# Patient Record
Sex: MALE | Race: WHITE | NOT HISPANIC OR LATINO | Employment: OTHER | URBAN - METROPOLITAN AREA
[De-identification: names, ages, dates, MRNs, and addresses within clinical notes are randomized per-mention and may not be internally consistent; named-entity substitution may affect disease eponyms.]

---

## 2017-04-10 DIAGNOSIS — F41.1 GAD (GENERALIZED ANXIETY DISORDER): Primary | ICD-10-CM

## 2017-04-10 RX ORDER — ALPRAZOLAM 0.25 MG/1
TABLET ORAL
Qty: 60 TABLET | Refills: 0 | Status: SHIPPED | OUTPATIENT
Start: 2017-04-10 | End: 2017-06-20 | Stop reason: SDUPTHER

## 2017-05-10 DIAGNOSIS — I10 ESSENTIAL HYPERTENSION: ICD-10-CM

## 2017-05-10 RX ORDER — OLMESARTAN MEDOXOMIL 20 MG/1
TABLET ORAL
Qty: 30 TABLET | Refills: 11 | Status: SHIPPED | OUTPATIENT
Start: 2017-05-10 | End: 2017-06-20 | Stop reason: SDUPTHER

## 2017-05-22 ENCOUNTER — TELEPHONE (OUTPATIENT)
Dept: FAMILY MEDICINE | Facility: CLINIC | Age: 62
End: 2017-05-22

## 2017-06-05 ENCOUNTER — HOSPITAL ENCOUNTER (INPATIENT)
Facility: HOSPITAL | Age: 62
LOS: 3 days | Discharge: HOME | DRG: 460 | End: 2017-06-09
Attending: EMERGENCY MEDICINE | Admitting: ORTHOPAEDIC SURGERY
Payer: COMMERCIAL

## 2017-06-05 ENCOUNTER — HOSPITAL ENCOUNTER (OUTPATIENT)
Dept: RADIOLOGY | Facility: CLINIC | Age: 62
Discharge: HOME | DRG: 460 | End: 2017-06-05
Attending: FAMILY MEDICINE
Payer: COMMERCIAL

## 2017-06-05 ENCOUNTER — OFFICE VISIT (OUTPATIENT)
Dept: FAMILY MEDICINE | Facility: CLINIC | Age: 62
DRG: 460 | End: 2017-06-05
Payer: COMMERCIAL

## 2017-06-05 ENCOUNTER — HOSPITAL ENCOUNTER (EMERGENCY)
Facility: HOSPITAL | Age: 62
Discharge: ANOTHER HOSPITAL | End: 2017-06-05
Attending: EMERGENCY MEDICINE
Payer: COMMERCIAL

## 2017-06-05 VITALS
HEART RATE: 84 BPM | HEIGHT: 70 IN | DIASTOLIC BLOOD PRESSURE: 69 MMHG | SYSTOLIC BLOOD PRESSURE: 140 MMHG | BODY MASS INDEX: 27.63 KG/M2 | RESPIRATION RATE: 16 BRPM | OXYGEN SATURATION: 95 % | WEIGHT: 193 LBS | TEMPERATURE: 98 F

## 2017-06-05 VITALS
SYSTOLIC BLOOD PRESSURE: 144 MMHG | HEIGHT: 70 IN | DIASTOLIC BLOOD PRESSURE: 87 MMHG | BODY MASS INDEX: 29.21 KG/M2 | HEART RATE: 96 BPM | WEIGHT: 204.06 LBS

## 2017-06-05 DIAGNOSIS — S32.001A BURST FRACTURE OF LUMBAR VERTEBRA, CLOSED, INITIAL ENCOUNTER: ICD-10-CM

## 2017-06-05 DIAGNOSIS — I10 ESSENTIAL HYPERTENSION: ICD-10-CM

## 2017-06-05 DIAGNOSIS — S32.001A BURST FRACTURE OF LUMBAR VERTEBRA: ICD-10-CM

## 2017-06-05 DIAGNOSIS — Z01.818 PREOPERATIVE EVALUATION TO RULE OUT SURGICAL CONTRAINDICATION: ICD-10-CM
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
## 2017-06-05 DIAGNOSIS — M54.42 ACUTE LEFT-SIDED LOW BACK PAIN WITH LEFT-SIDED SCIATICA: ICD-10-CM

## 2017-06-05 DIAGNOSIS — M54.42 ACUTE LEFT-SIDED LOW BACK PAIN WITH LEFT-SIDED SCIATICA: Primary | ICD-10-CM
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
## 2017-06-05 DIAGNOSIS — S32.040A CLOSED COMPRESSION FRACTURE OF L4 LUMBAR VERTEBRA, INITIAL ENCOUNTER: Primary | ICD-10-CM

## 2017-06-05 DIAGNOSIS — S32.001D BURST FRACTURE OF LUMBAR VERTEBRA, WITH ROUTINE HEALING, SUBSEQUENT ENCOUNTER: Primary | ICD-10-CM

## 2017-06-05 LAB
BASOPHILS # BLD AUTO: 0.01 K/UL
BASOPHILS NFR BLD: 0.2 %
DIFFERENTIAL METHOD: ABNORMAL
EOSINOPHIL # BLD AUTO: 0 K/UL
EOSINOPHIL NFR BLD: 0.5 %
ERYTHROCYTE [DISTWIDTH] IN BLOOD BY AUTOMATED COUNT: 13.4 %
HCT VFR BLD AUTO: 39.5 %
HGB BLD-MCNC: 13.4 G/DL
INR PPP: 1
LYMPHOCYTES # BLD AUTO: 0.9 K/UL
LYMPHOCYTES NFR BLD: 14.5 %
MCH RBC QN AUTO: 33.2 PG
MCHC RBC AUTO-ENTMCNC: 33.9 %
MCV RBC AUTO: 98 FL
MONOCYTES # BLD AUTO: 0.8 K/UL
MONOCYTES NFR BLD: 13 %
NEUTROPHILS # BLD AUTO: 4.3 K/UL
NEUTROPHILS NFR BLD: 71.8 %
PLATELET # BLD AUTO: 218 K/UL
PMV BLD AUTO: 9.5 FL
PROTHROMBIN TIME: 10.7 SEC
RBC # BLD AUTO: 4.04 M/UL
WBC # BLD AUTO: 5.98 K/UL

## 2017-06-05 PROCEDURE — 85025 COMPLETE CBC W/AUTO DIFF WBC: CPT

## 2017-06-05 PROCEDURE — 99285 EMERGENCY DEPT VISIT HI MDM: CPT | Mod: 25

## 2017-06-05 PROCEDURE — 96374 THER/PROPH/DIAG INJ IV PUSH: CPT

## 2017-06-05 PROCEDURE — 99214 OFFICE O/P EST MOD 30 MIN: CPT | Mod: S$GLB,,, | Performed by: FAMILY MEDICINE

## 2017-06-05 PROCEDURE — 63600175 PHARM REV CODE 636 W HCPCS: Performed by: EMERGENCY MEDICINE

## 2017-06-05 PROCEDURE — 99285 EMERGENCY DEPT VISIT HI MDM: CPT | Mod: 25,27

## 2017-06-05 PROCEDURE — 11000001 HC ACUTE MED/SURG PRIVATE ROOM

## 2017-06-05 PROCEDURE — 86850 RBC ANTIBODY SCREEN: CPT

## 2017-06-05 PROCEDURE — 99999 PR PBB SHADOW E&M-EST. PATIENT-LVL III: CPT | Mod: PBBFAC,,, | Performed by: FAMILY MEDICINE

## 2017-06-05 PROCEDURE — 85610 PROTHROMBIN TIME: CPT

## 2017-06-05 PROCEDURE — 86920 COMPATIBILITY TEST SPIN: CPT

## 2017-06-05 PROCEDURE — 93010 ELECTROCARDIOGRAM REPORT: CPT | Mod: S$GLB,,, | Performed by: INTERNAL MEDICINE

## 2017-06-05 PROCEDURE — 96375 TX/PRO/DX INJ NEW DRUG ADDON: CPT

## 2017-06-05 PROCEDURE — 93005 ELECTROCARDIOGRAM TRACING: CPT

## 2017-06-05 PROCEDURE — 86900 BLOOD TYPING SEROLOGIC ABO: CPT

## 2017-06-05 PROCEDURE — 72100 X-RAY EXAM L-S SPINE 2/3 VWS: CPT | Mod: 26,,, | Performed by: RADIOLOGY

## 2017-06-05 PROCEDURE — 96376 TX/PRO/DX INJ SAME DRUG ADON: CPT

## 2017-06-05 PROCEDURE — 99285 EMERGENCY DEPT VISIT HI MDM: CPT | Mod: 57,,, | Performed by: ORTHOPAEDIC SURGERY

## 2017-06-05 RX ORDER — HYDROCODONE BITARTRATE AND ACETAMINOPHEN 10; 325 MG/1; MG/1
1 TABLET ORAL EVERY 6 HOURS PRN
Qty: 20 TABLET | Refills: 0 | Status: ON HOLD | OUTPATIENT
Start: 2017-06-05 | End: 2017-06-09 | Stop reason: HOSPADM

## 2017-06-05 RX ORDER — CYCLOBENZAPRINE HCL 5 MG
5 TABLET ORAL 3 TIMES DAILY PRN
Qty: 30 TABLET | Refills: 0 | Status: SHIPPED | OUTPATIENT
Start: 2017-06-05 | End: 2017-06-15

## 2017-06-05 RX ORDER — HYDROMORPHONE HYDROCHLORIDE 1 MG/ML
1 INJECTION, SOLUTION INTRAMUSCULAR; INTRAVENOUS; SUBCUTANEOUS
Status: COMPLETED | OUTPATIENT
Start: 2017-06-05 | End: 2017-06-05

## 2017-06-05 RX ORDER — ONDANSETRON 2 MG/ML
INJECTION INTRAMUSCULAR; INTRAVENOUS
Status: DISCONTINUED
Start: 2017-06-05 | End: 2017-06-05 | Stop reason: HOSPADM

## 2017-06-05 RX ORDER — HYDROMORPHONE HYDROCHLORIDE 2 MG/ML
2 INJECTION, SOLUTION INTRAMUSCULAR; INTRAVENOUS; SUBCUTANEOUS
Status: COMPLETED | OUTPATIENT
Start: 2017-06-05 | End: 2017-06-05

## 2017-06-05 RX ORDER — IBUPROFEN 800 MG/1
800 TABLET ORAL 3 TIMES DAILY
Qty: 30 TABLET | Refills: 3 | Status: ON HOLD | OUTPATIENT
Start: 2017-06-05 | End: 2017-06-09 | Stop reason: HOSPADM

## 2017-06-05 RX ORDER — HYDROMORPHONE HYDROCHLORIDE 1 MG/ML
INJECTION, SOLUTION INTRAMUSCULAR; INTRAVENOUS; SUBCUTANEOUS
Status: DISCONTINUED
Start: 2017-06-05 | End: 2017-06-05 | Stop reason: HOSPADM

## 2017-06-05 RX ORDER — ONDANSETRON 2 MG/ML
4 INJECTION INTRAMUSCULAR; INTRAVENOUS
Status: COMPLETED | OUTPATIENT
Start: 2017-06-05 | End: 2017-06-05

## 2017-06-05 RX ADMIN — HYDROMORPHONE HYDROCHLORIDE 1 MG: 1 INJECTION, SOLUTION INTRAMUSCULAR; INTRAVENOUS; SUBCUTANEOUS at 06:06

## 2017-06-05 RX ADMIN — ONDANSETRON 4 MG: 2 INJECTION INTRAMUSCULAR; INTRAVENOUS at 06:06

## 2017-06-05 RX ADMIN — HYDROMORPHONE HYDROCHLORIDE 1 MG: 1 INJECTION, SOLUTION INTRAMUSCULAR; INTRAVENOUS; SUBCUTANEOUS at 09:06

## 2017-06-05 RX ADMIN — HYDROMORPHONE HYDROCHLORIDE 2 MG: 2 INJECTION, SOLUTION INTRAMUSCULAR; INTRAVENOUS; SUBCUTANEOUS at 07:06

## 2017-06-05 NOTE — ED PROVIDER NOTES
Encounter Date: 6/5/2017    SCRIBE #1 NOTE: I, Linda Vargas, am scribing for, and in the presence of, Dr. Garza.       History     Chief Complaint   Patient presents with    Back Injury     fell approx 10 ft from scaffolding yesterday.      Review of patient's allergies indicates:   Allergen Reactions    Naproxen      06/05/2017  5:55 PM     Chief Complaint:       The patient is a 61 y.o. male who has a PMHx of dermatitis, atopic; GERD; and HTN is presenting c/o sudden onset of fall yesterday. Pt fell 10 ft off a scaffolding and landed on his right side and right arm. He c/o left side back pain radiating to left buttock and left arm. He states that his pain worsens when he walks. He denies numbness, neck pain,  or right arm pain. Pt was seen at an urgent care, where he received a X-ray and was informed him that he had a 50% compression of L4 and instructed him to go to ED for CAT scan. Pt has a PSHx that includes hernia repair and wisdom tooth extraction.          The history is provided by the patient.     Past Medical History:   Diagnosis Date    Dermatitis, atopic     GERD (gastroesophageal reflux disease)     Hypertension      Past Surgical History:   Procedure Laterality Date    HERNIA REPAIR      WISDOM TOOTH EXTRACTION       Family History   Problem Relation Age of Onset    Early death Brother     Cancer Maternal Grandmother      abdominal    Cancer Maternal Grandfather 60     lung    Liver disease Paternal Grandfather     Arthritis Mother      Social History   Substance Use Topics    Smoking status: Former Smoker    Smokeless tobacco: Not on file    Alcohol use 1.2 oz/week     2 Glasses of wine per week     Review of Systems   Constitutional: Negative for fever.   HENT: Negative for sore throat.    Respiratory: Negative for shortness of breath.    Cardiovascular: Negative for chest pain.   Gastrointestinal: Negative for nausea.   Genitourinary: Negative for dysuria.   Musculoskeletal: Positive  for back pain (left side radiating to left buttock) and myalgias (left leg). Negative for neck pain.        No right arm pain.   Skin: Negative for rash.   Neurological: Negative for weakness and numbness.   Hematological: Does not bruise/bleed easily.       Physical Exam     Initial Vitals [06/05/17 1641]   BP Pulse Resp Temp SpO2   (!) 166/84 89 16 98.2 °F (36.8 °C) 97 %     Physical Exam    Nursing note and vitals reviewed.  Constitutional: He appears well-developed and well-nourished.  Non-toxic appearance.   HENT:   Head: Normocephalic and atraumatic.   Eyes: EOM are normal. Pupils are equal, round, and reactive to light.   Neck: Normal range of motion. Neck supple.   Cardiovascular: Normal rate, regular rhythm, normal heart sounds and intact distal pulses.   Abdominal: Normal appearance.   Musculoskeletal: Normal range of motion.   Left lower paraspinal tenderness.   + Straight leg raise. Great toe raise bilaterally. No step-off deformities.    Neurological: He is alert and oriented to person, place, and time. He has normal strength. GCS eye subscore is 4.   Reflex Scores:       Patellar reflexes are 2+ on the right side and 2+ on the left side.       Achilles reflexes are 2+ on the right side and 2+ on the left side.  5/5 strength and normal sensation throughout.   Skin: Skin is warm and dry.   Psychiatric: He has a normal mood and affect. His speech is normal and behavior is normal. He is not actively hallucinating.         ED Course   Procedures  Labs Reviewed - No data to display       X-Rays:   Independently Interpreted Readings:   Other Readings:  CT lumbar spine: Fragmented burst fracture of L4 with central canal stenosis and bilateral neuroforaminal stenosis.    Medical Decision Making:   History:   Old Medical Records: I decided to obtain old medical records.  Initial Assessment:   Dishes 61-year-old man fell off of a scaffold approximately 8-10 feet yesterday.  He presented complaining of severe low  back pain with radicular symptoms to the left.  X-rays obtained from primary care showed L4 burst fracture.  CT lumbar spine without contrast shows:         1.  Acute retropulsed L4 burst fracture with involvement of all 3 spinal columns which results in severe central canal stenosis, moderate left neuroforaminal stenosis, and moderate right neuroforaminal stenosis.  Please note that there is a coronally oriented fracture component which involves the left pedicle at L4.      2.  Obliquely oriented, mildly displaced fracture at the base of the L4 spinous process which extends inferiorly into the left lamina.    3.  Grade I anterolisthesis of L5 on S1 as a result of bilateral L5 pars defects.      Patient is neurovascularly intact.  He is placed on spinal precautions.  Pain is being addressed with Dilaudid.  Discussed with spine surgery at [facility] who suggests patient transferred to the emergency department on [facility].  Discussed with Dr. Blum who accepts patient and will order MRI and spine specialist consultation.  I have verbalized findings and plan with patient who voices understanding and is in agreement.  He is clinically stable for transfer.            Scribe Attestation:   Scribe #1: I performed the above scribed service and the documentation accurately describes the services I performed. I attest to the accuracy of the note.    Attending Attestation:           Physician Attestation for Scribe:  Physician Attestation Statement for Scribe #1: I, Dr. Garza, reviewed documentation, as scribed by Linda Vargas in my presence, and it is both accurate and complete.                 ED Course     Clinical Impression:   The encounter diagnosis was Closed compression fracture of L4 lumbar vertebra, initial encounter.          Handy Garza MD  06/05/17 1943

## 2017-06-05 NOTE — ED NOTES
Awake/alert, post fall approx. 15ft yesterday pm, states advised by private MD fracture of L4, verbalizes pain  To lumbar region only, spouse is present, side rails up

## 2017-06-05 NOTE — PROGRESS NOTES
Subjective:       Patient ID: Humberto Cantor is a 61 y.o. male.    Chief Complaint: Fall (cutting tree limb yesterday)    HPI     Fall  Pt reports that he had a fall from scaffold.   He fell due to a tree limb hittin his ladder.   Pt fell on his right arm and very minimal hit to his head.   He did not lose consciousness.   He has had Mild shoulder pain.   No HA.   He has pain to his lower back and left leg, however.   Pt has had improvement in leg pain with heating pad.   Back pain worse with movement     Review of Systems   Constitutional: Negative for chills and fever.   Respiratory: Negative for chest tightness and shortness of breath.    Cardiovascular: Negative for chest pain and leg swelling.   Gastrointestinal: Negative for abdominal pain, constipation, diarrhea and vomiting.   Genitourinary: Negative for decreased urine volume, dysuria and hematuria.   Musculoskeletal: Negative for arthralgias.   Neurological: Negative for weakness and light-headedness.       Objective:      Physical Exam   Constitutional: He appears well-developed and well-nourished. No distress.   HENT:   Head: Normocephalic and atraumatic.   Mouth/Throat: Oropharynx is clear and moist. No oropharyngeal exudate.   Eyes: EOM are normal. Pupils are equal, round, and reactive to light.   Neck: Normal range of motion. Neck supple. No thyromegaly present.   Cardiovascular: Normal rate, regular rhythm, normal heart sounds and intact distal pulses.    Pulmonary/Chest: Effort normal and breath sounds normal. No respiratory distress. He has no wheezes.   Abdominal: Soft. Bowel sounds are normal. He exhibits no distension and no mass. There is no tenderness.   Musculoskeletal: He exhibits no edema.   TTP to lumbar spine near L5-S1. Negative straight leg raise to BLE.   Lymphadenopathy:     He has no cervical adenopathy.   Neurological: He is alert.   Skin: Skin is warm. No rash noted. No erythema.   Psychiatric: He has a normal mood and affect. His  behavior is normal.   Vitals reviewed.      Assessment:       1. Acute left-sided low back pain with left-sided sciatica         Plan:       1. Acute left-sided low back pain with left-sided sciatica  - X-Ray Lumbar Spine AP And Lateral; Future  - hydrocodone-acetaminophen 10-325mg (NORCO)  mg Tab; Take 1 tablet by mouth every 6 (six) hours as needed for Pain.  Dispense: 20 tablet; Refill: 0  - ibuprofen (ADVIL,MOTRIN) 800 MG tablet; Take 1 tablet (800 mg total) by mouth 3 (three) times daily.  Dispense: 30 tablet; Refill: 3  - cyclobenzaprine (FLEXERIL) 5 MG tablet; Take 1 tablet (5 mg total) by mouth 3 (three) times daily as needed for Muscle spasms.  Dispense: 30 tablet; Refill: 0    Portions of this note were created using Dragon voice recognition software. There may be voice recognition errors found in the text, and attempts were made to correct these errors prior to signature    Esau Najera MD    Family Medicine  6/5/2017

## 2017-06-06 ENCOUNTER — ANESTHESIA (OUTPATIENT)
Dept: SURGERY | Facility: HOSPITAL | Age: 62
DRG: 460 | End: 2017-06-06
Payer: COMMERCIAL

## 2017-06-06 ENCOUNTER — ANESTHESIA EVENT (OUTPATIENT)
Dept: SURGERY | Facility: HOSPITAL | Age: 62
DRG: 460 | End: 2017-06-06
Payer: COMMERCIAL

## 2017-06-06 PROBLEM — S32.001A BURST FRACTURE OF LUMBAR VERTEBRA: Status: ACTIVE | Noted: 2017-06-06

## 2017-06-06 PROBLEM — Z01.818 PREOPERATIVE EVALUATION TO RULE OUT SURGICAL CONTRAINDICATION: Status: ACTIVE | Noted: 2017-06-06

## 2017-06-06 LAB
ABO + RH BLD: NORMAL
ALBUMIN SERPL BCP-MCNC: 3.6 G/DL
ALP SERPL-CCNC: 53 U/L
ALT SERPL W/O P-5'-P-CCNC: 33 U/L
ANION GAP SERPL CALC-SCNC: 10 MMOL/L
AST SERPL-CCNC: 60 U/L
BILIRUB SERPL-MCNC: 0.9 MG/DL
BLD GP AB SCN CELLS X3 SERPL QL: NORMAL
BUN SERPL-MCNC: 20 MG/DL
CALCIUM SERPL-MCNC: 9 MG/DL
CHLORIDE SERPL-SCNC: 102 MMOL/L
CO2 SERPL-SCNC: 24 MMOL/L
CREAT SERPL-MCNC: 0.9 MG/DL
EST. GFR  (AFRICAN AMERICAN): >60 ML/MIN/1.73 M^2
EST. GFR  (NON AFRICAN AMERICAN): >60 ML/MIN/1.73 M^2
GLUCOSE SERPL-MCNC: 106 MG/DL
POTASSIUM SERPL-SCNC: 3.7 MMOL/L
PROT SERPL-MCNC: 6.7 G/DL
SODIUM SERPL-SCNC: 136 MMOL/L

## 2017-06-06 PROCEDURE — 22325 TREAT SPINE FRACTURE: CPT | Mod: 62,51,, | Performed by: NEUROLOGICAL SURGERY

## 2017-06-06 PROCEDURE — 20936 SP BONE AGRFT LOCAL ADD-ON: CPT | Mod: ,,, | Performed by: ORTHOPAEDIC SURGERY

## 2017-06-06 PROCEDURE — 25000003 PHARM REV CODE 250: Performed by: ORTHOPAEDIC SURGERY

## 2017-06-06 PROCEDURE — 27201423 OPTIME MED/SURG SUP & DEVICES STERILE SUPPLY: Performed by: ORTHOPAEDIC SURGERY

## 2017-06-06 PROCEDURE — 22614 ARTHRD PST TQ 1NTRSPC EA ADD: CPT | Mod: 62,,, | Performed by: NEUROLOGICAL SURGERY

## 2017-06-06 PROCEDURE — 37000008 HC ANESTHESIA 1ST 15 MINUTES: Performed by: ORTHOPAEDIC SURGERY

## 2017-06-06 PROCEDURE — 63005 REMOVE SPINE LAMINA 1/2 LMBR: CPT | Mod: 62,51,, | Performed by: NEUROLOGICAL SURGERY

## 2017-06-06 PROCEDURE — 22325 TREAT SPINE FRACTURE: CPT | Mod: 62,51,, | Performed by: ORTHOPAEDIC SURGERY

## 2017-06-06 PROCEDURE — 36000711: Performed by: ORTHOPAEDIC SURGERY

## 2017-06-06 PROCEDURE — 71000033 HC RECOVERY, INTIAL HOUR: Performed by: ORTHOPAEDIC SURGERY

## 2017-06-06 PROCEDURE — 63600175 PHARM REV CODE 636 W HCPCS: Performed by: STUDENT IN AN ORGANIZED HEALTH CARE EDUCATION/TRAINING PROGRAM

## 2017-06-06 PROCEDURE — 99222 1ST HOSP IP/OBS MODERATE 55: CPT | Mod: ,,, | Performed by: INTERNAL MEDICINE

## 2017-06-06 PROCEDURE — 22614 ARTHRD PST TQ 1NTRSPC EA ADD: CPT | Mod: 62,,, | Performed by: ORTHOPAEDIC SURGERY

## 2017-06-06 PROCEDURE — 63005 REMOVE SPINE LAMINA 1/2 LMBR: CPT | Mod: 62,51,, | Performed by: ORTHOPAEDIC SURGERY

## 2017-06-06 PROCEDURE — D9220A PRA ANESTHESIA: Mod: CRNA,,, | Performed by: NURSE ANESTHETIST, CERTIFIED REGISTERED

## 2017-06-06 PROCEDURE — 25000003 PHARM REV CODE 250: Performed by: NURSE ANESTHETIST, CERTIFIED REGISTERED

## 2017-06-06 PROCEDURE — 94760 N-INVAS EAR/PLS OXIMETRY 1: CPT

## 2017-06-06 PROCEDURE — 22842 INSERT SPINE FIXATION DEVICE: CPT | Mod: ,,, | Performed by: ORTHOPAEDIC SURGERY

## 2017-06-06 PROCEDURE — 36000710: Performed by: ORTHOPAEDIC SURGERY

## 2017-06-06 PROCEDURE — 63600175 PHARM REV CODE 636 W HCPCS: Performed by: NURSE ANESTHETIST, CERTIFIED REGISTERED

## 2017-06-06 PROCEDURE — 37000009 HC ANESTHESIA EA ADD 15 MINS: Performed by: ORTHOPAEDIC SURGERY

## 2017-06-06 PROCEDURE — 11000001 HC ACUTE MED/SURG PRIVATE ROOM

## 2017-06-06 PROCEDURE — 25000003 PHARM REV CODE 250: Performed by: STUDENT IN AN ORGANIZED HEALTH CARE EDUCATION/TRAINING PROGRAM

## 2017-06-06 PROCEDURE — C1729 CATH, DRAINAGE: HCPCS | Performed by: ORTHOPAEDIC SURGERY

## 2017-06-06 PROCEDURE — D9220A PRA ANESTHESIA: Mod: ANES,,, | Performed by: ANESTHESIOLOGY

## 2017-06-06 PROCEDURE — 63600175 PHARM REV CODE 636 W HCPCS: Performed by: ORTHOPAEDIC SURGERY

## 2017-06-06 PROCEDURE — 22612 ARTHRD PST TQ 1NTRSPC LUMBAR: CPT | Mod: 62,,, | Performed by: ORTHOPAEDIC SURGERY

## 2017-06-06 PROCEDURE — C1713 ANCHOR/SCREW BN/BN,TIS/BN: HCPCS | Performed by: ORTHOPAEDIC SURGERY

## 2017-06-06 PROCEDURE — 99222 1ST HOSP IP/OBS MODERATE 55: CPT | Mod: 57,,, | Performed by: ORTHOPAEDIC SURGERY

## 2017-06-06 PROCEDURE — 96360 HYDRATION IV INFUSION INIT: CPT

## 2017-06-06 PROCEDURE — 71000039 HC RECOVERY, EACH ADD'L HOUR: Performed by: ORTHOPAEDIC SURGERY

## 2017-06-06 PROCEDURE — 22612 ARTHRD PST TQ 1NTRSPC LUMBAR: CPT | Mod: 62,,, | Performed by: NEUROLOGICAL SURGERY

## 2017-06-06 PROCEDURE — 80053 COMPREHEN METABOLIC PANEL: CPT

## 2017-06-06 DEVICE — ROD SPINAL EXPEDIUM 65MM: Type: IMPLANTABLE DEVICE | Site: BACK | Status: FUNCTIONAL

## 2017-06-06 DEVICE — SCREW INNER SINGLE SET TITANIU: Type: IMPLANTABLE DEVICE | Site: BACK | Status: FUNCTIONAL

## 2017-06-06 DEVICE — SCREW BONE SPINAL CORT 5X45MM: Type: IMPLANTABLE DEVICE | Site: BACK | Status: FUNCTIONAL

## 2017-06-06 DEVICE — IMPLANTABLE DEVICE: Type: IMPLANTABLE DEVICE | Site: BACK | Status: FUNCTIONAL

## 2017-06-06 RX ORDER — HYDROMORPHONE HYDROCHLORIDE 2 MG/ML
INJECTION, SOLUTION INTRAMUSCULAR; INTRAVENOUS; SUBCUTANEOUS
Status: DISCONTINUED | OUTPATIENT
Start: 2017-06-06 | End: 2017-06-06

## 2017-06-06 RX ORDER — PANTOPRAZOLE SODIUM 40 MG/1
40 TABLET, DELAYED RELEASE ORAL DAILY
Status: DISCONTINUED | OUTPATIENT
Start: 2017-06-06 | End: 2017-06-09 | Stop reason: HOSPADM

## 2017-06-06 RX ORDER — HYDROMORPHONE HCL IN 0.9% NACL 6 MG/30 ML
PATIENT CONTROLLED ANALGESIA SYRINGE INTRAVENOUS
Status: DISPENSED
Start: 2017-06-06 | End: 2017-06-07

## 2017-06-06 RX ORDER — AMOXICILLIN 250 MG
1 CAPSULE ORAL 2 TIMES DAILY
Status: DISCONTINUED | OUTPATIENT
Start: 2017-06-06 | End: 2017-06-09 | Stop reason: HOSPADM

## 2017-06-06 RX ORDER — VANCOMYCIN HYDROCHLORIDE 1 G/20ML
INJECTION, POWDER, LYOPHILIZED, FOR SOLUTION INTRAVENOUS
Status: DISCONTINUED | OUTPATIENT
Start: 2017-06-06 | End: 2017-06-06 | Stop reason: HOSPADM

## 2017-06-06 RX ORDER — DOCUSATE SODIUM 100 MG/1
100 CAPSULE, LIQUID FILLED ORAL 2 TIMES DAILY
Qty: 60 CAPSULE | Refills: 0 | Status: SHIPPED | OUTPATIENT
Start: 2017-06-06 | End: 2017-06-20

## 2017-06-06 RX ORDER — BUPIVACAINE HYDROCHLORIDE AND EPINEPHRINE 5; 5 MG/ML; UG/ML
INJECTION, SOLUTION EPIDURAL; INTRACAUDAL; PERINEURAL
Status: DISCONTINUED | OUTPATIENT
Start: 2017-06-06 | End: 2017-06-06 | Stop reason: HOSPADM

## 2017-06-06 RX ORDER — SUCCINYLCHOLINE CHLORIDE 20 MG/ML
INJECTION INTRAMUSCULAR; INTRAVENOUS
Status: DISCONTINUED | OUTPATIENT
Start: 2017-06-06 | End: 2017-06-06

## 2017-06-06 RX ORDER — ACETAMINOPHEN 325 MG/1
650 TABLET ORAL EVERY 8 HOURS PRN
Status: DISCONTINUED | OUTPATIENT
Start: 2017-06-06 | End: 2017-06-08

## 2017-06-06 RX ORDER — LIDOCAINE HYDROCHLORIDE AND EPINEPHRINE 10; 10 MG/ML; UG/ML
INJECTION, SOLUTION INFILTRATION; PERINEURAL
Status: DISCONTINUED | OUTPATIENT
Start: 2017-06-06 | End: 2017-06-06 | Stop reason: HOSPADM

## 2017-06-06 RX ORDER — NALOXONE HCL 0.4 MG/ML
0.02 VIAL (ML) INJECTION
Status: DISCONTINUED | OUTPATIENT
Start: 2017-06-06 | End: 2017-06-07

## 2017-06-06 RX ORDER — CEFAZOLIN SODIUM 2 G/50ML
2 SOLUTION INTRAVENOUS
Status: COMPLETED | OUTPATIENT
Start: 2017-06-07 | End: 2017-06-08

## 2017-06-06 RX ORDER — PROMETHAZINE HYDROCHLORIDE 25 MG/1
25 TABLET ORAL EVERY 6 HOURS PRN
Status: DISCONTINUED | OUTPATIENT
Start: 2017-06-06 | End: 2017-06-09 | Stop reason: HOSPADM

## 2017-06-06 RX ORDER — DEXAMETHASONE SODIUM PHOSPHATE 4 MG/ML
INJECTION, SOLUTION INTRA-ARTICULAR; INTRALESIONAL; INTRAMUSCULAR; INTRAVENOUS; SOFT TISSUE
Status: DISCONTINUED | OUTPATIENT
Start: 2017-06-06 | End: 2017-06-06

## 2017-06-06 RX ORDER — PHENYLEPHRINE HYDROCHLORIDE 10 MG/ML
INJECTION INTRAVENOUS
Status: DISCONTINUED | OUTPATIENT
Start: 2017-06-06 | End: 2017-06-06

## 2017-06-06 RX ORDER — OXYCODONE AND ACETAMINOPHEN 10; 325 MG/1; MG/1
1 TABLET ORAL EVERY 4 HOURS PRN
Qty: 90 TABLET | Refills: 0 | Status: SHIPPED | OUTPATIENT
Start: 2017-06-06 | End: 2017-06-27 | Stop reason: SDUPTHER

## 2017-06-06 RX ORDER — SODIUM CHLORIDE 0.9 % (FLUSH) 0.9 %
3 SYRINGE (ML) INJECTION
Status: DISCONTINUED | OUTPATIENT
Start: 2017-06-06 | End: 2017-06-06

## 2017-06-06 RX ORDER — METHOCARBAMOL 750 MG/1
750 TABLET, FILM COATED ORAL 4 TIMES DAILY
Qty: 60 TABLET | Refills: 0 | Status: SHIPPED | OUTPATIENT
Start: 2017-06-06 | End: 2017-06-16

## 2017-06-06 RX ORDER — PROPOFOL 10 MG/ML
VIAL (ML) INTRAVENOUS
Status: DISCONTINUED | OUTPATIENT
Start: 2017-06-06 | End: 2017-06-06

## 2017-06-06 RX ORDER — FENTANYL CITRATE 50 UG/ML
INJECTION, SOLUTION INTRAMUSCULAR; INTRAVENOUS
Status: DISCONTINUED | OUTPATIENT
Start: 2017-06-06 | End: 2017-06-06

## 2017-06-06 RX ORDER — ONDANSETRON HYDROCHLORIDE 2 MG/ML
INJECTION, SOLUTION INTRAMUSCULAR; INTRAVENOUS
Status: DISCONTINUED | OUTPATIENT
Start: 2017-06-06 | End: 2017-06-06

## 2017-06-06 RX ORDER — ONDANSETRON 8 MG/1
8 TABLET, ORALLY DISINTEGRATING ORAL EVERY 6 HOURS PRN
Qty: 60 TABLET | Refills: 0 | Status: SHIPPED | OUTPATIENT
Start: 2017-06-06 | End: 2017-06-20

## 2017-06-06 RX ORDER — OXYCODONE HYDROCHLORIDE 5 MG/1
10 TABLET ORAL EVERY 4 HOURS PRN
Status: DISCONTINUED | OUTPATIENT
Start: 2017-06-06 | End: 2017-06-08

## 2017-06-06 RX ORDER — LIDOCAINE HCL/PF 100 MG/5ML
SYRINGE (ML) INTRAVENOUS
Status: DISCONTINUED | OUTPATIENT
Start: 2017-06-06 | End: 2017-06-06

## 2017-06-06 RX ORDER — MORPHINE SULFATE 2 MG/ML
6 INJECTION, SOLUTION INTRAMUSCULAR; INTRAVENOUS EVERY 4 HOURS PRN
Status: DISCONTINUED | OUTPATIENT
Start: 2017-06-06 | End: 2017-06-08

## 2017-06-06 RX ORDER — MIDAZOLAM HYDROCHLORIDE 5 MG/ML
INJECTION INTRAMUSCULAR; INTRAVENOUS
Status: DISCONTINUED | OUTPATIENT
Start: 2017-06-06 | End: 2017-06-06

## 2017-06-06 RX ORDER — OLMESARTAN MEDOXOMIL 20 MG/1
20 TABLET ORAL DAILY
Status: DISCONTINUED | OUTPATIENT
Start: 2017-06-06 | End: 2017-06-09 | Stop reason: HOSPADM

## 2017-06-06 RX ORDER — HYDROMORPHONE HYDROCHLORIDE 1 MG/ML
0.2 INJECTION, SOLUTION INTRAMUSCULAR; INTRAVENOUS; SUBCUTANEOUS EVERY 5 MIN PRN
Status: DISCONTINUED | OUTPATIENT
Start: 2017-06-06 | End: 2017-06-06 | Stop reason: HOSPADM

## 2017-06-06 RX ORDER — LABETALOL HYDROCHLORIDE 5 MG/ML
INJECTION, SOLUTION INTRAVENOUS
Status: DISPENSED
Start: 2017-06-06 | End: 2017-06-07

## 2017-06-06 RX ORDER — LABETALOL HYDROCHLORIDE 5 MG/ML
INJECTION, SOLUTION INTRAVENOUS
Status: DISCONTINUED | OUTPATIENT
Start: 2017-06-06 | End: 2017-06-06

## 2017-06-06 RX ORDER — ALPRAZOLAM 0.25 MG/1
0.25 TABLET ORAL 2 TIMES DAILY PRN
Status: DISCONTINUED | OUTPATIENT
Start: 2017-06-06 | End: 2017-06-09 | Stop reason: HOSPADM

## 2017-06-06 RX ORDER — RAMELTEON 8 MG/1
8 TABLET ORAL NIGHTLY PRN
Status: DISCONTINUED | OUTPATIENT
Start: 2017-06-06 | End: 2017-06-09 | Stop reason: HOSPADM

## 2017-06-06 RX ORDER — HYDROMORPHONE HCL IN 0.9% NACL 6 MG/30 ML
PATIENT CONTROLLED ANALGESIA SYRINGE INTRAVENOUS CONTINUOUS
Status: DISCONTINUED | OUTPATIENT
Start: 2017-06-06 | End: 2017-06-07

## 2017-06-06 RX ORDER — METHOCARBAMOL 500 MG/1
500 TABLET, FILM COATED ORAL 4 TIMES DAILY
Status: DISCONTINUED | OUTPATIENT
Start: 2017-06-07 | End: 2017-06-09 | Stop reason: HOSPADM

## 2017-06-06 RX ORDER — FAMOTIDINE 20 MG/1
20 TABLET, FILM COATED ORAL 2 TIMES DAILY
Status: DISCONTINUED | OUTPATIENT
Start: 2017-06-06 | End: 2017-06-09 | Stop reason: HOSPADM

## 2017-06-06 RX ORDER — ONDANSETRON 8 MG/1
8 TABLET, ORALLY DISINTEGRATING ORAL EVERY 8 HOURS PRN
Status: DISCONTINUED | OUTPATIENT
Start: 2017-06-06 | End: 2017-06-09 | Stop reason: HOSPADM

## 2017-06-06 RX ORDER — SODIUM CHLORIDE 9 MG/ML
INJECTION, SOLUTION INTRAVENOUS CONTINUOUS
Status: DISCONTINUED | OUTPATIENT
Start: 2017-06-06 | End: 2017-06-08

## 2017-06-06 RX ORDER — KETAMINE HYDROCHLORIDE 100 MG/ML
INJECTION, SOLUTION INTRAMUSCULAR; INTRAVENOUS
Status: DISCONTINUED | OUTPATIENT
Start: 2017-06-06 | End: 2017-06-06

## 2017-06-06 RX ORDER — BACITRACIN 50000 [IU]/1
INJECTION, POWDER, FOR SOLUTION INTRAMUSCULAR
Status: DISCONTINUED | OUTPATIENT
Start: 2017-06-06 | End: 2017-06-06 | Stop reason: HOSPADM

## 2017-06-06 RX ORDER — ROCURONIUM BROMIDE 10 MG/ML
INJECTION, SOLUTION INTRAVENOUS
Status: DISCONTINUED | OUTPATIENT
Start: 2017-06-06 | End: 2017-06-06

## 2017-06-06 RX ORDER — GLYCOPYRROLATE 0.2 MG/ML
INJECTION INTRAMUSCULAR; INTRAVENOUS
Status: DISCONTINUED | OUTPATIENT
Start: 2017-06-06 | End: 2017-06-06

## 2017-06-06 RX ORDER — NEOSTIGMINE METHYLSULFATE 1 MG/ML
INJECTION, SOLUTION INTRAVENOUS
Status: DISCONTINUED | OUTPATIENT
Start: 2017-06-06 | End: 2017-06-06

## 2017-06-06 RX ADMIN — SODIUM CHLORIDE, SODIUM GLUCONATE, SODIUM ACETATE, POTASSIUM CHLORIDE, MAGNESIUM CHLORIDE, SODIUM PHOSPHATE, DIBASIC, AND POTASSIUM PHOSPHATE: .53; .5; .37; .037; .03; .012; .00082 INJECTION, SOLUTION INTRAVENOUS at 03:06

## 2017-06-06 RX ADMIN — GLYCOPYRROLATE 0.2 MG: 0.2 INJECTION, SOLUTION INTRAMUSCULAR; INTRAVENOUS at 03:06

## 2017-06-06 RX ADMIN — PHENYLEPHRINE HYDROCHLORIDE 200 MCG: 10 INJECTION INTRAVENOUS at 03:06

## 2017-06-06 RX ADMIN — FENTANYL CITRATE 150 MCG: 50 INJECTION, SOLUTION INTRAMUSCULAR; INTRAVENOUS at 03:06

## 2017-06-06 RX ADMIN — PROPOFOL 150 MG: 10 INJECTION, EMULSION INTRAVENOUS at 03:06

## 2017-06-06 RX ADMIN — DEXTROSE 2 G: 50 INJECTION, SOLUTION INTRAVENOUS at 04:06

## 2017-06-06 RX ADMIN — ROCURONIUM BROMIDE 20 MG: 10 INJECTION, SOLUTION INTRAVENOUS at 04:06

## 2017-06-06 RX ADMIN — MIDAZOLAM 2 MG: 5 INJECTION INTRAMUSCULAR; INTRAVENOUS at 03:06

## 2017-06-06 RX ADMIN — ROCURONIUM BROMIDE 5 MG: 10 INJECTION, SOLUTION INTRAVENOUS at 03:06

## 2017-06-06 RX ADMIN — STANDARDIZED SENNA CONCENTRATE AND DOCUSATE SODIUM 1 TABLET: 8.6; 5 TABLET, FILM COATED ORAL at 09:06

## 2017-06-06 RX ADMIN — MORPHINE SULFATE 6 MG: 2 INJECTION, SOLUTION INTRAMUSCULAR; INTRAVENOUS at 11:06

## 2017-06-06 RX ADMIN — HYDROMORPHONE HYDROCHLORIDE 0.4 MG: 2 INJECTION, SOLUTION INTRAMUSCULAR; INTRAVENOUS; SUBCUTANEOUS at 06:06

## 2017-06-06 RX ADMIN — REMIFENTANIL HYDROCHLORIDE 0.2 MCG/KG/MIN: 1 INJECTION, POWDER, LYOPHILIZED, FOR SOLUTION INTRAVENOUS at 03:06

## 2017-06-06 RX ADMIN — LIDOCAINE HYDROCHLORIDE 100 MG: 20 INJECTION, SOLUTION INTRAVENOUS at 03:06

## 2017-06-06 RX ADMIN — KETAMINE HYDROCHLORIDE 30 MG: 100 INJECTION, SOLUTION, CONCENTRATE INTRAMUSCULAR; INTRAVENOUS at 03:06

## 2017-06-06 RX ADMIN — SUCCINYLCHOLINE CHLORIDE 120 MG: 20 INJECTION, SOLUTION INTRAMUSCULAR; INTRAVENOUS at 03:06

## 2017-06-06 RX ADMIN — PHENYLEPHRINE HYDROCHLORIDE 200 MCG: 10 INJECTION INTRAVENOUS at 04:06

## 2017-06-06 RX ADMIN — ROCURONIUM BROMIDE 35 MG: 10 INJECTION, SOLUTION INTRAVENOUS at 03:06

## 2017-06-06 RX ADMIN — SODIUM CHLORIDE, SODIUM GLUCONATE, SODIUM ACETATE, POTASSIUM CHLORIDE, MAGNESIUM CHLORIDE, SODIUM PHOSPHATE, DIBASIC, AND POTASSIUM PHOSPHATE: .53; .5; .37; .037; .03; .012; .00082 INJECTION, SOLUTION INTRAVENOUS at 06:06

## 2017-06-06 RX ADMIN — LABETALOL HYDROCHLORIDE 15 MG: 5 INJECTION, SOLUTION INTRAVENOUS at 06:06

## 2017-06-06 RX ADMIN — ONDANSETRON 4 MG: 2 INJECTION, SOLUTION INTRAMUSCULAR; INTRAVENOUS at 05:06

## 2017-06-06 RX ADMIN — PHENYLEPHRINE HYDROCHLORIDE 100 MCG: 10 INJECTION INTRAVENOUS at 05:06

## 2017-06-06 RX ADMIN — PHENYLEPHRINE HYDROCHLORIDE 0.5 MCG/KG/MIN: 10 INJECTION INTRAVENOUS at 03:06

## 2017-06-06 RX ADMIN — MORPHINE SULFATE 6 MG: 2 INJECTION, SOLUTION INTRAMUSCULAR; INTRAVENOUS at 07:06

## 2017-06-06 RX ADMIN — NEOSTIGMINE METHYLSULFATE 4 MG: 1 INJECTION INTRAVENOUS at 06:06

## 2017-06-06 RX ADMIN — Medication: at 06:06

## 2017-06-06 RX ADMIN — PROPOFOL 50 MG: 10 INJECTION, EMULSION INTRAVENOUS at 04:06

## 2017-06-06 RX ADMIN — SODIUM CHLORIDE: 0.9 INJECTION, SOLUTION INTRAVENOUS at 02:06

## 2017-06-06 RX ADMIN — MORPHINE SULFATE 6 MG: 2 INJECTION, SOLUTION INTRAMUSCULAR; INTRAVENOUS at 03:06

## 2017-06-06 RX ADMIN — ONDANSETRON 8 MG: 8 TABLET, ORALLY DISINTEGRATING ORAL at 07:06

## 2017-06-06 RX ADMIN — SODIUM CHLORIDE: 0.9 INJECTION, SOLUTION INTRAVENOUS at 06:06

## 2017-06-06 RX ADMIN — FAMOTIDINE 20 MG: 20 TABLET, FILM COATED ORAL at 07:06

## 2017-06-06 RX ADMIN — DEXAMETHASONE SODIUM PHOSPHATE 8 MG: 4 INJECTION, SOLUTION INTRAMUSCULAR; INTRAVENOUS at 03:06

## 2017-06-06 RX ADMIN — GLYCOPYRROLATE 0.4 MG: 0.2 INJECTION, SOLUTION INTRAMUSCULAR; INTRAVENOUS at 06:06

## 2017-06-06 RX ADMIN — ALPRAZOLAM 0.25 MG: 0.25 TABLET ORAL at 07:06

## 2017-06-06 NOTE — PLAN OF CARE
Patient admitted with an L4 burst fracture. Plans for OR today at 1600 for possible decompression and fusion (according to MD notes). PT/OT to eval and treat post operatively. Patient currently lives with his spouse. Patient has good family support. CM completed discharge assessment and planning with patient. Patient verbalized understanding. All questions and concerns addressed. SW and CM will continue to follow for any additional needs. Plan A to discharge home with home health as soon as medically stable. Plan B to discharge home with family support and plans for outpatient rehab. Patient has no HH preference.    PCP: Saud Sosa MD    Pharmacy:   [id number] - [place], [place] - [address]  [address]  [place] [address]  Phone: [phone] Fax: [phone]    [facility] - [address]  [address] [facility] [address]  Phone: [phone] Fax: [phone]    Payor: BLUE CROSS BLUE [employer]IELD / Plan: [employer] [place] PPO / Product Type: PPO /      06/06/17 0930   Discharge Assessment   Assessment Type Discharge Planning Assessment   Confirmed/corrected address and phone number on facesheet? Yes   Assessment information obtained from? Patient;Medical Record   Expected Length of Stay (days) 5   Communicated expected length of stay with patient/caregiver yes   Prior to hospitilization cognitive status: Alert/Oriented   Prior to hospitalization functional status: Independent   Current cognitive status: Alert/Oriented   Current Functional Status: Needs Assistance   Arrived From home or self-care   Lives With spouse   Able to Return to Prior Arrangements yes   Is patient able to care for self after discharge? Yes   How many people do you have in your home that can help with your care after discharge? 1   Who are your caregiver(s) and their phone number(s)? Eleanor Cantor (spouse)- [phone]   Patient's perception of discharge disposition home  health   Readmission Within The Last 30 Days no previous admission in last 30 days   Patient currently being followed by outpatient case management? No   Patient currently receives home health services? No   Does the patient currently use HME? No   Patient currently receives private duty nursing? No   Patient currently receives any other outside agency services? No   Equipment Currently Used at Home none   Do you have any problems affording any of your prescribed medications? No   Is the patient taking medications as prescribed? yes   Do you have any financial concerns preventing you from receiving the healthcare you need? No   Does the patient have transportation to healthcare appointments? Yes   Transportation Available family or friend will provide   On Dialysis? No   Does the patient receive services at the [facility]mad[facility] Clinic? No   Are there any open cases? No   Discharge Plan A Home Health;Home with family   Discharge Plan B Home with family;Other  (outpatient rehab)   Patient/Family In Agreement With Plan yes

## 2017-06-06 NOTE — HPI
60 yo pmhx HTN, GERD, anixety presenting with L4 fracture after fall from scaffolding. We are consulted for pre-op risk assessment. Patient has no cardiac hx, denies angina, dyspnea on exertion. Patient swims laps daily.  Hypertension is well controlled on Benicar 20 mg daily. He is a former smoker. Only prior surgery was as a child. Pain is currently well controlled on regimen.

## 2017-06-06 NOTE — H&P
[facility]  Orthopedic Spine  H&P Note     Patient Name: Humberto Cantor  MRN: [id number]  Admission Date: 6/5/2017  Hospital Length of Stay: 0 days  Attending Provider: Donna Cali MD  Primary Care Provider: Saud Sosa MD     Patient information was obtained from patient and ER records.      Inpatient consult to Orthopedic Surgery  Consult performed by: GEOVANNI RAMIREZ  Consult ordered by: DONNA CALI        Subjective:     Humberto Cantor is a 61 y.o. male with PMHx of anxiety who presents as a transfer from the [facility] for evaluation of L4 vertebral fracture s/p fall from height which occurred yesterday. Patient states he was working on a ladder (3rd step on a platform) when he fell onto his right side. He was able to ambulate after the accident but not without pain on his left side. He denies LOC or any other injuries. X-rays by his PCP revealed an L4 fracture and he was sent to [place] ED where he was initially managed and sent [place] [facility] He denies previous back pain. He endorses mild paresthesias in the dorsum of his left foot. He denies any bowel or bladder dysfunction. He endorses pain radiating from his left flank to his lateral thigh and knee. He is currently retired     Principal Problem:lumbar back pain s/p fall. L4 burst fx      Chief Complaint:        Chief Complaint   Patient presents with    L4 Fracture -[facility] Transfer       Pt fell approx 10 feet from scaffolding yesterday. Pt here for Neuro eval.           HPI: Humberto Cantor is a 61 y.o. male with PMHx of anxiety who presents as a transfer from the [facility] for evaluation of L4 vertebral fracture s/p fall from height which occurred yesterday. Patient states he was working on a ladder (3rd step on a platform) when he fell onto his right side. He was able to ambulate after the accident but not without pain on his left side. He denies LOC or any other injuries. X-rays by his PCP revealed an L4 fracture and he was  sent to [place] ED where he was initially managed and sent to [facility]. He denies previous back pain. He endorses mild paresthesias in the dorsum of his left foot. He denies any bowel or bladder dysfunction. He endorses pain radiating from his left flank to his lateral thigh and knee. He is currently retired          Past Medical History:   Diagnosis Date    Dermatitis, atopic      GERD (gastroesophageal reflux disease)      Hypertension                 Past Surgical History:   Procedure Laterality Date    HERNIA REPAIR        WISDOM TOOTH EXTRACTION                  Review of patient's allergies indicates:   Allergen Reactions    Naproxen               Current Facility-Administered Medications   Medication    0.9%  NaCl infusion    acetaminophen tablet 650 mg    famotidine tablet 20 mg    morphine injection 6 mg    ondansetron disintegrating tablet 8 mg    oxycodone immediate release tablet 10 mg    promethazine tablet 25 mg    ramelteon tablet 8 mg    senna-docusate 8.6-50 mg per tablet 1 tablet           Current Outpatient Prescriptions   Medication Sig    alprazolam (XANAX) 0.25 MG tablet TAKE ONE TABLET BY MOUTH TWICE DAILY AS NEEDED    cyclobenzaprine (FLEXERIL) 5 MG tablet Take 1 tablet (5 mg total) by mouth 3 (three) times daily as needed for Muscle spasms.    fluocinonide 0.05% (LIDEX) 0.05 % cream Apply topically 2 (two) times daily.    hydrocodone-acetaminophen 10-325mg (NORCO)  mg Tab Take 1 tablet by mouth every 6 (six) hours as needed for Pain.    ibuprofen (ADVIL,MOTRIN) 800 MG tablet Take 1 tablet (800 mg total) by mouth 3 (three) times daily.    lansoprazole (PREVACID) 15 MG capsule Take 1 capsule (15 mg total) by mouth once daily.    olmesartan (BENICAR) 20 MG tablet Take 1 tablet (20 mg total) by mouth once daily.    olmesartan (BENICAR) 20 MG tablet TAKE ONE TABLET BY MOUTH ONCE DAILY    tadalafil (CIALIS) 20 MG Tab Take 1 tablet (20 mg total) by mouth once daily.          "  Family History      Problem Relation (Age of Onset)     Arthritis Mother     Cancer Maternal Grandmother, Maternal Grandfather (60)     Early death Brother     Liver disease Paternal Grandfather                Social History Main Topics    Smoking status: Former Smoker    Smokeless tobacco: Not on file    Alcohol use 1.2 oz/week       2 Glasses of wine per week    Drug use: No    Sexual activity: Yes       Partners: Female      ROS      Negative except stated above in HPI      Objective:      Vital Signs (Most Recent):  Temp: 98.1 °F (36.7 °C) (06/05/17 2225)  Pulse: 76 (06/05/17 2302)  Resp: 18 (06/05/17 2302)  BP: (!) 148/93 (06/05/17 2302)  SpO2: 98 % (06/05/17 2302) Vital Signs (24h Range):  Temp:  [98.1 °F (36.7 °C)-98.2 °F (36.8 °C)] 98.1 °F (36.7 °C)  Pulse:  [76-96] 76  Resp:  [16-18] 18  SpO2:  [95 %-98 %] 98 %  BP: (140-166)/(69-93) 148/93      Weight: 87.5 kg (193 lb)  Height: 5' 10" (177.8 cm)  Body mass index is 27.69 kg/m².     No intake or output data in the 24 hours ending 06/06/17 0154     Ortho/SPM Exam     AOx3 in no acute distress. Unlabored breathing  RRR, well perfused extremities. Palpable distal pulses     UE:   D B T WF WE HI  R 5 5 5 5 5 5  L 5 5 5 5 5 5    SILT C5-T1  2+ Radial Pulse  Negative Tang's  Normoreflexic Biceps, Triceps, Brachiradialis    LE:   IP Q H TA EHL GS  R 5 5 5 5 5 5  L 5 5 5 5 5 5    SILT L2-S1  2+ DP, PT pulses  Negative Clonus  Negative Babinski  Normoreflexic Knee Jerk and Achilles     Normal rectal tone             Imaging: CT, MRI, Lumbar spine XR obtained revealing an L4 vertebral body fracture with >50% loss of height, retropulsion with canal stenosis. There is extension into the L4 lamina. There is previous chronic L5/S1 isthmic spondylisthesis.      Assessment/Plan:          Burst fracture of lumbar vertebra     61 year old male with L4 burst fracture, retropulsion with canal stenosis--neurovascularly intact     --admit to orthopedic spine  --bed " rest. Pain control  --operative vs non-op treatment options discussed. He meets operative criteria for decompression and fusion due to significant stenosis and involvement of the posterior elements  --consented. Will discuss with staff in AM. Keep NPO.              Saul Tyson MD  Orthopedic Surgery, PGY3  [phone] (p)

## 2017-06-06 NOTE — SUBJECTIVE & OBJECTIVE
Past Medical History:   Diagnosis Date    Dermatitis, atopic     GERD (gastroesophageal reflux disease)     Hypertension        Past Surgical History:   Procedure Laterality Date    HERNIA REPAIR      WISDOM TOOTH EXTRACTION         Review of patient's allergies indicates:   Allergen Reactions    Naproxen        Current Facility-Administered Medications   Medication    0.9%  NaCl infusion    acetaminophen tablet 650 mg    famotidine tablet 20 mg    morphine injection 6 mg    ondansetron disintegrating tablet 8 mg    oxycodone immediate release tablet 10 mg    promethazine tablet 25 mg    ramelteon tablet 8 mg    senna-docusate 8.6-50 mg per tablet 1 tablet     Current Outpatient Prescriptions   Medication Sig    alprazolam (XANAX) 0.25 MG tablet TAKE ONE TABLET BY MOUTH TWICE DAILY AS NEEDED    cyclobenzaprine (FLEXERIL) 5 MG tablet Take 1 tablet (5 mg total) by mouth 3 (three) times daily as needed for Muscle spasms.    fluocinonide 0.05% (LIDEX) 0.05 % cream Apply topically 2 (two) times daily.    hydrocodone-acetaminophen 10-325mg (NORCO)  mg Tab Take 1 tablet by mouth every 6 (six) hours as needed for Pain.    ibuprofen (ADVIL,MOTRIN) 800 MG tablet Take 1 tablet (800 mg total) by mouth 3 (three) times daily.    lansoprazole (PREVACID) 15 MG capsule Take 1 capsule (15 mg total) by mouth once daily.    olmesartan (BENICAR) 20 MG tablet Take 1 tablet (20 mg total) by mouth once daily.    olmesartan (BENICAR) 20 MG tablet TAKE ONE TABLET BY MOUTH ONCE DAILY    tadalafil (CIALIS) 20 MG Tab Take 1 tablet (20 mg total) by mouth once daily.     Family History     Problem Relation (Age of Onset)    Arthritis Mother    Cancer Maternal Grandmother, Maternal Grandfather (60)    Early death Brother    Liver disease Paternal Grandfather        Social History Main Topics    Smoking status: Former Smoker    Smokeless tobacco: Not on file    Alcohol use 1.2 oz/week     2 Glasses of wine per week  "   Drug use: No    Sexual activity: Yes     Partners: Female     ROS     Negative except stated above in HPI     Objective:     Vital Signs (Most Recent):  Temp: 98.1 °F (36.7 °C) (06/05/17 2225)  Pulse: 76 (06/05/17 2302)  Resp: 18 (06/05/17 2302)  BP: (!) 148/93 (06/05/17 2302)  SpO2: 98 % (06/05/17 2302) Vital Signs (24h Range):  Temp:  [98.1 °F (36.7 °C)-98.2 °F (36.8 °C)] 98.1 °F (36.7 °C)  Pulse:  [76-96] 76  Resp:  [16-18] 18  SpO2:  [95 %-98 %] 98 %  BP: (140-166)/(69-93) 148/93     Weight: 87.5 kg (193 lb)  Height: 5' 10" (177.8 cm)  Body mass index is 27.69 kg/m².    No intake or output data in the 24 hours ending 06/06/17 0154    Ortho/SPM Exam    AOx3 in no acute distress. Unlabored breathing  RRR, well perfused extremities. Palpable distal pulses    UE:   D B T WF WE HI  R 5 5 5 5 5 5  L 5 5 5 5 5 5    SILT C5-T1  2+ Radial Pulse  Negative Tang's  Normoreflexic Biceps, Triceps, Brachiradialis    LE:   IP Q H TA EHL GS  R 5 5 5 5 5 5  L 5 5 5 5 5 5    SILT L2-S1  2+ DP, PT pulses  Negative Clonus  Negative Babinski  Normoreflexic Knee Jerk and Achilles         Imaging: CT, MRI, Lumbar spine XR obtained revealing an L4 vertebral body fracture with >50% loss of height, retropulsion with canal stenosis. There is extension into the L4 lamina. There is previous chronic L5/S1 isthmic spondylisthesis.   "

## 2017-06-06 NOTE — ANESTHESIA PREPROCEDURE EVALUATION
06/06/2017  Humberto Cantor is a 61 y.o., male with pmhx HTN, GERD, anixety presenting with L4 fracture after fall from scaffolding. Patient is now scheduled for the procedure below.    Pre-operative evaluation for L2-Pelvis PSF L4 Laminectomy Depuy SNS: SSEP/EMG New Reinaldo + Tongs + Pads Req (can roll for 3pm) (N/A)      LDA:  PIV 18G R forearm    Previous Airway: none on file    Drips:  MIVF @ 125ml/hr      Past Surgical History:   Procedure Laterality Date    HERNIA REPAIR      WISDOM TOOTH EXTRACTION           Vital Signs Range (Last 24H):  Temp:  [35.7 °C (96.2 °F)-36.8 °C (98.2 °F)]   Pulse:  [66-96]   Resp:  [16-18]   BP: (122-166)/(69-93)   SpO2:  [95 %-98 %]       CBC:     Recent Labs  Lab 06/05/17  2305   WBC 5.98   RBC 4.04*   HGB 13.4*   HCT 39.5*      MCV 98   MCH 33.2*   MCHC 33.9       CMP:   Recent Labs  Lab 06/06/17  0100      K 3.7      CO2 24   BUN 20   CREATININE 0.9      CALCIUM 9.0   ALBUMIN 3.6   PROT 6.7   ALKPHOS 53*   ALT 33   AST 60*   BILITOT 0.9       INR:    Recent Labs  Lab 06/05/17  2305   INR 1.0         Diagnostic Studies:  CXR:  The cardiomediastinal silhouette is within normal limits.  There is no pleural effusion.  The trachea is midline.  The lungs are symmetrically expanded bilaterally without evidence of acute parenchymal process.  There is bandlike atelectasis or scarring projected over the left lower lung zone. No large focal consolidation seen.  There is no pneumothorax.  The osseous structures are remarkable for degenerative changes of the spine and shoulders.    EKG:  Blood Pressure : 148/093 mmHG  Vent. Rate : 080 BPM     Atrial Rate : 080 BPM     P-R Int : 144 ms          QRS Dur : 086 ms      QT Int : 402 ms       P-R-T Axes : 088 077 070 degrees     QTc Int : 463 ms    Normal sinus rhythm  Normal ECG  No previous ECGs  available  Confirmed by Jose ORTEGA MD ([id number]) on 6/6/2017 9:16:21 AM    2D Echo:  None on file      Anesthesia Evaluation         Review of Systems  Anesthesia Hx:  Neg history of prior surgery. Denies Family Hx of Anesthesia complications.  Denies Personal Hx of Anesthesia complications.   Social:  Former Smoker    Hematology/Oncology:     Oncology Normal     EENT/Dental:EENT/Dental Normal   Cardiovascular:   Hypertension Denies CAD.    Denies CABG/stent.     Pulmonary:   Denies COPD.  Denies Asthma.    Hepatic/GI:   GERD    Musculoskeletal:  Spine Disorders: lumbar    Neurological:   Denies CVA. Denies Seizures.    Endocrine:   Denies Diabetes. Denies Hypothyroidism.    Psych:   anxiety          Physical Exam  General:  Well nourished    Airway/Jaw/Neck:  Airway Findings: Mouth Opening: Normal Tongue: Normal  General Airway Assessment: Adult  Mallampati: II  Jaw/Neck Findings:      Dental:  Dental Findings: In tact   Chest/Lungs:  Chest/Lungs Findings: Normal Respiratory Rate     Heart/Vascular:  Heart Findings: Rate: Normal  Rhythm: Regular Rhythm        Mental Status:  Mental Status Findings:  Cooperative, Alert and Oriented         Anesthesia Plan  Type of Anesthesia, risks & benefits discussed:  Anesthesia Type:  general  Patient's Preference:   Intra-op Monitoring Plan: standard ASA monitors and arterial line  Intra-op Monitoring Plan Comments:   Post Op Pain Control Plan:   Post Op Pain Control Plan Comments:   Induction:   IV  Beta Blocker:  Patient is not currently on a Beta-Blocker (No further documentation required).       Informed Consent: Patient understands risks and agrees with Anesthesia plan.  Questions answered. Anesthesia consent signed with patient.  ASA Score: 2     Day of Surgery Review of History & Physical:    H&P update referred to the surgeon.         Ready For Surgery From Anesthesia Perspective.

## 2017-06-06 NOTE — TRANSFER OF CARE
"Anesthesia Transfer of Care Note    Patient: Humberto Cantor    Procedure(s) Performed: Procedure(s) (LRB):  L3-L5 PSF, L4 Laminectomy, ORIF L4 Burst Fracture Depuy SNS: SSEP/EMG New Reinaldo + Tongs + Pads Req (can roll for 3pm) (N/A)    Patient location: PACU    Anesthesia Type: general    Transport from OR: Transported from OR on room air with adequate spontaneous ventilation    Post pain: adequate analgesia    Post assessment: no apparent anesthetic complications and tolerated procedure well    Post vital signs: stable    Level of consciousness: awake and alert    Nausea/Vomiting: no nausea/vomiting    Complications: none    Transfer of care protocol was followed      Last vitals:   Visit Vitals  BP (!) 177/86   Pulse (!) 125   Temp 36.6 °C (97.9 °F) (Temporal)   Resp 18   Ht 5' 10" (1.778 m)   Wt 87.5 kg (193 lb)   SpO2 98%   BMI 27.69 kg/m²     "

## 2017-06-06 NOTE — CONSULTS
[facility] Hospital Medicine  Consult Note    Patient Name: Humberto Cantor  MRN: [id number]  Admission Date: 6/5/2017  Hospital Length of Stay: 0 days  Attending Physician: Joaquin Navarro MD   Primary Care Provider: Saud Sosa MD     [facility] Medicine Team: Networked reference to record PCT  Tavares Cole MD      Patient information was obtained from patient, past medical records and ER records.     Inpatient consult to Hospital Medicine-Ortho Comanagement Only  Consult performed by: TAVARES COLE  Consult ordered by: GEOVANNI RAMIREZ        Subjective:     Principal Problem: <principal problem not specified>    Chief Complaint:   Chief Complaint   Patient presents with    L4 Fracture -[facility] Transfer     Pt fell approx 10 feet from scaffolding yesterday. Pt here for Neuro eval.          HPI: 60 yo pmhx HTN, GERD, anixety presenting with L4 fracture after fall from scaffolding. We are consulted for pre-op risk assessment. Patient has no cardiac hx, denies angina, dyspnea on exertion. Patient swims laps daily.  Hypertension is well controlled on Benicar 20 mg daily. He is a former smoker. Only prior surgery was as a child. Pain is currently well controlled on regimen.    Past Medical History:   Diagnosis Date    Dermatitis, atopic     GERD (gastroesophageal reflux disease)     Hypertension        Past Surgical History:   Procedure Laterality Date    HERNIA REPAIR      WISDOM TOOTH EXTRACTION         Review of patient's allergies indicates:   Allergen Reactions    Naproxen        Current Facility-Administered Medications on File Prior to Encounter   Medication    [COMPLETED] hydromorphone (PF) injection 2 mg    [COMPLETED] HYDROmorphone injection 1 mg    [COMPLETED] HYDROmorphone injection 1 mg    [COMPLETED] ondansetron injection 4 mg    [DISCONTINUED] HYDROmorphone (DILAUDID) 1 mg/mL injection    [DISCONTINUED] ondansetron 4 mg/2 mL injection     Current  Outpatient Prescriptions on File Prior to Encounter   Medication Sig    alprazolam (XANAX) 0.25 MG tablet TAKE ONE TABLET BY MOUTH TWICE DAILY AS NEEDED    cyclobenzaprine (FLEXERIL) 5 MG tablet Take 1 tablet (5 mg total) by mouth 3 (three) times daily as needed for Muscle spasms.    fluocinonide 0.05% (LIDEX) 0.05 % cream Apply topically 2 (two) times daily.    hydrocodone-acetaminophen 10-325mg (NORCO)  mg Tab Take 1 tablet by mouth every 6 (six) hours as needed for Pain.    ibuprofen (ADVIL,MOTRIN) 800 MG tablet Take 1 tablet (800 mg total) by mouth 3 (three) times daily.    lansoprazole (PREVACID) 15 MG capsule Take 1 capsule (15 mg total) by mouth once daily.    olmesartan (BENICAR) 20 MG tablet Take 1 tablet (20 mg total) by mouth once daily.    olmesartan (BENICAR) 20 MG tablet TAKE ONE TABLET BY MOUTH ONCE DAILY    tadalafil (CIALIS) 20 MG Tab Take 1 tablet (20 mg total) by mouth once daily.     Family History     Problem Relation (Age of Onset)    Arthritis Mother    Cancer Maternal Grandmother, Maternal Grandfather (60)    Early death Brother    Liver disease Paternal Grandfather        Social History Main Topics    Smoking status: Former Smoker    Smokeless tobacco: Not on file    Alcohol use 1.2 oz/week     2 Glasses of wine per week    Drug use: No    Sexual activity: Yes     Partners: Female     Review of Systems   Constitutional: Negative for activity change, appetite change and fatigue.   HENT: Negative for trouble swallowing and voice change.    Eyes: Negative for photophobia and visual disturbance.   Respiratory: Negative for apnea, choking, chest tightness and wheezing.    Cardiovascular: Negative for chest pain, palpitations and leg swelling.   Gastrointestinal: Negative for abdominal pain, nausea and vomiting.   Genitourinary: Negative for dysuria and urgency.   Musculoskeletal: Positive for back pain.   Skin: Negative for color change, pallor, rash and wound.   Neurological:  Negative for dizziness and weakness.   Hematological: Negative for adenopathy. Does not bruise/bleed easily.   Psychiatric/Behavioral: Negative for confusion.     Objective:     Vital Signs (Most Recent):  Temp: 96.2 °F (35.7 °C) (06/06/17 0715)  Pulse: 74 (06/06/17 0715)  Resp: 17 (06/06/17 0715)  BP: (!) 141/93 (06/06/17 0715)  SpO2: 97 % (06/06/17 0715) Vital Signs (24h Range):  Temp:  [96.2 °F (35.7 °C)-98.2 °F (36.8 °C)] 96.2 °F (35.7 °C)  Pulse:  [66-96] 74  Resp:  [16-18] 17  SpO2:  [95 %-98 %] 97 %  BP: (122-166)/(69-93) 141/93     Weight: 87.5 kg (193 lb)  Body mass index is 27.69 kg/m².    Physical Exam   Constitutional: He is oriented to person, place, and time. He appears well-developed and well-nourished. No distress.   HENT:   Head: Normocephalic and atraumatic.   Mouth/Throat: No oropharyngeal exudate.   Eyes: Conjunctivae and EOM are normal. Pupils are equal, round, and reactive to light. No scleral icterus.   Neck: Normal range of motion. Neck supple. No JVD present.   Cardiovascular: Normal rate, regular rhythm, normal heart sounds and intact distal pulses.  Exam reveals no gallop and no friction rub.    No murmur heard.  Pulmonary/Chest: Effort normal and breath sounds normal. No respiratory distress. He has no wheezes. He has no rales. He exhibits no tenderness.   Abdominal: Soft. Bowel sounds are normal. He exhibits no distension and no mass. There is no tenderness. There is no guarding.   Musculoskeletal: Normal range of motion. He exhibits no edema or tenderness.   Lymphadenopathy:     He has no cervical adenopathy.   Neurological: He is alert and oriented to person, place, and time.   Skin: Skin is warm and dry. He is not diaphoretic.       Significant Labs:   CBC:   Recent Labs  Lab 06/05/17  2305   WBC 5.98   HGB 13.4*   HCT 39.5*        CMP:   Recent Labs  Lab 06/06/17  0100      K 3.7      CO2 24      BUN 20   CREATININE 0.9   CALCIUM 9.0   PROT 6.7   ALBUMIN 3.6    BILITOT 0.9   ALKPHOS 53*   AST 60*   ALT 33   ANIONGAP 10   EGFRNONAA >60.0       Significant Imaging: I have reviewed all pertinent imaging results/findings within the past 24 hours.    Assessment/Plan:     Burst fracture of lumbar vertebra    -Patient with L4 burst fracture after fall. We are consulted for pre-op risk assessment. Patient is very active, >10 Mets, low risk for immediate risk surgery.    Cardiovascular Risk Assessment:  Non-emergent surgery.  No active cardiac problems (such as unstable angina, decompensated heart failure, significant uncontrolled arrhythmias or severe valvular disease).  Intermediate risk surgery.  Functional Status: able to climb a flight of stairs (> 4 METS)  Revised cardiac risk index is 0.         Recommendation:  1. Proceed to Surgery. No further testing indicated.  2. Continue home medications.              VTE Risk Mitigation         Ordered     Medium Risk of VTE  Once      06/06/17 0151     Place ANDRÉS hose  Until discontinued      06/06/17 0151     Place sequential compression device  Until discontinued      06/06/17 0151        Thank you for your consult. I will sign off. Please contact us if you have any additional questions.    Tavares Cole MD  Department of Hospital Medicine   [facility]

## 2017-06-06 NOTE — PLAN OF CARE
Problem: Patient Care Overview  Goal: Plan of Care Review  Outcome: Ongoing (interventions implemented as appropriate)  Plan of care reviewed with pt and spouse. Pt and spouse verbalized understanding. AAOx4. VSS on room air. Bedrest maintained. Pain managed with prn pain medication. Tolerating NPO. Denied n/v. Remained free from falls or injuries. Call light within reach. Will call for assistance. No distress noted. Will continue to monitor.

## 2017-06-06 NOTE — ASSESSMENT & PLAN NOTE
-Patient with L4 burst fracture after fall. We are consulted for pre-op risk assessment.    Cardiovascular Risk Assessment:  Non-emergent surgery.  No active cardiac problems (such as unstable angina, decompensated heart failure, significant uncontrolled arrhythmias or severe valvular disease).  Intermediate risk surgery.  Functional Status: able to climb a flight of stairs (> 4 METS)  Revised cardiac risk index is 0.         Recommendation:  1. Proceed to Surgery.

## 2017-06-06 NOTE — PROGRESS NOTES
"[facility]  Orthopedics  Progress Note    Patient Name: Humberto Cantor  MRN: [id number]  Admission Date: 6/5/2017  Hospital Length of Stay: 0 days  Attending Provider: Joaquin Navarro MD  Primary Care Provider: Saud Sosa MD  Follow-up For: Procedure(s) (LRB):  L2-Pelvis PSF L4 Laminectomy Depuy SNS: SSEP/EMG New Reinaldo + Tongs + Pads Req 4pm Room time.(can roll for 3pm) (N/A)    Post-Operative Day:    Subjective:     Principal Problem:<principal problem not specified>    Principal Orthopedic Problem: same    Interval History: NAEON.  NPO.  Images completed.  Pain controlled.    Review of patient's allergies indicates:   Allergen Reactions    Naproxen        Current Facility-Administered Medications   Medication    0.9%  NaCl infusion    acetaminophen tablet 650 mg    famotidine tablet 20 mg    morphine injection 6 mg    ondansetron disintegrating tablet 8 mg    oxycodone immediate release tablet 10 mg    promethazine tablet 25 mg    ramelteon tablet 8 mg    senna-docusate 8.6-50 mg per tablet 1 tablet     Objective:     Vital Signs (Most Recent):  Temp: 96.2 °F (35.7 °C) (06/06/17 0715)  Pulse: 74 (06/06/17 0715)  Resp: 17 (06/06/17 0715)  BP: (!) 141/93 (06/06/17 0715)  SpO2: 97 % (06/06/17 0715) Vital Signs (24h Range):  Temp:  [96.2 °F (35.7 °C)-98.2 °F (36.8 °C)] 96.2 °F (35.7 °C)  Pulse:  [66-96] 74  Resp:  [16-18] 17  SpO2:  [95 %-98 %] 97 %  BP: (122-166)/(69-93) 141/93     Weight: 87.5 kg (193 lb)  Height: 5' 10" (177.8 cm)  Body mass index is 27.69 kg/m².    No intake or output data in the 24 hours ending 06/06/17 0840    Ortho/SPM Exam     Awake/alert/oriented x3, No acute distress, Afebrile, Vital signs stable  Good inspiratory effort with unlaboured breathing  Pain to LLE  No lumbar ttp  SILT BL LE    Significant Labs:   CBC:   Recent Labs  Lab 06/05/17  2305   WBC 5.98   HGB 13.4*   HCT 39.5*        CMP:   Recent Labs  Lab 06/06/17  0100      K 3.7    "   CO2 24      BUN 20   CREATININE 0.9   CALCIUM 9.0   PROT 6.7   ALBUMIN 3.6   BILITOT 0.9   ALKPHOS 53*   AST 60*   ALT 33   ANIONGAP 10   EGFRNONAA >60.0     Coagulation:   Recent Labs  Lab 06/05/17  2305   INR 1.0     All pertinent labs within the past 24 hours have been reviewed.    Significant Imaging:   L4 burst fx, L5 isthmic spondylolisthesis, L5 facet cyst    Assessment/Plan:     * Burst fracture of lumbar vertebra    61 year old male with L4 burst fracture, retropulsion with canal stenosis--neurovascularly intact, L5 isthmic spondylolisthesis    - to OR today for L4/5 laminectomy and L2-pelvis PSF  - NPO  - pain control  - bed rest        Anxiety    Home meds        HTN (hypertension)    Home meds        GERD (gastroesophageal reflux disease)    Gi ppx              Mark Mon MD  Orthopedics  [facility]

## 2017-06-06 NOTE — SUBJECTIVE & OBJECTIVE
"Principal Problem:<principal problem not specified>    Principal Orthopedic Problem: same    Interval History: NAEON.  NPO.  Images completed.  Pain controlled.    Review of patient's allergies indicates:   Allergen Reactions    Naproxen        Current Facility-Administered Medications   Medication    0.9%  NaCl infusion    acetaminophen tablet 650 mg    famotidine tablet 20 mg    morphine injection 6 mg    ondansetron disintegrating tablet 8 mg    oxycodone immediate release tablet 10 mg    promethazine tablet 25 mg    ramelteon tablet 8 mg    senna-docusate 8.6-50 mg per tablet 1 tablet     Objective:     Vital Signs (Most Recent):  Temp: 96.2 °F (35.7 °C) (06/06/17 0715)  Pulse: 74 (06/06/17 0715)  Resp: 17 (06/06/17 0715)  BP: (!) 141/93 (06/06/17 0715)  SpO2: 97 % (06/06/17 0715) Vital Signs (24h Range):  Temp:  [96.2 °F (35.7 °C)-98.2 °F (36.8 °C)] 96.2 °F (35.7 °C)  Pulse:  [66-96] 74  Resp:  [16-18] 17  SpO2:  [95 %-98 %] 97 %  BP: (122-166)/(69-93) 141/93     Weight: 87.5 kg (193 lb)  Height: 5' 10" (177.8 cm)  Body mass index is 27.69 kg/m².    No intake or output data in the 24 hours ending 06/06/17 0840    Ortho/SPM Exam     Awake/alert/oriented x3, No acute distress, Afebrile, Vital signs stable  Good inspiratory effort with unlaboured breathing  Pain to LLE  No lumbar ttp  SILT BL LE    Significant Labs:   CBC:   Recent Labs  Lab 06/05/17  2305   WBC 5.98   HGB 13.4*   HCT 39.5*        CMP:   Recent Labs  Lab 06/06/17  0100      K 3.7      CO2 24      BUN 20   CREATININE 0.9   CALCIUM 9.0   PROT 6.7   ALBUMIN 3.6   BILITOT 0.9   ALKPHOS 53*   AST 60*   ALT 33   ANIONGAP 10   EGFRNONAA >60.0     Coagulation:   Recent Labs  Lab 06/05/17  2305   INR 1.0     All pertinent labs within the past 24 hours have been reviewed.    Significant Imaging:   L4 burst fx, L5 isthmic spondylolisthesis, L5 facet cyst  "

## 2017-06-06 NOTE — ASSESSMENT & PLAN NOTE
61 year old male with L4 burst fracture, retropulsion with canal stenosis--neurovascularly intact    --admit to orthopedic spine  --bed rest. Pain control  --operative vs non-op treatment options discussed. He meets operative criteria for decompression and fusion due to significant stenosis and involvement of the posterior elements  --consented. Will discuss with staff in AM. Keep NPO.

## 2017-06-06 NOTE — HPI
Humberto Cantor is a 61 y.o. male with PMHx of anxiety who presents as a transfer from the [facility] for evaluation of L4 vertebral fracture s/p fall from height which occurred yesterday. Patient states he was working on a ladder (3rd step on a platform) when he fell onto his right side. He was able to ambulate after the accident but not without pain on his left side. He denies LOC or any other injuries. X-rays by his PCP revealed an L4 fracture and he was sent to [place] ED where he was initially managed and sent to [facility]. He denies previous back pain. He endorses mild paresthesias in the dorsum of his left foot. He denies any bowel or bladder dysfunction. He endorses pain radiating from his left flank to his lateral thigh and knee. He is currently retired

## 2017-06-06 NOTE — SUBJECTIVE & OBJECTIVE
Past Medical History:   Diagnosis Date    Dermatitis, atopic     GERD (gastroesophageal reflux disease)     Hypertension        Past Surgical History:   Procedure Laterality Date    HERNIA REPAIR      WISDOM TOOTH EXTRACTION         Review of patient's allergies indicates:   Allergen Reactions    Naproxen        Current Facility-Administered Medications on File Prior to Encounter   Medication    [COMPLETED] hydromorphone (PF) injection 2 mg    [COMPLETED] HYDROmorphone injection 1 mg    [COMPLETED] HYDROmorphone injection 1 mg    [COMPLETED] ondansetron injection 4 mg    [DISCONTINUED] HYDROmorphone (DILAUDID) 1 mg/mL injection    [DISCONTINUED] ondansetron 4 mg/2 mL injection     Current Outpatient Prescriptions on File Prior to Encounter   Medication Sig    alprazolam (XANAX) 0.25 MG tablet TAKE ONE TABLET BY MOUTH TWICE DAILY AS NEEDED    cyclobenzaprine (FLEXERIL) 5 MG tablet Take 1 tablet (5 mg total) by mouth 3 (three) times daily as needed for Muscle spasms.    fluocinonide 0.05% (LIDEX) 0.05 % cream Apply topically 2 (two) times daily.    hydrocodone-acetaminophen 10-325mg (NORCO)  mg Tab Take 1 tablet by mouth every 6 (six) hours as needed for Pain.    ibuprofen (ADVIL,MOTRIN) 800 MG tablet Take 1 tablet (800 mg total) by mouth 3 (three) times daily.    lansoprazole (PREVACID) 15 MG capsule Take 1 capsule (15 mg total) by mouth once daily.    olmesartan (BENICAR) 20 MG tablet Take 1 tablet (20 mg total) by mouth once daily.    olmesartan (BENICAR) 20 MG tablet TAKE ONE TABLET BY MOUTH ONCE DAILY    tadalafil (CIALIS) 20 MG Tab Take 1 tablet (20 mg total) by mouth once daily.     Family History     Problem Relation (Age of Onset)    Arthritis Mother    Cancer Maternal Grandmother, Maternal Grandfather (60)    Early death Brother    Liver disease Paternal Grandfather        Social History Main Topics    Smoking status: Former Smoker    Smokeless tobacco: Not on file    Alcohol  use 1.2 oz/week     2 Glasses of wine per week    Drug use: No    Sexual activity: Yes     Partners: Female     Review of Systems   Constitutional: Negative for activity change, appetite change and fatigue.   HENT: Negative for trouble swallowing and voice change.    Eyes: Negative for photophobia and visual disturbance.   Respiratory: Negative for apnea, choking, chest tightness and wheezing.    Cardiovascular: Negative for chest pain, palpitations and leg swelling.   Gastrointestinal: Negative for abdominal pain, nausea and vomiting.   Genitourinary: Negative for dysuria and urgency.   Musculoskeletal: Positive for back pain.   Skin: Negative for color change, pallor, rash and wound.   Neurological: Negative for dizziness and weakness.   Hematological: Negative for adenopathy. Does not bruise/bleed easily.   Psychiatric/Behavioral: Negative for confusion.     Objective:     Vital Signs (Most Recent):  Temp: 96.2 °F (35.7 °C) (06/06/17 0715)  Pulse: 74 (06/06/17 0715)  Resp: 17 (06/06/17 0715)  BP: (!) 141/93 (06/06/17 0715)  SpO2: 97 % (06/06/17 0715) Vital Signs (24h Range):  Temp:  [96.2 °F (35.7 °C)-98.2 °F (36.8 °C)] 96.2 °F (35.7 °C)  Pulse:  [66-96] 74  Resp:  [16-18] 17  SpO2:  [95 %-98 %] 97 %  BP: (122-166)/(69-93) 141/93     Weight: 87.5 kg (193 lb)  Body mass index is 27.69 kg/m².    Physical Exam   Constitutional: He is oriented to person, place, and time. He appears well-developed and well-nourished. No distress.   HENT:   Head: Normocephalic and atraumatic.   Mouth/Throat: No oropharyngeal exudate.   Eyes: Conjunctivae and EOM are normal. Pupils are equal, round, and reactive to light. No scleral icterus.   Neck: Normal range of motion. Neck supple. No JVD present.   Cardiovascular: Normal rate, regular rhythm, normal heart sounds and intact distal pulses.  Exam reveals no gallop and no friction rub.    No murmur heard.  Pulmonary/Chest: Effort normal and breath sounds normal. No respiratory  distress. He has no wheezes. He has no rales. He exhibits no tenderness.   Abdominal: Soft. Bowel sounds are normal. He exhibits no distension and no mass. There is no tenderness. There is no guarding.   Musculoskeletal: Normal range of motion. He exhibits no edema or tenderness.   Lymphadenopathy:     He has no cervical adenopathy.   Neurological: He is alert and oriented to person, place, and time.   Skin: Skin is warm and dry. He is not diaphoretic.       Significant Labs:   CBC:   Recent Labs  Lab 06/05/17  2305   WBC 5.98   HGB 13.4*   HCT 39.5*        CMP:   Recent Labs  Lab 06/06/17  0100      K 3.7      CO2 24      BUN 20   CREATININE 0.9   CALCIUM 9.0   PROT 6.7   ALBUMIN 3.6   BILITOT 0.9   ALKPHOS 53*   AST 60*   ALT 33   ANIONGAP 10   EGFRNONAA >60.0       Significant Imaging: I have reviewed all pertinent imaging results/findings within the past 24 hours.

## 2017-06-06 NOTE — CONSULTS
[facility]  Orthopedics  Consult Note    Patient Name: Humberto Cantor  MRN: [id number]  Admission Date: 6/5/2017  Hospital Length of Stay: 0 days  Attending Provider: Donna Cali MD  Primary Care Provider: Saud Sosa MD    Patient information was obtained from patient and ER records.     Inpatient consult to Orthopedic Surgery  Consult performed by: GEOVANNI RAMIREZ  Consult ordered by: DONNA CALI        Subjective:   Humberto Cantor is a 61 y.o. male with PMHx of anxiety who presents as a transfer from the [facility] for evaluation of L4 vertebral fracture s/p fall from height which occurred yesterday. Patient states he was working on a ladder (3rd step on a platform) when he fell onto his right side. He was able to ambulate after the accident but not without pain on his left side. He denies LOC or any other injuries. X-rays by his PCP revealed an L4 fracture and he was sent to [place] ED where he was initially managed and sent to [facility]. He denies previous back pain. He endorses mild paresthesias in the dorsum of his left foot. He denies any bowel or bladder dysfunction. He endorses pain radiating from his left flank to his lateral thigh and knee. He is currently retired    Principal Problem:<principal problem not specified>    Chief Complaint:   Chief Complaint   Patient presents with    L4 Fracture -[facility] Transfer     Pt fell approx 10 feet from scaffolding yesterday. Pt here for Neuro eval.          HPI: Humberto Cantor is a 61 y.o. male with PMHx of anxiety who presents as a transfer from the [facility] for evaluation of L4 vertebral fracture s/p fall from height which occurred yesterday. Patient states he was working on a ladder (3rd step on a platform) when he fell onto his right side. He was able to ambulate after the accident but not without pain on his left side. He denies LOC or any other injuries. X-rays by his PCP revealed an L4 fracture and he was sent to [place] ED where  he was initially managed and sent to [facility]. He denies previous back pain. He endorses mild paresthesias in the dorsum of his left foot. He denies any bowel or bladder dysfunction. He endorses pain radiating from his left flank to his lateral thigh and knee. He is currently retired    Past Medical History:   Diagnosis Date    Dermatitis, atopic     GERD (gastroesophageal reflux disease)     Hypertension        Past Surgical History:   Procedure Laterality Date    HERNIA REPAIR      WISDOM TOOTH EXTRACTION         Review of patient's allergies indicates:   Allergen Reactions    Naproxen        Current Facility-Administered Medications   Medication    0.9%  NaCl infusion    acetaminophen tablet 650 mg    famotidine tablet 20 mg    morphine injection 6 mg    ondansetron disintegrating tablet 8 mg    oxycodone immediate release tablet 10 mg    promethazine tablet 25 mg    ramelteon tablet 8 mg    senna-docusate 8.6-50 mg per tablet 1 tablet     Current Outpatient Prescriptions   Medication Sig    alprazolam (XANAX) 0.25 MG tablet TAKE ONE TABLET BY MOUTH TWICE DAILY AS NEEDED    cyclobenzaprine (FLEXERIL) 5 MG tablet Take 1 tablet (5 mg total) by mouth 3 (three) times daily as needed for Muscle spasms.    fluocinonide 0.05% (LIDEX) 0.05 % cream Apply topically 2 (two) times daily.    hydrocodone-acetaminophen 10-325mg (NORCO)  mg Tab Take 1 tablet by mouth every 6 (six) hours as needed for Pain.    ibuprofen (ADVIL,MOTRIN) 800 MG tablet Take 1 tablet (800 mg total) by mouth 3 (three) times daily.    lansoprazole (PREVACID) 15 MG capsule Take 1 capsule (15 mg total) by mouth once daily.    olmesartan (BENICAR) 20 MG tablet Take 1 tablet (20 mg total) by mouth once daily.    olmesartan (BENICAR) 20 MG tablet TAKE ONE TABLET BY MOUTH ONCE DAILY    tadalafil (CIALIS) 20 MG Tab Take 1 tablet (20 mg total) by mouth once daily.     Family History     Problem Relation (Age of Onset)    Arthritis  "Mother    Cancer Maternal Grandmother, Maternal Grandfather (60)    Early death Brother    Liver disease Paternal Grandfather        Social History Main Topics    Smoking status: Former Smoker    Smokeless tobacco: Not on file    Alcohol use 1.2 oz/week     2 Glasses of wine per week    Drug use: No    Sexual activity: Yes     Partners: Female     ROS     Negative except stated above in HPI     Objective:     Vital Signs (Most Recent):  Temp: 98.1 °F (36.7 °C) (06/05/17 2225)  Pulse: 76 (06/05/17 2302)  Resp: 18 (06/05/17 2302)  BP: (!) 148/93 (06/05/17 2302)  SpO2: 98 % (06/05/17 2302) Vital Signs (24h Range):  Temp:  [98.1 °F (36.7 °C)-98.2 °F (36.8 °C)] 98.1 °F (36.7 °C)  Pulse:  [76-96] 76  Resp:  [16-18] 18  SpO2:  [95 %-98 %] 98 %  BP: (140-166)/(69-93) 148/93     Weight: 87.5 kg (193 lb)  Height: 5' 10" (177.8 cm)  Body mass index is 27.69 kg/m².    No intake or output data in the 24 hours ending 06/06/17 0154    Ortho/SPM Exam    AOx3 in no acute distress. Unlabored breathing  RRR, well perfused extremities. Palpable distal pulses    UE:   D B T WF WE HI  R 5 5 5 5 5 5  L 5 5 5 5 5 5    SILT C5-T1  2+ Radial Pulse  Negative Tang's  Normoreflexic Biceps, Triceps, Brachiradialis    LE:   IP Q H TA EHL GS  R 5 5 5 5 5 5  L 5 5 5 5 5 5    SILT L2-S1  2+ DP, PT pulses  Negative Clonus  Negative Babinski  Normoreflexic Knee Jerk and Achilles         Imaging: CT, MRI, Lumbar spine XR obtained revealing an L4 vertebral body fracture with >50% loss of height, retropulsion with canal stenosis. There is extension into the L4 lamina. There is previous chronic L5/S1 isthmic spondylisthesis.     Assessment/Plan:     Burst fracture of lumbar vertebra    61 year old male with L4 burst fracture, retropulsion with canal stenosis--neurovascularly intact    --admit to orthopedic spine  --bed rest. Pain control  --operative vs non-op treatment options discussed. He meets operative criteria for decompression and fusion " due to significant stenosis and involvement of the posterior elements  --consented. Will discuss with staff in AM. Keep NPO.             Saul Tyson MD  Orthopedic Surgery, PGY3  [phone] (p)

## 2017-06-07 PROCEDURE — 25000003 PHARM REV CODE 250: Performed by: ORTHOPAEDIC SURGERY

## 2017-06-07 PROCEDURE — 11000001 HC ACUTE MED/SURG PRIVATE ROOM

## 2017-06-07 PROCEDURE — 0QS004Z REPOSITION LUMBAR VERTEBRA WITH INTERNAL FIXATION DEVICE, OPEN APPROACH: ICD-10-PCS | Performed by: ORTHOPAEDIC SURGERY

## 2017-06-07 PROCEDURE — 97530 THERAPEUTIC ACTIVITIES: CPT

## 2017-06-07 PROCEDURE — 97535 SELF CARE MNGMENT TRAINING: CPT

## 2017-06-07 PROCEDURE — 0SG10J1 FUSION OF 2 OR MORE LUMBAR VERTEBRAL JOINTS WITH SYNTHETIC SUBSTITUTE, POSTERIOR APPROACH, POSTERIOR COLUMN, OPEN APPROACH: ICD-10-PCS | Performed by: ORTHOPAEDIC SURGERY

## 2017-06-07 PROCEDURE — 01NB0ZZ RELEASE LUMBAR NERVE, OPEN APPROACH: ICD-10-PCS | Performed by: ORTHOPAEDIC SURGERY

## 2017-06-07 PROCEDURE — 63600175 PHARM REV CODE 636 W HCPCS: Performed by: STUDENT IN AN ORGANIZED HEALTH CARE EDUCATION/TRAINING PROGRAM

## 2017-06-07 PROCEDURE — 97165 OT EVAL LOW COMPLEX 30 MIN: CPT

## 2017-06-07 PROCEDURE — 97162 PT EVAL MOD COMPLEX 30 MIN: CPT

## 2017-06-07 PROCEDURE — 25000003 PHARM REV CODE 250: Performed by: STUDENT IN AN ORGANIZED HEALTH CARE EDUCATION/TRAINING PROGRAM

## 2017-06-07 RX ADMIN — STANDARDIZED SENNA CONCENTRATE AND DOCUSATE SODIUM 1 TABLET: 8.6; 5 TABLET, FILM COATED ORAL at 08:06

## 2017-06-07 RX ADMIN — OLMESARTAN MEDOXOMIL 20 MG: 20 TABLET, FILM COATED ORAL at 08:06

## 2017-06-07 RX ADMIN — FAMOTIDINE 20 MG: 20 TABLET, FILM COATED ORAL at 08:06

## 2017-06-07 RX ADMIN — METHOCARBAMOL 500 MG: 500 TABLET ORAL at 05:06

## 2017-06-07 RX ADMIN — METHOCARBAMOL 500 MG: 500 TABLET ORAL at 12:06

## 2017-06-07 RX ADMIN — CEFAZOLIN SODIUM 2 G: 2 SOLUTION INTRAVENOUS at 08:06

## 2017-06-07 RX ADMIN — VANCOMYCIN HYDROCHLORIDE 1500 MG: 100 INJECTION, POWDER, LYOPHILIZED, FOR SOLUTION INTRAVENOUS at 05:06

## 2017-06-07 RX ADMIN — METHOCARBAMOL 500 MG: 500 TABLET ORAL at 02:06

## 2017-06-07 RX ADMIN — OXYCODONE HYDROCHLORIDE 10 MG: 5 TABLET ORAL at 05:06

## 2017-06-07 RX ADMIN — CEFAZOLIN SODIUM 2 G: 2 SOLUTION INTRAVENOUS at 02:06

## 2017-06-07 RX ADMIN — METHOCARBAMOL 500 MG: 500 TABLET ORAL at 11:06

## 2017-06-07 RX ADMIN — CEFAZOLIN SODIUM 2 G: 2 SOLUTION INTRAVENOUS at 10:06

## 2017-06-07 RX ADMIN — OXYCODONE HYDROCHLORIDE 10 MG: 5 TABLET ORAL at 11:06

## 2017-06-07 RX ADMIN — PANTOPRAZOLE SODIUM 40 MG: 40 TABLET, DELAYED RELEASE ORAL at 08:06

## 2017-06-07 RX ADMIN — Medication: at 03:06

## 2017-06-07 RX ADMIN — VANCOMYCIN HYDROCHLORIDE 1500 MG: 100 INJECTION, POWDER, LYOPHILIZED, FOR SOLUTION INTRAVENOUS at 06:06

## 2017-06-07 NOTE — NURSING TRANSFER
Nursing Transfer Note      6/6/2017     Transfer To: 541B from PACU    Transfer via bed    Transfer with IV pole, ET monitoring    Transported by RN and PCT    Medicines sent: NS infusing, Dilaudid PCA    Chart send with patient: Yes    Notified: spouse at bedside    Patient reassessed at: 6/6/17 20:00

## 2017-06-07 NOTE — ASSESSMENT & PLAN NOTE
61 year old male s/p L4 laminectomy and L3-5 PSF for L4 burst fracture, retropulsion with canal stenosis--neurovascularly intact, L5 isthmic spondylolisthesis    Plan:  1) Antibiotics: post op  2) Weight bearing status: wbat in LSO brace  3) Labs: reveiwed  4) DVT Prophylaxis: mechanical  5) Lines/Drains: suresh, drains x2, PIV  6) PT/OT  7) LSO brace at all times when not in bed

## 2017-06-07 NOTE — PROGRESS NOTES
Patient AAOx3, VSS, on room air, tolerating sips of water, pain control with PCA, Patients wife at bedside visiting with Patient, NAD, Wctm

## 2017-06-07 NOTE — ANESTHESIA POSTPROCEDURE EVALUATION
"Anesthesia Post Evaluation    Patient: Humberto Cantor    Procedure(s) Performed: Procedure(s) (LRB):  L3-L5 PSF, L4 Laminectomy, ORIF L4 Burst Fracture Depuy SNS: SSEP/EMG New Reinaldo + Tongs + Pads Req (can roll for 3pm) (N/A)    Final Anesthesia Type: general  Patient location during evaluation: PACU  Patient participation: Yes- Able to Participate  Level of consciousness: awake and alert and oriented  Post-procedure vital signs: reviewed and stable  Pain management: adequate  Airway patency: patent  PONV status at discharge: No PONV  Anesthetic complications: no      Cardiovascular status: blood pressure returned to baseline and hemodynamically stable  Respiratory status: unassisted and room air  Hydration status: euvolemic  Follow-up not needed.        Visit Vitals  /66   Pulse 67   Temp 36.6 °C (97.9 °F) (Temporal)   Resp (!) 24   Ht 5' 10" (1.778 m)   Wt 87.5 kg (193 lb)   SpO2 98%   BMI 27.69 kg/m²       Pain/Cristin Score: Pain Assessment Performed: Yes (6/6/2017  6:56 PM)  Presence of Pain: complains of pain/discomfort (6/6/2017  7:46 PM)  Pain Rating Prior to Med Admin: 3 (6/6/2017  7:19 PM)  Pain Rating Post Med Admin: 6 (6/6/2017 12:00 PM)  Cristin Score: 10 (6/6/2017  8:02 PM)      "

## 2017-06-07 NOTE — OP NOTE
DATE OF PROCEDURE:  06/06/2017    SURGEON:  Joaquin Navarro M.D.    CO-SURGEON:  Trev Gregory M.D. of the Neurosurgery Service.    PREOPERATIVE DIAGNOSES:  L4 burst fracture with disruption of the posterior   ligamentous complex and left lower extremity radiculopathy.    POSTOPERATIVE DIAGNOSES:  L4 burst fracture with disruption of the posterior   ligamentous complex and left lower extremity radiculopathy.    PROCEDURES PERFORMED:  1.  Open reduction and internal fixation of L4 burst fracture.  2.  Posterior spinal fusion, L3 to L5.  3.  Posterior segmental instrumentation, L3 to L5.  4.  L4 laminectomy for decompression of epidural hematoma as well as   decompression of left L4 nerve root.  5.  Local bone grafting.    ANESTHESIA:  General endotracheal anesthesia.    ESTIMATED BLOOD LOSS:  250 mL.    IMPLANTS USED:  mcTEL.    FINDINGS:  Laminar fracture at L4 and gross disruption of the L5-S1 interspinous   ligament as well as dorsal and ventral epidural hematomas.    SPECIMENS:  None.    DRAINS:  One superficial, one deep.    SPONGE AND NEEDLE COUNT:  Correct x2.    NEUROLOGIC MONITORING NOTES:  Somatosensory evoked potentials, free-run EMG as   well as EMG stimulation of all screws.  Please note that there were no changes   to stable and reliable bilateral upper and lower extremity SSEPs throughout the   entire procedure.  There was no significant EMG activity.  All screws stimulated   at or greater than 20 milliamperes.  The left L5 nerve root stimulated at 5.5   milliamperes.    REASON FOR OPERATION AND BRIEF HISTORY AND PHYSICAL:  Humberto Cantor is a   61-year-old male who suffered a L4 burst fracture after a fall out of a tree on   Sunday, 06/04/2017.  Because of the patient's severe lower extremity radicular   pain as well as low back pain, he presented to an Emergency Room yesterday.  He   was transferred to [facility] for definitive evaluation and is here for surgery   today.    DESCRIPTION OF INFORMED  CONSENT:  Please see my full hospital consultation for   full description of the informed consent process I had with the patient and his   wife.    DESCRIPTION OF PROCEDURE:  The patient was met in the preoperative area and   subsequently brought to the Operating Room.  Sequential compression devices were   placed on the patient's bilateral calves and run throughout the case.  At this   point, general endotracheal anesthesia was then induced.  A Sharma catheter was   then inserted in a standard sterile fashion.  The Morales-[place] tongs were   applied in standard sterile fashion.  The patient was then flipped prone onto a   Reinaldo table with four post-pads.  The head was secured with 15 pounds of   inline traction.  The arms were held in the 90/90 position.  All bony   prominences were padded paying special attention to the hands, elbows, axilla,   chest, genitalia, knees and feet.  Being satisfied with positioning and   confirming this to be adequate with anesthesia, the patient's back was prepped   and draped in normal sterile fashion.    A full timeout was called identifying the patient, procedural site and levels,   the availability of all instruments and implants and no specific nursing,   surgical, anesthetic or neurological monitoring concerns.  Finding that it was   safe to proceed with surgery, the patient was given a weight appropriate dose of   Ancef by the Anesthesia Service.    Next, we infiltrated the planned incision with 10 mL of 1% lidocaine with   epinephrine.    Next, working in tandem with Dr. Gregory, we made a midline skin incision and   carried dissection down through skin and subcutaneous tissues using combination   of sharp dissection and electrocautery where necessary.  The dorsal fascia was   identified, incised in the midline and we performed a preliminary subperiosteal   exposure of what we took to be the L3, L4 and L5 lamina as well as L3, L4, L5   transverse processes and relevant  intervening facet joints.  Great care was   taken not to sacrifice any facet joints at this time.  At the conclusion of the   preliminary exposure, I placed a marker and what we took to be the left L4   pedicle, took AP and lateral radiograph and thus confirmed our level.  Exposure   was then finalized.  Next, we performed facetectomies with an osteotome at the   L3-L4 and L4-L5 levels.    Next, freehand pedicle screws were placed using anatomical landmarks into the   L3, L4 and L5 bilaterally.  Please note that we placed short interpedicular   screws at L4.  The patient's bone quality was found to be excellent.  At this   point, we decided that clinically fusion from L3-L5 would be acceptable.  We did   discuss fusing from L2 to the sacrum/pelvis; however, this was a significantly   larger operation that will leave him with only one lumbar motion segment.  The   patient is very active and I elected that it was desirable to plan a limited   fusion.  Thus, the transverse processes at L3, L4 and L5 were decorticated with   a high-speed drill.  The wound was then thoroughly irrigated.    Our attention was then turned to the neurological decompression portion of the   procedure.  Gross rupture of the L4-L5 interspinous ligament was noted.  We then   performed an L4 laminectomy with a combination of Leksell rongeur, high-speed   drill and Kerrison punches.  The ligamentum flavum was visualized and resected   revealing the blue white dura.  We continued our decompression out laterally on   each side.  We paid special attention on the left to the L4 exiting nerve root,   which was visualized and any ventral fracture fragments were tamped anteriorly.    In conclusion of our neurological decompression, we could see the dura from its   lateral margins to its lateral margin with no residual compression along the   L4-L5 nerve roots, particularly on the left.  The wound was then thoroughly   irrigated.  Hemostasis was achieved  with FloSeal, bipolar electrocautery and   patties.  Rods were then measured, cut, contoured and locked into place with    provided torque wrench.  Final radiographs were taken,   demonstrating correct level as well as adequate position of all implants.  The   patient's local bone graft was milled in a bone mill, mixed with 1 g of   vancomycin powder and was laid dorsally along the transverse processes from L3   to L5 bilaterally.  Deep drain was then placed and deep fascia was closed with   #1 Vicryl in an interrupted figure-of-eight fashion.  The superficial layer was   then irrigated and closed over a drain with 2-0 Vicryl, 3-0 Monocryl, Dermabond   and Steri-Strips.  The drains were secured in place with 2-0 silk suture.  At   this point, a soft dressing was placed.  The patient was then flipped supine and   the Morales-[place] tongs were removed.  Bleeding was noted from the right   Morales-[place]s tongs site, which was stapled.  The patient was then extubated and   transferred to the Recovery Room in stable condition.    POSTOPERATIVE PLAN:  1.  The patient will be weightbearing as tolerated in a LSO brace.  2.  Mobilize with physical therapy tomorrow.  3.  Sequential compression devices for now.  4.  Sharma catheter out tomorrow morning.    JUSTIFICATION OF CO-SURGEON: This is a complex spinal fracture. The combined efforts of 2 attending surgeons is indicated to expedite surgery, decrease blood loss, and improve outcomes.

## 2017-06-07 NOTE — PROGRESS NOTES
[facility]  Orthopedics  Progress Note    Patient Name: Humberto Cantor  MRN: [id number]  Admission Date: 6/5/2017  Hospital Length of Stay: 1 days  Attending Provider: Joaquin Navarro MD  Primary Care Provider: Saud Sosa MD  Follow-up For: Procedure(s) (LRB):  L3-L5 PSF, L4 Laminectomy, ORIF L4 Burst Fracture Depuy SNS: SSEP/EMG New Reinaldo + Tongs + Pads Req (can roll for 3pm) (N/A)    Post-Operative Day: 1 Day Post-Op  Subjective:     Principal Problem:Burst fracture of lumbar vertebra    Principal Orthopedic Problem: same    Interval History: The patient was seen and examined this morning at the bedside. Patient reports no acute issues overnight.  Pain very well controlled with PCA.  Radicular pain to LLE is gone.       Review of patient's allergies indicates:   Allergen Reactions    Naproxen        Current Facility-Administered Medications   Medication    0.9%  NaCl infusion    acetaminophen tablet 650 mg    alprazolam tablet 0.25 mg    cefazolin (ANCEF) 2 gram in dextrose 5% 50 mL IVPB (premix)    famotidine tablet 20 mg    HYDROmorphone PCA in 0.9 % NaCl 6 Mg/30 mL (0.2 mg/mL)    methocarbamol tablet 500 mg    morphine injection 6 mg    naloxone 0.4 mg/mL injection 0.02 mg    olmesartan tablet 20 mg    ondansetron disintegrating tablet 8 mg    oxycodone immediate release tablet 10 mg    pantoprazole EC tablet 40 mg    promethazine tablet 25 mg    ramelteon tablet 8 mg    senna-docusate 8.6-50 mg per tablet 1 tablet    vancomycin (VANCOCIN) 1,500 mg in dextrose 5 % 250 mL IVPB     Objective:     Vital Signs (Most Recent):  Temp: 96.3 °F (35.7 °C) (06/07/17 0339)  Pulse: 64 (06/07/17 0339)  Resp: 16 (06/07/17 0339)  BP: (!) 102/57 (06/07/17 0339)  SpO2: 97 % (06/07/17 0339) Vital Signs (24h Range):  Temp:  [96.3 °F (35.7 °C)-98.2 °F (36.8 °C)] 96.3 °F (35.7 °C)  Pulse:  [] 64  Resp:  [16-26] 16  SpO2:  [96 %-99 %] 97 %  BP: (102-177)/(57-89) 102/57     Weight: 87.5 kg  "(193 lb)  Height: 5' 10" (177.8 cm)  Body mass index is 27.69 kg/m².      Intake/Output Summary (Last 24 hours) at 06/07/17 0942  Last data filed at 06/06/17 2020   Gross per 24 hour   Intake             3231 ml   Output              605 ml   Net             2626 ml       Ortho/SPM Exam    NAD, A/O x 3.  Wound c/d/i with clean dressing.  No focal motor or sensory deficits noted.     Significant Labs: All pertinent labs within the past 24 hours have been reviewed.      Assessment/Plan:     * Burst fracture of lumbar vertebra    61 year old male s/p L4 laminectomy and L3-5 PSF for L4 burst fracture, retropulsion with canal stenosis--neurovascularly intact, L5 isthmic spondylolisthesis    Plan:  1) Antibiotics: post op  2) Weight bearing status: wbat in LSO brace  3) Labs: reveiwed  4) DVT Prophylaxis: mechanical  5) Lines/Drains: suresh, drains x2, PIV  6) PT/OT  7) LSO brace at all times when not in bed         Anxiety    Home meds        Essential hypertension    Home meds        GERD (gastroesophageal reflux disease)    Gi ppx              Makr Mon MD  Orthopedics  [facility]  "

## 2017-06-07 NOTE — PLAN OF CARE
Problem: Occupational Therapy Goal  Goal: Occupational Therapy Goal  Goals to be met by: 06/13    Patient will increase functional independence with ADLs by performing:    UE Dressing with Supervision.  LE Dressing with Supervision with AD as needed.  Grooming while standing with Supervision.  Toileting from bedside commode with Supervision for hygiene and clothing management.   Stand pivot transfers with Supervision.  Toilet transfer to bedside commode with Supervision.         JANELLE Dior  6/7/2017

## 2017-06-07 NOTE — SUBJECTIVE & OBJECTIVE
"Principal Problem:Burst fracture of lumbar vertebra    Principal Orthopedic Problem: same    Interval History: The patient was seen and examined this morning at the bedside. Patient reports no acute issues overnight.  Pain very well controlled with PCA.  Radicular pain to LLE is gone.       Review of patient's allergies indicates:   Allergen Reactions    Naproxen        Current Facility-Administered Medications   Medication    0.9%  NaCl infusion    acetaminophen tablet 650 mg    alprazolam tablet 0.25 mg    cefazolin (ANCEF) 2 gram in dextrose 5% 50 mL IVPB (premix)    famotidine tablet 20 mg    HYDROmorphone PCA in 0.9 % NaCl 6 Mg/30 mL (0.2 mg/mL)    methocarbamol tablet 500 mg    morphine injection 6 mg    naloxone 0.4 mg/mL injection 0.02 mg    olmesartan tablet 20 mg    ondansetron disintegrating tablet 8 mg    oxycodone immediate release tablet 10 mg    pantoprazole EC tablet 40 mg    promethazine tablet 25 mg    ramelteon tablet 8 mg    senna-docusate 8.6-50 mg per tablet 1 tablet    vancomycin (VANCOCIN) 1,500 mg in dextrose 5 % 250 mL IVPB     Objective:     Vital Signs (Most Recent):  Temp: 96.3 °F (35.7 °C) (06/07/17 0339)  Pulse: 64 (06/07/17 0339)  Resp: 16 (06/07/17 0339)  BP: (!) 102/57 (06/07/17 0339)  SpO2: 97 % (06/07/17 0339) Vital Signs (24h Range):  Temp:  [96.3 °F (35.7 °C)-98.2 °F (36.8 °C)] 96.3 °F (35.7 °C)  Pulse:  [] 64  Resp:  [16-26] 16  SpO2:  [96 %-99 %] 97 %  BP: (102-177)/(57-89) 102/57     Weight: 87.5 kg (193 lb)  Height: 5' 10" (177.8 cm)  Body mass index is 27.69 kg/m².      Intake/Output Summary (Last 24 hours) at 06/07/17 0942  Last data filed at 06/06/17 2020   Gross per 24 hour   Intake             3231 ml   Output              605 ml   Net             2626 ml       Ortho/SPM Exam    NAD, A/O x 3.  Wound c/d/i with clean dressing.  No focal motor or sensory deficits noted.     Significant Labs: All pertinent labs within the past 24 hours have been " reviewed.

## 2017-06-07 NOTE — PT/OT/SLP EVAL
Physical Therapy  Evaluation    Humberto Cantor   MRN: [id number]   Admitting Diagnosis: Burst fracture of lumbar vertebra    PT Received On: 06/07/17  PT Start Time: 1313     PT Stop Time: 1340    PT Total Time (min): 27 min       Billable Minutes:  Evaluation 15 and Therapeutic Activity 12    Diagnosis: Burst fracture of lumbar vertebra  PT/OT Co-eval    Past Medical History:   Diagnosis Date    Dermatitis, atopic     Essential hypertension 11/19/2013    GERD (gastroesophageal reflux disease)     Hypertension       Past Surgical History:   Procedure Laterality Date    HERNIA REPAIR      WISDOM TOOTH EXTRACTION       General Precautions: Standard, fall (spinal)  Orthopedic Precautions:  (WBAT)   Braces: LSO            Patient History:  Lives With: spouse  Living Arrangements: house  Home Accessibility: stairs to enter home, stairs within home  Home Layout: Able to live on 1st floor  Number of Stairs to Enter Home: 1  Number of Stairs Within Home: 15  Stair Railings at Home: none  Transportation Available: family or friend will provide  Living Environment Comment: 24/7 Spv/(A) available  Equipment Currently Used at Home: none    Previous Level of Function:  Ambulation Skills: independent  Transfer Skills: independent  ADL Skills: independent  Work/Leisure Activity: independent    Subjective:  Communicated with nsg prior to session.  Pt agreeable to PT session  Chief Complaint: Decreased mobility  Patient goals: Return to PLOF    Pain/Comfort  Pain Rating 1: 4/10  Location 1: back      Objective:   Patient found with: SCD, PCEA, hemovac, peripheral IV     Cognitive Exam:  Oriented to: Person, Place, Time and Situation    Follows Commands/attention: Follows multistep  commands  Communication: clear/fluent  Safety awareness/insight to disability: intact    Physical Exam:  Postural examination/scapula alignment: Abnormal trunk flexion    Sensation:   Intact to LT in BLEs    Lower Extremity Strength:  Right Lower  "Extremity: 4/5 grossly  Left Lower Extremity: 3+/5 grossly     Fine motor coordination:  Intact    Functional Mobility:  Bed Mobility:  Supine to Sit: Stand by Assistance (log rolling)    Transfers:  Sit <> Stand Assistance: Contact Guard Assistance  Sit <> Stand Assistive Device: Rolling Walker    Gait:   Gait Distance: 200 ft  Assistance 1: Stand by Assistance  Gait Assistive Device: Rolling walker  Gait Pattern: swing-through gait  Gait Deviation(s): decreased migdalia, increased time in double stance, decreased velocity of limb motion, increased stride width    Stairs:  Pt ascended/descend 6" curb step with Rolling Walker with no and handrails with Contact Guard Assistance.     Balance:   Static Sit: GOOD: Takes MODERATE challenges from all directions  Dynamic Sit: GOOD: Maintains balance through MODERATE excursions of active trunk movement  Static Stand: FAIR+: Takes MINIMAL challenges from all directions  Dynamic stand: FAIR+: Needs CLOSE SUPERVISION during gait and is able to right self with minor LOB    Therapeutic Activities and Exercises:  White board updated    Pt educated on PT POC  Pt educated on importance of OOB mobility for improving outcomes after surgery    LSO otilia    AM-PAC 6 CLICK MOBILITY  How much help from another person does this patient currently need?   1 = Unable, Total/Dependent Assistance  2 = A lot, Maximum/Moderate Assistance  3 = A little, Minimum/Contact Guard/Supervision  4 = None, Modified [place]/Independent    Turning over in bed (including adjusting bedclothes, sheets and blankets)?: 4  Sitting down on and standing up from a chair with arms (e.g., wheelchair, bedside commode, etc.): 3  Moving from lying on back to sitting on the side of the bed?: 4  Moving to and from a bed to a chair (including a wheelchair)?: 3  Need to walk in hospital room?: 3  Climbing 3-5 steps with a railing?: 3  Total Score: 20     AM-PAC Raw Score CMS G-Code Modifier Level of Impairment " Assistance   6 % Total / Unable   7 - 9 CM 80 - 100% Maximal Assist   10 - 14 CL 60 - 80% Moderate Assist   15 - 19 CK 40 - 60% Moderate Assist   20 - 22 CJ 20 - 40% Minimal Assist   23 CI 1-20% SBA / CGA   24 CH 0% Independent/ Mod I     Patient left up in chair with all lines intact, call button in reach and nsg notified.    Assessment:   Humberto Cantor is a 61 y.o. male with a medical diagnosis of Burst fracture of lumbar vertebra.  Pt tolerated initial session well and was able to complete all ex's.  Pt displayed an appropriate safety level during functional mobility tasks for discharge to home with home health and family support when medically appropriate.    Rehab identified problem list/impairments: Rehab identified problem list/impairments: weakness, impaired endurance, impaired self care skills, impaired functional mobilty, gait instability, impaired balance, pain, impaired skin, decreased ROM, edema, orthopedic precautions    Rehab potential is good.    Activity tolerance: Good    Discharge recommendations: Discharge Facility/Level Of Care Needs: home with home health     Barriers to discharge: Barriers to Discharge: None    Equipment recommendations: Equipment Needed After Discharge: bedside commode, walker, rolling, shower chair, hip kit     GOALS:    Physical Therapy Goals        Problem: Physical Therapy Goal    Goal Priority Disciplines Outcome Goal Variances Interventions   Physical Therapy Goal     PT/OT, PT Ongoing (interventions implemented as appropriate)     Description:  Goals to be met by:      Patient will increase functional independence with mobility by performin. Supine to sit with Set-up [place]  2. Sit to supine with Set-up [place]  3. Sit to stand transfer with Supervision  4. Gait  x 150 feet with Supervision using Rolling Walker.   5. Ascend/descend 15 stairs without Handrails Contact Guard Assistance using appropriate AD.                       PLAN:     Patient to be seen daily to address the above listed problems via therapeutic activities, therapeutic exercises, gait training, neuromuscular re-education  Plan of Care expires: 07/07/17  Plan of Care reviewed with: patient, spouse          Ari Francois, PT  06/07/2017

## 2017-06-07 NOTE — PLAN OF CARE
Problem: Patient Care Overview  Goal: Plan of Care Review  Outcome: Ongoing (interventions implemented as appropriate)  Patient resting in bed comfortably. IV intact with no signs of irritation. Fall precautions maintained with no falls noted. Call light in reach bed locked and in lowest position. Non-skid socks on while out of bed. patient instructed to call for assistance. Skin integrity maintained as patient is assisted with frequent positioning. C/o pain managed with prn meds. No other complaints or concerns. Progressing towards goals. Will continue to monitor and follow plan of care.

## 2017-06-07 NOTE — PT/OT/SLP EVAL
Occupational Therapy  Evaluation    Humberto Cantor   MRN: [id number]   Admitting Diagnosis: Burst fracture of lumbar vertebra    OT Date of Treatment: 06/07/17   OT Start Time: 1313  OT Stop Time: 1340  OT Total Time (min): 27 min    Billable Minutes:  Evaluation 18  Self Care/Home Management 9    Diagnosis: Burst fracture of lumbar vertebra   S/p L3-L5 PSF, L4 Laminectomy, ORIF L4 Burst Fracture     Past Medical History:   Diagnosis Date    Dermatitis, atopic     Essential hypertension 11/19/2013    GERD (gastroesophageal reflux disease)     Hypertension       Past Surgical History:   Procedure Laterality Date    HERNIA REPAIR      WISDOM TOOTH EXTRACTION         General Precautions: Standard, fall (spinal)  Orthopedic Precautions:  (WBAT )  Braces: LSO    Patient History:  Living Environment  Lives With: spouse  Living Arrangements: house  Home Accessibility: stairs to enter home, stairs within home  Home Layout: Able to live on 1st floor  Number of Stairs to Enter Home: 1  Number of Stairs Within Home: 15  Stair Railings at Home: none  Transportation Available: family or friend will provide  Living Environment Comment: 24/7 Spv/(A) available  Equipment Currently Used at Home: none    Prior level of function:   Bed Mobility/Transfers: independent  Grooming: independent  Bathing: independent  Upper Body Dressing: independent  Lower Body Dressing: independent  Toileting: independent    Subjective:  Communicated with RN prior to session.    Pt agreeable to Evaluation    Pain/Comfort  Pain Rating 1: 4/10  Location 1: back  Pain Addressed 1: Pre-medicate for activity, Reposition, Distraction    Objective:  Patient found with: SCD, PCEA, hemovac, peripheral IV    Upper Extremity Range of Motion:  Right Upper Extremity: WFL  Left Upper Extremity: WFL    Upper Extremity Strength:  Right Upper Extremity: WFL  Left Upper Extremity: WFL    Functional Mobility:  Bed Mobility:  Scooting/Bridging: Stand by Assistance  Supine  "to Sit: Minimum Assistance    Transfers:  Sit <> Stand Assistance: Contact Guard Assistance  Sit <> Stand Assistive Device: Rolling Walker    Functional Ambulation: CGA with  feet    Activities of Daily Living:  UE Dressing Level of Assistance: Moderate assistance    LE Dressing Level of Assistance: Total assistance   Pt educated on LE dressing techniques to maintain spinal precautions    Grooming Position: Seated, EOB  Grooming Level of Assistance: Stand by assistance    AM-PAC 6 CLICK ADL  How much help from another person does this patient currently need?  1 = Unable, Total/Dependent Assistance  2 = A lot, Maximum/Moderate Assistance  3 = A little, Minimum/Contact Guard/Supervision  4 = None, Modified [place]/Independent    Putting on and taking off regular lower body clothing? : 2  Bathing (including washing, rinsing, drying)?: 2  Toileting, which includes using toilet, bedpan, or urinal? : 3  Putting on and taking off regular upper body clothing?: 3  Taking care of personal grooming such as brushing teeth?: 3  Eating meals?: 4  Total Score: 17    AM-PAC Raw Score CMS "G-Code Modifier Level of Impairment Assistance   6 % Total / Unable   7 - 9 CM 80 - 100% Maximal Assist   10-14 CL 60 - 80% Moderate Assist   15 - 19 CK 40 - 60% Moderate Assist   20 - 22 CJ 20 - 40% Minimal Assist   23 CI 1-20% SBA / CGA   24 CH 0% Independent/ Mod I       Patient left up in chair with all lines intact, call button in reach and RN notified    Assessment:  Humberto Cantor is a 61 y.o. male with a medical diagnosis of Burst fracture of lumbar vertebra and presents with deficits below and spinal precautions and would benefit from OT services to maximize functional (I) and safety.    Rehab identified problem list/impairments: Rehab identified problem list/impairments: weakness, impaired endurance, impaired self care skills, impaired functional mobilty, gait instability, decreased lower extremity function, decreased " safety awareness, impaired balance, pain, orthopedic precautions, decreased ROM, impaired skin, edema    Rehab potential is good.    Activity tolerance: Good    Discharge recommendations: Discharge Facility/Level Of Care Needs: home with home health     Barriers to discharge: Barriers to Discharge: None    Equipment recommendations: bedside commode, walker, rolling, shower chair (hip kit pending progress)     GOALS:    Occupational Therapy Goals        Problem: Occupational Therapy Goal    Goal Priority Disciplines Outcome Interventions   Occupational Therapy Goal     OT, PT/OT     Description:  Goals to be met by: 06/13    Patient will increase functional independence with ADLs by performing:    UE Dressing with Supervision.  LE Dressing with Supervision with AD as needed.  Grooming while standing with Supervision.  Toileting from bedside commode with Supervision for hygiene and clothing management.   Stand pivot transfers with Supervision.  Toilet transfer to bedside commode with Supervision.                         PLAN:  Patient to be seen 5 x/week to address the above listed problems via self-care/home management, therapeutic activities, therapeutic exercises  Plan of Care reviewed with: patient, spouse    JANELLE Dior  06/07/2017

## 2017-06-07 NOTE — ANESTHESIA RELEASE NOTE
"Anesthesia Release from PACU Note    Patient: Humberto Cantor    Procedure(s) Performed: Procedure(s) (LRB):  L3-L5 PSF, L4 Laminectomy, ORIF L4 Burst Fracture Depuy SNS: SSEP/EMG New [place] + Tongs + Pads Req (can roll for 3pm) (N/A)    Anesthesia type: general    Post pain: Adequate analgesia    Post assessment: no apparent anesthetic complications, tolerated procedure well and no evidence of recall    Last Vitals:   Visit Vitals  /66   Pulse 67   Temp 36.6 °C (97.9 °F) (Temporal)   Resp (!) 24   Ht 5' 10" (1.778 m)   Wt 87.5 kg (193 lb)   SpO2 98%   BMI 27.69 kg/m²       Post vital signs: stable    Level of consciousness: awake, alert  and oriented    Nausea/Vomiting: no nausea/no vomiting    Complications: none    Airway Patency: patent    Respiratory: unassisted    Cardiovascular: stable and blood pressure at baseline    Hydration: euvolemic  "

## 2017-06-07 NOTE — PLAN OF CARE
Problem: Physical Therapy Goal  Goal: Physical Therapy Goal  Goals to be met by:      Patient will increase functional independence with mobility by performin. Supine to sit with Set-up [place]  2. Sit to supine with Set-up [place]  3. Sit to stand transfer with Supervision  4. Gait  x 150 feet with Supervision using Rolling Walker.   5. Ascend/descend 15 stairs without Handrails Contact Guard Assistance using appropriate AD.     Outcome: Ongoing (interventions implemented as appropriate)  Ari Francois, PT  2017

## 2017-06-08 PROCEDURE — 11000001 HC ACUTE MED/SURG PRIVATE ROOM

## 2017-06-08 PROCEDURE — 97116 GAIT TRAINING THERAPY: CPT

## 2017-06-08 PROCEDURE — 25000003 PHARM REV CODE 250: Performed by: STUDENT IN AN ORGANIZED HEALTH CARE EDUCATION/TRAINING PROGRAM

## 2017-06-08 PROCEDURE — 25000003 PHARM REV CODE 250: Performed by: ORTHOPAEDIC SURGERY

## 2017-06-08 PROCEDURE — 97530 THERAPEUTIC ACTIVITIES: CPT

## 2017-06-08 PROCEDURE — 63600175 PHARM REV CODE 636 W HCPCS: Performed by: STUDENT IN AN ORGANIZED HEALTH CARE EDUCATION/TRAINING PROGRAM

## 2017-06-08 RX ORDER — OXYCODONE HYDROCHLORIDE 5 MG/1
5 TABLET ORAL EVERY 4 HOURS PRN
Status: DISCONTINUED | OUTPATIENT
Start: 2017-06-08 | End: 2017-06-09 | Stop reason: HOSPADM

## 2017-06-08 RX ORDER — OXYCODONE HYDROCHLORIDE 5 MG/1
10 TABLET ORAL EVERY 4 HOURS PRN
Status: DISCONTINUED | OUTPATIENT
Start: 2017-06-08 | End: 2017-06-09 | Stop reason: HOSPADM

## 2017-06-08 RX ORDER — OXYCODONE HYDROCHLORIDE 5 MG/1
15 TABLET ORAL EVERY 4 HOURS PRN
Status: DISCONTINUED | OUTPATIENT
Start: 2017-06-08 | End: 2017-06-09 | Stop reason: HOSPADM

## 2017-06-08 RX ORDER — HYDROMORPHONE HYDROCHLORIDE 1 MG/ML
1 INJECTION, SOLUTION INTRAMUSCULAR; INTRAVENOUS; SUBCUTANEOUS EVERY 4 HOURS PRN
Status: DISCONTINUED | OUTPATIENT
Start: 2017-06-08 | End: 2017-06-09 | Stop reason: HOSPADM

## 2017-06-08 RX ORDER — HEPARIN SODIUM 5000 [USP'U]/ML
5000 INJECTION, SOLUTION INTRAVENOUS; SUBCUTANEOUS EVERY 8 HOURS
Status: DISCONTINUED | OUTPATIENT
Start: 2017-06-08 | End: 2017-06-09 | Stop reason: HOSPADM

## 2017-06-08 RX ADMIN — VANCOMYCIN HYDROCHLORIDE 1500 MG: 100 INJECTION, POWDER, LYOPHILIZED, FOR SOLUTION INTRAVENOUS at 06:06

## 2017-06-08 RX ADMIN — METHOCARBAMOL 500 MG: 500 TABLET ORAL at 11:06

## 2017-06-08 RX ADMIN — STANDARDIZED SENNA CONCENTRATE AND DOCUSATE SODIUM 1 TABLET: 8.6; 5 TABLET, FILM COATED ORAL at 09:06

## 2017-06-08 RX ADMIN — HYDROMORPHONE HYDROCHLORIDE 1 MG: 1 INJECTION, SOLUTION INTRAMUSCULAR; INTRAVENOUS; SUBCUTANEOUS at 11:06

## 2017-06-08 RX ADMIN — CEFAZOLIN SODIUM 2 G: 2 SOLUTION INTRAVENOUS at 05:06

## 2017-06-08 RX ADMIN — PANTOPRAZOLE SODIUM 40 MG: 40 TABLET, DELAYED RELEASE ORAL at 09:06

## 2017-06-08 RX ADMIN — CEFAZOLIN SODIUM 2 G: 2 SOLUTION INTRAVENOUS at 01:06

## 2017-06-08 RX ADMIN — HEPARIN SODIUM 5000 UNITS: 5000 INJECTION, SOLUTION INTRAVENOUS; SUBCUTANEOUS at 09:06

## 2017-06-08 RX ADMIN — OLMESARTAN MEDOXOMIL 20 MG: 20 TABLET, FILM COATED ORAL at 09:06

## 2017-06-08 RX ADMIN — OXYCODONE HYDROCHLORIDE 15 MG: 5 TABLET ORAL at 06:06

## 2017-06-08 RX ADMIN — METHOCARBAMOL 500 MG: 500 TABLET ORAL at 06:06

## 2017-06-08 RX ADMIN — OXYCODONE HYDROCHLORIDE 10 MG: 5 TABLET ORAL at 05:06

## 2017-06-08 RX ADMIN — OXYCODONE HYDROCHLORIDE 15 MG: 5 TABLET ORAL at 09:06

## 2017-06-08 RX ADMIN — FAMOTIDINE 20 MG: 20 TABLET, FILM COATED ORAL at 09:06

## 2017-06-08 RX ADMIN — METHOCARBAMOL 500 MG: 500 TABLET ORAL at 05:06

## 2017-06-08 RX ADMIN — OXYCODONE HYDROCHLORIDE 10 MG: 5 TABLET ORAL at 09:06

## 2017-06-08 NOTE — PROGRESS NOTES
[facility]  Orthopedics  Progress Note    Patient Name: Humberto Cantor  MRN: [id number]  Admission Date: 6/5/2017  Hospital Length of Stay: 2 days  Attending Provider: Joaquin Navarro MD  Primary Care Provider: Saud Sosa MD  Follow-up For: Procedure(s) (LRB):  L3-L5 PSF, L4 Laminectomy, ORIF L4 Burst Fracture Depuy SNS: SSEP/EMG New [place] + Tongs + Pads Req (can roll for 3pm) (N/A)    Post-Operative Day: 2 Days Post-Op  Subjective:     Principal Problem:Burst fracture of lumbar vertebra    Principal Orthopedic Problem: same    Interval History: The patient was seen and examined this morning at the bedside. Patient reports no acute issues overnight.  PCA dc'd, pain controlled on PO meds.  Worked with PT, ambulated 200ft and tolerated well.  Only has LLE pain/numbness if laying prone.    Review of patient's allergies indicates:   Allergen Reactions    Naproxen        Current Facility-Administered Medications   Medication    alprazolam tablet 0.25 mg    cefazolin (ANCEF) 2 gram in dextrose 5% 50 mL IVPB (premix)    famotidine tablet 20 mg    HYDROmorphone injection 1 mg    methocarbamol tablet 500 mg    olmesartan tablet 20 mg    ondansetron disintegrating tablet 8 mg    oxycodone immediate release tablet 10 mg    oxycodone immediate release tablet 15 mg    oxycodone immediate release tablet 5 mg    pantoprazole EC tablet 40 mg    promethazine tablet 25 mg    ramelteon tablet 8 mg    senna-docusate 8.6-50 mg per tablet 1 tablet    vancomycin (VANCOCIN) 1,500 mg in dextrose 5 % 250 mL IVPB     Objective:     Vital Signs (Most Recent):  Temp: 98.8 °F (37.1 °C) (06/08/17 0336)  Pulse: 86 (06/08/17 0336)  Resp: 16 (06/08/17 0336)  BP: 130/73 (06/08/17 0336)  SpO2: (!) 94 % (06/08/17 0336) Vital Signs (24h Range):  Temp:  [97.8 °F (36.6 °C)-99.5 °F (37.5 °C)] 98.8 °F (37.1 °C)  Pulse:  [] 86  Resp:  [14-18] 16  SpO2:  [92 %-97 %] 94 %  BP: (107-130)/(51-73) 130/73     Weight:  "87.5 kg (193 lb)  Height: 5' 10" (177.8 cm)  Body mass index is 27.69 kg/m².      Intake/Output Summary (Last 24 hours) at 06/08/17 0621  Last data filed at 06/08/17 0619   Gross per 24 hour   Intake              480 ml   Output             5750 ml   Net            -5270 ml       Ortho/SPM Exam      NAD, A/O x 3.  Wound c/d/i with clean dressing.  No focal motor or sensory deficits noted.     Significant Labs: All pertinent labs within the past 24 hours have been reviewed.      Assessment/Plan:     * Burst fracture of lumbar vertebra    61 year old male s/p L4 laminectomy and L3-5 PSF for L4 burst fracture, retropulsion with canal stenosis--neurovascularly intact, L5 isthmic spondylolisthesis    Plan:  1) Antibiotics: post op  2) Weight bearing status: wbat in LSO brace  3) Labs: reveiwed  4) DVT Prophylaxis: mechanical  5) Lines/Drains: PIV, drains x2  6) PT/OT  7) LSO brace at all times when not in bed   8) dc drains today        Anxiety    Home meds        Essential hypertension    Home meds        GERD (gastroesophageal reflux disease)    Gi ppx              Mark Mon MD  Orthopedics  [facility]  "

## 2017-06-08 NOTE — ASSESSMENT & PLAN NOTE
61 year old male s/p L4 laminectomy and L3-5 PSF for L4 burst fracture, retropulsion with canal stenosis--neurovascularly intact, L5 isthmic spondylolisthesis    Plan:  1) Antibiotics: post op  2) Weight bearing status: wbat in LSO brace  3) Labs: reveiwed  4) DVT Prophylaxis: mechanical  5) Lines/Drains: PIV, drains x2  6) PT/OT  7) LSO brace at all times when not in bed   8) dc drains today

## 2017-06-08 NOTE — PLAN OF CARE
Problem: Physical Therapy Goal  Goal: Physical Therapy Goal  Goals to be met by:      Patient will increase functional independence with mobility by performin. Supine to sit with Set-up [place]  2. Sit to supine with Set-up [place]  3. Sit to stand transfer with Supervision  4. Gait  x 150 feet with Supervision using Rolling Walker.   5. Ascend/descend 15 stairs without Handrails Contact Guard Assistance using appropriate AD.      Goals remain appropriate. Cont. With POC

## 2017-06-08 NOTE — PT/OT/SLP PROGRESS
Physical Therapy  Treatment    Humberto Cantor   MRN: [id number]   Admitting Diagnosis: Burst fracture of lumbar vertebra    PT Received On: 06/08/17  PT Start Time: 1600     PT Stop Time: 1635    PT Total Time (min): 35 min       Billable Minutes:  Gait [id number] and Therapeutic Activity 8    Treatment Type: Treatment  PT/PTA: PTA     PTA Visit Number: 1       General Precautions: Standard, fall (spinal precautions)  Orthopedic Precautions:  (WBAT)   Braces: LSO         Subjective:  Communicated with Peri villa prior to session.  Pt agreeable and motivated for physical therapy. Him and his wife reported that they want to discuss the discharge equipment recommendations. They said that they do not want a shower chair or a bedside commode. Eleanor said the shower chair may not fit into their tub and suggested going to a store to find a good match there.   Pt reported numbness in involved lower extremity and encouraged to tell his nurse or doctor about it. Nsg notified.       Objective:   Patient found with: PCEA walking to the bathroom utilizing the furniture in the room for support.     Functional Mobility:  Bed Mobility:   Not performed 2/2 to patient not in bed.     Transfers:  Sit <> Stand Assistance: Contact Guard Assistance  Sit <> Stand Assistive Device: Rolling Walker    Gait:   Gait Distance: >400 feet with 2 sitting rest breaks.   Assistance 1: Contact Guard Assistance  Gait Assistive Device: Rolling walker  Gait Pattern: reciprocal (vcs for heel strike)  Gait Deviation(s): decreased migdalia, increased time in double stance, decreased velocity of limb motion, increased stride width    Balance:   Static Sit: GOOD: Takes MODERATE challenges from all directions  Dynamic Sit: GOOD: Maintains balance through MODERATE excursions of active trunk movement  Static Stand: FAIR+: Takes MINIMAL challenges from all directions  Dynamic stand: FAIR+: Needs CLOSE SUPERVISION during gait and is able to right self with minor LOB  "    Therapeutic Activities and Exercises:  White board updated  LSO donned  Pt performed sit <> stand x 10 trials with emphasis on hand placement, knee flexion and maintaining back precautions. Minimal vcs needed. CGA  Pt educated on the benefits of equipment to be utilized at home and the considerations of potential progression of adaptive equipment based on his progress if deemed appropriate by his physical therapist. Pt and spouse verbalized understanding. Both were encouraged to communicate their thoughts with the therapist during their next treatment session. Both verbalized understanding.     Pt ascended/descend 6" curb step with Rolling Walker x 6 trials with no and handrails with Contact Guard Assistance.     Chair available throughout the session for safety.    AM-PAC 6 CLICK MOBILITY  How much help from another person does this patient currently need?   1 = Unable, Total/Dependent Assistance  2 = A lot, Maximum/Moderate Assistance  3 = A little, Minimum/Contact Guard/Supervision  4 = None, Modified [place]/Independent    Turning over in bed (including adjusting bedclothes, sheets and blankets)?: 4  Sitting down on and standing up from a chair with arms (e.g., wheelchair, bedside commode, etc.): 3  Moving from lying on back to sitting on the side of the bed?: 4  Moving to and from a bed to a chair (including a wheelchair)?: 3  Need to walk in hospital room?: 3  Climbing 3-5 steps with a railing?: 3  Total Score: 20    AM-PAC Raw Score CMS G-Code Modifier Level of Impairment Assistance   6 % Total / Unable   7 - 9 CM 80 - 100% Maximal Assist   10 - 14 CL 60 - 80% Moderate Assist   15 - 19 CK 40 - 60% Moderate Assist   20 - 22 CJ 20 - 40% Minimal Assist   23 CI 1-20% SBA / CGA   24 CH 0% Independent/ Mod I     Patient left up in chair with call button in reach, nsg notified and spouse present.    Assessment:  Humberto Cantor is a 61 y.o. male with a medical diagnosis of Burst fracture of lumbar " vertebra and presents with the rehab identified problem list below. The patient tolerated treatment well. He shows good endurance with the distance ambulated from his room to the main elevators x 3 trials and ascending/descending a curb step during these laps. The patient required some cuing during his first trial with the curb step, but demonstrated understanding in subsequent attempts. The patient would continue to benefit from skilled PT to address the deficits in his plan of care.     Rehab identified problem list/impairments: Rehab identified problem list/impairments: weakness, impaired endurance, impaired self care skills, gait instability, impaired balance, impaired skin, decreased ROM, edema, orthopedic precautions, impaired functional mobilty, impaired sensation    Rehab potential is good.    Activity tolerance: Good    Discharge recommendations: Discharge Facility/Level Of Care Needs: home with home health     Barriers to discharge: Barriers to Discharge: None    Equipment recommendations: Equipment Needed After Discharge: walker, rolling, bedside commode, hip kit, shower chair     GOALS:    Physical Therapy Goals        Problem: Physical Therapy Goal    Goal Priority Disciplines Outcome Goal Variances Interventions   Physical Therapy Goal     PT/OT, PT Ongoing (interventions implemented as appropriate)     Description:  Goals to be met by:      Patient will increase functional independence with mobility by performin. Supine to sit with Set-up [place]  2. Sit to supine with Set-up [place]  3. Sit to stand transfer with Supervision  4. Gait  x 150 feet with Supervision using Rolling Walker.   5. Ascend/descend 15 stairs without Handrails Contact Guard Assistance using appropriate AD.                       PLAN:    Patient to be seen daily  to address the above listed problems via gait training, therapeutic activities, therapeutic exercises, neuromuscular re-education  Plan of Care  expires: 07/07/17  Plan of Care reviewed with: patient, spouse (spouse's name is Eleanor)         Concepcion Akers, PTA  06/08/2017

## 2017-06-08 NOTE — SUBJECTIVE & OBJECTIVE
"Principal Problem:Burst fracture of lumbar vertebra    Principal Orthopedic Problem: same    Interval History: The patient was seen and examined this morning at the bedside. Patient reports no acute issues overnight.  PCA dc'd, pain controlled on PO meds.  Worked with PT, ambulated 200ft and tolerated well.  Only has LLE pain/numbness if laying prone.    Review of patient's allergies indicates:   Allergen Reactions    Naproxen        Current Facility-Administered Medications   Medication    alprazolam tablet 0.25 mg    cefazolin (ANCEF) 2 gram in dextrose 5% 50 mL IVPB (premix)    famotidine tablet 20 mg    HYDROmorphone injection 1 mg    methocarbamol tablet 500 mg    olmesartan tablet 20 mg    ondansetron disintegrating tablet 8 mg    oxycodone immediate release tablet 10 mg    oxycodone immediate release tablet 15 mg    oxycodone immediate release tablet 5 mg    pantoprazole EC tablet 40 mg    promethazine tablet 25 mg    ramelteon tablet 8 mg    senna-docusate 8.6-50 mg per tablet 1 tablet    vancomycin (VANCOCIN) 1,500 mg in dextrose 5 % 250 mL IVPB     Objective:     Vital Signs (Most Recent):  Temp: 98.8 °F (37.1 °C) (06/08/17 0336)  Pulse: 86 (06/08/17 0336)  Resp: 16 (06/08/17 0336)  BP: 130/73 (06/08/17 0336)  SpO2: (!) 94 % (06/08/17 0336) Vital Signs (24h Range):  Temp:  [97.8 °F (36.6 °C)-99.5 °F (37.5 °C)] 98.8 °F (37.1 °C)  Pulse:  [] 86  Resp:  [14-18] 16  SpO2:  [92 %-97 %] 94 %  BP: (107-130)/(51-73) 130/73     Weight: 87.5 kg (193 lb)  Height: 5' 10" (177.8 cm)  Body mass index is 27.69 kg/m².      Intake/Output Summary (Last 24 hours) at 06/08/17 0621  Last data filed at 06/08/17 0619   Gross per 24 hour   Intake              480 ml   Output             5750 ml   Net            -5270 ml       Ortho/SPM Exam      NAD, A/O x 3.  Wound c/d/i with clean dressing.  No focal motor or sensory deficits noted.     Significant Labs: All pertinent labs within the past 24 hours have " been reviewed.

## 2017-06-09 VITALS
HEIGHT: 70 IN | OXYGEN SATURATION: 96 % | SYSTOLIC BLOOD PRESSURE: 130 MMHG | WEIGHT: 193 LBS | BODY MASS INDEX: 27.63 KG/M2 | RESPIRATION RATE: 18 BRPM | HEART RATE: 74 BPM | TEMPERATURE: 98 F | DIASTOLIC BLOOD PRESSURE: 70 MMHG

## 2017-06-09 LAB
BLD PROD TYP BPU: NORMAL
BLD PROD TYP BPU: NORMAL
BLOOD UNIT EXPIRATION DATE: NORMAL
BLOOD UNIT EXPIRATION DATE: NORMAL
BLOOD UNIT TYPE CODE: 5100
BLOOD UNIT TYPE CODE: 5100
BLOOD UNIT TYPE: NORMAL
BLOOD UNIT TYPE: NORMAL
CODING SYSTEM: NORMAL
CODING SYSTEM: NORMAL
DISPENSE STATUS: NORMAL
DISPENSE STATUS: NORMAL
TRANS ERYTHROCYTES VOL PATIENT: NORMAL ML
TRANS ERYTHROCYTES VOL PATIENT: NORMAL ML

## 2017-06-09 PROCEDURE — 63600175 PHARM REV CODE 636 W HCPCS: Performed by: STUDENT IN AN ORGANIZED HEALTH CARE EDUCATION/TRAINING PROGRAM

## 2017-06-09 PROCEDURE — 25000003 PHARM REV CODE 250: Performed by: ORTHOPAEDIC SURGERY

## 2017-06-09 PROCEDURE — 25000003 PHARM REV CODE 250: Performed by: STUDENT IN AN ORGANIZED HEALTH CARE EDUCATION/TRAINING PROGRAM

## 2017-06-09 PROCEDURE — 97116 GAIT TRAINING THERAPY: CPT

## 2017-06-09 RX ORDER — HEPARIN SODIUM 5000 [USP'U]/ML
5000 INJECTION, SOLUTION INTRAVENOUS; SUBCUTANEOUS EVERY 8 HOURS
Qty: 90 ML | Refills: 0 | Status: SHIPPED | OUTPATIENT
Start: 2017-06-09 | End: 2017-06-20 | Stop reason: ALTCHOICE

## 2017-06-09 RX ADMIN — PANTOPRAZOLE SODIUM 40 MG: 40 TABLET, DELAYED RELEASE ORAL at 07:06

## 2017-06-09 RX ADMIN — FAMOTIDINE 20 MG: 20 TABLET, FILM COATED ORAL at 07:06

## 2017-06-09 RX ADMIN — OXYCODONE HYDROCHLORIDE 15 MG: 5 TABLET ORAL at 05:06

## 2017-06-09 RX ADMIN — VANCOMYCIN HYDROCHLORIDE 1500 MG: 100 INJECTION, POWDER, LYOPHILIZED, FOR SOLUTION INTRAVENOUS at 05:06

## 2017-06-09 RX ADMIN — METHOCARBAMOL 500 MG: 500 TABLET ORAL at 12:06

## 2017-06-09 RX ADMIN — HYDROMORPHONE HYDROCHLORIDE 1 MG: 1 INJECTION, SOLUTION INTRAMUSCULAR; INTRAVENOUS; SUBCUTANEOUS at 07:06

## 2017-06-09 RX ADMIN — METHOCARBAMOL 500 MG: 500 TABLET ORAL at 05:06

## 2017-06-09 RX ADMIN — OXYCODONE HYDROCHLORIDE 15 MG: 5 TABLET ORAL at 11:06

## 2017-06-09 RX ADMIN — METHOCARBAMOL 500 MG: 500 TABLET ORAL at 11:06

## 2017-06-09 RX ADMIN — HEPARIN SODIUM 5000 UNITS: 5000 INJECTION, SOLUTION INTRAVENOUS; SUBCUTANEOUS at 05:06

## 2017-06-09 RX ADMIN — STANDARDIZED SENNA CONCENTRATE AND DOCUSATE SODIUM 1 TABLET: 8.6; 5 TABLET, FILM COATED ORAL at 07:06

## 2017-06-09 RX ADMIN — HEPARIN SODIUM 5000 UNITS: 5000 INJECTION, SOLUTION INTRAVENOUS; SUBCUTANEOUS at 01:06

## 2017-06-09 RX ADMIN — OLMESARTAN MEDOXOMIL 20 MG: 20 TABLET, FILM COATED ORAL at 07:06

## 2017-06-09 NOTE — ASSESSMENT & PLAN NOTE
61 year old male s/p L4 laminectomy and L3-5 PSF for L4 burst fracture, retropulsion with canal stenosis--neurovascularly intact, L5 isthmic spondylolisthesis    Plan:  1) Antibiotics: post op  2) Weight bearing status: wbat in LSO brace  3) Labs: reveiwed  4) DVT Prophylaxis: mechanical  5) Lines/Drains: PIV, drains x2  6) PT/OT  7) LSO brace at all times when not in bed   8) dc drains today  9) xrays today

## 2017-06-09 NOTE — PROGRESS NOTES
"[facility]  Orthopedics  Progress Note    Patient Name: Humberto Cantor  MRN: [id number]  Admission Date: 6/5/2017  Hospital Length of Stay: 3 days  Attending Provider: Joaquin Navarro MD  Primary Care Provider: Saud Sosa MD  Follow-up For: Procedure(s) (LRB):  L3-L5 PSF, L4 Laminectomy, ORIF L4 Burst Fracture Depuy SNS: SSEP/EMG [place] + Tongs + Pads Req (can roll for 3pm) (N/A)    Post-Operative Day: 3 Days Post-Op  Subjective:     Principal Problem:Burst fracture of lumbar vertebra    Principal Orthopedic Problem: same    Interval History: The patient was seen and examined this morning at the bedside. Patient reports no acute issues overnight.  Pain well controlled.  Has been ambulating independently.      Drains with 160cc out total    Review of patient's allergies indicates:   Allergen Reactions    Naproxen        Current Facility-Administered Medications   Medication    alprazolam tablet 0.25 mg    famotidine tablet 20 mg    heparin (porcine) injection 5,000 Units    HYDROmorphone injection 1 mg    methocarbamol tablet 500 mg    olmesartan tablet 20 mg    ondansetron disintegrating tablet 8 mg    oxycodone immediate release tablet 10 mg    oxycodone immediate release tablet 15 mg    oxycodone immediate release tablet 5 mg    pantoprazole EC tablet 40 mg    promethazine tablet 25 mg    ramelteon tablet 8 mg    senna-docusate 8.6-50 mg per tablet 1 tablet    vancomycin (VANCOCIN) 1,500 mg in dextrose 5 % 250 mL IVPB     Objective:     Vital Signs (Most Recent):  Temp: 98.5 °F (36.9 °C) (06/09/17 0732)  Pulse: 81 (06/09/17 0732)  Resp: 18 (06/09/17 0732)  BP: 132/76 (06/09/17 0732)  SpO2: 95 % (06/09/17 0732) Vital Signs (24h Range):  Temp:  [97.8 °F (36.6 °C)-98.9 °F (37.2 °C)] 98.5 °F (36.9 °C)  Pulse:  [70-81] 81  Resp:  [17-18] 18  SpO2:  [93 %-97 %] 95 %  BP: (109-137)/(58-85) 132/76     Weight: 87.5 kg (193 lb)  Height: 5' 10" (177.8 cm)  Body mass index is 27.69 " kg/m².      Intake/Output Summary (Last 24 hours) at 06/09/17 1007  Last data filed at 06/09/17 0500   Gross per 24 hour   Intake             2700 ml   Output              290 ml   Net             2410 ml       Ortho/SPM Exam      NAD, A/O x 3.  Wound c/d/i with clean dressing.  No focal motor or sensory deficits noted.     Significant Labs: All pertinent labs within the past 24 hours have been reviewed.      Assessment/Plan:     * Burst fracture of lumbar vertebra    61 year old male s/p L4 laminectomy and L3-5 PSF for L4 burst fracture, retropulsion with canal stenosis--neurovascularly intact, L5 isthmic spondylolisthesis    Plan:  1) Antibiotics: post op  2) Weight bearing status: wbat in LSO brace  3) Labs: reveiwed  4) DVT Prophylaxis: mechanical  5) Lines/Drains: PIV, drains x2  6) PT/OT  7) LSO brace at all times when not in bed   8) dc drains today  9) xrays today        Anxiety    Home meds        Essential hypertension    Home meds        GERD (gastroesophageal reflux disease)    Gi ppx          Home today    Mark Mon MD  Orthopedics  [facility]

## 2017-06-09 NOTE — PLAN OF CARE
[facility]    HOME HEALTH ORDERS  FACE TO FACE ENCOUNTER    Patient Name: Humberto Cantor  YOB: 1955    PCP: Saud Sosa MD   PCP Address: [address] / [place] [address]  PCP Phone Number: [phone]  PCP Fax: [phone]    Encounter Date: 06/09/2017    Admit to Home Health    Diagnoses:  Active Hospital Problems    Diagnosis  POA    *Burst fracture of lumbar vertebra [S32.001A]  Yes     Priority: 1 - High    Preoperative evaluation to rule out surgical contraindication [Z01.818]  Not Applicable    Anxiety [F41.9]  Yes     Chronic    Essential hypertension [I10]  Yes    GERD (gastroesophageal reflux disease) [K21.9]  Yes      Resolved Hospital Problems    Diagnosis Date Resolved POA   No resolved problems to display.       Future Appointments  Date Time Provider Department Center   6/20/2017 9:00 AM Saud Sosa MD [facility] [place]           I have seen and examined this patient face to face today. My clinical findings that support the need for the home health skilled services and home bound status are the following:  Weakness/numbness causing balance and gait disturbance due to Fracture and Surgery making it taxing to leave home.    Allergies:  Review of patient's allergies indicates:   Allergen Reactions    Naproxen        Diet: regular diet    Activities: WBAT; LSO brace to be worn at all times when not in bed    Nursing:   SN to complete comprehensive assessment including routine vital signs. Instruct on disease process and s/s of complications to report to MD. Review/verify medication list sent home with the patient at time of discharge  and instruct patient/caregiver as needed. Frequency may be adjusted depending on start of care date.    Notify MD if SBP > 160 or < 90; DBP > 90 or < 50; HR > 120 or < 50; Temp > 101;       CONSULTS:    Physical Therapy to evaluate and treat. Evaluate for home safety and equipment needs; Establish/upgrade home exercise  program. Perform / instruct on therapeutic exercises, gait training, transfer training, and Range of Motion.  Occupational Therapy to evaluate and treat. Evaluate home environment for safety and equipment needs. Perform/Instruct on transfers, ADL training, ROM, and therapeutic exercises.  Aide to provide assistance with personal care, ADLs, and vital signs.    MISCELLANEOUS CARE:      WOUND CARE ORDERS  Change dressing as needed.  OK to shower 7 days after surgery.  No baths      Medications: Review discharge medications with patient and family and provide education.      Current Discharge Medication List      START taking these medications    Details   docusate sodium (COLACE) 100 MG capsule Take 1 capsule (100 mg total) by mouth 2 (two) times daily.  Qty: 60 capsule, Refills: 0      HEPARIN SODIUM,PORCINE (HEPARIN, PORCINE,) 5,000 unit/mL injection Inject 1 mL (5,000 Units total) into the skin every 8 (eight) hours.  Qty: 90 mL, Refills: 0      methocarbamol (ROBAXIN) 750 MG Tab Take 1 tablet (750 mg total) by mouth 4 (four) times daily.  Qty: 60 tablet, Refills: 0      ondansetron (ZOFRAN-ODT) 8 MG TbDL Take 1 tablet (8 mg total) by mouth every 6 (six) hours as needed.  Qty: 60 tablet, Refills: 0      oxycodone-acetaminophen (PERCOCET)  mg per tablet Take 1 tablet by mouth every 4 (four) hours as needed for Pain.  Qty: 90 tablet, Refills: 0         CONTINUE these medications which have NOT CHANGED    Details   alprazolam (XANAX) 0.25 MG tablet TAKE ONE TABLET BY MOUTH TWICE DAILY AS NEEDED  Qty: 60 tablet, Refills: 0    Associated Diagnoses: TORRIE (generalized anxiety disorder)      cyclobenzaprine (FLEXERIL) 5 MG tablet Take 1 tablet (5 mg total) by mouth 3 (three) times daily as needed for Muscle spasms.  Qty: 30 tablet, Refills: 0    Associated Diagnoses: Acute left-sided low back pain with left-sided sciatica      fluocinonide 0.05% (LIDEX) 0.05 % cream Apply topically 2 (two) times daily.  Qty: 60 g,  Refills: 3    Associated Diagnoses: Dermatitis due to plants      lansoprazole (PREVACID) 15 MG capsule Take 1 capsule (15 mg total) by mouth once daily.  Qty: 30 capsule, Refills: 11    Associated Diagnoses: GERD (gastroesophageal reflux disease)      !! olmesartan (BENICAR) 20 MG tablet Take 1 tablet (20 mg total) by mouth once daily.  Qty: 90 tablet, Refills: 3      !! olmesartan (BENICAR) 20 MG tablet TAKE ONE TABLET BY MOUTH ONCE DAILY  Qty: 30 tablet, Refills: 11    Associated Diagnoses: Essential hypertension      tadalafil (CIALIS) 20 MG Tab Take 1 tablet (20 mg total) by mouth once daily.  Qty: 100 tablet, Refills: 0    Associated Diagnoses: Essential hypertension       !! - Potential duplicate medications found. Please discuss with provider.      STOP taking these medications       hydrocodone-acetaminophen 10-325mg (NORCO)  mg Tab Comments:   Reason for Stopping:         ibuprofen (ADVIL,MOTRIN) 800 MG tablet Comments:   Reason for Stopping:               I certify that this patient is confined to his home and needs intermittent skilled nursing care, physical therapy and occupational therapy.

## 2017-06-09 NOTE — DISCHARGE INSTRUCTIONS
DR. NONA THOMPSON  POSTOPERATIVE LUMBAR FUSION INSTRUCTIONS  Antibiotics: You do not need additional antibiotics at home.  NSAIDs: Please refrain from taking ibuprofen (Advil), naproxen (Aleve), and other non  steroidal anti-inflammatory medications (NSAIDs) as they may inhibit bone healing in  the postoperative period.  Wound Care: You may remove your dressing and shower 7 days after surgery. Until  then please keep your wound clean and dry. Sponge baths are acceptable. Do not go in  a pool or hot tub until seen in clinic. Please leave the small steri-strips covering your  wound in place until they fall off naturally (2 weeks). You may notice clear suture ends  hanging from the sides of your incense after the steri-strips are removed, it is ok to clip  these with scissors.  Brace: You may be prescribed a brace, please wear this when up and walking, it is not  necessary to wear at night when sleeping.  Pain: You will be given a prescription for pain medicines and muscle relaxers before  discharge. If you pain is not adequately controlled with these medications, please call  the number below.  Infection: Signs of infection include increasing wound drainage and redness around the  wound, as well as a temperature over 101.5 degrees. It is unnecessary to take your  temperature on a routine basis. Please call the below number if you are concerned  about an infection.  Driving and Work: It is ok to return to driving and work as long as you are not taking  narcotic pain medications and can walk greater than 100 feet. Please do not lift over 10  pounds or participate in exercise or sports until cleared by Dr. Navarro.  Deep Venous Thrombosis (Blood Clots): Symptoms include swelling in the legs and  shortness of breath. Please call the office or proceed to the nearest emergency room if  you have any of these symptoms.  Physical Therapy: The best physical therapy after surgery is walking. Please try to  walk as much as  possible.  Follow-up: You will be scheduled for a follow-up appointment in 2-3 weeks.  Questions: During business hours please call [phone] for routine questions. For  after hours questions please call [phone] and ask to speak with the orthopaedic  resident on call.

## 2017-06-09 NOTE — PLAN OF CARE
Pt. to d/c home with [facility]. RW delivered to pt.     Future Appointments  Date Time Provider Department Center   6/20/2017 9:00 AM Saud Sosa MD [facility] [place]

## 2017-06-09 NOTE — PLAN OF CARE
SW informed pt was unsure about the necessity of recommended DME and HH. SW to f/u with pt at bedside.    SW met with pt and pt's wife. After much discussion, pt is amenable to [facility] for PT and OT only. Pt would also like a RW for d/c. Referral sent to [facility].    [facility] notified of pending RW order. RW delivered.    Katey Vizcarra LMSW, [facility]  [phone]

## 2017-06-09 NOTE — NURSING
Pt discharged to home via wheelchair. Pt given prescriptions and copy of discharge home instructions. Pt denies pain at the this time. Pt educated on signs and symptoms of when to call the doctor. All belongings with the patient. Pt verbalized understanding of all discharge instructions.

## 2017-06-09 NOTE — PT/OT/SLP PROGRESS
"Physical Therapy         Treatment        Humberto Cantor   MRN: [id number]     PT Received On: 06/09/17  PT Start Time: 1354     PT Stop Time: 1404    PT Total Time (min): 10 min       Billable Minutes:  Gait Training 10      General Precautions: Standard, fall (spinal)  Orthopedic Precautions :  (WBAT)  Braces: LSO    Subjective:  Communicated with RN prior to session.    "I need to get my walker fitted"    Pain Rating 1: 0/10   Pain Rating Post-Intervention 1: 0/10    Objective:  Patient found with: PCEA    Pt found supine in bed     Functional Mobility:    Bed Mobility:  Scooting/Bridging: Supervision (to EOB)  Supine to Sit: Supervision    Transfers:    Sit <> Stand Assistance: Stand By Assistance  Sit <> Stand Assistive Device: Rolling Walker    Ambulation:    Gait Distance: ~200 feet with no LOB or SOB  Assistance 1: Stand by Assistance  Gait Assistive Device: Rolling walker  Gait Pattern: reciprocal  Gait Deviation(s): decreased migdalia, decreased step length, decreased stride length    Stairs:  Pt ascended/descend 4 stair(s) with No Assistive Device with right railing with Stand-by Assistance.  Pt reports feeling comfortable ascending/descending stairs upon return to home     Balance:   Static Sit: GOOD: Takes MODERATE challenges from all directions  Dynamic Sit:  GOOD: Maintains balance through MODERATE excursions of active trunk movement  Static Stand: FAIR+: Takes MINIMAL challenges from all directions  Dynamic stand: FAIR+: Needs CLOSE SUPERVISION during gait and is able to right self with minor LOB    Therapeutic Activities and Exercises:  PT adjusted pt's RW to appropriate height for home use.      Pt able to don LSO with (S) from PT    Pt safe to transfer with RN staff    Patient left up in chair with all lines intact, call button in reach and RN notified.    AM-PAC 6 CLICK MOBILITY  1 = Unable, Total/Dependent Assistance  2 = A lot, Maximum/Moderate Assistance  3 = A little, Minimum/Contact " Guard/Supervision  4 = None, Modified Raymond/Independent  Turning over in bed (including adjusting bedclothes, sheets and blankets)?: 4  Sitting down on and standing up from a chair with arms (e.g., wheelchair, bedside commode, etc.): 4  Moving from lying on back to sitting on the side of the bed?: 4  Moving to and from a bed to a chair (including a wheelchair)?: 4  Need to walk in hospital room?: 3  Climbing 3-5 steps with a railing?: 3  Total Score: 22    AM-PAC Raw Score   CMS G-Code Modifier   Level of Impairment   Assistance     6   CN   100%         Total / Unable   7 - 9   CM   80 - 100%   Maximal Assist     10 - 14   CL   60 - 80%   Moderate Assist     15 - 19   CK   40 - 60%   Moderate Assist     20 - 22   CJ   20 - 40%   Minimal Assist     23   CI   1-20%         SBA / CGA     24 CH   0%   Independent/Modified Independent       Assessment:  Humberto Cantor is a 61 y.o. male with a medical diagnosis of Burst fracture of lumbar vertebra. He presents with deficits listed below.  Pt tolerated treatment well and is safe to discharge home from a PT standpoint.      Rehab identified problem list/impairments: weakness, impaired endurance, impaired functional mobilty, gait instability, impaired balance, impaired self care skills, decreased lower extremity function, pain, impaired sensation, decreased ROM, orthopedic precautions    Rehab potential is good.    Activity tolerance: Good    Discharge Facility/Level Of Care Needs: home with home health     Barriers to Discharge: None    Equipment Needed After Discharge: walker, rolling, bedside commode, hip kit, shower chair     GOALS:    Physical Therapy Goals        Problem: Physical Therapy Goal    Goal Priority Disciplines Outcome Goal Variances Interventions   Physical Therapy Goal     PT/OT, PT Ongoing (interventions implemented as appropriate)     Description:  Goals to be met by: 6/17     Patient will increase functional independence with mobility by  performin. Supine to sit with Set-up Dougherty  2. Sit to supine with Set-up Dougherty  3. Sit to stand transfer with Supervision  4. Gait  x 150 feet with Supervision using Rolling Walker.   5. Ascend/descend 15 stairs without Handrails Contact Guard Assistance using appropriate AD.                       PLAN:    D/C Acute PT     Lucho Schneider PT, DPT  2017   [phone]

## 2017-06-09 NOTE — PLAN OF CARE
Ssc received a call from Mary Jo @ 12:35 and delivered @ 12:53 a rolling walker        Venita/colt

## 2017-06-12 NOTE — DISCHARGE SUMMARY
[facility]  Discharge Summary      Patient Name: Humberto Cantor  MRN: [id number]  Admission Date: 6/5/2017  Hospital Length of Stay: 3 days  Discharge Date and Time: 6/9/2017  2:45 PM  Attending Physician: Joaquin Navarro MD    Discharging Provider: Mark Mon MD  Primary Care Provider: Saud Sosa MD     HPI: L3 burst fx    Procedure(s) (LRB):  L3-L5 PSF, L4 Laminectomy, ORIF L4 Burst Fracture Depuy SNS: SSEP/EMG New Reinaldo + Tongs + Pads Req (can roll for 3pm) (N/A)     Hospital Course: On 6/5/2017 the patient presented to [facility] ED with back pain as a transfer from an OSH.  He fell off of a ladder.  After reviewing the images, the decision was made to take him to the operating room for stabilization of his fracture.  On 6/6/2017 he was taken for L4 laminectomy and L3-5 PSF, please see op note for details.  After recovery from the anesthetic agents he returned to the hospital floor.  Remainder of his stay was uncomplicated.  A suresh catheter was placed upon admission and removed on POD 1; he has been urinating without complication since.  He had complete resolution of his radicular pains.  Pain was controlled initially with PCA, but was transitioned to an oral regimen.  He began working with PT on POD 1 and has been mobilizing independently since.  He is safe/stable for discharge home.    Consults:   Consults         Status Ordering Provider     Inpatient consult to Hospital Medicine-Ortho Comanagement Only  Once     Provider:  (Not yet assigned)    Completed GEOVANNI RAMIREZ     Inpatient consult to Orthopedic Surgery  Once     Provider:  (Not yet assigned)    Completed DONNA TINAJERO          Significant Diagnostic Studies:     Pending Diagnostic Studies:     None        Final Active Diagnoses:    Diagnosis Date Noted POA    PRINCIPAL PROBLEM:  Burst fracture of lumbar vertebra [S32.001A] 06/06/2017 Yes    Preoperative evaluation to rule out surgical contraindication [Z01.818]  "06/06/2017 Not Applicable    Anxiety [F41.9] 05/29/2014 Yes     Chronic    Essential hypertension [I10] 11/19/2013 Yes    GERD (gastroesophageal reflux disease) [K21.9]  Yes      Problems Resolved During this Admission:    Diagnosis Date Noted Date Resolved POA      Discharged Condition: stable    Disposition: Home or Self Care    Follow Up:  Follow-up Information     Joaquin Navarro MD In 3 weeks.    Specialties:  Orthopedic Surgery, Spine Surgery  Why:  clinic will call to schedule  Contact information:  [address]  [place] [phone]                 Patient Instructions:     WALKER FOR HOME USE   Order Specific Question Answer Comments   Type of Walker: Adult (5'4"-6'6")    With wheels? Yes    Height: 5' 10" (1.778 m)    Weight: 87.5 kg (193 lb)    Length of need (1-99 months): 99    Does patient have medical equipment at home? none    Please check all that apply: Patient is unable to safely ambulate without equipment.    Please check all that apply: Patient's condition impairs ambulation.    Please check all that apply: Walker will be used for gait training.    Vendor: [facility] delivered to pts bedside   Expected Date of Delivery: 6/9/2017      Diet general     Activity as tolerated     Other restrictions (specify):   Order Comments: Brace to be worn at all times when not in bed     Call MD for:  temperature >100.4     Call MD for:  increased confusion or weakness     Call MD for:  persistent dizziness, light-headedness, or visual disturbances     Call MD for:  worsening rash     Call MD for:  severe persistent headache     Call MD for:  difficulty breathing or increased cough     Call MD for:  redness, tenderness, or signs of infection (pain, swelling, redness, odor or green/yellow discharge around incision site)     Call MD for:  severe uncontrolled pain     Call MD for:  persistent nausea and vomiting or diarrhea     Change dressing (specify)   Order Comments: Change " dressing as needed, shower 7 days after surgery, no baths       Medications:  Reconciled Home Medications:   Discharge Medication List as of 6/9/2017 10:26 AM      START taking these medications    Details   docusate sodium (COLACE) 100 MG capsule Take 1 capsule (100 mg total) by mouth 2 (two) times daily., Starting Tue 6/6/2017, Until Tue 7/4/2017, Print      methocarbamol (ROBAXIN) 750 MG Tab Take 1 tablet (750 mg total) by mouth 4 (four) times daily., Starting Tue 6/6/2017, Until Fri 6/16/2017, Print      ondansetron (ZOFRAN-ODT) 8 MG TbDL Take 1 tablet (8 mg total) by mouth every 6 (six) hours as needed., Starting Tue 6/6/2017, Print      oxycodone-acetaminophen (PERCOCET)  mg per tablet Take 1 tablet by mouth every 4 (four) hours as needed for Pain., Starting Tue 6/6/2017, Print         CONTINUE these medications which have NOT CHANGED    Details   alprazolam (XANAX) 0.25 MG tablet TAKE ONE TABLET BY MOUTH TWICE DAILY AS NEEDED, Normal      cyclobenzaprine (FLEXERIL) 5 MG tablet Take 1 tablet (5 mg total) by mouth 3 (three) times daily as needed for Muscle spasms., Starting Mon 6/5/2017, Until Thu 6/15/2017, Normal      fluocinonide 0.05% (LIDEX) 0.05 % cream Apply topically 2 (two) times daily., Starting 3/26/2015, Until Wed 6/29/16, Print      lansoprazole (PREVACID) 15 MG capsule Take 1 capsule (15 mg total) by mouth once daily., Starting 9/25/2014, Until Wed 6/29/16, No Print      !! olmesartan (BENICAR) 20 MG tablet Take 1 tablet (20 mg total) by mouth once daily., Starting 12/19/2016, Until Discontinued, Normal      !! olmesartan (BENICAR) 20 MG tablet TAKE ONE TABLET BY MOUTH ONCE DAILY, Normal      tadalafil (CIALIS) 20 MG Tab Take 1 tablet (20 mg total) by mouth once daily., Starting 10/31/2016, Until Discontinued, Print       !! - Potential duplicate medications found. Please discuss with provider.      STOP taking these medications       hydrocodone-acetaminophen 10-325mg (NORCO)  mg Tab  Comments:   Reason for Stopping:         ibuprofen (ADVIL,MOTRIN) 800 MG tablet Comments:   Reason for Stopping:               Mark Mon MD  General Surgery  [facility]

## 2017-06-14 DIAGNOSIS — Z11.59 NEED FOR HEPATITIS C SCREENING TEST: Primary | ICD-10-CM

## 2017-06-19 ENCOUNTER — DOCUMENTATION ONLY (OUTPATIENT)
Dept: FAMILY MEDICINE | Facility: CLINIC | Age: 62
End: 2017-06-19

## 2017-06-19 NOTE — PROGRESS NOTES
Pre-Visit Chart Review  For Appointment Scheduled on 06/20/2017    Health Maintenance Due   Topic Date Due    Hepatitis C Screening  1955    Colonoscopy  10/12/2003    Zoster Vaccine  10/12/2013    Lipid Panel  03/29/2017

## 2017-06-20 ENCOUNTER — LAB VISIT (OUTPATIENT)
Dept: LAB | Facility: HOSPITAL | Age: 62
End: 2017-06-20
Attending: FAMILY MEDICINE
Payer: COMMERCIAL

## 2017-06-20 ENCOUNTER — OFFICE VISIT (OUTPATIENT)
Dept: FAMILY MEDICINE | Facility: CLINIC | Age: 62
End: 2017-06-20
Payer: COMMERCIAL

## 2017-06-20 VITALS
BODY MASS INDEX: 27.67 KG/M2 | WEIGHT: 193.31 LBS | SYSTOLIC BLOOD PRESSURE: 133 MMHG | TEMPERATURE: 98 F | DIASTOLIC BLOOD PRESSURE: 85 MMHG | HEART RATE: 75 BPM | HEIGHT: 70 IN

## 2017-06-20 DIAGNOSIS — D63.8 ANEMIA, CHRONIC DISEASE: ICD-10-CM

## 2017-06-20 DIAGNOSIS — I10 ESSENTIAL HYPERTENSION: Primary | ICD-10-CM

## 2017-06-20 DIAGNOSIS — F41.1 GAD (GENERALIZED ANXIETY DISORDER): ICD-10-CM

## 2017-06-20 DIAGNOSIS — K21.9 GASTROESOPHAGEAL REFLUX DISEASE WITHOUT ESOPHAGITIS: ICD-10-CM

## 2017-06-20 LAB
ANION GAP SERPL CALC-SCNC: 10 MMOL/L
BASOPHILS # BLD AUTO: 0.04 K/UL
BASOPHILS NFR BLD: 0.5 %
BUN SERPL-MCNC: 17 MG/DL
CALCIUM SERPL-MCNC: 10.3 MG/DL
CHLORIDE SERPL-SCNC: 101 MMOL/L
CO2 SERPL-SCNC: 26 MMOL/L
CREAT SERPL-MCNC: 1.2 MG/DL
DIFFERENTIAL METHOD: ABNORMAL
EOSINOPHIL # BLD AUTO: 0.2 K/UL
EOSINOPHIL NFR BLD: 2.3 %
ERYTHROCYTE [DISTWIDTH] IN BLOOD BY AUTOMATED COUNT: 13.2 %
EST. GFR  (AFRICAN AMERICAN): >60 ML/MIN/1.73 M^2
EST. GFR  (NON AFRICAN AMERICAN): >60 ML/MIN/1.73 M^2
GLUCOSE SERPL-MCNC: 97 MG/DL
HCT VFR BLD AUTO: 36.4 %
HGB BLD-MCNC: 11.8 G/DL
LYMPHOCYTES # BLD AUTO: 1 K/UL
LYMPHOCYTES NFR BLD: 12.5 %
MCH RBC QN AUTO: 32.2 PG
MCHC RBC AUTO-ENTMCNC: 32.4 %
MCV RBC AUTO: 99 FL
MONOCYTES # BLD AUTO: 0.7 K/UL
MONOCYTES NFR BLD: 9.1 %
NEUTROPHILS # BLD AUTO: 5.9 K/UL
NEUTROPHILS NFR BLD: 75 %
PLATELET # BLD AUTO: 630 K/UL
PMV BLD AUTO: 8.9 FL
POTASSIUM SERPL-SCNC: 4.2 MMOL/L
RBC # BLD AUTO: 3.67 M/UL
SODIUM SERPL-SCNC: 137 MMOL/L
WBC # BLD AUTO: 7.91 K/UL

## 2017-06-20 PROCEDURE — 99214 OFFICE O/P EST MOD 30 MIN: CPT | Mod: S$GLB,,, | Performed by: FAMILY MEDICINE

## 2017-06-20 PROCEDURE — 85025 COMPLETE CBC W/AUTO DIFF WBC: CPT

## 2017-06-20 PROCEDURE — 80048 BASIC METABOLIC PNL TOTAL CA: CPT

## 2017-06-20 PROCEDURE — 99999 PR PBB SHADOW E&M-EST. PATIENT-LVL IV: CPT | Mod: PBBFAC,,, | Performed by: FAMILY MEDICINE

## 2017-06-20 PROCEDURE — 36415 COLL VENOUS BLD VENIPUNCTURE: CPT | Mod: PO

## 2017-06-20 RX ORDER — METHOCARBAMOL 750 MG/1
750 TABLET, FILM COATED ORAL 4 TIMES DAILY
COMMUNITY
End: 2017-06-27 | Stop reason: SDUPTHER

## 2017-06-20 RX ORDER — CALC/MAG/B COMPLEX/D3/HERB 61
15 TABLET ORAL DAILY
Qty: 30 CAPSULE | Refills: 11
Start: 2017-06-20 | End: 2019-11-14 | Stop reason: SDUPTHER

## 2017-06-20 RX ORDER — ALPRAZOLAM 0.25 MG/1
0.25 TABLET ORAL 2 TIMES DAILY PRN
Qty: 60 TABLET | Refills: 1 | Status: SHIPPED | OUTPATIENT
Start: 2017-06-20 | End: 2017-09-05 | Stop reason: SDUPTHER

## 2017-06-20 RX ORDER — IBUPROFEN 200 MG
200 TABLET ORAL EVERY 6 HOURS PRN
Status: ON HOLD | COMMUNITY
End: 2017-07-05 | Stop reason: HOSPADM

## 2017-06-20 RX ORDER — LORATADINE 10 MG/1
10 TABLET ORAL DAILY PRN
COMMUNITY
End: 2017-09-21

## 2017-06-20 NOTE — PATIENT INSTRUCTIONS
Anemia  Anemia is a condition that occurs when your body does not have enough healthy red blood cells (RBCs). Your RBCs are the parts of your blood that carry oxygen throughout your body. A protein called hemoglobin allows your RBCs to absorb and release oxygen. Without enough RBCs or hemoglobin, your body doesn't get enough oxygen. Symptoms of anemia may then occur.    Symptoms of anemia  Some people with anemia have no symptoms. But most people have symptoms that range from mild to severe. These can include:  · Tiredness (fatigue)  · Weakness  · Pale skin  · Shortness of breath  · Dizziness or fainting  · Rapid heartbeat  · Trouble doing normal amounts of activity  · Jaundice (yellowing of your eyes, skin, or mouth; dark urine)  Causes of anemia  Anemia can occur when your body:  · Loses too much blood  · Does not make enough RBCs  · Destroys your RBCs at a faster rate than it can replace them  · Does not make a normal amount of hemoglobin in your RBCs  These problems can occur for many reasons, including:  · A condition that you are born with (congenital or inherited). This includes sickle cell disease or thalassemia.  · Heavy bleeding for any reason, including injury, surgery, childbirth, or even heavy menstrual periods.  · Being low in certain nutrients, such as iron, folate, or vitamin B12. This may be due to poor diet. Also, a condition like celiac disease or Crohn's disease can cause poor absorption of these nutrients  · Certain chronic conditions like diabetes, arthritis, or kidney disease.  · Certain chronic infections like tuberculosis or HIV.  · Exposure to certain medications, such as those used for chemotherapy.  There are different types of anemia. Your doctor can tell you more about the type of anemia you have and what may have caused it.  Diagnosing anemia  To diagnose anemia, your doctor gives you blood tests. These can include:  · Complete blood cell count (CBC). This test measures the amounts  of the different types of blood cells.  · Blood smear. This test checks the size and shape of your blood cells. To perform the test, your doctor views a drop of your blood under a microscope. Your doctor uses a stain to make the blood cells easier to see.  · Iron studies. These tests measure the amount of iron in your blood. Your body needs iron to make hemoglobin in your RBCs.  · Vitamin B12 and folate studies. These tests check for some of the components that help give RBCs a normal size and shape.  · Reticulocyte count. This test measures the amount of new RBCs that your bone marrow makes.  · Hemoglobin electrophoresis. This test checks for problems with your hemoglobin in RBCs.  Treating anemia  Treatment for anemia is based on the type of anemia, its cause, and the severity of your symptoms. Treatments may include:  · Diet changes. This involves increasing the amount of certain nutrients in your diet, such as iron, vitamin B12, or folate. Your doctor may also prescribe nutrient supplements.  · Medications. Certain medications treat the cause of your anemia. Others help build new RBCs or relieve symptoms. If a medication is the cause of your anemia, you may need to stop or change it.  · Blood transfusions. Replacing some of your blood can increase the number of healthy RBCs in your body.  · Surgery. In some cases, your doctor can do surgery to treat the underlying cause of anemia. If you need surgery, your doctor will explain the procedure and outline the risks and benefits for you.  Long-term concerns  If you have a certain type of anemia, you can expect a full recovery after treatment. If you have other types of anemia (especially a type you're born with), you will need to manage it for life. Your doctor can tell you more.  Date Last Reviewed: 4/27/2015  © 0894-0868 [employer]. [address] [place] [address], [place] [address]. All rights reserved. This information is not intended as a substitute for  professional medical care. Always follow your healthcare professional's instructions.

## 2017-06-20 NOTE — PROGRESS NOTES
"DATE OF PROCEDURE:  06/06/2017     SURGEON:  Joaquin Navarro M.D.     CO-SURGEON:  Trev Gregory M.D. of the Neurosurgery Service.     PREOPERATIVE DIAGNOSES:  L4 burst fracture with disruption of the posterior   ligamentous complex and left lower extremity radiculopathy.     POSTOPERATIVE DIAGNOSES:  L4 burst fracture with disruption of the posterior   ligamentous complex and left lower extremity radiculopathy.     PROCEDURES PERFORMED:  1.  Open reduction and internal fixation of L4 burst fracture.  2.  Posterior spinal fusion, L3 to L5.  3.  Posterior segmental instrumentation, L3 to L5.  4.  L4 laminectomy for decompression of epidural hematoma as well as   decompression of left L4 nerve root.  5.  Local bone grafting.  SUBJECTIVE:   Humberto Cantor is a 61 y.o. male who complains of low back pain for 3 week(s), recent fall, and surgery due to L4 burst fracture, He has mild pain, ambulating with a walker, positional with bending or lifting, without radiation down the legs. Wound dressing are clean, no fevers, no chills.  OBJECTIVE:  /85 (BP Location: Right arm, Patient Position: Sitting, BP Method: Automatic)   Pulse 75   Temp 97.9 °F (36.6 °C) (Oral)   Ht 5' 10" (1.778 m)   Wt 87.7 kg (193 lb 5.5 oz)   BMI 27.74 kg/m²    Patient appears to be in mild to moderate pain, antalgic gait noted. Lumbosacral spine area reveals no local tenderness or mass.  Painful and reduced LS ROM noted. Straight leg raise is neg at 90 degrees  Bilateral. DTR's, motor strength and sensation normal, including heel and toe gait.  Peripheral pulses are palpable. X-Ray: not indicated.Dressing clean, No fevers and chills, He has been sluggish at movement.  ASSESSMENT: s/p Laminectomy  degenerative disc disease without herniated disc  Essential hypertension    TORRIE (generalized anxiety disorder)  -     alprazolam (XANAX) 0.25 MG tablet; Take 1 tablet (0.25 mg total) by mouth 2 (two) times daily as needed.  Dispense: 60 tablet; " Refill: 1    Anemia, chronic disease  -     Basic metabolic panel; Future  -     CBC auto differential; Future    Gastroesophageal reflux disease without esophagitis  -     lansoprazole (PREVACID) 15 MG capsule; Take 1 capsule (15 mg total) by mouth once daily.  Dispense: 30 capsule; Refill: 11      Needs PT.  CC Dr Navarro  PLAN:  Call or return to clinic prn if these symptoms worsen or fail to improve as anticipated.He needs to participate with PT. He declined referral from me. He

## 2017-06-27 ENCOUNTER — HOSPITAL ENCOUNTER (OUTPATIENT)
Dept: RADIOLOGY | Facility: HOSPITAL | Age: 62
Discharge: HOME | End: 2017-06-27
Attending: ORTHOPAEDIC SURGERY
Payer: COMMERCIAL

## 2017-06-27 ENCOUNTER — OFFICE VISIT (OUTPATIENT)
Dept: ORTHOPEDICS | Facility: CLINIC | Age: 62
End: 2017-06-27
Payer: COMMERCIAL

## 2017-06-27 VITALS — HEIGHT: 70 IN | WEIGHT: 191.5 LBS | BODY MASS INDEX: 27.41 KG/M2

## 2017-06-27 DIAGNOSIS — S32.001D BURST FRACTURE OF LUMBAR VERTEBRA, WITH ROUTINE HEALING, SUBSEQUENT ENCOUNTER: ICD-10-CM

## 2017-06-27 DIAGNOSIS — Z98.1 S/P LUMBAR FUSION: ICD-10-CM

## 2017-06-27 DIAGNOSIS — S32.001D BURST FRACTURE OF LUMBAR VERTEBRA, WITH ROUTINE HEALING, SUBSEQUENT ENCOUNTER: Primary | ICD-10-CM

## 2017-06-27 PROCEDURE — 99024 POSTOP FOLLOW-UP VISIT: CPT | Mod: S$GLB,,, | Performed by: PHYSICIAN ASSISTANT

## 2017-06-27 PROCEDURE — 72131 CT LUMBAR SPINE W/O DYE: CPT | Mod: 26,,, | Performed by: RADIOLOGY

## 2017-06-27 PROCEDURE — 72131 CT LUMBAR SPINE W/O DYE: CPT | Mod: TC

## 2017-06-27 PROCEDURE — 72100 X-RAY EXAM L-S SPINE 2/3 VWS: CPT | Mod: 26,,, | Performed by: RADIOLOGY

## 2017-06-27 PROCEDURE — 72100 X-RAY EXAM L-S SPINE 2/3 VWS: CPT | Mod: TC

## 2017-06-27 PROCEDURE — 99999 PR PBB SHADOW E&M-EST. PATIENT-LVL III: CPT | Mod: PBBFAC,,, | Performed by: PHYSICIAN ASSISTANT

## 2017-06-27 RX ORDER — METHOCARBAMOL 750 MG/1
750 TABLET, FILM COATED ORAL 4 TIMES DAILY
Qty: 90 TABLET | Refills: 0 | Status: ON HOLD | OUTPATIENT
Start: 2017-06-27 | End: 2017-07-05 | Stop reason: HOSPADM

## 2017-06-27 RX ORDER — OXYCODONE AND ACETAMINOPHEN 10; 325 MG/1; MG/1
1 TABLET ORAL EVERY 4 HOURS PRN
Qty: 90 TABLET | Refills: 0 | Status: ON HOLD | OUTPATIENT
Start: 2017-06-27 | End: 2017-07-05 | Stop reason: HOSPADM

## 2017-06-27 NOTE — PROGRESS NOTES
Date: 06/27/2017    Supervising Physician: Joaquin Navarro M.D.    Date of Surgery: 6/6/2017    Procedure: ORIF L4 burst fracture, L3-L5 posterior spinal fusion, L4 laminectomy     History: Humberto Cantor is seen today for follow-up following the above listed procedure. Overall the patient is doing well but today notes he slipped in the shower yesterday.  He caught himself before he fell but says he is having increased pain in the back and left leg.  Overall he says his pain is improving since surgery.   Pain is well controlled with current pain medication.  He takes percocet and robaxin.   he denies fever, chills, and sweats since the time of the surgery.  He has not started home health PT because there was a problem with the insurance.  He is ambulating with a walker today.  He is wearing an LSO brace.       Exam: Post op dressing taken down.  Incision is healing well, clean, dry and intact.   There is no sign of infection. Neuro exam is stable. No signs of DVT.    Radiographs: imaging today shows backing out of the inferior screws.     Assessment/Plan: 3 weeks post op.    Doing well postoperatively. Will obtain CT scan for further evaluation    Refill percocet and robaxin given today.  We discussed discontinuing ibuprofen.  Patient understands and agrees.       I will plan to see the patient back for the next postop visit in 3 weeks with imaging.      Thank you for the opportunity to participate in this patient's care. Please give me a call if there are any concerns or questions.

## 2017-06-30 ENCOUNTER — OFFICE VISIT (OUTPATIENT)
Dept: ORTHOPEDICS | Facility: CLINIC | Age: 62
DRG: 460 | End: 2017-06-30
Payer: COMMERCIAL

## 2017-06-30 VITALS — HEIGHT: 70 IN | WEIGHT: 191.38 LBS | BODY MASS INDEX: 27.4 KG/M2

## 2017-06-30 DIAGNOSIS — S32.001K: Primary | ICD-10-CM

## 2017-06-30 DIAGNOSIS — S32.001D BURST FRACTURE OF LUMBAR VERTEBRA, WITH ROUTINE HEALING, SUBSEQUENT ENCOUNTER: Primary | ICD-10-CM

## 2017-06-30 DIAGNOSIS — M54.16 LUMBAR RADICULOPATHY: ICD-10-CM

## 2017-06-30 DIAGNOSIS — Z98.1 S/P LUMBAR FUSION: ICD-10-CM

## 2017-06-30 PROCEDURE — 99999 PR PBB SHADOW E&M-EST. PATIENT-LVL III: CPT | Mod: PBBFAC,,, | Performed by: PHYSICIAN ASSISTANT

## 2017-06-30 PROCEDURE — 99024 POSTOP FOLLOW-UP VISIT: CPT | Mod: S$GLB,,, | Performed by: PHYSICIAN ASSISTANT

## 2017-06-30 RX ORDER — MUPIROCIN 20 MG/G
1 OINTMENT TOPICAL
Status: CANCELLED | OUTPATIENT
Start: 2017-06-30

## 2017-06-30 NOTE — PROGRESS NOTES
Date: 06/30/2017    Supervising Physician: Joaquin Navarro M.D.    Date of Surgery: 6/6/2017    Procedure: ORIF L4 burst fracture, L3-L5 posterior spinal fusion, L4 laminectomy     History: Humberto Cantor is seen today for follow-up following the above listed procedure. He returns today after having a CT scan to evaluate for hardware failure.  The CT shows backing out of the inferior screws.  He continues to have back pain and left leg pain and worsening kyphosis due to pain.  He is ambulating with a walker.      Exam: Post op dressing taken down.  Incision is healing well, clean, dry and intact.   There is no sign of infection. Neuro exam is stable. No signs of DVT.  He is ambulating with a walker and leaning forward to relieve pain.      Radiographs: imaging shows backing out of the inferior screws.     CT scan shows loosening of the inferior screws.      Assessment/Plan: 3 weeks post op.    Patient seen and examined with Dr. Navarro.  We discussed at length all treatment options including observation with possibly hardware removal in 6 months, and a revision surgery including lumbar fusion to pelvis.  The patient has decided to proceed with a revision surgery planned for Monday, 7/3.

## 2017-07-02 ENCOUNTER — ANESTHESIA EVENT (OUTPATIENT)
Dept: SURGERY | Facility: HOSPITAL | Age: 62
DRG: 460 | End: 2017-07-02
Payer: COMMERCIAL

## 2017-07-03 ENCOUNTER — ANESTHESIA (OUTPATIENT)
Dept: SURGERY | Facility: HOSPITAL | Age: 62
DRG: 460 | End: 2017-07-03
Payer: COMMERCIAL

## 2017-07-03 ENCOUNTER — HOSPITAL ENCOUNTER (INPATIENT)
Facility: HOSPITAL | Age: 62
LOS: 4 days | Discharge: HOME HEALTH | DRG: 460 | End: 2017-07-07
Attending: ORTHOPAEDIC SURGERY | Admitting: ORTHOPAEDIC SURGERY
Payer: COMMERCIAL

## 2017-07-03 DIAGNOSIS — I10 ESSENTIAL HYPERTENSION: ICD-10-CM

## 2017-07-03 DIAGNOSIS — S32.001K: Primary | ICD-10-CM

## 2017-07-03 DIAGNOSIS — K21.9 GASTROESOPHAGEAL REFLUX DISEASE, ESOPHAGITIS PRESENCE NOT SPECIFIED: ICD-10-CM

## 2017-07-03 PROBLEM — S32.001A LUMBAR BURST FRACTURE: Status: ACTIVE | Noted: 2017-07-03

## 2017-07-03 LAB
ABO + RH BLD: NORMAL
ALBUMIN SERPL BCP-MCNC: 3.1 G/DL
ALP SERPL-CCNC: 79 U/L
ALT SERPL W/O P-5'-P-CCNC: 16 U/L
ANION GAP SERPL CALC-SCNC: 11 MMOL/L
AST SERPL-CCNC: 24 U/L
BASOPHILS # BLD AUTO: 0.02 K/UL
BASOPHILS # BLD AUTO: 0.05 K/UL
BASOPHILS NFR BLD: 0.3 %
BASOPHILS NFR BLD: 0.9 %
BILIRUB SERPL-MCNC: 0.3 MG/DL
BLD GP AB SCN CELLS X3 SERPL QL: NORMAL
BUN SERPL-MCNC: 13 MG/DL
CALCIUM SERPL-MCNC: 8.8 MG/DL
CHLORIDE SERPL-SCNC: 103 MMOL/L
CO2 SERPL-SCNC: 22 MMOL/L
CREAT SERPL-MCNC: 0.9 MG/DL
DIFFERENTIAL METHOD: ABNORMAL
DIFFERENTIAL METHOD: ABNORMAL
EOSINOPHIL # BLD AUTO: 0 K/UL
EOSINOPHIL # BLD AUTO: 0.1 K/UL
EOSINOPHIL NFR BLD: 0.3 %
EOSINOPHIL NFR BLD: 1.9 %
ERYTHROCYTE [DISTWIDTH] IN BLOOD BY AUTOMATED COUNT: 12.5 %
ERYTHROCYTE [DISTWIDTH] IN BLOOD BY AUTOMATED COUNT: 12.6 %
EST. GFR  (AFRICAN AMERICAN): >60 ML/MIN/1.73 M^2
EST. GFR  (NON AFRICAN AMERICAN): >60 ML/MIN/1.73 M^2
GLUCOSE SERPL-MCNC: 101 MG/DL
GLUCOSE SERPL-MCNC: 88 MG/DL (ref 70–110)
HCO3 UR-SCNC: 21 MMOL/L (ref 24–28)
HCT VFR BLD AUTO: 33.2 %
HCT VFR BLD AUTO: 35.1 %
HCT VFR BLD CALC: 24 %PCV (ref 36–54)
HGB BLD-MCNC: 11.1 G/DL
HGB BLD-MCNC: 11.6 G/DL
LYMPHOCYTES # BLD AUTO: 0.5 K/UL
LYMPHOCYTES # BLD AUTO: 0.8 K/UL
LYMPHOCYTES NFR BLD: 14.7 %
LYMPHOCYTES NFR BLD: 7.4 %
MAGNESIUM SERPL-MCNC: 1.7 MG/DL
MCH RBC QN AUTO: 32.2 PG
MCH RBC QN AUTO: 32.7 PG
MCHC RBC AUTO-ENTMCNC: 33 %
MCHC RBC AUTO-ENTMCNC: 33.4 %
MCV RBC AUTO: 98 FL
MCV RBC AUTO: 98 FL
MONOCYTES # BLD AUTO: 0.1 K/UL
MONOCYTES # BLD AUTO: 0.4 K/UL
MONOCYTES NFR BLD: 1.2 %
MONOCYTES NFR BLD: 7.8 %
NEUTROPHILS # BLD AUTO: 4 K/UL
NEUTROPHILS # BLD AUTO: 6.1 K/UL
NEUTROPHILS NFR BLD: 74.5 %
NEUTROPHILS NFR BLD: 89.9 %
PCO2 BLDA: 35 MMHG (ref 35–45)
PH SMN: 7.39 [PH] (ref 7.35–7.45)
PHOSPHATE SERPL-MCNC: 2.9 MG/DL
PLATELET # BLD AUTO: 338 K/UL
PLATELET # BLD AUTO: 364 K/UL
PMV BLD AUTO: 9 FL
PMV BLD AUTO: 9.1 FL
PO2 BLDA: 29 MMHG (ref 40–60)
POC BE: -4 MMOL/L
POC IONIZED CALCIUM: 1.12 MMOL/L (ref 1.06–1.42)
POC SATURATED O2: 54 % (ref 95–100)
POC TCO2: 22 MMOL/L (ref 24–29)
POTASSIUM BLD-SCNC: 3.4 MMOL/L (ref 3.5–5.1)
POTASSIUM SERPL-SCNC: 4.7 MMOL/L
PROT SERPL-MCNC: 6 G/DL
RBC # BLD AUTO: 3.39 M/UL
RBC # BLD AUTO: 3.6 M/UL
SAMPLE: ABNORMAL
SODIUM BLD-SCNC: 141 MMOL/L (ref 136–145)
SODIUM SERPL-SCNC: 136 MMOL/L
WBC # BLD AUTO: 5.38 K/UL
WBC # BLD AUTO: 6.75 K/UL

## 2017-07-03 PROCEDURE — C1713 ANCHOR/SCREW BN/BN,TIS/BN: HCPCS | Performed by: ORTHOPAEDIC SURGERY

## 2017-07-03 PROCEDURE — 63600175 PHARM REV CODE 636 W HCPCS: Performed by: ORTHOPAEDIC SURGERY

## 2017-07-03 PROCEDURE — 0SG1071 FUSION OF 2 OR MORE LUMBAR VERTEBRAL JOINTS WITH AUTOLOGOUS TISSUE SUBSTITUTE, POSTERIOR APPROACH, POSTERIOR COLUMN, OPEN APPROACH: ICD-10-PCS | Performed by: ORTHOPAEDIC SURGERY

## 2017-07-03 PROCEDURE — 0SP00JZ REMOVAL OF SYNTHETIC SUBSTITUTE FROM LUMBAR VERTEBRAL JOINT, OPEN APPROACH: ICD-10-PCS | Performed by: ORTHOPAEDIC SURGERY

## 2017-07-03 PROCEDURE — 0QH204Z INSERTION OF INTERNAL FIXATION DEVICE INTO RIGHT PELVIC BONE, OPEN APPROACH: ICD-10-PCS | Performed by: ORTHOPAEDIC SURGERY

## 2017-07-03 PROCEDURE — 0SG3071 FUSION OF LUMBOSACRAL JOINT WITH AUTOLOGOUS TISSUE SUBSTITUTE, POSTERIOR APPROACH, POSTERIOR COLUMN, OPEN APPROACH: ICD-10-PCS | Performed by: ORTHOPAEDIC SURGERY

## 2017-07-03 PROCEDURE — 25000003 PHARM REV CODE 250: Performed by: ORTHOPAEDIC SURGERY

## 2017-07-03 PROCEDURE — 36415 COLL VENOUS BLD VENIPUNCTURE: CPT

## 2017-07-03 PROCEDURE — 22848 INSERT PELV FIXATION DEVICE: CPT | Mod: 59,,, | Performed by: ORTHOPAEDIC SURGERY

## 2017-07-03 PROCEDURE — 20600001 HC STEP DOWN PRIVATE ROOM

## 2017-07-03 PROCEDURE — 25000003 PHARM REV CODE 250: Performed by: STUDENT IN AN ORGANIZED HEALTH CARE EDUCATION/TRAINING PROGRAM

## 2017-07-03 PROCEDURE — 63600175 PHARM REV CODE 636 W HCPCS: Performed by: NURSE ANESTHETIST, CERTIFIED REGISTERED

## 2017-07-03 PROCEDURE — 84100 ASSAY OF PHOSPHORUS: CPT

## 2017-07-03 PROCEDURE — 25000003 PHARM REV CODE 250: Performed by: NURSE ANESTHETIST, CERTIFIED REGISTERED

## 2017-07-03 PROCEDURE — 20930 SP BONE ALGRFT MORSEL ADD-ON: CPT | Mod: ,,, | Performed by: ORTHOPAEDIC SURGERY

## 2017-07-03 PROCEDURE — C1751 CATH, INF, PER/CENT/MIDLINE: HCPCS | Performed by: NURSE ANESTHETIST, CERTIFIED REGISTERED

## 2017-07-03 PROCEDURE — 99900035 HC TECH TIME PER 15 MIN (STAT)

## 2017-07-03 PROCEDURE — 27100021 HC MULTIPORT INFUSION MANIFOLD: Performed by: NURSE ANESTHETIST, CERTIFIED REGISTERED

## 2017-07-03 PROCEDURE — 37000008 HC ANESTHESIA 1ST 15 MINUTES: Performed by: ORTHOPAEDIC SURGERY

## 2017-07-03 PROCEDURE — 22830 EXPLORATION OF SPINAL FUSION: CPT | Mod: 78,59,62, | Performed by: NEUROLOGICAL SURGERY

## 2017-07-03 PROCEDURE — 22612 ARTHRD PST TQ 1NTRSPC LUMBAR: CPT | Mod: 78,62,, | Performed by: ORTHOPAEDIC SURGERY

## 2017-07-03 PROCEDURE — 20936 SP BONE AGRFT LOCAL ADD-ON: CPT | Mod: ,,, | Performed by: ORTHOPAEDIC SURGERY

## 2017-07-03 PROCEDURE — 63600175 PHARM REV CODE 636 W HCPCS: Performed by: ANESTHESIOLOGY

## 2017-07-03 PROCEDURE — D9220A PRA ANESTHESIA: Mod: ANES,,, | Performed by: ANESTHESIOLOGY

## 2017-07-03 PROCEDURE — 88300 SURGICAL PATH GROSS: CPT | Performed by: PATHOLOGY

## 2017-07-03 PROCEDURE — 86901 BLOOD TYPING SEROLOGIC RH(D): CPT

## 2017-07-03 PROCEDURE — 71000039 HC RECOVERY, EACH ADD'L HOUR: Performed by: ORTHOPAEDIC SURGERY

## 2017-07-03 PROCEDURE — 85025 COMPLETE CBC W/AUTO DIFF WBC: CPT | Mod: 91

## 2017-07-03 PROCEDURE — 36000711: Performed by: ORTHOPAEDIC SURGERY

## 2017-07-03 PROCEDURE — 63600175 PHARM REV CODE 636 W HCPCS: Performed by: STUDENT IN AN ORGANIZED HEALTH CARE EDUCATION/TRAINING PROGRAM

## 2017-07-03 PROCEDURE — 22614 ARTHRD PST TQ 1NTRSPC EA ADD: CPT | Mod: 78,62,, | Performed by: NEUROLOGICAL SURGERY

## 2017-07-03 PROCEDURE — 22614 ARTHRD PST TQ 1NTRSPC EA ADD: CPT | Mod: 78,62,, | Performed by: ORTHOPAEDIC SURGERY

## 2017-07-03 PROCEDURE — 83735 ASSAY OF MAGNESIUM: CPT

## 2017-07-03 PROCEDURE — 22612 ARTHRD PST TQ 1NTRSPC LUMBAR: CPT | Mod: 78,62,, | Performed by: NEUROLOGICAL SURGERY

## 2017-07-03 PROCEDURE — 37000009 HC ANESTHESIA EA ADD 15 MINS: Performed by: ORTHOPAEDIC SURGERY

## 2017-07-03 PROCEDURE — 22842 INSERT SPINE FIXATION DEVICE: CPT | Mod: ,,, | Performed by: ORTHOPAEDIC SURGERY

## 2017-07-03 PROCEDURE — 27201423 OPTIME MED/SURG SUP & DEVICES STERILE SUPPLY: Performed by: ORTHOPAEDIC SURGERY

## 2017-07-03 PROCEDURE — 94770 HC EXHALED C02 TEST: CPT

## 2017-07-03 PROCEDURE — D9220A PRA ANESTHESIA: Mod: CRNA,,, | Performed by: NURSE ANESTHETIST, CERTIFIED REGISTERED

## 2017-07-03 PROCEDURE — 36000710: Performed by: ORTHOPAEDIC SURGERY

## 2017-07-03 PROCEDURE — 27800903 OPTIME MED/SURG SUP & DEVICES OTHER IMPLANTS: Performed by: ORTHOPAEDIC SURGERY

## 2017-07-03 PROCEDURE — 22830 EXPLORATION OF SPINAL FUSION: CPT | Mod: 78,59,62, | Performed by: ORTHOPAEDIC SURGERY

## 2017-07-03 PROCEDURE — 25000003 PHARM REV CODE 250: Performed by: ANESTHESIOLOGY

## 2017-07-03 PROCEDURE — 71000033 HC RECOVERY, INTIAL HOUR: Performed by: ORTHOPAEDIC SURGERY

## 2017-07-03 PROCEDURE — 88300 SURGICAL PATH GROSS: CPT | Mod: 26,,, | Performed by: PATHOLOGY

## 2017-07-03 PROCEDURE — 27100025 HC TUBING, SET FLUID WARMER: Performed by: NURSE ANESTHETIST, CERTIFIED REGISTERED

## 2017-07-03 PROCEDURE — 80053 COMPREHEN METABOLIC PANEL: CPT

## 2017-07-03 PROCEDURE — 86920 COMPATIBILITY TEST SPIN: CPT

## 2017-07-03 PROCEDURE — 86900 BLOOD TYPING SEROLOGIC ABO: CPT

## 2017-07-03 PROCEDURE — 94799 UNLISTED PULMONARY SVC/PX: CPT

## 2017-07-03 PROCEDURE — 4A11X4G MONITORING OF PERIPHERAL NERVOUS ELECTRICAL ACTIVITY, INTRAOPERATIVE, EXTERNAL APPROACH: ICD-10-PCS | Performed by: ORTHOPAEDIC SURGERY

## 2017-07-03 DEVICE — SCREW BONE SPINAL CORT 5X45MM: Type: IMPLANTABLE DEVICE | Site: BACK | Status: FUNCTIONAL

## 2017-07-03 DEVICE — SCREW SPINAL 5.5 8 X 80MM: Type: IMPLANTABLE DEVICE | Site: BACK | Status: FUNCTIONAL

## 2017-07-03 DEVICE — BONE CHIP CANC 1.7-10MM 15ML: Type: IMPLANTABLE DEVICE | Site: BACK | Status: FUNCTIONAL

## 2017-07-03 DEVICE — SCREW INNER SINGLE SET TITANIU: Type: IMPLANTABLE DEVICE | Site: BACK | Status: FUNCTIONAL

## 2017-07-03 DEVICE — ROD SPINAL PRECUT 5.5 X 480MM: Type: IMPLANTABLE DEVICE | Site: BACK | Status: FUNCTIONAL

## 2017-07-03 DEVICE — IMPLANTABLE DEVICE: Type: IMPLANTABLE DEVICE | Site: BACK | Status: FUNCTIONAL

## 2017-07-03 DEVICE — SCREW BONE SPINAL CORICAL 6X40: Type: IMPLANTABLE DEVICE | Site: BACK | Status: FUNCTIONAL

## 2017-07-03 DEVICE — CHIPS CANCELLOUS 30CC: Type: IMPLANTABLE DEVICE | Site: BACK | Status: FUNCTIONAL

## 2017-07-03 DEVICE — KIT MED BONE INFUSE: Type: IMPLANTABLE DEVICE | Site: BACK | Status: FUNCTIONAL

## 2017-07-03 RX ORDER — AMOXICILLIN 250 MG
1 CAPSULE ORAL 2 TIMES DAILY
Status: DISCONTINUED | OUTPATIENT
Start: 2017-07-03 | End: 2017-07-07 | Stop reason: HOSPADM

## 2017-07-03 RX ORDER — ONDANSETRON 2 MG/ML
4 INJECTION INTRAMUSCULAR; INTRAVENOUS ONCE AS NEEDED
Status: DISCONTINUED | OUTPATIENT
Start: 2017-07-03 | End: 2017-07-03 | Stop reason: HOSPADM

## 2017-07-03 RX ORDER — SODIUM CHLORIDE 9 MG/ML
INJECTION, SOLUTION INTRAVENOUS CONTINUOUS
Status: ACTIVE | OUTPATIENT
Start: 2017-07-03 | End: 2017-07-04

## 2017-07-03 RX ORDER — BUPIVACAINE HYDROCHLORIDE 2.5 MG/ML
INJECTION, SOLUTION EPIDURAL; INFILTRATION; INTRACAUDAL
Status: DISCONTINUED | OUTPATIENT
Start: 2017-07-03 | End: 2017-07-03 | Stop reason: HOSPADM

## 2017-07-03 RX ORDER — HYDROMORPHONE HYDROCHLORIDE 1 MG/ML
0.2 INJECTION, SOLUTION INTRAMUSCULAR; INTRAVENOUS; SUBCUTANEOUS EVERY 5 MIN PRN
Status: DISCONTINUED | OUTPATIENT
Start: 2017-07-03 | End: 2017-07-03 | Stop reason: HOSPADM

## 2017-07-03 RX ORDER — ALPRAZOLAM 0.25 MG/1
0.25 TABLET ORAL 2 TIMES DAILY PRN
Status: DISCONTINUED | OUTPATIENT
Start: 2017-07-03 | End: 2017-07-07 | Stop reason: HOSPADM

## 2017-07-03 RX ORDER — DEXAMETHASONE SODIUM PHOSPHATE 4 MG/ML
INJECTION, SOLUTION INTRA-ARTICULAR; INTRALESIONAL; INTRAMUSCULAR; INTRAVENOUS; SOFT TISSUE
Status: DISCONTINUED | OUTPATIENT
Start: 2017-07-03 | End: 2017-07-03

## 2017-07-03 RX ORDER — OXYCODONE HYDROCHLORIDE 5 MG/1
5 TABLET ORAL
Status: DISCONTINUED | OUTPATIENT
Start: 2017-07-03 | End: 2017-07-07 | Stop reason: HOSPADM

## 2017-07-03 RX ORDER — NEOSTIGMINE METHYLSULFATE 1 MG/ML
INJECTION, SOLUTION INTRAVENOUS
Status: DISCONTINUED | OUTPATIENT
Start: 2017-07-03 | End: 2017-07-03

## 2017-07-03 RX ORDER — SODIUM CHLORIDE 9 MG/ML
INJECTION, SOLUTION INTRAVENOUS CONTINUOUS
Status: DISCONTINUED | OUTPATIENT
Start: 2017-07-03 | End: 2017-07-07

## 2017-07-03 RX ORDER — SODIUM CHLORIDE 0.9 % (FLUSH) 0.9 %
3 SYRINGE (ML) INJECTION EVERY 8 HOURS
Status: DISCONTINUED | OUTPATIENT
Start: 2017-07-04 | End: 2017-07-07 | Stop reason: HOSPADM

## 2017-07-03 RX ORDER — OXYCODONE HYDROCHLORIDE 5 MG/1
10 TABLET ORAL
Status: DISCONTINUED | OUTPATIENT
Start: 2017-07-03 | End: 2017-07-07 | Stop reason: HOSPADM

## 2017-07-03 RX ORDER — SODIUM CHLORIDE 0.9 % (FLUSH) 0.9 %
3 SYRINGE (ML) INJECTION
Status: DISCONTINUED | OUTPATIENT
Start: 2017-07-03 | End: 2017-07-03 | Stop reason: HOSPADM

## 2017-07-03 RX ORDER — PROPOFOL 10 MG/ML
VIAL (ML) INTRAVENOUS
Status: DISCONTINUED | OUTPATIENT
Start: 2017-07-03 | End: 2017-07-03

## 2017-07-03 RX ORDER — HYDROMORPHONE HCL IN 0.9% NACL 6 MG/30 ML
PATIENT CONTROLLED ANALGESIA SYRINGE INTRAVENOUS CONTINUOUS
Status: DISCONTINUED | OUTPATIENT
Start: 2017-07-03 | End: 2017-07-04

## 2017-07-03 RX ORDER — ACETAMINOPHEN 10 MG/ML
1000 INJECTION, SOLUTION INTRAVENOUS ONCE
Status: COMPLETED | OUTPATIENT
Start: 2017-07-03 | End: 2017-07-03

## 2017-07-03 RX ORDER — LIDOCAINE HYDROCHLORIDE 10 MG/ML
1 INJECTION, SOLUTION EPIDURAL; INFILTRATION; INTRACAUDAL; PERINEURAL ONCE
Status: COMPLETED | OUTPATIENT
Start: 2017-07-03 | End: 2017-07-03

## 2017-07-03 RX ORDER — LACTULOSE 10 G/15ML
20 SOLUTION ORAL EVERY 6 HOURS PRN
Status: DISCONTINUED | OUTPATIENT
Start: 2017-07-03 | End: 2017-07-07 | Stop reason: HOSPADM

## 2017-07-03 RX ORDER — CETIRIZINE HYDROCHLORIDE 10 MG/1
10 TABLET ORAL DAILY
Status: DISCONTINUED | OUTPATIENT
Start: 2017-07-04 | End: 2017-07-07 | Stop reason: HOSPADM

## 2017-07-03 RX ORDER — CEFAZOLIN SODIUM 2 G/50ML
2 SOLUTION INTRAVENOUS ONCE
Status: COMPLETED | OUTPATIENT
Start: 2017-07-03 | End: 2017-07-03

## 2017-07-03 RX ORDER — HYDROMORPHONE HYDROCHLORIDE 1 MG/ML
0.2 INJECTION, SOLUTION INTRAMUSCULAR; INTRAVENOUS; SUBCUTANEOUS EVERY 5 MIN PRN
Status: COMPLETED | OUTPATIENT
Start: 2017-07-03 | End: 2017-07-03

## 2017-07-03 RX ORDER — SODIUM CHLORIDE 9 MG/ML
INJECTION, SOLUTION INTRAVENOUS CONTINUOUS PRN
Status: DISCONTINUED | OUTPATIENT
Start: 2017-07-03 | End: 2017-07-03

## 2017-07-03 RX ORDER — PANTOPRAZOLE SODIUM 40 MG/1
40 TABLET, DELAYED RELEASE ORAL DAILY
Status: DISCONTINUED | OUTPATIENT
Start: 2017-07-04 | End: 2017-07-07 | Stop reason: HOSPADM

## 2017-07-03 RX ORDER — PROMETHAZINE HYDROCHLORIDE 12.5 MG/1
25 TABLET ORAL EVERY 6 HOURS PRN
Status: DISCONTINUED | OUTPATIENT
Start: 2017-07-03 | End: 2017-07-07 | Stop reason: HOSPADM

## 2017-07-03 RX ORDER — ACETAMINOPHEN 500 MG
1000 TABLET ORAL EVERY 6 HOURS
Status: DISCONTINUED | OUTPATIENT
Start: 2017-07-03 | End: 2017-07-07 | Stop reason: HOSPADM

## 2017-07-03 RX ORDER — POLYETHYLENE GLYCOL 3350 17 G/17G
17 POWDER, FOR SOLUTION ORAL DAILY
Status: DISCONTINUED | OUTPATIENT
Start: 2017-07-04 | End: 2017-07-07 | Stop reason: HOSPADM

## 2017-07-03 RX ORDER — NALOXONE HCL 0.4 MG/ML
0.02 VIAL (ML) INJECTION
Status: DISCONTINUED | OUTPATIENT
Start: 2017-07-03 | End: 2017-07-04

## 2017-07-03 RX ORDER — FENTANYL CITRATE 50 UG/ML
INJECTION, SOLUTION INTRAMUSCULAR; INTRAVENOUS
Status: DISCONTINUED | OUTPATIENT
Start: 2017-07-03 | End: 2017-07-03

## 2017-07-03 RX ORDER — NICARDIPINE HYDROCHLORIDE 2.5 MG/ML
INJECTION INTRAVENOUS
Status: DISCONTINUED | OUTPATIENT
Start: 2017-07-03 | End: 2017-07-03

## 2017-07-03 RX ORDER — OLMESARTAN MEDOXOMIL 20 MG/1
20 TABLET ORAL DAILY
Status: DISCONTINUED | OUTPATIENT
Start: 2017-07-04 | End: 2017-07-07 | Stop reason: HOSPADM

## 2017-07-03 RX ORDER — HEPARIN SODIUM 5000 [USP'U]/ML
5000 INJECTION, SOLUTION INTRAVENOUS; SUBCUTANEOUS EVERY 8 HOURS
Status: DISCONTINUED | OUTPATIENT
Start: 2017-07-04 | End: 2017-07-07 | Stop reason: HOSPADM

## 2017-07-03 RX ORDER — BACITRACIN 50000 [IU]/1
INJECTION, POWDER, FOR SOLUTION INTRAMUSCULAR
Status: DISCONTINUED | OUTPATIENT
Start: 2017-07-03 | End: 2017-07-03 | Stop reason: HOSPADM

## 2017-07-03 RX ORDER — ONDANSETRON 2 MG/ML
4 INJECTION INTRAMUSCULAR; INTRAVENOUS EVERY 12 HOURS PRN
Status: DISCONTINUED | OUTPATIENT
Start: 2017-07-03 | End: 2017-07-07 | Stop reason: HOSPADM

## 2017-07-03 RX ORDER — MUPIROCIN 20 MG/G
1 OINTMENT TOPICAL 2 TIMES DAILY
Status: DISCONTINUED | OUTPATIENT
Start: 2017-07-03 | End: 2017-07-07 | Stop reason: HOSPADM

## 2017-07-03 RX ORDER — OXYCODONE HYDROCHLORIDE 5 MG/1
15 TABLET ORAL
Status: DISCONTINUED | OUTPATIENT
Start: 2017-07-03 | End: 2017-07-07 | Stop reason: HOSPADM

## 2017-07-03 RX ORDER — RAMELTEON 8 MG/1
8 TABLET ORAL NIGHTLY PRN
Status: DISCONTINUED | OUTPATIENT
Start: 2017-07-03 | End: 2017-07-07 | Stop reason: HOSPADM

## 2017-07-03 RX ORDER — PROPOFOL 10 MG/ML
VIAL (ML) INTRAVENOUS CONTINUOUS PRN
Status: DISCONTINUED | OUTPATIENT
Start: 2017-07-03 | End: 2017-07-03

## 2017-07-03 RX ORDER — ACETAMINOPHEN 10 MG/ML
INJECTION, SOLUTION INTRAVENOUS
Status: DISCONTINUED | OUTPATIENT
Start: 2017-07-03 | End: 2017-07-03

## 2017-07-03 RX ORDER — LIDOCAINE HYDROCHLORIDE AND EPINEPHRINE 10; 10 MG/ML; UG/ML
INJECTION, SOLUTION INFILTRATION; PERINEURAL
Status: DISCONTINUED | OUTPATIENT
Start: 2017-07-03 | End: 2017-07-03 | Stop reason: HOSPADM

## 2017-07-03 RX ORDER — MUPIROCIN 20 MG/G
1 OINTMENT TOPICAL
Status: COMPLETED | OUTPATIENT
Start: 2017-07-03 | End: 2017-07-03

## 2017-07-03 RX ORDER — BISACODYL 10 MG
10 SUPPOSITORY, RECTAL RECTAL EVERY 12 HOURS PRN
Status: DISCONTINUED | OUTPATIENT
Start: 2017-07-03 | End: 2017-07-07 | Stop reason: HOSPADM

## 2017-07-03 RX ORDER — HEPARIN SODIUM 5000 [USP'U]/ML
5000 INJECTION, SOLUTION INTRAVENOUS; SUBCUTANEOUS EVERY 12 HOURS
Status: DISCONTINUED | OUTPATIENT
Start: 2017-07-04 | End: 2017-07-03

## 2017-07-03 RX ORDER — KETAMINE HYDROCHLORIDE 100 MG/ML
INJECTION, SOLUTION INTRAMUSCULAR; INTRAVENOUS
Status: DISCONTINUED | OUTPATIENT
Start: 2017-07-03 | End: 2017-07-03

## 2017-07-03 RX ORDER — METHOCARBAMOL 750 MG/1
750 TABLET, FILM COATED ORAL 4 TIMES DAILY
Status: DISCONTINUED | OUTPATIENT
Start: 2017-07-03 | End: 2017-07-07 | Stop reason: HOSPADM

## 2017-07-03 RX ORDER — LIDOCAINE HCL/PF 100 MG/5ML
SYRINGE (ML) INTRAVENOUS
Status: DISCONTINUED | OUTPATIENT
Start: 2017-07-03 | End: 2017-07-03

## 2017-07-03 RX ORDER — CEFAZOLIN SODIUM 2 G/50ML
2 SOLUTION INTRAVENOUS
Status: DISCONTINUED | OUTPATIENT
Start: 2017-07-03 | End: 2017-07-04

## 2017-07-03 RX ORDER — ONDANSETRON 2 MG/ML
INJECTION INTRAMUSCULAR; INTRAVENOUS
Status: DISCONTINUED | OUTPATIENT
Start: 2017-07-03 | End: 2017-07-03

## 2017-07-03 RX ORDER — MIDAZOLAM HYDROCHLORIDE 1 MG/ML
INJECTION, SOLUTION INTRAMUSCULAR; INTRAVENOUS
Status: DISCONTINUED | OUTPATIENT
Start: 2017-07-03 | End: 2017-07-03

## 2017-07-03 RX ORDER — GLYCOPYRROLATE 0.2 MG/ML
INJECTION INTRAMUSCULAR; INTRAVENOUS
Status: DISCONTINUED | OUTPATIENT
Start: 2017-07-03 | End: 2017-07-03

## 2017-07-03 RX ORDER — ROCURONIUM BROMIDE 10 MG/ML
INJECTION, SOLUTION INTRAVENOUS
Status: DISCONTINUED | OUTPATIENT
Start: 2017-07-03 | End: 2017-07-03

## 2017-07-03 RX ADMIN — MUPIROCIN 1 G: 20 OINTMENT TOPICAL at 08:07

## 2017-07-03 RX ADMIN — SODIUM CHLORIDE: 0.9 INJECTION, SOLUTION INTRAVENOUS at 12:07

## 2017-07-03 RX ADMIN — HYDROMORPHONE HYDROCHLORIDE 0.2 MG: 1 INJECTION, SOLUTION INTRAMUSCULAR; INTRAVENOUS; SUBCUTANEOUS at 12:07

## 2017-07-03 RX ADMIN — HYDROMORPHONE HYDROCHLORIDE 0.2 MG: 1 INJECTION, SOLUTION INTRAMUSCULAR; INTRAVENOUS; SUBCUTANEOUS at 05:07

## 2017-07-03 RX ADMIN — SODIUM CHLORIDE, SODIUM GLUCONATE, SODIUM ACETATE, POTASSIUM CHLORIDE, MAGNESIUM CHLORIDE, SODIUM PHOSPHATE, DIBASIC, AND POTASSIUM PHOSPHATE: .53; .5; .37; .037; .03; .012; .00082 INJECTION, SOLUTION INTRAVENOUS at 04:07

## 2017-07-03 RX ADMIN — PROPOFOL 200 MCG/KG/MIN: 10 INJECTION, EMULSION INTRAVENOUS at 02:07

## 2017-07-03 RX ADMIN — LIDOCAINE HYDROCHLORIDE 40 MG: 20 INJECTION, SOLUTION INTRAVENOUS at 01:07

## 2017-07-03 RX ADMIN — KETAMINE HYDROCHLORIDE 50 MG: 100 INJECTION, SOLUTION, CONCENTRATE INTRAMUSCULAR; INTRAVENOUS at 01:07

## 2017-07-03 RX ADMIN — HYDROMORPHONE HYDROCHLORIDE 1 MG: 1 INJECTION, SOLUTION INTRAMUSCULAR; INTRAVENOUS; SUBCUTANEOUS at 01:07

## 2017-07-03 RX ADMIN — ACETAMINOPHEN 1000 MG: 10 INJECTION, SOLUTION INTRAVENOUS at 05:07

## 2017-07-03 RX ADMIN — ROCURONIUM BROMIDE 40 MG: 10 INJECTION, SOLUTION INTRAVENOUS at 01:07

## 2017-07-03 RX ADMIN — LIDOCAINE HYDROCHLORIDE 0.1 ML: 10 INJECTION, SOLUTION EPIDURAL; INFILTRATION; INTRACAUDAL; PERINEURAL at 12:07

## 2017-07-03 RX ADMIN — METHOCARBAMOL 750 MG: 750 TABLET ORAL at 11:07

## 2017-07-03 RX ADMIN — GLYCOPYRROLATE 0.2 MG: 0.2 INJECTION, SOLUTION INTRAMUSCULAR; INTRAVENOUS at 04:07

## 2017-07-03 RX ADMIN — SODIUM CHLORIDE: 0.9 INJECTION, SOLUTION INTRAVENOUS at 02:07

## 2017-07-03 RX ADMIN — OXYCODONE HYDROCHLORIDE 15 MG: 5 TABLET ORAL at 05:07

## 2017-07-03 RX ADMIN — DEXAMETHASONE SODIUM PHOSPHATE 12 MG: 4 INJECTION, SOLUTION INTRAMUSCULAR; INTRAVENOUS at 02:07

## 2017-07-03 RX ADMIN — CEFAZOLIN SODIUM 2 G: 2 SOLUTION INTRAVENOUS at 02:07

## 2017-07-03 RX ADMIN — ACETAMINOPHEN 1000 MG: 10 INJECTION, SOLUTION INTRAVENOUS at 02:07

## 2017-07-03 RX ADMIN — GLYCOPYRROLATE 0.2 MG: 0.2 INJECTION, SOLUTION INTRAMUSCULAR; INTRAVENOUS at 01:07

## 2017-07-03 RX ADMIN — MIDAZOLAM HYDROCHLORIDE 2 MG: 1 INJECTION, SOLUTION INTRAMUSCULAR; INTRAVENOUS at 01:07

## 2017-07-03 RX ADMIN — FENTANYL CITRATE 50 MCG: 50 INJECTION, SOLUTION INTRAMUSCULAR; INTRAVENOUS at 01:07

## 2017-07-03 RX ADMIN — SODIUM CHLORIDE, SODIUM GLUCONATE, SODIUM ACETATE, POTASSIUM CHLORIDE, MAGNESIUM CHLORIDE, SODIUM PHOSPHATE, DIBASIC, AND POTASSIUM PHOSPHATE: .53; .5; .37; .037; .03; .012; .00082 INJECTION, SOLUTION INTRAVENOUS at 02:07

## 2017-07-03 RX ADMIN — Medication: at 05:07

## 2017-07-03 RX ADMIN — STANDARDIZED SENNA CONCENTRATE AND DOCUSATE SODIUM 1 TABLET: 8.6; 5 TABLET, FILM COATED ORAL at 08:07

## 2017-07-03 RX ADMIN — PROPOFOL 50 MG: 10 INJECTION, EMULSION INTRAVENOUS at 01:07

## 2017-07-03 RX ADMIN — NICARDIPINE HYDROCHLORIDE 0.2 MG: 2.5 INJECTION INTRAVENOUS at 03:07

## 2017-07-03 RX ADMIN — MUPIROCIN 1 G: 20 OINTMENT TOPICAL at 12:07

## 2017-07-03 RX ADMIN — REMIFENTANIL HYDROCHLORIDE 0.1 MCG/KG/MIN: 1 INJECTION, POWDER, LYOPHILIZED, FOR SOLUTION INTRAVENOUS at 02:07

## 2017-07-03 RX ADMIN — NICARDIPINE HYDROCHLORIDE 0.2 MG: 2.5 INJECTION INTRAVENOUS at 02:07

## 2017-07-03 RX ADMIN — SODIUM CHLORIDE: 0.9 INJECTION, SOLUTION INTRAVENOUS at 05:07

## 2017-07-03 RX ADMIN — ACETAMINOPHEN 1000 MG: 500 TABLET ORAL at 11:07

## 2017-07-03 RX ADMIN — ROCURONIUM BROMIDE 20 MG: 10 INJECTION, SOLUTION INTRAVENOUS at 02:07

## 2017-07-03 RX ADMIN — PROPOFOL 100 MG: 10 INJECTION, EMULSION INTRAVENOUS at 01:07

## 2017-07-03 RX ADMIN — ONDANSETRON 8 MG: 2 INJECTION INTRAMUSCULAR; INTRAVENOUS at 02:07

## 2017-07-03 RX ADMIN — FENTANYL CITRATE 200 MCG: 50 INJECTION, SOLUTION INTRAMUSCULAR; INTRAVENOUS at 02:07

## 2017-07-03 RX ADMIN — FENTANYL CITRATE 100 MCG: 50 INJECTION, SOLUTION INTRAMUSCULAR; INTRAVENOUS at 01:07

## 2017-07-03 RX ADMIN — NEOSTIGMINE METHYLSULFATE 2.5 MG: 1 INJECTION INTRAVENOUS at 04:07

## 2017-07-03 NOTE — H&P
DATE: 7/3/2017  PATIENT: Humberto Cantor    Attending Physician: Joaquin Navarro M.D.    HISTORY:  Humberto Cantor is a 61 y.o. male who underwent an L3-5 Laminectomy and fusion for an unstable L4 burst fracture on 6/6/2017. He has developed distal hardware failure with kyphosis, low back pain and radiculopathy. He is here for revision surgery today.    The Patient denies myelopathic symptoms such as handwriting changes or difficulty with buttons/coins/keys. Denies perineal paresthesias, bowel/bladder dysfunction.    PAST MEDICAL/SURGICAL HISTORY:  Past Medical History:   Diagnosis Date    Dermatitis, atopic     Essential hypertension 11/19/2013    GERD (gastroesophageal reflux disease)     Hypertension      Past Surgical History:   Procedure Laterality Date    HERNIA REPAIR      WISDOM TOOTH EXTRACTION         Current Medications:   Current Facility-Administered Medications:     0.9%  NaCl infusion, , Intravenous, Continuous, Joel Howell MD, Last Rate: 10 mL/hr at 07/03/17 1209    cefazolin (ANCEF) 2 gram in dextrose 5% 50 mL IVPB (premix), 2 g, Intravenous, Once, Grady Servin MD    HYDROmorphone injection 0.2 mg, 0.2 mg, Intravenous, Q5 Min PRN, Joel Howell MD    Social History:   Social History     Social History    Marital status:      Spouse name: N/A    Number of children: N/A    Years of education: N/A     Occupational History     [employer]     Social History Main Topics    Smoking status: Former Smoker    Smokeless tobacco: Never Used    Alcohol use 1.2 oz/week     2 Glasses of wine per week    Drug use: No    Sexual activity: Yes     Partners: Female     Other Topics Concern    Not on file     Social History Narrative    No narrative on file       REVIEW OF SYSTEMS:  Constitution: Negative. Negative for chills, fever and night sweats.   Cardiovascular: Negative for chest pain and syncope.   Respiratory: Negative for cough and shortness of breath.     "  EXAM:  /79 (BP Location: Left arm, Patient Position: Lying, BP Method: Automatic)   Pulse 72   Temp 98.1 °F (36.7 °C) (Oral)   Resp 16   Ht 5' 10" (1.778 m)   Wt 84.8 kg (187 lb)   SpO2 98%   BMI 26.83 kg/m²     PHYSICAL EXAMINATION:    General: The patient is a very pleasant 61 y.o. male in no apparent distress, the patient is orientatied to person, place and time.  Psych: Normal mood and affect  HEENT: Vision grossly intact, hearing intact to the spoken word.  Lungs: Respirations unlabored.  Skin: Dorsal lumbar incision c/d/i.    A: L4 burst fracture with hardware failure, kyphosis, radiculopahy  P:  L2-Pelvis posterior spinal fusion today.    I had discussion with the patient and his wife regarding a L2-Pelvis revision spinal fusion with possible extension to L1. We specifically discussed the risks, benefits, and alternatives to surgery. We discussed the surgical procedure including the skin incision, nerve decompression, bone fusion, allograft, iliac crest bone graft, and surgical implants including pedicle screws and interbody devices as indicated: they understand the risks include but are not limited to death, paralysis, blindness, bleeding, infection, damage to arteries, veins and nerves, spinal fluid leak, continued or worsening pain, no improvement in symptoms, non-union, and the possible need for more surgery in the future, the possible need for perioperative blood transfusion, as well as the possibility other unforseen and unknown complications. We talked about expected hospital stay and recovery period. All questions were answered; they understand and wish to proceed.     "

## 2017-07-03 NOTE — NURSING TRANSFER
Nursing Transfer Note      7/3/2017     Transfer To: 623A    Transfer via bed    Transfer with none    Transported by RNx2    Medicines sent: fluids and PCA pump    Chart send with patient: Yes    Notified: spouse    Patient reassessed at: 7/3/17 see flowsheets

## 2017-07-03 NOTE — ANESTHESIA RELEASE NOTE
"Anesthesia Release from PACU Note    Patient: Hmuberto Cantor    Procedure(s) Performed: Procedure(s) (LRB):  L1-Pelvis Revision Depuy Remove/Reinsert SNS: Motors/SSEP/EMG New Reinaldo (N/A)    Anesthesia type: general    Post pain: Adequate analgesia    Post assessment: no apparent anesthetic complications, tolerated procedure well and no evidence of recall    Last Vitals:   Visit Vitals  /67 (BP Location: Right arm, Patient Position: Lying, BP Method: Automatic)   Pulse 65   Temp 36.3 °C (97.4 °F) (Axillary)   Resp 15   Ht 5' 10" (1.778 m)   Wt 84.8 kg (187 lb)   SpO2 99%   BMI 26.83 kg/m²       Post vital signs: stable    Level of consciousness: awake, alert  and oriented    Nausea/Vomiting: no nausea/no vomiting    Complications: none    Airway Patency: patent    Respiratory: unassisted, spontaneous ventilation, room air    Cardiovascular: stable and blood pressure at baseline    Hydration: euvolemic  "

## 2017-07-03 NOTE — ANESTHESIA POSTPROCEDURE EVALUATION
"Anesthesia Post Evaluation    Patient: Humberto Cantor    Procedure(s) Performed: Procedure(s) (LRB):  L1-Pelvis Revision Depuy Remove/Reinsert SNS: Motors/SSEP/EMG New Reinaldo (N/A)    Final Anesthesia Type: general  Patient location during evaluation: PACU  Patient participation: Yes- Able to Participate  Level of consciousness: awake and alert and oriented  Post-procedure vital signs: reviewed and stable  Pain management: adequate  Airway patency: patent  PONV status at discharge: No PONV  Anesthetic complications: no      Cardiovascular status: blood pressure returned to baseline and hemodynamically stable  Respiratory status: unassisted, spontaneous ventilation and room air  Hydration status: euvolemic  Follow-up not needed.        Visit Vitals  /67 (BP Location: Right arm, Patient Position: Lying, BP Method: Automatic)   Pulse 65   Temp 36.3 °C (97.4 °F) (Axillary)   Resp 15   Ht 5' 10" (1.778 m)   Wt 84.8 kg (187 lb)   SpO2 99%   BMI 26.83 kg/m²       Pain/Cristin Score: Pain Assessment Performed: Yes (7/3/2017  5:05 PM)  Presence of Pain: complains of pain/discomfort (7/3/2017  5:39 PM)  Pain Rating Prior to Med Admin: 7 (7/3/2017  5:45 PM)  Pain Rating Post Med Admin: 7 (7/3/2017  5:39 PM)  Cristin Score: 8 (7/3/2017  5:05 PM)      "

## 2017-07-03 NOTE — TRANSFER OF CARE
"Anesthesia Transfer of Care Note    Patient: Humberto Cantor    Procedure(s) Performed: Procedure(s) (LRB):  L1-Pelvis Revision Depuy Remove/Reinsert SNS: Motors/SSEP/EMG New Reinaldo (N/A)    Patient location: PACU    Anesthesia Type: general    Transport from OR: Transported from OR on 6-10 L/min O2 by face mask with adequate spontaneous ventilation    Post pain: adequate analgesia    Post assessment: no apparent anesthetic complications    Post vital signs: stable    Level of consciousness: awake    Nausea/Vomiting: no nausea/vomiting    Complications: none    Transfer of care protocol was followed      Last vitals:   Visit Vitals  /75 (BP Location: Right arm, Patient Position: Lying, BP Method: Automatic)   Pulse 70   Temp 36.3 °C (97.4 °F) (Axillary)   Resp 15   Ht 5' 10" (1.778 m)   Wt 84.8 kg (187 lb)   SpO2 100%   BMI 26.83 kg/m²     "

## 2017-07-03 NOTE — ANESTHESIA PREPROCEDURE EVALUATION
07/03/2017  Humberto Cantor is a 61 y.o., male.    Anesthesia Evaluation    I have reviewed the Patient Summary Reports.     I have reviewed the Medications.     Review of Systems  Anesthesia Hx:  History of prior surgery of interest to airway management or planning:  Denies Personal Hx of Anesthesia complications.   Social:  Non-Smoker, No Alcohol Use    Hematology/Oncology:  Hematology Normal   Oncology Normal     EENT/Dental:EENT/Dental Normal   Cardiovascular:   Exercise tolerance: good Hypertension, well controlled    Pulmonary:  Pulmonary Normal    Renal/:  Renal/ Normal     Hepatic/GI:   GERD, well controlled    Musculoskeletal:   Lumbar burst fx with hardware failure   Neurological:  Neurology Normal    Dermatological:  Skin Normal    Psych:  Psychiatric Normal           Physical Exam  General:  Well nourished    Airway/Jaw/Neck:  Airway Findings: Mouth Opening: Normal Tongue: Normal  General Airway Assessment: Adult, Good  Mallampati: III  TM Distance: Normal, at least 6 cm     Eyes/Ears/Nose:  EYES/EARS/NOSE FINDINGS: Normal   Dental:  Dental Findings: In tact   Chest/Lungs:  Chest/Lungs Findings: Clear to auscultation, Normal Respiratory Rate     Heart/Vascular:  Heart Findings: Rate: Normal  Rhythm: Regular Rhythm  Sounds: Normal  Heart murmur: negative       Mental Status:  Mental Status Findings:  Cooperative, Alert and Oriented         Anesthesia Plan  Type of Anesthesia, risks & benefits discussed:  Anesthesia Type:  general  Patient's Preference:   Intra-op Monitoring Plan:   Intra-op Monitoring Plan Comments:   Post Op Pain Control Plan:   Post Op Pain Control Plan Comments:   Induction:   IV  Beta Blocker:  Patient is not currently on a Beta-Blocker (No further documentation required).       Informed Consent: Patient understands risks and agrees with Anesthesia plan.  Questions answered.  Anesthesia consent signed with patient.  ASA Score: 2     Day of Surgery Review of History & Physical:    H&P update referred to the surgeon.     Anesthesia Plan Notes:   61M HTN, lumbar burst fx with hardware failure for revision L1-pelvis fusion under GETA TIVA, possible arterial monitoring, possible blood tx        Ready For Surgery From Anesthesia Perspective.

## 2017-07-04 PROBLEM — Z01.818 PREOPERATIVE EVALUATION TO RULE OUT SURGICAL CONTRAINDICATION: Status: RESOLVED | Noted: 2017-06-06 | Resolved: 2017-07-04

## 2017-07-04 LAB
ANION GAP SERPL CALC-SCNC: 6 MMOL/L
BASOPHILS # BLD AUTO: 0.01 K/UL
BASOPHILS NFR BLD: 0.1 %
BUN SERPL-MCNC: 12 MG/DL
CALCIUM SERPL-MCNC: 8.9 MG/DL
CHLORIDE SERPL-SCNC: 101 MMOL/L
CO2 SERPL-SCNC: 28 MMOL/L
CREAT SERPL-MCNC: 0.9 MG/DL
DIFFERENTIAL METHOD: ABNORMAL
EOSINOPHIL # BLD AUTO: 0 K/UL
EOSINOPHIL NFR BLD: 0 %
ERYTHROCYTE [DISTWIDTH] IN BLOOD BY AUTOMATED COUNT: 12.5 %
EST. GFR  (AFRICAN AMERICAN): >60 ML/MIN/1.73 M^2
EST. GFR  (NON AFRICAN AMERICAN): >60 ML/MIN/1.73 M^2
GLUCOSE SERPL-MCNC: 139 MG/DL
HCT VFR BLD AUTO: 29.5 %
HGB BLD-MCNC: 10 G/DL
LYMPHOCYTES # BLD AUTO: 0.7 K/UL
LYMPHOCYTES NFR BLD: 8.6 %
MAGNESIUM SERPL-MCNC: 1.6 MG/DL
MCH RBC QN AUTO: 32.6 PG
MCHC RBC AUTO-ENTMCNC: 33.9 %
MCV RBC AUTO: 96 FL
MONOCYTES # BLD AUTO: 0.7 K/UL
MONOCYTES NFR BLD: 9.1 %
NEUTROPHILS # BLD AUTO: 6.5 K/UL
NEUTROPHILS NFR BLD: 82.1 %
PHOSPHATE SERPL-MCNC: 3.6 MG/DL
PLATELET # BLD AUTO: 386 K/UL
PMV BLD AUTO: 9.2 FL
POTASSIUM SERPL-SCNC: 4.1 MMOL/L
RBC # BLD AUTO: 3.07 M/UL
SODIUM SERPL-SCNC: 135 MMOL/L
WBC # BLD AUTO: 7.98 K/UL

## 2017-07-04 PROCEDURE — 80048 BASIC METABOLIC PNL TOTAL CA: CPT

## 2017-07-04 PROCEDURE — 25000003 PHARM REV CODE 250: Performed by: ORTHOPAEDIC SURGERY

## 2017-07-04 PROCEDURE — 36415 COLL VENOUS BLD VENIPUNCTURE: CPT

## 2017-07-04 PROCEDURE — 83735 ASSAY OF MAGNESIUM: CPT

## 2017-07-04 PROCEDURE — 94770 HC EXHALED C02 TEST: CPT

## 2017-07-04 PROCEDURE — 97165 OT EVAL LOW COMPLEX 30 MIN: CPT

## 2017-07-04 PROCEDURE — 63600175 PHARM REV CODE 636 W HCPCS: Performed by: STUDENT IN AN ORGANIZED HEALTH CARE EDUCATION/TRAINING PROGRAM

## 2017-07-04 PROCEDURE — 97116 GAIT TRAINING THERAPY: CPT

## 2017-07-04 PROCEDURE — 85025 COMPLETE CBC W/AUTO DIFF WBC: CPT

## 2017-07-04 PROCEDURE — 97530 THERAPEUTIC ACTIVITIES: CPT

## 2017-07-04 PROCEDURE — 20600001 HC STEP DOWN PRIVATE ROOM

## 2017-07-04 PROCEDURE — 25000003 PHARM REV CODE 250: Performed by: STUDENT IN AN ORGANIZED HEALTH CARE EDUCATION/TRAINING PROGRAM

## 2017-07-04 PROCEDURE — 99221 1ST HOSP IP/OBS SF/LOW 40: CPT | Mod: ,,, | Performed by: INTERNAL MEDICINE

## 2017-07-04 PROCEDURE — 84100 ASSAY OF PHOSPHORUS: CPT

## 2017-07-04 PROCEDURE — 97161 PT EVAL LOW COMPLEX 20 MIN: CPT

## 2017-07-04 PROCEDURE — 97535 SELF CARE MNGMENT TRAINING: CPT

## 2017-07-04 PROCEDURE — 63600175 PHARM REV CODE 636 W HCPCS: Performed by: ORTHOPAEDIC SURGERY

## 2017-07-04 RX ORDER — HYDROMORPHONE HYDROCHLORIDE 1 MG/ML
0.5 INJECTION, SOLUTION INTRAMUSCULAR; INTRAVENOUS; SUBCUTANEOUS EVERY 4 HOURS PRN
Status: DISCONTINUED | OUTPATIENT
Start: 2017-07-04 | End: 2017-07-07 | Stop reason: HOSPADM

## 2017-07-04 RX ORDER — HYDROMORPHONE HYDROCHLORIDE 1 MG/ML
0.2 INJECTION, SOLUTION INTRAMUSCULAR; INTRAVENOUS; SUBCUTANEOUS EVERY 4 HOURS PRN
Status: DISCONTINUED | OUTPATIENT
Start: 2017-07-04 | End: 2017-07-04

## 2017-07-04 RX ORDER — CEFAZOLIN SODIUM 2 G/50ML
2 SOLUTION INTRAVENOUS
Status: COMPLETED | OUTPATIENT
Start: 2017-07-04 | End: 2017-07-04

## 2017-07-04 RX ADMIN — STANDARDIZED SENNA CONCENTRATE AND DOCUSATE SODIUM 1 TABLET: 8.6; 5 TABLET, FILM COATED ORAL at 08:07

## 2017-07-04 RX ADMIN — Medication 3 ML: at 10:07

## 2017-07-04 RX ADMIN — Medication: at 06:07

## 2017-07-04 RX ADMIN — ONDANSETRON 4 MG: 2 INJECTION INTRAMUSCULAR; INTRAVENOUS at 04:07

## 2017-07-04 RX ADMIN — HEPARIN SODIUM 5000 UNITS: 5000 INJECTION, SOLUTION INTRAVENOUS; SUBCUTANEOUS at 09:07

## 2017-07-04 RX ADMIN — METHOCARBAMOL 750 MG: 750 TABLET ORAL at 05:07

## 2017-07-04 RX ADMIN — ACETAMINOPHEN 1000 MG: 500 TABLET ORAL at 05:07

## 2017-07-04 RX ADMIN — METHOCARBAMOL 750 MG: 750 TABLET ORAL at 11:07

## 2017-07-04 RX ADMIN — ONDANSETRON 4 MG: 2 INJECTION INTRAMUSCULAR; INTRAVENOUS at 03:07

## 2017-07-04 RX ADMIN — OXYCODONE HYDROCHLORIDE 15 MG: 5 TABLET ORAL at 02:07

## 2017-07-04 RX ADMIN — ACETAMINOPHEN 1000 MG: 500 TABLET ORAL at 11:07

## 2017-07-04 RX ADMIN — CETIRIZINE HYDROCHLORIDE 10 MG: 10 TABLET, FILM COATED ORAL at 09:07

## 2017-07-04 RX ADMIN — OXYCODONE HYDROCHLORIDE 15 MG: 5 TABLET ORAL at 01:07

## 2017-07-04 RX ADMIN — HYDROMORPHONE HYDROCHLORIDE 0.5 MG: 1 INJECTION, SOLUTION INTRAMUSCULAR; INTRAVENOUS; SUBCUTANEOUS at 08:07

## 2017-07-04 RX ADMIN — PANTOPRAZOLE SODIUM 40 MG: 40 TABLET, DELAYED RELEASE ORAL at 09:07

## 2017-07-04 RX ADMIN — MUPIROCIN 1 G: 20 OINTMENT TOPICAL at 10:07

## 2017-07-04 RX ADMIN — SODIUM CHLORIDE: 0.9 INJECTION, SOLUTION INTRAVENOUS at 12:07

## 2017-07-04 RX ADMIN — POLYETHYLENE GLYCOL 3350 17 G: 17 POWDER, FOR SOLUTION ORAL at 09:07

## 2017-07-04 RX ADMIN — OXYCODONE HYDROCHLORIDE 15 MG: 5 TABLET ORAL at 10:07

## 2017-07-04 RX ADMIN — CEFAZOLIN SODIUM 2 G: 2 SOLUTION INTRAVENOUS at 02:07

## 2017-07-04 RX ADMIN — HYDROMORPHONE HYDROCHLORIDE 0.5 MG: 1 INJECTION, SOLUTION INTRAMUSCULAR; INTRAVENOUS; SUBCUTANEOUS at 11:07

## 2017-07-04 RX ADMIN — METHOCARBAMOL 750 MG: 750 TABLET ORAL at 06:07

## 2017-07-04 RX ADMIN — CEFAZOLIN SODIUM 2 G: 2 SOLUTION INTRAVENOUS at 12:07

## 2017-07-04 RX ADMIN — Medication 3 ML: at 06:07

## 2017-07-04 RX ADMIN — OXYCODONE HYDROCHLORIDE 15 MG: 5 TABLET ORAL at 05:07

## 2017-07-04 RX ADMIN — HYDROMORPHONE HYDROCHLORIDE 0.2 MG: 1 INJECTION, SOLUTION INTRAMUSCULAR; INTRAVENOUS; SUBCUTANEOUS at 03:07

## 2017-07-04 RX ADMIN — STANDARDIZED SENNA CONCENTRATE AND DOCUSATE SODIUM 1 TABLET: 8.6; 5 TABLET, FILM COATED ORAL at 09:07

## 2017-07-04 RX ADMIN — MUPIROCIN 1 G: 20 OINTMENT TOPICAL at 09:07

## 2017-07-04 RX ADMIN — HEPARIN SODIUM 5000 UNITS: 5000 INJECTION, SOLUTION INTRAVENOUS; SUBCUTANEOUS at 10:07

## 2017-07-04 RX ADMIN — CEFAZOLIN SODIUM 2 G: 2 SOLUTION INTRAVENOUS at 08:07

## 2017-07-04 RX ADMIN — METHOCARBAMOL 750 MG: 750 TABLET ORAL at 12:07

## 2017-07-04 NOTE — CONSULTS
[facility] Hospital Medicine  Consult Note    Patient Name: Humberto Cantor  MRN: [id number]  Admission Date: 7/3/2017  Hospital Length of Stay: 1 days  Attending Physician: Joaquin Navarro MD   Primary Care Provider: Saud Sosa MD     [facility] Medicine Team: Networked reference to record PCT  Bird Manzo MD      Patient information was obtained from patient and ER records.     Inpatient consult to [facility] Medicine-Ortho Comanagement Only  Consult performed by: BIRD MANZO  Consult ordered by: LEVY PARISH  Reason for consult: Management of HTN        Subjective:     Principal Problem: <principal problem not specified>    Chief Complaint: No chief complaint on file.       HPI: Alex Cantor is a 61 yr old male who presented with low back pain and bilateral lower extremity pain since roughly 3 weeks.     He underwent L3-L5 laminectomy for fusion of an unstable L4 burst fracture on 06/06/2017. Pre-surgical X-ray of the lumbar spine showed a compression fracture of L5 with decrease in height of >50%. Following the surgery he developed kyphosis, low back pain with radiation of pain down the lower limbs (L>R). The pain was was an 8 on a scale of 1-10, at its worst. He also complains of pain along the surgical incision. An X-ray of the lumbar spine done on 06/09/2017 showed worsening of the compression fracture of L4, likely due to distal hardware failure. Currently he is posted for revision surgery.    Hospital medicine was consulted for management of hypertension.    PMH: Atopic dermatitis, Essential hypertension (x 4 yrs) and GERD.    PSH: Hernia repair, wisdom tooth extraction.      Past Medical History:   Diagnosis Date    Dermatitis, atopic     Essential hypertension 11/19/2013    GERD (gastroesophageal reflux disease)     Hypertension        Past Surgical History:   Procedure Laterality Date    HERNIA REPAIR      WISDOM TOOTH EXTRACTION         Review  of patient's allergies indicates:   Allergen Reactions    Naproxen      Side effect of GIB       No current facility-administered medications on file prior to encounter.      Current Outpatient Prescriptions on File Prior to Encounter   Medication Sig    alprazolam (XANAX) 0.25 MG tablet Take 1 tablet (0.25 mg total) by mouth 2 (two) times daily as needed.    lansoprazole (PREVACID) 15 MG capsule Take 1 capsule (15 mg total) by mouth once daily.    loratadine (CLARITIN) 10 mg tablet Take 10 mg by mouth once daily.    methocarbamol (ROBAXIN) 750 MG Tab Take 1 tablet (750 mg total) by mouth 4 (four) times daily.    olmesartan (BENICAR) 20 MG tablet Take 1 tablet (20 mg total) by mouth once daily.    oxycodone-acetaminophen (PERCOCET)  mg per tablet Take 1 tablet by mouth every 4 (four) hours as needed for Pain.    tadalafil (CIALIS) 20 MG Tab Take 1 tablet (20 mg total) by mouth once daily.    fluocinonide 0.05% (LIDEX) 0.05 % cream Apply topically 2 (two) times daily.    ibuprofen (ADVIL,MOTRIN) 200 MG tablet Take 200 mg by mouth every 6 (six) hours as needed for Pain.     Family History     Problem Relation (Age of Onset)    Arthritis Mother    Cancer Maternal Grandmother, Maternal Grandfather (60)    Early death Brother    Liver disease Paternal Grandfather        Social History Main Topics    Smoking status: Former Smoker    Smokeless tobacco: Never Used    Alcohol use 1.2 oz/week     2 Glasses of wine per week    Drug use: No    Sexual activity: Yes     Partners: Female     Review of Systems   Constitutional: Negative for chills, fever and unexpected weight change.        Denies any appetite change but states that he has never been a big eater.   Respiratory: Negative.    Cardiovascular: Negative.    Gastrointestinal: Positive for nausea.        Complains of mild nausea since today.  Complains of mild acidity after drinking apple juice.   Musculoskeletal: Positive for back pain.    Neurological: Negative for dizziness, light-headedness and headaches.        Complains of back pain with pain along both the lower limbs, (L>R)   Psychiatric/Behavioral: Negative.      Objective:     Vital Signs (Most Recent):  Temp: 96.1 °F (35.6 °C) (07/04/17 1220)  Pulse: 80 (07/04/17 1220)  Resp: 18 (07/04/17 1220)  BP: 127/85 (07/04/17 1220)  SpO2: 96 % (07/04/17 1220) Vital Signs (24h Range):  Temp:  [96.1 °F (35.6 °C)-98.6 °F (37 °C)] 96.1 °F (35.6 °C)  Pulse:  [61-86] 80  Resp:  [9-20] 18  SpO2:  [95 %-100 %] 96 %  BP: (124-169)/(60-88) 127/85     Weight: 84.8 kg (187 lb)  Body mass index is 26.83 kg/m².    Physical Exam   Constitutional: He is oriented to person, place, and time. He appears well-developed and well-nourished. No distress.   Cardiovascular: Normal rate, regular rhythm and normal heart sounds.    Pulmonary/Chest: Effort normal and breath sounds normal.   Musculoskeletal:        Back:    Neurological: He is alert and oriented to person, place, and time.   Psychiatric: His speech is normal and behavior is normal.   Vitals reviewed.      Significant Labs: All pertinent labs within the past 24 hours have been reviewed.    Significant Imaging: I have reviewed all pertinent imaging results/findings within the past 24 hours.    Assessment/Plan:     Lumbar burst fracture    - Posted for revision surgery following distal hardware failure after previous L4-L5 laminectomy on 06/06/2017.  -Follow-up with orthopedic surgery.            Essential hypertension    - Patient has been on Olmesartan 20mg at home which has been continued since admission.  -His current BP is 127/85 mmHg. Systolic jacky to 169 once in the last 24 hrs, but has since been stable.  -Recommend continue Olmesartan 20mg.  -Monitor BP         GERD (gastroesophageal reflux disease)    -Patient has been taking lansoprazole at home for GERD.  -Prescribed as pantoprazole 20mg since admission.  -Monitor for any worseing of acid reflux.             VTE Risk Mitigation         Ordered     heparin (porcine) injection 5,000 Units  Every 8 hours     Route:  Subcutaneous        07/03/17 1801     Place sequential compression device  Until discontinued      07/03/17 1113        Thank you for your consult. I will follow-up with patient. Please contact us if you have any additional questions.    Bird Mace MD  Department of Hospital Medicine   [facility]

## 2017-07-04 NOTE — SUBJECTIVE & OBJECTIVE
Past Medical History:   Diagnosis Date    Dermatitis, atopic     Essential hypertension 11/19/2013    GERD (gastroesophageal reflux disease)     Hypertension        Past Surgical History:   Procedure Laterality Date    HERNIA REPAIR      WISDOM TOOTH EXTRACTION         Review of patient's allergies indicates:   Allergen Reactions    Naproxen      Side effect of GIB       No current facility-administered medications on file prior to encounter.      Current Outpatient Prescriptions on File Prior to Encounter   Medication Sig    alprazolam (XANAX) 0.25 MG tablet Take 1 tablet (0.25 mg total) by mouth 2 (two) times daily as needed.    lansoprazole (PREVACID) 15 MG capsule Take 1 capsule (15 mg total) by mouth once daily.    loratadine (CLARITIN) 10 mg tablet Take 10 mg by mouth once daily.    methocarbamol (ROBAXIN) 750 MG Tab Take 1 tablet (750 mg total) by mouth 4 (four) times daily.    olmesartan (BENICAR) 20 MG tablet Take 1 tablet (20 mg total) by mouth once daily.    oxycodone-acetaminophen (PERCOCET)  mg per tablet Take 1 tablet by mouth every 4 (four) hours as needed for Pain.    tadalafil (CIALIS) 20 MG Tab Take 1 tablet (20 mg total) by mouth once daily.    fluocinonide 0.05% (LIDEX) 0.05 % cream Apply topically 2 (two) times daily.    ibuprofen (ADVIL,MOTRIN) 200 MG tablet Take 200 mg by mouth every 6 (six) hours as needed for Pain.     Family History     Problem Relation (Age of Onset)    Arthritis Mother    Cancer Maternal Grandmother, Maternal Grandfather (60)    Early death Brother    Liver disease Paternal Grandfather        Social History Main Topics    Smoking status: Former Smoker    Smokeless tobacco: Never Used    Alcohol use 1.2 oz/week     2 Glasses of wine per week    Drug use: No    Sexual activity: Yes     Partners: Female     Review of Systems   Constitutional: Negative for chills, fever and unexpected weight change.        Denies any appetite change but states that  he has never been a big eater.   Respiratory: Negative.    Cardiovascular: Negative.    Gastrointestinal: Positive for nausea.        Complains of mild nausea since today.  Complains of mild acidity after drinking apple juice.   Musculoskeletal: Positive for back pain.   Neurological: Negative for dizziness, light-headedness and headaches.        Complains of back pain with pain along both the lower limbs, (L>R)   Psychiatric/Behavioral: Negative.      Objective:     Vital Signs (Most Recent):  Temp: 96.1 °F (35.6 °C) (07/04/17 1220)  Pulse: 80 (07/04/17 1220)  Resp: 18 (07/04/17 1220)  BP: 127/85 (07/04/17 1220)  SpO2: 96 % (07/04/17 1220) Vital Signs (24h Range):  Temp:  [96.1 °F (35.6 °C)-98.6 °F (37 °C)] 96.1 °F (35.6 °C)  Pulse:  [61-86] 80  Resp:  [9-20] 18  SpO2:  [95 %-100 %] 96 %  BP: (124-169)/(60-88) 127/85     Weight: 84.8 kg (187 lb)  Body mass index is 26.83 kg/m².    Physical Exam   Constitutional: He is oriented to person, place, and time. He appears well-developed and well-nourished. No distress.   Cardiovascular: Normal rate, regular rhythm and normal heart sounds.    Pulmonary/Chest: Effort normal and breath sounds normal.   Musculoskeletal:        Back:    Neurological: He is alert and oriented to person, place, and time.   Psychiatric: His speech is normal and behavior is normal.   Vitals reviewed.      Significant Labs: All pertinent labs within the past 24 hours have been reviewed.    Significant Imaging: I have reviewed all pertinent imaging results/findings within the past 24 hours.

## 2017-07-04 NOTE — HPI
Alex Cantor is a 61 yr old male who presented with low back pain and bilateral lower extremity pain since roughly 3 weeks.     He underwent L3-L5 laminectomy for fusion of an unstable L4 burst fracture on 06/06/2017. Pre-surgical X-ray of the lumbar spine showed a compression fracture of L5 with decrease in height of >50%. Following the surgery he developed kyphosis, low back pain with radiation of pain down the lower limbs (L>R). The pain was was an 8 on a scale of 1-10, at its worst. He also complains of pain along the surgical incision. An X-ray of the lumbar spine done on 06/09/2017 showed worsening of the compression fracture of L4, likely due to distal hardware failure. Currently he is posted for revision surgery.    Hospital medicine was consulted for management of hypertension.    PMH: Atopic dermatitis, Essential hypertension (x 4 yrs) and GERD.    PSH: Hernia repair, wisdom tooth extraction.

## 2017-07-04 NOTE — ASSESSMENT & PLAN NOTE
- Antibiotics: post-op Ancef  - Weight bearing status: WBAT  - Labs: reviewed  - DVT Prophylaxis: heparin q8h  - Lines/Drains: PIV, suresh (d/c today)  - Pain control: PCA

## 2017-07-04 NOTE — PROGRESS NOTES
[facility]  Orthopedics  Progress Note    Patient Name: Humberto Cantor  MRN: [id number]  Admission Date: 7/3/2017  Hospital Length of Stay: 1 days  Attending Provider: Joaquin Navarro MD  Primary Care Provider: Saud Sosa MD  Follow-up For: Procedure(s) (LRB):  L1-Pelvis Revision Depuy Remove/Reinsert SNS: Motors/SSEP/EMG [place] (N/A)    Post-Operative Day: 1 Day Post-Op  Subjective:     Principal Problem:<principal problem not specified>    Principal Orthopedic Problem: s/p L2-S1 PSF    Interval History: AFVSS. Mildly hypertensive. Pain controlled on PCA. Sharma in place with adequate UOP. Drains - 200 cc, 100 cc.    Review of patient's allergies indicates:   Allergen Reactions    Naproxen        Current Facility-Administered Medications   Medication    0.9%  NaCl infusion    0.9%  NaCl infusion    acetaminophen tablet 1,000 mg    alprazolam tablet 0.25 mg    bisacodyl suppository 10 mg    cefazolin (ANCEF) 2 gram in dextrose 5% 50 mL IVPB (premix)    cetirizine tablet 10 mg    heparin (porcine) injection 5,000 Units    HYDROmorphone PCA in 0.9 % NaCl 6 Mg/30 mL (0.2 mg/mL)    lactulose 20 gram/30 mL solution Soln 20 g    methocarbamol tablet 750 mg    mupirocin 2 % ointment 1 g    naloxone 0.4 mg/mL injection 0.02 mg    olmesartan tablet 20 mg    ondansetron injection 4 mg    oxycodone immediate release tablet 10 mg    oxycodone immediate release tablet 15 mg    oxycodone immediate release tablet 5 mg    pantoprazole EC tablet 40 mg    polyethylene glycol packet 17 g    promethazine tablet 25 mg    ramelteon tablet 8 mg    senna-docusate 8.6-50 mg per tablet 1 tablet    sodium chloride 0.9% flush 3 mL     Objective:     Vital Signs (Most Recent):  Temp: 97.7 °F (36.5 °C) (07/04/17 0419)  Pulse: 61 (07/04/17 0419)  Resp: 15 (07/04/17 0419)  BP: 124/73 (07/04/17 0419)  SpO2: 98 % (07/04/17 0419) Vital Signs (24h Range):  Temp:  [97.4 °F (36.3 °C)-98.6 °F (37 °C)] 97.7  "°F (36.5 °C)  Pulse:  [61-86] 61  Resp:  [9-20] 15  SpO2:  [95 %-100 %] 98 %  BP: (124-169)/(60-88) 124/73     Weight: 84.8 kg (187 lb)  Height: 5' 10" (177.8 cm)  Body mass index is 26.83 kg/m².      Intake/Output Summary (Last 24 hours) at 07/04/17 0616  Last data filed at 07/04/17 0233   Gross per 24 hour   Intake          2145.83 ml   Output             3250 ml   Net         -1104.17 ml       Ortho/SPM Exam   HEENT: normocephalic, atraumatic  Resp: no increased work of breathing  CV: regular rate and rhythm  MSK: moves all extremities well  Neuro: no focal neuro deficits  Back: dressings c/d/i    Significant Labs:   BMP:   Recent Labs  Lab 07/04/17  0506   *   *   K 4.1      CO2 28   BUN 12   CREATININE 0.9   CALCIUM 8.9   MG 1.6     CBC:   Recent Labs  Lab 07/03/17  1205 07/03/17  1610 07/03/17  1709 07/04/17  0506   WBC 5.38  --  6.75 7.98   HGB 11.6*  --  11.1* 10.0*   HCT 35.1* 24* 33.2* 29.5*   *  --  338 386*       Significant Imaging: I have reviewed and interpreted all pertinent imaging results/findings.    Assessment/Plan:     Lumbar burst fracture    - Antibiotics: post-op Ancef  - Weight bearing status: WBAT  - Labs: reviewed  - DVT Prophylaxis: heparin q8h  - Lines/Drains: demetrice VALENCIA (d/c today)  - Pain control: PCA            Essential hypertension    Continue home meds        GERD (gastroesophageal reflux disease)    Continue home meds              Beti Lauren MD  Orthopedics  [facility]  "

## 2017-07-04 NOTE — PT/OT/SLP EVAL
Occupational Therapy  Evaluation    Humberto Cantor   MRN: [id number]   Admitting Diagnosis: Lumbar burst fracture    OT Date of Treatment: 07/04/17   OT Start Time: 0834  OT Stop Time: 0904  OT Total Time (min): 30 min    Billable Minutes:  Evaluation 20  Therapeutic Activity 10    Diagnosis:    S/p L2-pelvis fusion    Past Medical History:   Diagnosis Date    Dermatitis, atopic     Essential hypertension 11/19/2013    GERD (gastroesophageal reflux disease)     Hypertension       Past Surgical History:   Procedure Laterality Date    HERNIA REPAIR      WISDOM TOOTH EXTRACTION         General Precautions: Standard, fall  Orthopedic Precautions: LLE weight bearing as tolerated, RLE weight bearing as tolerated  Braces: LSO    Patient History:  Living Environment  Lives With: spouse  Living Arrangements: house  Home Accessibility: stairs to enter home, stairs within home  Home Layout: Bathroom on 2nd floor, Bedroom on 2nd floor  Number of Stairs to Enter Home: 1  Number of Stairs Within Home: 15  Stair Railings at Home: none  Transportation Available: family or friend will provide  Living Environment Comment: 24/7 Spv/(A) available  Equipment Currently Used at Home: walker, rolling    Prior level of function:   Bed Mobility/Transfers: needs device  Grooming: independent  Bathing: independent  Upper Body Dressing: independent  Lower Body Dressing: independent  Toileting: independent    Subjective:  Communicated with RN prior to session.    Pt agreeable to Evaluation    Pain/Comfort  Pain Rating 1: 5/10  Location 1: back  Pain Addressed 1: Pre-medicate for activity, Reposition, Distraction    Objective:  Patient found with: hemovac, suresh catheter, telemetry, SCD, peripheral IV, PCA    Upper Extremity Range of Motion:  Right Upper Extremity: WFL  Left Upper Extremity: WFL    Upper Extremity Strength:  Right Upper Extremity: WFL  Left Upper Extremity: WFL    Functional Mobility:  Bed Mobility:  Scooting/Bridging: Minimum  "Assistance  Supine to Sit: Minimum Assistance    Transfers:  Sit <> Stand Assistance: Contact Guard Assistance  Sit <> Stand Assistive Device: Rolling Walker    Functional Ambulation: CGA with RW 85 feet    Activities of Daily Living:  UE Dressing Level of Assistance: Moderate assistance   Matheus gown around back and matheus LSO    LE Dressing Level of Assistance: Maximum assistance    Therapeutic Activities and Exercises:  Pt educated on  · OT role/POC  · Importance of OOB activity to improve functional outcomes following sx  · Spinal precautions  · Log rolling with bed mobility    AM-PAC 6 CLICK ADL  How much help from another person does this patient currently need?  1 = Unable, Total/Dependent Assistance  2 = A lot, Maximum/Moderate Assistance  3 = A little, Minimum/Contact Guard/Supervision  4 = None, Modified [place]/Independent    Putting on and taking off regular lower body clothing? : 2  Bathing (including washing, rinsing, drying)?: 2  Toileting, which includes using toilet, bedpan, or urinal? : 2  Putting on and taking off regular upper body clothing?: 3  Taking care of personal grooming such as brushing teeth?: 3  Eating meals?: 4  Total Score: 16    AM-PAC Raw Score CMS "G-Code Modifier Level of Impairment Assistance   6 % Total / Unable   7 - 9 CM 80 - 100% Maximal Assist   10-14 CL 60 - 80% Moderate Assist   15 - 19 CK 40 - 60% Moderate Assist   20 - 22 CJ 20 - 40% Minimal Assist   23 CI 1-20% SBA / CGA   24 CH 0% Independent/ Mod I       Patient left up in chair with all lines intact, call button in reach and RN notified    Assessment:  Humberto Cantor is a 61 y.o. male with a medical diagnosis of s/p L2-pelvis fusion and presents with increased pain and decreased function of B LE impeding his ability to perform ADLs and functional mobility and would benefit from OT services to maximize functional (I) and safety.    Rehab identified problem list/impairments: Rehab identified problem " list/impairments: weakness, impaired endurance, impaired self care skills, impaired functional mobilty, gait instability, impaired balance, decreased lower extremity function, decreased safety awareness, impaired skin, pain, edema, orthopedic precautions    Rehab potential is good.    Activity tolerance: Good    Discharge recommendations: Discharge Facility/Level Of Care Needs: home with home health     Barriers to discharge: Barriers to Discharge: Inaccessible home environment    Equipment recommendations: none     GOALS:    Occupational Therapy Goals        Problem: Occupational Therapy Goal    Goal Priority Disciplines Outcome Interventions   Occupational Therapy Goal     OT, PT/OT     Description:  Goals to be met by: 07/10    Patient will increase functional independence with ADLs by performing:    UE Dressing with Supervision.  LE Dressing with Supervision with AD as needed.  Grooming while standing with Supervision.  Toileting from bedside commode with Supervision for hygiene and clothing management.   Stand pivot transfers with Supervision.  Toilet transfer to bedside commode with Supervision.                         PLAN:  Patient to be seen 4 x/week to address the above listed problems via self-care/home management, therapeutic activities, therapeutic exercises  Plan of Care reviewed with: patient    JANELLE Dior  07/04/2017

## 2017-07-04 NOTE — PLAN OF CARE
Problem: Occupational Therapy Goal  Goal: Occupational Therapy Goal  Goals to be met by: 07/10    Patient will increase functional independence with ADLs by performing:    UE Dressing with Supervision.  LE Dressing with Supervision with AD as needed.  Grooming while standing with Supervision.  Toileting from bedside commode with Supervision for hygiene and clothing management.   Stand pivot transfers with Supervision.  Toilet transfer to bedside commode with Supervision.         JANELLE Dior  7/4/2017

## 2017-07-04 NOTE — SUBJECTIVE & OBJECTIVE
"Principal Problem:<principal problem not specified>    Principal Orthopedic Problem: s/p L2-S1 PSF    Interval History: AFVSS. Mildly hypertensive. Pain controlled on PCA. Sharma in place with adequate UOP. Drains - 200 cc, 100 cc.    Review of patient's allergies indicates:   Allergen Reactions    Naproxen        Current Facility-Administered Medications   Medication    0.9%  NaCl infusion    0.9%  NaCl infusion    acetaminophen tablet 1,000 mg    alprazolam tablet 0.25 mg    bisacodyl suppository 10 mg    cefazolin (ANCEF) 2 gram in dextrose 5% 50 mL IVPB (premix)    cetirizine tablet 10 mg    heparin (porcine) injection 5,000 Units    HYDROmorphone PCA in 0.9 % NaCl 6 Mg/30 mL (0.2 mg/mL)    lactulose 20 gram/30 mL solution Soln 20 g    methocarbamol tablet 750 mg    mupirocin 2 % ointment 1 g    naloxone 0.4 mg/mL injection 0.02 mg    olmesartan tablet 20 mg    ondansetron injection 4 mg    oxycodone immediate release tablet 10 mg    oxycodone immediate release tablet 15 mg    oxycodone immediate release tablet 5 mg    pantoprazole EC tablet 40 mg    polyethylene glycol packet 17 g    promethazine tablet 25 mg    ramelteon tablet 8 mg    senna-docusate 8.6-50 mg per tablet 1 tablet    sodium chloride 0.9% flush 3 mL     Objective:     Vital Signs (Most Recent):  Temp: 97.7 °F (36.5 °C) (07/04/17 0419)  Pulse: 61 (07/04/17 0419)  Resp: 15 (07/04/17 0419)  BP: 124/73 (07/04/17 0419)  SpO2: 98 % (07/04/17 0419) Vital Signs (24h Range):  Temp:  [97.4 °F (36.3 °C)-98.6 °F (37 °C)] 97.7 °F (36.5 °C)  Pulse:  [61-86] 61  Resp:  [9-20] 15  SpO2:  [95 %-100 %] 98 %  BP: (124-169)/(60-88) 124/73     Weight: 84.8 kg (187 lb)  Height: 5' 10" (177.8 cm)  Body mass index is 26.83 kg/m².      Intake/Output Summary (Last 24 hours) at 07/04/17 0616  Last data filed at 07/04/17 0233   Gross per 24 hour   Intake          2145.83 ml   Output             3250 ml   Net         -1104.17 ml       Ortho/SPM Exam "   HEENT: normocephalic, atraumatic  Resp: no increased work of breathing  CV: regular rate and rhythm  MSK: moves all extremities well  Neuro: no focal neuro deficits  Back: dressings c/d/i    Significant Labs:   BMP:   Recent Labs  Lab 07/04/17  0506   *   *   K 4.1      CO2 28   BUN 12   CREATININE 0.9   CALCIUM 8.9   MG 1.6     CBC:   Recent Labs  Lab 07/03/17  1205 07/03/17  1610 07/03/17  1709 07/04/17  0506   WBC 5.38  --  6.75 7.98   HGB 11.6*  --  11.1* 10.0*   HCT 35.1* 24* 33.2* 29.5*   *  --  338 386*       Significant Imaging: I have reviewed and interpreted all pertinent imaging results/findings.

## 2017-07-04 NOTE — ASSESSMENT & PLAN NOTE
- Posted for revision surgery following distal hardware failure after previous L4-L5 laminectomy on 06/06/2017.  -Follow-up with orthopedic surgery.

## 2017-07-04 NOTE — ASSESSMENT & PLAN NOTE
- Patient has been on Olmesartan 20mg at home which has been continued since admission.  -His current BP is 127/85 mmHg. Systolic jacky to 169 once in the last 24 hrs, but has since been stable.  -Recommend continue Olmesartan 20mg.  -Monitor BP

## 2017-07-04 NOTE — PT/OT/SLP EVAL
Physical Therapy  Evaluation    Humberto Cantor   MRN: [id number]   Admitting Diagnosis: lumbar burst fractrure    PT Received On: 07/04/17  PT Start Time: 0834     PT Stop Time: 0904    PT Total Time (min): 30 min       Billable Minutes:  Evaluation 15 and Gait [id number]    Diagnosis: lumbar burst fracture  S/p L4 spinal fusion revision POD #1    Past Medical History:   Diagnosis Date    Dermatitis, atopic     Essential hypertension 11/19/2013    GERD (gastroesophageal reflux disease)     Hypertension       Past Surgical History:   Procedure Laterality Date    HERNIA REPAIR      WISDOM TOOTH EXTRACTION         Referring physician: Ramon  Date referred to PT: 7/4/2017      General Precautions: Standard, fall  Orthopedic Precautions: LLE weight bearing as tolerated, RLE weight bearing as tolerated   Braces: LSO       Do you have any cultural, spiritual, Temple conflicts, given your current situation?: none    Patient History:  Lives With: spouse  Living Arrangements: house  Home Accessibility: stairs to enter home, stairs within home  Home Layout: Bathroom on 2nd floor, Bedroom on 2nd floor  Number of Stairs to Enter Home: 1  Number of Stairs Within Home: 15  Stair Railings at Home: none  Transportation Available: family or friend will provide  Living Environment Comment: Pt lives with his wife who can provide 24/7 (A) upon d/c.  Equipment Currently Used at Home: walker, rolling  DME owned (not currently used):     Previous Level of Function:  Ambulation Skills: independent  Transfer Skills: independent  ADL Skills: independent  Work/Leisure Activity: independent    Subjective:  Communicated with nsg prior to session.  -Pt agreeable to PT session    Chief Complaint: impaired functional mobility  Patient goals: return home to OF    Pain/Comfort  Pain Rating 1: 5/10  Location - Orientation 1: lower  Location 1: back  Pain Addressed 1: Pre-medicate for activity, Cessation of Activity, Reposition  Pain Rating  Post-Intervention 1: 5/10      Objective:   Patient found with: blood pressure cuff, hemovac, suresh catheter, FCD, oxygen, PCA, peripheral IV, pulse ox (continuous), telemetry     Cognitive Exam:  Oriented to: Person, Place, Time and Situation    Follows Commands/attention: Follows multistep  commands  Communication: clear/fluent  Safety awareness/insight to disability: intact    Physical Exam:  Postural examination/scapula alignment: Rounded shoulder    Skin integrity: Visible skin intact  Edema: None noted     Sensation:   Intact  light/touch (B) LE    Lower Extremity Range of Motion:  Right Lower Extremity: WFL  Left Lower Extremity: WFL    Lower Extremity Strength:  Right Lower Extremity: Deficits: 4+/5 at knee  Left Lower Extremity: Deficits: 4+/5 at knee    Functional Mobility:  Bed Mobility:  Scooting/Bridging: Contact Guard Assistance  Supine to Sit: Contact Guard Assistance    Transfers:  Sit <> Stand Assistance: Contact Guard Assistance  Sit <> Stand Assistive Device: Rolling Walker  Bed <> Chair Technique: Stand Pivot  Bed <> Chair Assistance: Contact Guard Assistance  Bed <> Chair Assistive Device: Rolling Walker    Gait:   Gait Distance: 85' x1 trial  Assistance 1: Contact Guard Assistance  Gait Assistive Device: Rolling walker  Gait Pattern: swing-to gait  Gait Deviation(s): decreased migdalia, increased time in double stance, decreased toe-to-floor clearance, decreased velocity of limb motion, decreased step length, decreased stride length, decreased weight-shifting ability, decreased swing-to-stance ratio, foot flat    Balance:   Static Sit: FAIR+: Able to take MINIMAL challenges from all directions  Dynamic Sit: FAIR+: Maintains balance through MINIMAL excursions of active trunk motion  Static Stand: FAIR+: Takes MINIMAL challenges from all directions  Dynamic stand: FAIR: Needs CONTACT GUARD during gait    Therapeutic Activities and Exercises:  -Pt educated on:  A. PT POC and role of PT  B. Spinal  precautions and using log-rolling technique to get OOB  C. Importance of OOB activity to improve functional outcomes after surgery  D. DME mgmt and gait sequencing  E. Walking 2-3x/day  F. Performing HEP to prevent risk of blood clots    AM-PAC 6 CLICK MOBILITY  How much help from another person does this patient currently need?   1 = Unable, Total/Dependent Assistance  2 = A lot, Maximum/Moderate Assistance  3 = A little, Minimum/Contact Guard/Supervision  4 = None, Modified [place]/Independent    Turning over in bed (including adjusting bedclothes, sheets and blankets)?: 3  Sitting down on and standing up from a chair with arms (e.g., wheelchair, bedside commode, etc.): 3  Moving from lying on back to sitting on the side of the bed?: 3  Moving to and from a bed to a chair (including a wheelchair)?: 3  Need to walk in hospital room?: 3  Climbing 3-5 steps with a railing?: 3  Total Score: 18     AM-PAC Raw Score CMS G-Code Modifier Level of Impairment Assistance   6 % Total / Unable   7 - 9 CM 80 - 100% Maximal Assist   10 - 14 CL 60 - 80% Moderate Assist   15 - 19 CK 40 - 60% Moderate Assist   20 - 22 CJ 20 - 40% Minimal Assist   23 CI 1-20% SBA / CGA   24 CH 0% Independent/ Mod I     Patient left up in chair with all lines intact, call button in reach and nsg notified.    Assessment:   Humberto Cantor is a 61 y.o. male with a medical diagnosis of lumbar burst fracture and presents with impaired functional mobility and (B) LE weakness. Pt tolerated session well with no complications. Pt able to perform ambulation using RW but demonstrated short step length with a forward lean. Pt experienced no LOB during ambulation or t/f's. Pt will cont to benefit from skilled therapy services and is appropriate for HHPT upon d/c pending safety with stair training.    Rehab identified problem list/impairments: Rehab identified problem list/impairments: weakness, impaired endurance, impaired self care skills, impaired  balance, gait instability, impaired functional mobilty, decreased lower extremity function, pain, decreased safety awareness, impaired joint extensibility, orthopedic precautions, decreased ROM    Rehab potential is good.    Activity tolerance: Good    Discharge recommendations: Discharge Facility/Level Of Care Needs: home health PT     Barriers to discharge: Barriers to Discharge: Inaccessible home environment    Equipment recommendations: Equipment Needed After Discharge: none (pt owns RW)     GOALS:    Physical Therapy Goals        Problem: Physical Therapy Goal    Goal Priority Disciplines Outcome Goal Variances Interventions   Physical Therapy Goal     PT/OT, PT Ongoing (interventions implemented as appropriate)     Description:  Goals to be met by: 17     Patient will increase functional independence with mobility by performin. Supine to sit with Set-up [place]  2. Sit to supine with Set-up [place]  3. Sit to stand transfer with Supervision  4. Bed to chair transfer with Supervision using Rolling Walker  5. Gait  x 200 feet with Stand-by Assistance using Rolling Walker.   6. Ascend/descend 15 stair with no Handrails Stand-by Assistance.   7. Lower extremity exercise program x20-30 reps per handout, with independence                      PLAN:    Patient to be seen 6 x/week to address the above listed problems via gait training, therapeutic activities, therapeutic exercises, neuromuscular re-education  Plan of Care expires: 17  Plan of Care reviewed with: patient          Carloz Priest, PT  2017

## 2017-07-04 NOTE — ASSESSMENT & PLAN NOTE
-Patient has been taking lansoprazole at home for GERD.  -Prescribed as pantoprazole 20mg since admission.  -Monitor for any worseing of acid reflux.

## 2017-07-04 NOTE — PLAN OF CARE
Problem: Physical Therapy Goal  Goal: Physical Therapy Goal  Goals to be met by: 17     Patient will increase functional independence with mobility by performin. Supine to sit with Set-up [place]  2. Sit to supine with Set-up [place]  3. Sit to stand transfer with Supervision  4. Bed to chair transfer with Supervision using Rolling Walker  5. Gait  x 200 feet with Stand-by Assistance using Rolling Walker.   6. Ascend/descend 15 stair with no Handrails Stand-by Assistance.   7. Lower extremity exercise program x20-30 reps per handout, with independence    Outcome: Ongoing (interventions implemented as appropriate)  Pt tolerated session well with no complications. Pt able to perform ambulation using RW but demonstrated short step length with a forward lean. Pt experienced no LOB during ambulation or t/f's. Pt will cont to benefit from skilled therapy services and is appropriate for HHPT upon d/c pending safety with stair training.

## 2017-07-05 PROBLEM — E83.51 HYPOCALCEMIA: Status: ACTIVE | Noted: 2017-07-05

## 2017-07-05 PROBLEM — E83.39 HYPOPHOSPHATEMIA: Status: ACTIVE | Noted: 2017-07-05

## 2017-07-05 PROBLEM — E83.42 HYPOMAGNESEMIA: Status: ACTIVE | Noted: 2017-07-05

## 2017-07-05 LAB
ANION GAP SERPL CALC-SCNC: 5 MMOL/L
BASOPHILS # BLD AUTO: 0.02 K/UL
BASOPHILS # BLD AUTO: 0.03 K/UL
BASOPHILS NFR BLD: 0.3 %
BASOPHILS NFR BLD: 0.4 %
BUN SERPL-MCNC: 11 MG/DL
CALCIUM SERPL-MCNC: 8.2 MG/DL
CHLORIDE SERPL-SCNC: 103 MMOL/L
CO2 SERPL-SCNC: 27 MMOL/L
CREAT SERPL-MCNC: 1 MG/DL
DIFFERENTIAL METHOD: ABNORMAL
DIFFERENTIAL METHOD: ABNORMAL
EOSINOPHIL # BLD AUTO: 0 K/UL
EOSINOPHIL # BLD AUTO: 0.1 K/UL
EOSINOPHIL NFR BLD: 0.5 %
EOSINOPHIL NFR BLD: 0.7 %
ERYTHROCYTE [DISTWIDTH] IN BLOOD BY AUTOMATED COUNT: 12.8 %
ERYTHROCYTE [DISTWIDTH] IN BLOOD BY AUTOMATED COUNT: 12.9 %
EST. GFR  (AFRICAN AMERICAN): >60 ML/MIN/1.73 M^2
EST. GFR  (NON AFRICAN AMERICAN): >60 ML/MIN/1.73 M^2
GLUCOSE SERPL-MCNC: 118 MG/DL
HCT VFR BLD AUTO: 24.4 %
HCT VFR BLD AUTO: 24.8 %
HGB BLD-MCNC: 8.3 G/DL
HGB BLD-MCNC: 8.4 G/DL
LYMPHOCYTES # BLD AUTO: 0.7 K/UL
LYMPHOCYTES # BLD AUTO: 0.8 K/UL
LYMPHOCYTES NFR BLD: 11.3 %
LYMPHOCYTES NFR BLD: 9 %
MAGNESIUM SERPL-MCNC: 1.5 MG/DL
MCH RBC QN AUTO: 32.7 PG
MCH RBC QN AUTO: 33.1 PG
MCHC RBC AUTO-ENTMCNC: 33.9 %
MCHC RBC AUTO-ENTMCNC: 34 %
MCV RBC AUTO: 97 FL
MCV RBC AUTO: 97 FL
MONOCYTES # BLD AUTO: 1.2 K/UL
MONOCYTES # BLD AUTO: 1.2 K/UL
MONOCYTES NFR BLD: 16.2 %
MONOCYTES NFR BLD: 16.7 %
NEUTROPHILS # BLD AUTO: 5.1 K/UL
NEUTROPHILS # BLD AUTO: 5.6 K/UL
NEUTROPHILS NFR BLD: 70.8 %
NEUTROPHILS NFR BLD: 73.9 %
PHOSPHATE SERPL-MCNC: 2 MG/DL
PLATELET # BLD AUTO: 274 K/UL
PLATELET # BLD AUTO: 293 K/UL
PMV BLD AUTO: 9 FL
PMV BLD AUTO: 9.1 FL
POTASSIUM SERPL-SCNC: 3.6 MMOL/L
RBC # BLD AUTO: 2.51 M/UL
RBC # BLD AUTO: 2.57 M/UL
SODIUM SERPL-SCNC: 135 MMOL/L
WBC # BLD AUTO: 7.14 K/UL
WBC # BLD AUTO: 7.57 K/UL

## 2017-07-05 PROCEDURE — 25000003 PHARM REV CODE 250: Performed by: ORTHOPAEDIC SURGERY

## 2017-07-05 PROCEDURE — 84100 ASSAY OF PHOSPHORUS: CPT

## 2017-07-05 PROCEDURE — 63600175 PHARM REV CODE 636 W HCPCS: Performed by: STUDENT IN AN ORGANIZED HEALTH CARE EDUCATION/TRAINING PROGRAM

## 2017-07-05 PROCEDURE — 36415 COLL VENOUS BLD VENIPUNCTURE: CPT

## 2017-07-05 PROCEDURE — 97116 GAIT TRAINING THERAPY: CPT

## 2017-07-05 PROCEDURE — 20600001 HC STEP DOWN PRIVATE ROOM

## 2017-07-05 PROCEDURE — 97535 SELF CARE MNGMENT TRAINING: CPT

## 2017-07-05 PROCEDURE — 83735 ASSAY OF MAGNESIUM: CPT

## 2017-07-05 PROCEDURE — 80048 BASIC METABOLIC PNL TOTAL CA: CPT

## 2017-07-05 PROCEDURE — 25000003 PHARM REV CODE 250: Performed by: STUDENT IN AN ORGANIZED HEALTH CARE EDUCATION/TRAINING PROGRAM

## 2017-07-05 PROCEDURE — 97530 THERAPEUTIC ACTIVITIES: CPT

## 2017-07-05 PROCEDURE — 85025 COMPLETE CBC W/AUTO DIFF WBC: CPT | Mod: 91

## 2017-07-05 PROCEDURE — 25000003 PHARM REV CODE 250: Performed by: ANESTHESIOLOGY

## 2017-07-05 RX ORDER — SODIUM,POTASSIUM PHOSPHATES 280-250MG
2 POWDER IN PACKET (EA) ORAL
Status: COMPLETED | OUTPATIENT
Start: 2017-07-05 | End: 2017-07-05

## 2017-07-05 RX ORDER — ONDANSETRON HYDROCHLORIDE 8 MG/1
8 TABLET, FILM COATED ORAL EVERY 8 HOURS PRN
Qty: 30 TABLET | Refills: 0 | Status: SHIPPED | OUTPATIENT
Start: 2017-07-05 | End: 2018-01-12 | Stop reason: SDUPTHER

## 2017-07-05 RX ORDER — DOCUSATE SODIUM 100 MG/1
100 CAPSULE, LIQUID FILLED ORAL 2 TIMES DAILY
Qty: 60 CAPSULE | Refills: 0 | Status: SHIPPED | OUTPATIENT
Start: 2017-07-05 | End: 2017-09-21

## 2017-07-05 RX ORDER — MAGNESIUM SULFATE HEPTAHYDRATE 40 MG/ML
2 INJECTION, SOLUTION INTRAVENOUS ONCE
Status: COMPLETED | OUTPATIENT
Start: 2017-07-05 | End: 2017-07-05

## 2017-07-05 RX ORDER — OXYCODONE HYDROCHLORIDE 15 MG/1
15 TABLET ORAL EVERY 4 HOURS PRN
Qty: 90 TABLET | Refills: 0 | Status: SHIPPED | OUTPATIENT
Start: 2017-07-05 | End: 2017-07-07 | Stop reason: HOSPADM

## 2017-07-05 RX ORDER — METHOCARBAMOL 750 MG/1
750 TABLET, FILM COATED ORAL 4 TIMES DAILY
Qty: 60 TABLET | Refills: 0 | Status: SHIPPED | OUTPATIENT
Start: 2017-07-05 | End: 2017-07-07

## 2017-07-05 RX ADMIN — HEPARIN SODIUM 5000 UNITS: 5000 INJECTION, SOLUTION INTRAVENOUS; SUBCUTANEOUS at 02:07

## 2017-07-05 RX ADMIN — OXYCODONE HYDROCHLORIDE 15 MG: 5 TABLET ORAL at 07:07

## 2017-07-05 RX ADMIN — HEPARIN SODIUM 5000 UNITS: 5000 INJECTION, SOLUTION INTRAVENOUS; SUBCUTANEOUS at 06:07

## 2017-07-05 RX ADMIN — Medication 3 ML: at 02:07

## 2017-07-05 RX ADMIN — OXYCODONE HYDROCHLORIDE 15 MG: 5 TABLET ORAL at 02:07

## 2017-07-05 RX ADMIN — METHOCARBAMOL 750 MG: 750 TABLET ORAL at 06:07

## 2017-07-05 RX ADMIN — STANDARDIZED SENNA CONCENTRATE AND DOCUSATE SODIUM 1 TABLET: 8.6; 5 TABLET, FILM COATED ORAL at 09:07

## 2017-07-05 RX ADMIN — OXYCODONE HYDROCHLORIDE 15 MG: 5 TABLET ORAL at 09:07

## 2017-07-05 RX ADMIN — ACETAMINOPHEN 1000 MG: 500 TABLET ORAL at 11:07

## 2017-07-05 RX ADMIN — ACETAMINOPHEN 1000 MG: 500 TABLET ORAL at 05:07

## 2017-07-05 RX ADMIN — HEPARIN SODIUM 5000 UNITS: 5000 INJECTION, SOLUTION INTRAVENOUS; SUBCUTANEOUS at 09:07

## 2017-07-05 RX ADMIN — MAGNESIUM SULFATE IN WATER 2 G: 40 INJECTION, SOLUTION INTRAVENOUS at 07:07

## 2017-07-05 RX ADMIN — HYDROMORPHONE HYDROCHLORIDE 0.5 MG: 1 INJECTION, SOLUTION INTRAMUSCULAR; INTRAVENOUS; SUBCUTANEOUS at 05:07

## 2017-07-05 RX ADMIN — METHOCARBAMOL 750 MG: 750 TABLET ORAL at 11:07

## 2017-07-05 RX ADMIN — Medication 3 ML: at 09:07

## 2017-07-05 RX ADMIN — METHOCARBAMOL 750 MG: 750 TABLET ORAL at 12:07

## 2017-07-05 RX ADMIN — OXYCODONE HYDROCHLORIDE 15 MG: 5 TABLET ORAL at 06:07

## 2017-07-05 RX ADMIN — POTASSIUM & SODIUM PHOSPHATES POWDER PACK 280-160-250 MG 2 PACKET: 280-160-250 PACK at 12:07

## 2017-07-05 RX ADMIN — POLYETHYLENE GLYCOL 3350 17 G: 17 POWDER, FOR SOLUTION ORAL at 09:07

## 2017-07-05 RX ADMIN — METHOCARBAMOL 750 MG: 750 TABLET ORAL at 05:07

## 2017-07-05 RX ADMIN — OLMESARTAN MEDOXOMIL 20 MG: 20 TABLET, FILM COATED ORAL at 09:07

## 2017-07-05 RX ADMIN — PANTOPRAZOLE SODIUM 40 MG: 40 TABLET, DELAYED RELEASE ORAL at 09:07

## 2017-07-05 RX ADMIN — STANDARDIZED SENNA CONCENTRATE AND DOCUSATE SODIUM 1 TABLET: 8.6; 5 TABLET, FILM COATED ORAL at 08:07

## 2017-07-05 RX ADMIN — POTASSIUM & SODIUM PHOSPHATES POWDER PACK 280-160-250 MG 2 PACKET: 280-160-250 PACK at 08:07

## 2017-07-05 RX ADMIN — OXYCODONE HYDROCHLORIDE 15 MG: 5 TABLET ORAL at 03:07

## 2017-07-05 RX ADMIN — HYDROMORPHONE HYDROCHLORIDE 0.5 MG: 1 INJECTION, SOLUTION INTRAMUSCULAR; INTRAVENOUS; SUBCUTANEOUS at 04:07

## 2017-07-05 RX ADMIN — HYDROMORPHONE HYDROCHLORIDE 0.5 MG: 1 INJECTION, SOLUTION INTRAMUSCULAR; INTRAVENOUS; SUBCUTANEOUS at 12:07

## 2017-07-05 RX ADMIN — POTASSIUM & SODIUM PHOSPHATES POWDER PACK 280-160-250 MG 2 PACKET: 280-160-250 PACK at 07:07

## 2017-07-05 RX ADMIN — HYDROMORPHONE HYDROCHLORIDE 0.5 MG: 1 INJECTION, SOLUTION INTRAMUSCULAR; INTRAVENOUS; SUBCUTANEOUS at 09:07

## 2017-07-05 RX ADMIN — SODIUM CHLORIDE: 0.9 INJECTION, SOLUTION INTRAVENOUS at 02:07

## 2017-07-05 RX ADMIN — POTASSIUM & SODIUM PHOSPHATES POWDER PACK 280-160-250 MG 2 PACKET: 280-160-250 PACK at 04:07

## 2017-07-05 RX ADMIN — HYDROMORPHONE HYDROCHLORIDE 0.5 MG: 1 INJECTION, SOLUTION INTRAMUSCULAR; INTRAVENOUS; SUBCUTANEOUS at 08:07

## 2017-07-05 RX ADMIN — ACETAMINOPHEN 1000 MG: 500 TABLET ORAL at 06:07

## 2017-07-05 RX ADMIN — Medication 3 ML: at 05:07

## 2017-07-05 RX ADMIN — OXYCODONE HYDROCHLORIDE 15 MG: 5 TABLET ORAL at 10:07

## 2017-07-05 RX ADMIN — CETIRIZINE HYDROCHLORIDE 10 MG: 10 TABLET, FILM COATED ORAL at 09:07

## 2017-07-05 NOTE — ASSESSMENT & PLAN NOTE
- Antibiotics: post-op Ancef  - Weight bearing status: WBAT  - Labs: reviewed  - DVT Prophylaxis: heparin q8h  - Lines/Drains: PIV  - Pain control: multimodal  - Dispo: continue to treat

## 2017-07-05 NOTE — PROGRESS NOTES
SHAMA faxed HH orders to [facility]. [facility] added to patient's AVS.    Marilia Bird RN, CM [facility]  [phone] -x- [phone]

## 2017-07-05 NOTE — SUBJECTIVE & OBJECTIVE
"Principal Problem:Lumbar burst fracture    Principal Orthopedic Problem: s/p L2-S1 PSF    Interval History: AFVSS. Some issues with pain control since stopping PCA yesterday. Drains with 5 and 5 mL outputs overnight. Left leg pain greatly improved from preop. Still has residual numbness in left toes.    Review of patient's allergies indicates:   Allergen Reactions    Naproxen        Current Facility-Administered Medications   Medication    0.9%  NaCl infusion    acetaminophen tablet 1,000 mg    alprazolam tablet 0.25 mg    bisacodyl suppository 10 mg    cetirizine tablet 10 mg    heparin (porcine) injection 5,000 Units    hydromorphone injection 0.5 mg    lactulose 20 gram/30 mL solution Soln 20 g    magnesium sulfate 2g in water 50mL IVPB (premix)    methocarbamol tablet 750 mg    mupirocin 2 % ointment 1 g    olmesartan tablet 20 mg    ondansetron injection 4 mg    oxycodone immediate release tablet 10 mg    oxycodone immediate release tablet 15 mg    oxycodone immediate release tablet 5 mg    pantoprazole EC tablet 40 mg    polyethylene glycol packet 17 g    potassium, sodium phosphates 280-160-250 mg packet 2 packet    promethazine tablet 25 mg    ramelteon tablet 8 mg    senna-docusate 8.6-50 mg per tablet 1 tablet    sodium chloride 0.9% flush 3 mL     Objective:     Vital Signs (Most Recent):  Temp: 98.2 °F (36.8 °C) (07/05/17 0433)  Pulse: 86 (07/05/17 0433)  Resp: 16 (07/05/17 0433)  BP: 116/64 (07/05/17 0505)  SpO2: 97 % (07/05/17 0433) Vital Signs (24h Range):  Temp:  [96.1 °F (35.6 °C)-98.3 °F (36.8 °C)] 98.2 °F (36.8 °C)  Pulse:  [74-86] 86  Resp:  [16-18] 16  SpO2:  [94 %-99 %] 97 %  BP: ()/(53-85) 116/64     Weight: 84.8 kg (187 lb)  Height: 5' 10" (177.8 cm)  Body mass index is 26.83 kg/m².      Intake/Output Summary (Last 24 hours) at 07/05/17 0605  Last data filed at 07/05/17 0538   Gross per 24 hour   Intake                0 ml   Output             3835 ml   Net       "      -3835 ml       Ortho/SPM Exam     HEENT: normocephalic, atraumatic  Resp: no increased work of breathing  CV: regular rate and rhythm  MSK: moves all extremities well  Neuro: no focal neuro deficits  Back: dressings c/d/i    Significant Labs:   BMP:     Recent Labs  Lab 07/05/17  0414   *   *   K 3.6      CO2 27   BUN 11   CREATININE 1.0   CALCIUM 8.2*   MG 1.5*     CBC:     Recent Labs  Lab 07/03/17  1709 07/04/17  0506 07/05/17  0414   WBC 6.75 7.98 7.57   HGB 11.1* 10.0* 8.4*   HCT 33.2* 29.5* 24.8*    386* 293       Significant Imaging: I have reviewed and interpreted all pertinent imaging results/findings.

## 2017-07-05 NOTE — PROGRESS NOTES
[facility]  Orthopedics  Progress Note    Patient Name: Humberto Cantor  MRN: [id number]  Admission Date: 7/3/2017  Hospital Length of Stay: 2 days  Attending Provider: Joaquin Navarro MD  Primary Care Provider: Saud Sosa MD  Follow-up For: Procedure(s) (LRB):  L1-Pelvis Revision Depuy Remove/Reinsert SNS: Motors/SSEP/EMG [place] (N/A)    Post-Operative Day: 2 Days Post-Op  Subjective:     Principal Problem:Lumbar burst fracture    Principal Orthopedic Problem: s/p L2-S1 PSF    Interval History: AFVSS. Some issues with pain control since stopping PCA yesterday. Drains with   45 and 0 mL outputs overnight. Left leg pain greatly improved from preop. Still has residual numbness in left toes. Drop in H/H noted this morning to 8.4/24.8 from 10.0/29.5  Yesterday.    Review of patient's allergies indicates:   Allergen Reactions    Naproxen        Current Facility-Administered Medications   Medication    0.9%  NaCl infusion    acetaminophen tablet 1,000 mg    alprazolam tablet 0.25 mg    bisacodyl suppository 10 mg    cetirizine tablet 10 mg    heparin (porcine) injection 5,000 Units    hydromorphone injection 0.5 mg    lactulose 20 gram/30 mL solution Soln 20 g    magnesium sulfate 2g in water 50mL IVPB (premix)    methocarbamol tablet 750 mg    mupirocin 2 % ointment 1 g    olmesartan tablet 20 mg    ondansetron injection 4 mg    oxycodone immediate release tablet 10 mg    oxycodone immediate release tablet 15 mg    oxycodone immediate release tablet 5 mg    pantoprazole EC tablet 40 mg    polyethylene glycol packet 17 g    potassium, sodium phosphates 280-160-250 mg packet 2 packet    promethazine tablet 25 mg    ramelteon tablet 8 mg    senna-docusate 8.6-50 mg per tablet 1 tablet    sodium chloride 0.9% flush 3 mL     Objective:     Vital Signs (Most Recent):  Temp: 98.2 °F (36.8 °C) (07/05/17 0433)  Pulse: 86 (07/05/17 0433)  Resp: 16 (07/05/17 0433)  BP: 116/64  "(07/05/17 0508)  SpO2: 97 % (07/05/17 0433) Vital Signs (24h Range):  Temp:  [96.1 °F (35.6 °C)-98.3 °F (36.8 °C)] 98.2 °F (36.8 °C)  Pulse:  [74-86] 86  Resp:  [16-18] 16  SpO2:  [94 %-99 %] 97 %  BP: ()/(53-85) 116/64     Weight: 84.8 kg (187 lb)  Height: 5' 10" (177.8 cm)  Body mass index is 26.83 kg/m².      Intake/Output Summary (Last 24 hours) at 07/05/17 0605  Last data filed at 07/05/17 0538   Gross per 24 hour   Intake                0 ml   Output             3835 ml   Net            -3835 ml       Ortho/SPM Exam     HEENT: normocephalic, atraumatic  Resp: no increased work of breathing  CV: regular rate and rhythm  MSK: moves all extremities well  Neuro: no focal neuro deficits  Back: dressings c/d/i    Significant Labs:   BMP:     Recent Labs  Lab 07/05/17  0414   *   *   K 3.6      CO2 27   BUN 11   CREATININE 1.0   CALCIUM 8.2*   MG 1.5*     CBC:     Recent Labs  Lab 07/03/17  1709 07/04/17  0506 07/05/17  0414   WBC 6.75 7.98 7.57   HGB 11.1* 10.0* 8.4*   HCT 33.2* 29.5* 24.8*    386* 293       Significant Imaging: I have reviewed and interpreted all pertinent imaging results/findings.    Assessment/Plan:     Essential hypertension    Continue home meds        GERD (gastroesophageal reflux disease)    Continue home meds        * Lumbar burst fracture    - Antibiotics: post-op Ancef  - Weight bearing status: WBAT  - Labs: reviewed  - DVT Prophylaxis: heparin q8h  - Lines/Drains: PIV  - Pain control: multimodal  - Dispo: continue to treat                  Grady Mercado MD  Orthopedics  [facility]  "

## 2017-07-05 NOTE — PT/OT/SLP PROGRESS
Physical Therapy  Treatment    Humberto Cantor   MRN: [id number]   Admitting Diagnosis: Lumbar burst fracture    PT Received On: 07/05/17  PT Start Time: 1029   PT Stop Time: 1053  PT Total Time (min): 24 min       Billable Minutes:  Gait [id number] and Therapeutic Activity 8    Treatment Type: Treatment  PT/PTA: PTA     PTA Visit Number: 1       General Precautions: Standard, fall  Orthopedic Precautions: LLE weight bearing as tolerated, RLE weight bearing as tolerated   Braces: LSO    Do you have any cultural, spiritual, Judaism conflicts, given your current situation?: none    Subjective:  Communicated with RN prior to session.  Pt was found up in bathroom. Pt stated that he was ready to participate in PT session.    Pain/Comfort  Pain Rating 1: 9/10  Location - Side 1: Bilateral  Location - Orientation 1: lower  Location 1: back  Pain Rating Post-Intervention 1: 9/10    Objective:   Patient found with: hemovac, telemetry, peripheral IV    Functional Mobility:  Bed Mobility:   Rolling/Turning to Left: Minimum assistance (Min A d/t legs management)  Scooting/Bridging: Contact Guard Assistance  Sit to Supine: Minimum Assistance (Min A for legs management)    Transfers:   stand to sit on EOB with CGA with RW  vcs for hand palcement    Gait:   Gait Distance: 110ft CGA with LSO. Pt needed verbal cues for maintaining upright posture.   Assistance 1: Contact Guard Assistance  Gait Assistive Device: Rolling walker  Gait Pattern: 2-point gait  Gait Deviation(s): decreased migdalia, increased time in double stance, decreased velocity of limb motion, decreased step length, decreased stride length, forward lean      Therapeutic Activities and Exercises:  Pt performed BLE 1x10 reps of ankle pumps  Pt educated on log rolling from sit<->supine to decrease torque on lower lumbar  White board updated    AM-PAC 6 CLICK MOBILITY  How much help from another person does this patient currently need?   1 = Unable, Total/Dependent  Assistance  2 = A lot, Maximum/Moderate Assistance  3 = A little, Minimum/Contact Guard/Supervision  4 = None, Modified [place]/Independent    Turning over in bed (including adjusting bedclothes, sheets and blankets)?: 3  Sitting down on and standing up from a chair with arms (e.g., wheelchair, bedside commode, etc.): 3  Moving from lying on back to sitting on the side of the bed?: 3  Moving to and from a bed to a chair (including a wheelchair)?: 3  Need to walk in hospital room?: 3  Climbing 3-5 steps with a railing?: 3  Total Score: 18    AM-PAC Raw Score CMS G-Code Modifier Level of Impairment Assistance   6 % Total / Unable   7 - 9 CM 80 - 100% Maximal Assist   10 - 14 CL 60 - 80% Moderate Assist   15 - 19 CK 40 - 60% Moderate Assist   20 - 22 CJ 20 - 40% Minimal Assist   23 CI 1-20% SBA / CGA   24 CH 0% Independent/ Mod I     Patient left HOB elevated with call button in reach.    Assessment:  Humberto Cantor is a 61 y.o. male with a medical diagnosis of Lumbar burst fracture and presents with combined deficits as listed below. Pt return demonstrated fatigue/pain post gait, needing verbal cues for maintaining upright posture. Pt will continue to benefit from PT POC.goals remain appropriate     Rehab identified problem list/impairments:      Rehab potential is good.    Activity tolerance: Good    Discharge recommendations: Discharge Facility/Level Of Care Needs: home health PT     Barriers to discharge: Barriers to Discharge: Inaccessible home environment    Equipment recommendations:       GOALS:    Physical Therapy Goals        Problem: Physical Therapy Goal    Goal Priority Disciplines Outcome Goal Variances Interventions   Physical Therapy Goal     PT/OT, PT Ongoing (interventions implemented as appropriate)     Description:  Goals to be met by: 17     Patient will increase functional independence with mobility by performin. Supine to sit with Set-up [place]  2. Sit to supine with  Set-up [place]  3. Sit to stand transfer with Supervision  4. Bed to chair transfer with Supervision using Rolling Walker  5. Gait  x 200 feet with Stand-by Assistance using Rolling Walker.   6. Ascend/descend 15 stair with no Handrails Stand-by Assistance.   7. Lower extremity exercise program x20-30 reps per handout, with independence                      PLAN:    Patient to be seen 5 x/week  to address the above listed problems via gait training, therapeutic activities, therapeutic exercises  Plan of Care expires: 08/04/17  Plan of Care reviewed with: patient, spouse         Malcolm Moeller, SPTA  07/05/2017   I certify that I was present in the room directing the student in service delivery and guiding them using my skilled judgment. As the co-signing therapist I have reviewed the students documentation and am responsible for the treatment, assessment, and plan.  Nba Johnson, PTA  7/5/2017

## 2017-07-05 NOTE — PLAN OF CARE
[facility]    HOME HEALTH ORDERS  FACE TO FACE ENCOUNTER    Patient Name: Humberto Cantor  YOB: 1955    PCP: Saud Sosa MD   PCP Address: [address] / [place] [address]  PCP Phone Number: [phone]  PCP Fax: [phone]    Encounter Date: 07/05/2017    Admit to Home Health    Diagnoses:  Active Hospital Problems    Diagnosis  POA    *Lumbar burst fracture [S32.001A]  Yes    Essential hypertension [I10]  Yes    GERD (gastroesophageal reflux disease) [K21.9]  Yes      Resolved Hospital Problems    Diagnosis Date Resolved POA   No resolved problems to display.       Future Appointments  Date Time Provider Department Center   9/21/2017 10:00 AM Saud Sosa MD [facility] [place]           I have seen and examined this patient face to face today. My clinical findings that support the need for the home health skilled services and home bound status are the following:  Weakness/numbness causing balance and gait disturbance due to Surgery making it taxing to leave home.    Allergies:  Review of patient's allergies indicates:   Allergen Reactions    Naproxen      Side effect of GIB       Diet: regular diet    Activities: activity as tolerated, must ambulate in LSO brace    Nursing:   SN to complete comprehensive assessment including routine vital signs. Instruct on disease process and s/s of complications to report to MD. Follow specific home health arthoplasty protocol. Review/verify medication list sent home with the patient at time of discharge  and instruct patient/caregiver as needed.    Notify MD if SBP > 160 or < 90; DBP > 90 or < 50; HR > 120 or < 50; Temp > 101    Home Medical Equipment:  Walker, 3-1 bedside commode, transfer tub bench    CONSULTS:    Physical Therapy to evaluate and treat. Evaluate for home safety and equipment needs; Establish/upgrade home exercise program. Perform / instruct on therapeutic exercises, gait training, transfer training, and Range  of Motion.      WOUND CARE ORDERS  Follow Home Health specific protocol.      Medications: Review discharge medications with patient and family and provide education.      Current Discharge Medication List      START taking these medications    Details   docusate sodium (COLACE) 100 MG capsule Take 1 capsule (100 mg total) by mouth 2 (two) times daily.  Qty: 60 capsule, Refills: 0      ondansetron (ZOFRAN) 8 MG tablet Take 1 tablet (8 mg total) by mouth every 8 (eight) hours as needed for Nausea.  Qty: 30 tablet, Refills: 0      oxycodone (ROXICODONE) 15 MG Tab Take 1 tablet (15 mg total) by mouth every 4 (four) hours as needed for Pain.  Qty: 90 tablet, Refills: 0         CONTINUE these medications which have CHANGED    Details   methocarbamol (ROBAXIN) 750 MG Tab Take 1 tablet (750 mg total) by mouth 4 (four) times daily.  Qty: 60 tablet, Refills: 0         CONTINUE these medications which have NOT CHANGED    Details   alprazolam (XANAX) 0.25 MG tablet Take 1 tablet (0.25 mg total) by mouth 2 (two) times daily as needed.  Qty: 60 tablet, Refills: 1    Associated Diagnoses: TORRIE (generalized anxiety disorder)      lansoprazole (PREVACID) 15 MG capsule Take 1 capsule (15 mg total) by mouth once daily.  Qty: 30 capsule, Refills: 11    Associated Diagnoses: Gastroesophageal reflux disease without esophagitis      loratadine (CLARITIN) 10 mg tablet Take 10 mg by mouth once daily.      olmesartan (BENICAR) 20 MG tablet Take 1 tablet (20 mg total) by mouth once daily.  Qty: 90 tablet, Refills: 3      tadalafil (CIALIS) 20 MG Tab Take 1 tablet (20 mg total) by mouth once daily.  Qty: 100 tablet, Refills: 0    Associated Diagnoses: Essential hypertension      fluocinonide 0.05% (LIDEX) 0.05 % cream Apply topically 2 (two) times daily.  Qty: 60 g, Refills: 3    Associated Diagnoses: Dermatitis due to plants         STOP taking these medications       oxycodone-acetaminophen (PERCOCET)  mg per tablet Comments:   Reason  for Stopping:         ibuprofen (ADVIL,MOTRIN) 200 MG tablet Comments:   Reason for Stopping:               I certify that this patient is confined to his home and needs intermittent skilled nursing care and physical therapy.

## 2017-07-05 NOTE — PLAN OF CARE
Problem: Physical Therapy Goal  Goal: Physical Therapy Goal  Goals to be met by: 17     Patient will increase functional independence with mobility by performin. Supine to sit with Set-up [place]  2. Sit to supine with Set-up [place]  3. Sit to stand transfer with Supervision  4. Bed to chair transfer with Supervision using Rolling Walker  5. Gait  x 200 feet with Stand-by Assistance using Rolling Walker.   6. Ascend/descend 15 stair with no Handrails Stand-by Assistance.   7. Lower extremity exercise program x20-30 reps per handout, with independence     Outcome: Ongoing (interventions implemented as appropriate)  Pt will continue to progress towards goals per PT POC.     I certify that I was present in the room directing the student in service delivery and guiding them using my skilled judgment. As the co-signing therapist I have reviewed the students documentation and am responsible for the treatment, assessment, and plan.  Nba Johnson, PTA  2017

## 2017-07-05 NOTE — PLAN OF CARE
[facility] Medicine Progress Note      HPI:       Alex Cantor is a 61 yr old male who presented with low back pain and bilateral lower extremity pain since roughly 3 weeks.      He underwent L3-L5 laminectomy for fusion of an unstable L4 burst fracture on 06/06/2017. Pre-surgical X-ray of the lumbar spine showed a compression fracture of L5 with decrease in height of >50%. Following the surgery he developed kyphosis, low back pain with radiation of pain down the lower limbs (L>R). The pain was was an 8 on a scale of 1-10, at its worst. He also complains of pain along the surgical incision. An X-ray of the lumbar spine done on 06/09/2017 showed worsening of the compression fracture of L4, likely due to distal hardware failure. He underwent revision surgery (L2-S1 posterior spinal fusion) yesterday.     [facility] medicine is following up for management of hypertension.    Currently he is oriented and in no acute distress. He complains of muscle cramping, nausea. He also reports that he is feeling very cold.   Pain is present over the incision site and is aggravated when he moves.    Assessment and Plan:    1) Essential Hypertension.    -BP is 118/56  -Recommend continue Olmesartan 20mg daily.  -Monitor BP    2) GERD.    -Recommend continue Pantoprazole 20mg daily.  -Monitor for any worsening of acid reflux.      3) Lumbar Burst Fracture; S/p L2-S1 PSF following distal hardware failure after L4-L5 laminectomy done on 06/06.    -Follow-up with orthopedic surgery.  -Adequate pain management.      4) Post-op hypomagnesemia    - Magnesium levels were low in the post-op period (Mg-1.5).  - MgSo4 2g IV infusion given this morning.   - Monitor levels     5) Post-op hypophosphatemia    - Phosphorus levels were low in the post-op period. (Phosp-2)  - Potassium, sodium phosphates 280-160-250 mg packets prescribed, four times daily with meals.   - Monitor levels    6) Post-op hypocalcemia    - Calcium levels were low in the post op  period; Ca-8.2  - Monitor levels.

## 2017-07-05 NOTE — PLAN OF CARE
Problem: Pressure Ulcer Risk (Jony Scale) (Adult,Obstetrics,Pediatric)  Goal: Identify Related Risk Factors and Signs and Symptoms  Related risk factors and signs and symptoms are identified upon initiation of Human Response Clinical Practice Guideline (CPG)   Pt repositions self in bed independently. Pt ambulates to restroom with assistance of spouse and staff.     Problem: Fall Risk (Adult)  Goal: Identify Related Risk Factors and Signs and Symptoms  Related risk factors and signs and symptoms are identified upon initiation of Human Response Clinical Practice Guideline (CPG)   No falls during this shift.

## 2017-07-05 NOTE — PLAN OF CARE
Problem: Patient Care Overview  Goal: Plan of Care Review  Outcome: Ongoing (interventions implemented as appropriate)  Plan of care reviewed with patient and spouse. Verbal understanding received. VSS, RA no complaints of SOB, chest pain, N/V. Patient has severe pain mostly with movement. Pain controlled with PRN and breakthrough medication. Isicion dressings clean dry and intact. Drains patent with serosanguinous fluid. Adequate urination via urinal. Tolerating clear liquid diet. All items within reach, patient free of injury or falls. RN will continue to monitor

## 2017-07-05 NOTE — PLAN OF CARE
07/05/17 0919   Readmission Questionnaire   At the time of your discharge, did someone talk to you about what your health problems were? Yes   At the time of discharge, did someone talk to you about what to watch out for regarding worsening of your health problem? Yes   At the time of discharge, did someone talk to you about what to do if you experienced worsening of your health problem? Yes   At the time of discharge, did someone talk to you about which medication to take when you left the hospital and which ones to stop taking? Yes   At the time of discharge, did someone talk to you about when and where to follow up with a doctor after you left the hospital? Yes   What do you believe caused you to be sick enough to be re-admitted? Developed distal hardware failure, lower back pain, here for revision surgery.   How often do you need to have someone help you when you read instructions, pamphlets, or other written material from your doctor or pharmacy? Always   Do you have problems taking your medications as prescribed? No   Do you have any problems affording any of  your prescribed medications? No   Do you have problems obtaining/receiving your medications? No   Does the patient have transportation to healthcare appointments? Yes   Lives With spouse   Living Arrangements house   Does the patient have family/friends to help with healtcare needs after discharge? yes   Who are your caregiver(s) and their phone number(s)? Eleanor Cantor- spouse- [phone], [phone]    Does your caregiver provide all the help you need? Yes   Are you currently feeling confused? No   Are you currently having problems thinking? No   Are you currently having memory problems? No   Have you felt down, depressed, or hopeless? 0   Have you felt little interest or pleasure in doing things? 0   In the last 7 days, my sleep quality was: good

## 2017-07-05 NOTE — PLAN OF CARE
POD 2 s/p L2-S1 PSF. PT/OT ordered to eval and treat. PT/OT recommending HH; patient owns all necessary DME. Patient currently lives with spouse. Patient has good family support. CM completed discharge assessment and planning with patient. Patient verbalized understanding. All questions and concerns addressed. SW and CM will continue to follow for any additional needs. Plan A to discharge home with home health as soon as medically stable. Plan B to discharge home with family support and plans for outpatient rehab. Patient used [facility]  in [place] after last admission/discharge.    PCP: Saud Sosa MD    Pharmacy:   [id number] - [place], [place] - [address]  [address]  [place] [address]  Phone: [phone] Fax: [phone]    [facility] - [place], [place] - [facility]  [address] [address]  Phone: [phone] Fax: [phone]    Payor: BLUE CROSS BLUE SHIELD / Plan: [employer] [place] PPO / Product Type: PPO /      07/05/17 0915   Discharge Assessment   Assessment Type Discharge Planning Assessment   Confirmed/corrected address and phone number on facesheet? Yes   Assessment information obtained from? Patient;Medical Record   Expected Length of Stay (days) 4   Communicated expected length of stay with patient/caregiver yes   Prior to hospitilization cognitive status: Alert/Oriented   Prior to hospitalization functional status: Assistive Equipment   Current cognitive status: Alert/Oriented   Current Functional Status: Assistive Equipment   Arrived From home or self-care   Lives With spouse   Able to Return to Prior Arrangements yes   Is patient able to care for self after discharge? Yes   How many people do you have in your home that can help with your care after discharge? 1   Who are your caregiver(s) and their phone number(s)? spouse- Eleanor Cantor- [phone], [phone]    Patient's perception of discharge disposition home health    Readmission Within The Last 30 Days no previous admission in last 30 days   Patient currently being followed by outpatient case management? No   Patient currently receives home health services? No   Does the patient currently use HME? Yes   Patient currently receives private duty nursing? No   Patient currently receives any other outside agency services? No   Equipment Currently Used at Home walker, rolling   Do you have any problems affording any of your prescribed medications? No   Is the patient taking medications as prescribed? yes   Do you have any financial concerns preventing you from receiving the healthcare you need? No   Does the patient have transportation to healthcare appointments? Yes   Transportation Available family or friend will provide   On Dialysis? No   Does the patient receive services at the Coumadin Clinic? No   Are there any open cases? No   Discharge Plan A Home Health;Home with family   Discharge Plan B Home with family;Other  (outpatient rehab)   Patient/Family In Agreement With Plan yes

## 2017-07-05 NOTE — PLAN OF CARE
Problem: Occupational Therapy Goal  Goal: Occupational Therapy Goal  Goals to be met by: 07/10    Patient will increase functional independence with ADLs by performing:    UE Dressing with Supervision.  LE Dressing with Supervision with AD as needed.  Grooming while standing with Supervision. -MET 7/5  Toileting from bedside commode with Supervision for hygiene and clothing management.   Stand pivot transfers with Supervision.  Toilet transfer to bedside commode with Supervision.        Outcome: Ongoing (interventions implemented as appropriate)  Pt met 1 goal on this date. Pt is progressing toward remaining goals.  OBEY Meadows   7/5/2017

## 2017-07-05 NOTE — PT/OT/SLP PROGRESS
"Occupational Therapy  Treatment    Humberot Cantor   MRN: [id number]   Admitting Diagnosis: Lumbar burst fracture    OT Date of Treatment: 07/05/17   OT Start Time: 0830  OT Stop Time: 0904  OT Total Time (min): 34 min    Billable Minutes:  Self Care/Home Management 34    General Precautions: Standard, fall  Orthopedic Precautions: per chart "weight bearing as tolerated"  Braces: LSO       Subjective:  Communicated with RN prior to session.  "I know that this is good for me and I have to do it, but it hurts." -pt stated  Pain/Comfort  Pain Rating 1:  (Pt did not quantify but reported, "I am in severe pain. Worst pain I've had")  Location - Side 1: Bilateral  Location - Orientation 1: generalized  Location 1: back  Pain Addressed 1: Reposition, Nurse notified, Distraction  Pain Rating Post-Intervention 1:  (Pt did not quantify)    Objective:  Patient found with: hemovac, telemetry, peripheral IV     Functional Mobility:  Bed Mobility:  Rolling/Turning Right: Minimum assistance, With side rail (HOB elevated)  Scooting/Bridging: Contact Guard Assistance (To EOB and back of chair)  Supine to Sit: Minimum Assistance (HOB elevated; Min A required for trunk elevation)    Transfers:   Sit <> Stand Assistance: Contact Guard Assistance (EOB>chair)  Sit <> Stand Assistive Device: Rolling Walker    Functional Ambulation: Pt performed functional mobility ~15 ft with SBA and RW.    Activities of Daily Living:     UE Dressing Level of Assistance: Moderate assistance (Mod A to don gown like robe while seated EOB 2* line management. Pt donned LSO brace with min A required to tighten for proper fit.)  LE Dressing Level of Assistance: Minimum assistance (donned socks at EOB; Pt required min A for BLE support in figure four position and required increased time. Pt reported increased pain throughout task)  Grooming Position: Standing at sink  Grooming Level of Assistance: Supervision (washed hands at sink)  Toileting Where Assessed: " "Toilet  Toileting Level of Assistance: Supervision (Pt voided while standing at toilet utilizing RW for external support)        Therapeutic Activities and Exercises:  -Pt educated with participation on proper fit and importance of donning LSO brace. Pt educated on importance of donning brace before OOB ax while seated. Pt repeatedly stating, "I need to do while standing up" and "I've had this surgery before so I know what I need to do".  -Pt educated on spinal precautions, especially during bed mobility transitions.    -Pt educated on safety with daily tasks OOB, and importance of participating in daily ax. Pt whiteboard updated.      AM-PAC 6 CLICK ADL   How much help from another person does this patient currently need?   1 = Unable, Total/Dependent Assistance  2 = A lot, Maximum/Moderate Assistance  3 = A little, Minimum/Contact Guard/Supervision  4 = None, Modified [place]/Independent    Putting on and taking off regular lower body clothing? : 3  Bathing (including washing, rinsing, drying)?: 2  Toileting, which includes using toilet, bedpan, or urinal? : 4  Putting on and taking off regular upper body clothing?: 3  Taking care of personal grooming such as brushing teeth?: 4  Eating meals?: 4  Total Score: 20     AM-PAC Raw Score CMS "G-Code Modifier Level of Impairment Assistance   6 % Total / Unable   7 - 8 CM 80 - 100% Maximal Assist   9-13 CL 60 - 80% Moderate Assist   14 - 19 CK 40 - 60% Moderate Assist   20 - 22 CJ 20 - 40% Minimal Assist   23 CI 1-20% SBA / CGA   24 CH 0% Independent/ Mod I       Patient left up in chair with all lines intact, call button in reach, RN notified and wife present    ASSESSMENT:  Humberto Cantor is a 61 y.o. male with a medical diagnosis of Lumbar burst fracture. Pt tolerated OT session well and put forth good effort to participate. Pt fixated on pain throughout session, requiring redirection and distraction to effectively participate in task. Pt continues to " require increased physical A and VCs for technique to complete bed mobility transitions. Pt participated in lower body dressing tasks, requiring increased time and physical A to complete due to decreased flexibility and pain. However, pt is progressing, meeting one goal by performing sink-side grooming tasks on this date. Pt and spouse educated on proper fit and importance of donning LSO brace before OOB ax. Pt has good family support and is motivated to return [facility]. Overall, pt presents with pain, decreased flexibility, decreased functional endurance, and decreased stability for self-care tasks. Pt will continue to benefit from OT services in order to increase safety and maximize independence in ADL tasks.           Rehab identified problem list/impairments: Rehab identified problem list/impairments: weakness, impaired endurance, impaired self care skills, impaired functional mobilty, gait instability, decreased lower extremity function, pain, decreased safety awareness, orthopedic precautions    Rehab potential is good.    Activity tolerance: Good    Discharge recommendations: Discharge Facility/Level Of Care Needs: home health OT, home health PT     Barriers to discharge: Barriers to Discharge: Inaccessible home environment    Equipment recommendations: none     GOALS:    Occupational Therapy Goals        Problem: Occupational Therapy Goal    Goal Priority Disciplines Outcome Interventions   Occupational Therapy Goal     OT, PT/OT Ongoing (interventions implemented as appropriate)    Description:  Goals to be met by: 07/10    Patient will increase functional independence with ADLs by performing:    UE Dressing with Supervision.  LE Dressing with Supervision with AD as needed.  Grooming while standing with Supervision. -MET 7/5  Toileting from bedside commode with Supervision for hygiene and clothing management.   Stand pivot transfers with Supervision.  Toilet transfer to bedside commode with Supervision.                           Plan:  Patient to be seen 4 x/week to address the above listed problems via self-care/home management, community/work re-entry, therapeutic activities, therapeutic exercises  Plan of Care expires: 08/03/17  Plan of Care reviewed with: patient, spouse       OBEY Meadows  07/05/2017

## 2017-07-06 LAB
ANION GAP SERPL CALC-SCNC: 6 MMOL/L
BASOPHILS # BLD AUTO: 0.03 K/UL
BASOPHILS NFR BLD: 0.6 %
BUN SERPL-MCNC: 11 MG/DL
CALCIUM SERPL-MCNC: 8.5 MG/DL
CHLORIDE SERPL-SCNC: 103 MMOL/L
CO2 SERPL-SCNC: 28 MMOL/L
CREAT SERPL-MCNC: 0.8 MG/DL
DIFFERENTIAL METHOD: ABNORMAL
EOSINOPHIL # BLD AUTO: 0.3 K/UL
EOSINOPHIL NFR BLD: 5 %
ERYTHROCYTE [DISTWIDTH] IN BLOOD BY AUTOMATED COUNT: 13 %
EST. GFR  (AFRICAN AMERICAN): >60 ML/MIN/1.73 M^2
EST. GFR  (NON AFRICAN AMERICAN): >60 ML/MIN/1.73 M^2
GLUCOSE SERPL-MCNC: 99 MG/DL
HCT VFR BLD AUTO: 23.5 %
HGB BLD-MCNC: 7.9 G/DL
LYMPHOCYTES # BLD AUTO: 1 K/UL
LYMPHOCYTES NFR BLD: 19.7 %
MAGNESIUM SERPL-MCNC: 1.6 MG/DL
MCH RBC QN AUTO: 33.1 PG
MCHC RBC AUTO-ENTMCNC: 33.6 %
MCV RBC AUTO: 98 FL
MONOCYTES # BLD AUTO: 0.8 K/UL
MONOCYTES NFR BLD: 14.3 %
NEUTROPHILS # BLD AUTO: 3.2 K/UL
NEUTROPHILS NFR BLD: 60 %
PHOSPHATE SERPL-MCNC: 3.1 MG/DL
PLATELET # BLD AUTO: 274 K/UL
PMV BLD AUTO: 9.3 FL
POTASSIUM SERPL-SCNC: 3.7 MMOL/L
RBC # BLD AUTO: 2.39 M/UL
SODIUM SERPL-SCNC: 137 MMOL/L
WBC # BLD AUTO: 5.24 K/UL

## 2017-07-06 PROCEDURE — 36415 COLL VENOUS BLD VENIPUNCTURE: CPT

## 2017-07-06 PROCEDURE — 97110 THERAPEUTIC EXERCISES: CPT

## 2017-07-06 PROCEDURE — 85025 COMPLETE CBC W/AUTO DIFF WBC: CPT

## 2017-07-06 PROCEDURE — 97530 THERAPEUTIC ACTIVITIES: CPT

## 2017-07-06 PROCEDURE — 84100 ASSAY OF PHOSPHORUS: CPT

## 2017-07-06 PROCEDURE — 80048 BASIC METABOLIC PNL TOTAL CA: CPT

## 2017-07-06 PROCEDURE — 97116 GAIT TRAINING THERAPY: CPT

## 2017-07-06 PROCEDURE — 20600001 HC STEP DOWN PRIVATE ROOM

## 2017-07-06 PROCEDURE — 25000003 PHARM REV CODE 250: Performed by: ORTHOPAEDIC SURGERY

## 2017-07-06 PROCEDURE — 83735 ASSAY OF MAGNESIUM: CPT

## 2017-07-06 PROCEDURE — 97535 SELF CARE MNGMENT TRAINING: CPT

## 2017-07-06 PROCEDURE — 25000003 PHARM REV CODE 250: Performed by: STUDENT IN AN ORGANIZED HEALTH CARE EDUCATION/TRAINING PROGRAM

## 2017-07-06 RX ADMIN — CETIRIZINE HYDROCHLORIDE 10 MG: 10 TABLET, FILM COATED ORAL at 09:07

## 2017-07-06 RX ADMIN — HYDROMORPHONE HYDROCHLORIDE 0.5 MG: 1 INJECTION, SOLUTION INTRAMUSCULAR; INTRAVENOUS; SUBCUTANEOUS at 06:07

## 2017-07-06 RX ADMIN — HYDROMORPHONE HYDROCHLORIDE 0.5 MG: 1 INJECTION, SOLUTION INTRAMUSCULAR; INTRAVENOUS; SUBCUTANEOUS at 01:07

## 2017-07-06 RX ADMIN — HYDROMORPHONE HYDROCHLORIDE 0.5 MG: 1 INJECTION, SOLUTION INTRAMUSCULAR; INTRAVENOUS; SUBCUTANEOUS at 08:07

## 2017-07-06 RX ADMIN — HYDROMORPHONE HYDROCHLORIDE 0.5 MG: 1 INJECTION, SOLUTION INTRAMUSCULAR; INTRAVENOUS; SUBCUTANEOUS at 12:07

## 2017-07-06 RX ADMIN — STANDARDIZED SENNA CONCENTRATE AND DOCUSATE SODIUM 1 TABLET: 8.6; 5 TABLET, FILM COATED ORAL at 08:07

## 2017-07-06 RX ADMIN — OXYCODONE HYDROCHLORIDE 15 MG: 5 TABLET ORAL at 09:07

## 2017-07-06 RX ADMIN — Medication 3 ML: at 05:07

## 2017-07-06 RX ADMIN — METHOCARBAMOL 750 MG: 750 TABLET ORAL at 12:07

## 2017-07-06 RX ADMIN — POLYETHYLENE GLYCOL 3350 17 G: 17 POWDER, FOR SOLUTION ORAL at 09:07

## 2017-07-06 RX ADMIN — ACETAMINOPHEN 1000 MG: 500 TABLET ORAL at 12:07

## 2017-07-06 RX ADMIN — HEPARIN SODIUM 5000 UNITS: 5000 INJECTION, SOLUTION INTRAVENOUS; SUBCUTANEOUS at 05:07

## 2017-07-06 RX ADMIN — METHOCARBAMOL 750 MG: 750 TABLET ORAL at 06:07

## 2017-07-06 RX ADMIN — HEPARIN SODIUM 5000 UNITS: 5000 INJECTION, SOLUTION INTRAVENOUS; SUBCUTANEOUS at 02:07

## 2017-07-06 RX ADMIN — OXYCODONE HYDROCHLORIDE 15 MG: 5 TABLET ORAL at 03:07

## 2017-07-06 RX ADMIN — Medication 3 ML: at 09:07

## 2017-07-06 RX ADMIN — OXYCODONE HYDROCHLORIDE 15 MG: 5 TABLET ORAL at 06:07

## 2017-07-06 RX ADMIN — STANDARDIZED SENNA CONCENTRATE AND DOCUSATE SODIUM 1 TABLET: 8.6; 5 TABLET, FILM COATED ORAL at 09:07

## 2017-07-06 RX ADMIN — METHOCARBAMOL 750 MG: 750 TABLET ORAL at 05:07

## 2017-07-06 RX ADMIN — Medication 3 ML: at 02:07

## 2017-07-06 RX ADMIN — OLMESARTAN MEDOXOMIL 20 MG: 20 TABLET, FILM COATED ORAL at 09:07

## 2017-07-06 RX ADMIN — HYDROMORPHONE HYDROCHLORIDE 0.5 MG: 1 INJECTION, SOLUTION INTRAMUSCULAR; INTRAVENOUS; SUBCUTANEOUS at 04:07

## 2017-07-06 RX ADMIN — HEPARIN SODIUM 5000 UNITS: 5000 INJECTION, SOLUTION INTRAVENOUS; SUBCUTANEOUS at 09:07

## 2017-07-06 RX ADMIN — PANTOPRAZOLE SODIUM 40 MG: 40 TABLET, DELAYED RELEASE ORAL at 09:07

## 2017-07-06 RX ADMIN — OXYCODONE HYDROCHLORIDE 15 MG: 5 TABLET ORAL at 02:07

## 2017-07-06 NOTE — PT/OT/SLP PROGRESS
"Occupational Therapy  Treatment    Humberto Cantor   MRN: [id number]   Admitting Diagnosis: Lumbar burst fracture    OT Date of Treatment: 07/06/17   OT Start Time: 1333  OT Stop Time: 1346  OT Total Time (min): 13 min    Billable Minutes:  Self Care/Home Management 13    General Precautions: Standard, fall  Orthopedic Precautions:  (per chart "weight bearing as tolerated")  Braces: LSO      Subjective:  Communicated with RN prior to session.  "I'm feeling much better than yesterday since I'm not exhausted anymore" -pt staed  Pain/Comfort  Pain Rating 1:  (Pt did not quantify)  Pain Rating Post-Intervention 1:  (Pt did not quantify)    Objective:  Patient found with: telemetry     Functional Mobility:  Bed Mobility:  Rolling/Turning Right: Stand by assistance, With side rail (HOB flat)  Supine to Sit: Stand by Assistance, WIth side rail (HOB flat)  Sit to Supine: Stand by Assistance, With side rail (to transport stretcher while head elevated; Pt refused-log roll technique despite therapist VCs for maintaining spinal precautions and transitioned straight to long-sit)    Transfers:   Sit <> Stand Assistance: Stand By Assistance (EOB>transport stretcher; VCs for maintaining spinal precautions to sit)  Sit <> Stand Assistive Device: Rolling Walker  Toilet Transfer Technique: Stand Pivot  Toilet Transfer Assistance: Stand By Assistance  Toilet Transfer Assistive Device: grab bar, Rolling Walker    Functional Ambulation: Pt performed functional mobility ~15ft with SBA and RW.    Activities of Daily Living:     UE Dressing Level of Assistance: Minimum assistance (Pt donned LSO brace while seated EOB. Min A required to place brace around abdomen)  LE Dressing Level of Assistance:  (Pt refused)  Grooming Position: Standing at sink  Grooming Level of Assistance: Supervision (Brushed hair, pt utilizing LUE for external support on RW)              Therapeutic Activities and Exercises:  -Pt and spouse educated on importance of  " "maintaining spinal precautions during t/fs and bed mobility transitions.  -Pt's spouse aware of spinal precautions. Spouse reported that pt "can be hard headed at times" but she is motivated to ensure that he maintains precautions stating, "I'll even print out a sheet if I have to".   -Pt educated on safety with daily tasks OOB, and importance of participating in daily ax. Pt whiteboard updated.        AM-PAC 6 CLICK ADL   How much help from another person does this patient currently need?   1 = Unable, Total/Dependent Assistance  2 = A lot, Maximum/Moderate Assistance  3 = A little, Minimum/Contact Guard/Supervision  4 = None, Modified [place]/Independent    Putting on and taking off regular lower body clothing? : 3  Bathing (including washing, rinsing, drying)?: 2  Toileting, which includes using toilet, bedpan, or urinal? : 4  Putting on and taking off regular upper body clothing?: 3  Taking care of personal grooming such as brushing teeth?: 4  Eating meals?: 4  Total Score: 20     AM-PAC Raw Score CMS "G-Code Modifier Level of Impairment Assistance   6 % Total / Unable   7 - 8 CM 80 - 100% Maximal Assist   9-13 CL 60 - 80% Moderate Assist   14 - 19 CK 40 - 60% Moderate Assist   20 - 22 CJ 20 - 40% Minimal Assist   23 CI 1-20% SBA / CGA   24 CH 0% Independent/ Mod I       Patient left supine on trasport stretcher with all lines intact and spouse and transport team present    ASSESSMENT:  Humberto Cantor is a 61 y.o. male with a medical diagnosis of Lumbar burst fracture. Pt tolerated OT session well and put forth good effort to participate. Session slighlty limited on this date due to scheduled x-ray procedure with transport arrival mid-session. Pt required decreased physical A for bed mobility on this date, however, required increased VCs for spinal precautions while transitioning from sit>supine. Pt limited adherence noted during OOB ax with precautions when transitioning to different functional " activities. Pt demonstrated progress during upper body dressing tasks, requiring decreased physical A to properly don LSO brace and identify proper placement and fit. Pt continues to present with decreased stability utilizing external support and requiring frequent VCs for spinal alignment during functional transitions and functional tasks OOB. Pt's spouse is very involved and will assist as needed for pt to maintain spinal precautions. Pt will continue to benefit from skilled OT services in order to increase safety and maximize (I) in ADL tasks.      Rehab identified problem list/impairments: Rehab identified problem list/impairments: impaired endurance, impaired self care skills, impaired functional mobilty, pain, gait instability, orthopedic precautions    Rehab potential is good.    Activity tolerance: Good    Discharge recommendations: Discharge Facility/Level Of Care Needs: home health PT     Barriers to discharge: Barriers to Discharge: Inaccessible home environment    Equipment recommendations: none     GOALS:    Occupational Therapy Goals        Problem: Occupational Therapy Goal    Goal Priority Disciplines Outcome Interventions   Occupational Therapy Goal     OT, PT/OT Ongoing (interventions implemented as appropriate)    Description:  Goals to be met by: 07/10    Patient will increase functional independence with ADLs by performing:    UE Dressing with Supervision.  LE Dressing with Supervision with AD as needed.  Grooming while standing with Supervision. -MET 7/5  Toileting from bedside commode with Supervision for hygiene and clothing management.   Stand pivot transfers with Supervision.  Toilet transfer to bedside commode with Supervision.                          Plan:  Patient to be seen 4 x/week to address the above listed problems via self-care/home management, community/work re-entry, therapeutic activities, therapeutic exercises  Plan of Care expires: 08/03/17  Plan of Care reviewed with:  patient, spouse         Cordelia Ramos, SOT  07/06/2017

## 2017-07-06 NOTE — PLAN OF CARE
Problem: Occupational Therapy Goal  Goal: Occupational Therapy Goal  Goals to be met by: 07/10    Patient will increase functional independence with ADLs by performing:    UE Dressing with Supervision.  LE Dressing with Supervision with AD as needed.  Grooming while standing with Supervision. -MET 7/5  Toileting from bedside commode with Supervision for hygiene and clothing management.   Stand pivot transfers with Supervision.  Toilet transfer to bedside commode with Supervision.         Outcome: Ongoing (interventions implemented as appropriate)  Pt making progress towards t/f and toilet t/f goals on this date. Goals remain appropriate.  OBEY [place]  7/6/2017

## 2017-07-06 NOTE — PROGRESS NOTES
Patient accepted to [facility]. [place] added to patient's AVS.    Marilia Bird RN, CM  [facility]  [phone] -[phone]

## 2017-07-06 NOTE — PLAN OF CARE
Problem: Pressure Ulcer Risk (Jony Scale) (Adult,Obstetrics,Pediatric)  Goal: Identify Related Risk Factors and Signs and Symptoms  Related risk factors and signs and symptoms are identified upon initiation of Human Response Clinical Practice Guideline (CPG)   Pt repositions self in bed independently.     Problem: Fall Risk (Adult)  Goal: Identify Related Risk Factors and Signs and Symptoms  Related risk factors and signs and symptoms are identified upon initiation of Human Response Clinical Practice Guideline (CPG)   No falls during this shift.

## 2017-07-06 NOTE — PLAN OF CARE
Problem: Patient Care Overview  Goal: Plan of Care Review  Outcome: Ongoing (interventions implemented as appropriate)  POC reviewed with pt who verbalized understanding. AAOx4. VSS. Remains free of falls and injury. Back telfa C/D/I. Accordion drains x2 in place. Pain managed with PRN meds per MAR. IVF infusing throughout shift. Voiding per urinal. Tolerating regular diet; no complaints of nausea. SCDs off and on throughout shift; pt educated on the importance. No acute events. No distress noted. Will continue to monitor.

## 2017-07-06 NOTE — PROGRESS NOTES
[facility]  Orthopedics  Progress Note    Patient Name: Humberto Cantor  MRN: [id number]  Admission Date: 7/3/2017  Hospital Length of Stay: 3 days  Attending Provider: Joaquin Navarro MD  Primary Care Provider: Saud Sosa MD  Follow-up For: Procedure(s) (LRB):  L1-Pelvis Revision Depuy Remove/Reinsert SNS: Motors/SSEP/EMG [place] (N/A)    Post-Operative Day: 3 Days Post-Op  Subjective:     Principal Problem:Lumbar burst fracture    Principal Orthopedic Problem: s/p L2-S1 PSF    Interval History: AFVSS. Improved pain control. Drains with 70 (deep) and 20 (superficial) mL outputs overnight. Walked 110 feet with PT yesterday. Passing gas, no BM yet.    Review of patient's allergies indicates:   Allergen Reactions    Naproxen        Current Facility-Administered Medications   Medication    0.9%  NaCl infusion    acetaminophen tablet 1,000 mg    alprazolam tablet 0.25 mg    bisacodyl suppository 10 mg    cetirizine tablet 10 mg    heparin (porcine) injection 5,000 Units    hydromorphone injection 0.5 mg    lactulose 20 gram/30 mL solution Soln 20 g    methocarbamol tablet 750 mg    mupirocin 2 % ointment 1 g    olmesartan tablet 20 mg    ondansetron injection 4 mg    oxycodone immediate release tablet 10 mg    oxycodone immediate release tablet 15 mg    oxycodone immediate release tablet 5 mg    pantoprazole EC tablet 40 mg    polyethylene glycol packet 17 g    promethazine tablet 25 mg    ramelteon tablet 8 mg    senna-docusate 8.6-50 mg per tablet 1 tablet    sodium chloride 0.9% flush 3 mL     Objective:     Vital Signs (Most Recent):  Temp: 99 °F (37.2 °C) (07/06/17 0428)  Pulse: 70 (07/06/17 0428)  Resp: 18 (07/06/17 0428)  BP: (!) 100/55 (07/06/17 0428)  SpO2: (!) 72 % (07/06/17 0428) Vital Signs (24h Range):  Temp:  [96.9 °F (36.1 °C)-99 °F (37.2 °C)] 99 °F (37.2 °C)  Pulse:  [70-87] 70  Resp:  [16-20] 18  SpO2:  [72 %-97 %] 72 %  BP: (100-121)/(52-66) 100/55  "    Weight: 84.8 kg (187 lb)  Height: 5' 10" (177.8 cm)  Body mass index is 26.83 kg/m².      Intake/Output Summary (Last 24 hours) at 07/06/17 0600  Last data filed at 07/06/17 0500   Gross per 24 hour   Intake             2515 ml   Output             3600 ml   Net            -1085 ml       Ortho/SPM Exam     HEENT: normocephalic, atraumatic  Resp: no increased work of breathing  CV: regular rate and rhythm  MSK: moves all extremities well  Neuro: no focal neuro deficits  Back: dressings c/d/i    Significant Labs:   BMP:     Recent Labs  Lab 07/05/17 0414   *   *   K 3.6      CO2 27   BUN 11   CREATININE 1.0   CALCIUM 8.2*   MG 1.5*     CBC:     Recent Labs  Lab 07/05/17 0414 07/05/17 0621   WBC 7.57 7.14   HGB 8.4* 8.3*   HCT 24.8* 24.4*    274       Significant Imaging: I have reviewed and interpreted all pertinent imaging results/findings.    Assessment/Plan:     Hypocalcemia    Monitor levels        Hypophosphatemia    Oral replacement yesterday  Monitor levels        Hypomagnesemia    IV replacement yesterday  Monitor levels        Essential hypertension    Continue home meds        GERD (gastroesophageal reflux disease)    Continue home meds        * Lumbar burst fracture    - Antibiotics: post-op Ancef complete  - Weight bearing status: WBAT  - Labs: reviewed  - DVT Prophylaxis: heparin q8h  - Lines/Drains: PIV  - Pain control: multimodal  - Dispo: continue to treat, likely home in next few days                  Grady Mercado MD  Orthopedics  [facility]  "

## 2017-07-06 NOTE — PT/OT/SLP PROGRESS
Physical Therapy  Treatment    Humberto Cantor   MRN: [id number]   Admitting Diagnosis: Lumbar burst fracture    PT Received On: 07/06/17  PT Start Time: 0909     PT Stop Time: 0953    PT Total Time (min): 44 min       Billable Minutes:  Gait Training 20 and Therapeutic Exercise 10 Therapeutic Activity 14    Treatment Type: Treatment  PT/PTA: PTA     PTA Visit Number: 1       General Precautions: Standard, fall  Orthopedic Precautions: LLE weight bearing as tolerated, RLE weight bearing as tolerated   Braces: LSO    Do you have any cultural, spiritual, Worship conflicts, given your current situation?: none    Subjective:  Communicated with RN prior to session.  Pt stated that he was in pain but willing to participate with PT session    Pain/Comfort  Pain Rating 1: 8/10  Location - Side 1: Bilateral  Location - Orientation 1: lower  Location 1: back  Pain Addressed 1: Pre-medicate for activity  Pain Rating Post-Intervention 1: 8/10    Objective:   Patient found with: hemovac    Functional Mobility:  Bed Mobility:   Rolling/Turning Right: Stand by assistance  Scooting/Bridging: Stand by Assistance  Supine to Sit: Stand by Assistance    Transfers:  Sit <> Stand Assistance: Contact Guard Assistance  Sit <> Stand Assistive Device: Rolling Walker    Gait:   Gait Distance: 250ft CGA. Pt needed Min verbal cues for maintaining upright posture.  Assistance 1: Contact Guard Assistance  Gait Assistive Device: Rolling walker  Gait Pattern: reciprocal  Gait Deviation(s): decreased migdalia, increased time in double stance, decreased velocity of limb motion, decreased stride length, decreased step length, forward lean    Stairs:  Pt ascended/descend 1 flight(s) with No Assistive Device with bilateral with Contact Guard Assistance.     Therapeutic Activities and Exercises:  Pt performed BLE 1x10 reps each of ankle pumps, LAQ, hip flex/abd  Donned 2nd gown  Donned socks  Pt educated on LSO fitting and return demonstrated an understanding  by fitting it himself.  White board updated.      AM-PAC 6 CLICK MOBILITY  How much help from another person does this patient currently need?   1 = Unable, Total/Dependent Assistance  2 = A lot, Maximum/Moderate Assistance  3 = A little, Minimum/Contact Guard/Supervision  4 = None, Modified [place]/Independent    Turning over in bed (including adjusting bedclothes, sheets and blankets)?: 3  Sitting down on and standing up from a chair with arms (e.g., wheelchair, bedside commode, etc.): 3  Moving from lying on back to sitting on the side of the bed?: 3  Moving to and from a bed to a chair (including a wheelchair)?: 3  Need to walk in hospital room?: 3  Climbing 3-5 steps with a railing?: 3  Total Score: 18    AM-PAC Raw Score CMS G-Code Modifier Level of Impairment Assistance   6 % Total / Unable   7 - 9 CM 80 - 100% Maximal Assist   10 - 14 CL 60 - 80% Moderate Assist   15 - 19 CK 40 - 60% Moderate Assist   20 - 22 CJ 20 - 40% Minimal Assist   23 CI 1-20% SBA / CGA   24 CH 0% Independent/ Mod I     Patient left up in chair with call button in reach.    Assessment:  Humberto Cantor is a 61 y.o. male with a medical diagnosis of Lumbar burst fracture and presents with combined deficits as listed below. Pt return demonstrated fatigue/pain post gait/stairs. Pt needing verbal cues for maintaining upright posture while ambulating. Pt performed stairs with CGA with verbal cues for stepping sequencing. Pt will continue to progress and benefit form PT POC.    Rehab identified problem list/impairments: Rehab identified problem list/impairments: weakness, impaired endurance, impaired self care skills, impaired functional mobilty, pain    Rehab potential is good.    Activity tolerance: Good    Discharge recommendations: Discharge Facility/Level Of Care Needs: home health PT     Barriers to discharge: Barriers to Discharge: Inaccessible home environment    Equipment recommendations: Equipment Needed After Discharge:  none     GOALS:    Physical Therapy Goals        Problem: Physical Therapy Goal    Goal Priority Disciplines Outcome Goal Variances Interventions   Physical Therapy Goal     PT/OT, PT Ongoing (interventions implemented as appropriate)     Description:  Goals to be met by: 17     Patient will increase functional independence with mobility by performin. Supine to sit with Set-up [place]  2. Sit to supine with Set-up [place]  3. Sit to stand transfer with Supervision  4. Bed to chair transfer with Supervision using Rolling Walker  5. Gait  x 200 feet with Stand-by Assistance using Rolling Walker.   6. Ascend/descend 15 stair with no Handrails Stand-by Assistance.   7. Lower extremity exercise program x20-30 reps per handout, with independence                      PLAN:    Patient to be seen 5 x/week  to address the above listed problems via gait training, therapeutic activities, therapeutic exercises  Plan of Care expires: 17  Plan of Care reviewed with: patient         Malcolm Moeller, SPTA  2017  I certify that I was present in the room directing the student in service delivery and guiding them using my skilled judgment. As the co-signing therapist I have reviewed the students documentation and am responsible for the treatment, assessment, and plan.  Nba Johnson PTA 2017

## 2017-07-06 NOTE — PLAN OF CARE
[facility] Medicine Progress Note    HPI:         Alex Cantor is a 61 yr old male who presented with low back pain and bilateral lower extremity pain since roughly 3 weeks.      He underwent L3-L5 laminectomy for fusion of an unstable L4 burst fracture on 06/06/2017. Pre-surgical X-ray of the lumbar spine showed a compression fracture of L5 with decrease in height of >50%. Following the surgery he developed kyphosis, low back pain with radiation of pain down the lower limbs (L>R). The pain was was an 8 on a scale of 1-10, at its worst. He also complains of pain along the surgical incision. An X-ray of the lumbar spine done on 06/09/2017 showed worsening of the compression fracture of L4, likely due to distal hardware failure. He underwent revision surgery (L2-S1 posterior spinal fusion) yesterday.     [facility] medicine is following up for management of hypertension.     Assessment and Plan:     1) Essential Hypertension.     -BP is 138/66  -Recommend continue Olmesartan 20mg.  -Monitor BP     2) GERD.     -Recommend continue Pantoprazole 20mg daily.  -Monitor for any worsening of acid reflux.        3) Lumbar Burst Fracture; S/p L2-S1 PSF following distal hardware failure after L4-L5 laminectomy done on 06/06.     -Follow-up with orthopedic surgery.  -Adequate pain management.        4) Post-op hypomagnesemia      - Magnesium levels were low in the post-op period (Mg-1.5). MgSo4 2g IV infusion given yesterday morning.  - Current level is 1.6   - Monitor levels    5) Post-op hypophosphatemia     - Phosphorus levels were low in the post-op period. (Phosp-2). Four packets of Potassium, sodium phosphates 280-160-250 mg packets given yesterday.  -Current level is 3.1   - Monitor levels     6) Post-op hypocalcemia     - Calcium levels were low in the post op period (Ca-8.2).  -Current level is 8.5  - Monitor levels.

## 2017-07-06 NOTE — SUBJECTIVE & OBJECTIVE
"Principal Problem:Lumbar burst fracture    Principal Orthopedic Problem: s/p L2-S1 PSF    Interval History: AFVSS. Improved pain control. Drains with 70 (deep) and 20 (superficial) mL outputs overnight. Walked 110 feet with PT yesterday. Passing gas, no BM yet.    Review of patient's allergies indicates:   Allergen Reactions    Naproxen        Current Facility-Administered Medications   Medication    0.9%  NaCl infusion    acetaminophen tablet 1,000 mg    alprazolam tablet 0.25 mg    bisacodyl suppository 10 mg    cetirizine tablet 10 mg    heparin (porcine) injection 5,000 Units    hydromorphone injection 0.5 mg    lactulose 20 gram/30 mL solution Soln 20 g    methocarbamol tablet 750 mg    mupirocin 2 % ointment 1 g    olmesartan tablet 20 mg    ondansetron injection 4 mg    oxycodone immediate release tablet 10 mg    oxycodone immediate release tablet 15 mg    oxycodone immediate release tablet 5 mg    pantoprazole EC tablet 40 mg    polyethylene glycol packet 17 g    promethazine tablet 25 mg    ramelteon tablet 8 mg    senna-docusate 8.6-50 mg per tablet 1 tablet    sodium chloride 0.9% flush 3 mL     Objective:     Vital Signs (Most Recent):  Temp: 99 °F (37.2 °C) (07/06/17 0428)  Pulse: 70 (07/06/17 0428)  Resp: 18 (07/06/17 0428)  BP: (!) 100/55 (07/06/17 0428)  SpO2: (!) 72 % (07/06/17 0428) Vital Signs (24h Range):  Temp:  [96.9 °F (36.1 °C)-99 °F (37.2 °C)] 99 °F (37.2 °C)  Pulse:  [70-87] 70  Resp:  [16-20] 18  SpO2:  [72 %-97 %] 72 %  BP: (100-121)/(52-66) 100/55     Weight: 84.8 kg (187 lb)  Height: 5' 10" (177.8 cm)  Body mass index is 26.83 kg/m².      Intake/Output Summary (Last 24 hours) at 07/06/17 0600  Last data filed at 07/06/17 0500   Gross per 24 hour   Intake             2515 ml   Output             3600 ml   Net            -1085 ml       Ortho/SPM Exam     HEENT: normocephalic, atraumatic  Resp: no increased work of breathing  CV: regular rate and rhythm  MSK: moves " all extremities well  Neuro: no focal neuro deficits  Back: dressings c/d/i    Significant Labs:   BMP:     Recent Labs  Lab 07/05/17 0414   *   *   K 3.6      CO2 27   BUN 11   CREATININE 1.0   CALCIUM 8.2*   MG 1.5*     CBC:     Recent Labs  Lab 07/05/17 0414 07/05/17 0621   WBC 7.57 7.14   HGB 8.4* 8.3*   HCT 24.8* 24.4*    274       Significant Imaging: I have reviewed and interpreted all pertinent imaging results/findings.

## 2017-07-06 NOTE — ASSESSMENT & PLAN NOTE
- Antibiotics: post-op Ancef complete  - Weight bearing status: WBAT  - Labs: reviewed  - DVT Prophylaxis: heparin q8h  - Lines/Drains: PIV  - Pain control: multimodal  - Dispo: continue to treat, likely home in next few days

## 2017-07-06 NOTE — PLAN OF CARE
Problem: Physical Therapy Goal  Goal: Physical Therapy Goal  Goals to be met by: 17     Patient will increase functional independence with mobility by performin. Supine to sit with Set-up [place]  2. Sit to supine with Set-up [place]  3. Sit to stand transfer with Supervision  4. Bed to chair transfer with Supervision using Rolling Walker  5. Gait  x 200 feet with Stand-by Assistance using Rolling Walker.   6. Ascend/descend 15 stair with no Handrails Stand-by Assistance.   7. Lower extremity exercise program x20-30 reps per handout, with independence     Outcome: Ongoing (interventions implemented as appropriate)  Pt will continue to progress towards goals poc   I certify that I was present in the room directing the student in service delivery and guiding them using my skilled judgment. As the co-signing therapist I have reviewed the students documentation and am responsible for the treatment, assessment, and plan.  Nba Johnson PTA 2017

## 2017-07-07 VITALS
TEMPERATURE: 98 F | SYSTOLIC BLOOD PRESSURE: 125 MMHG | HEART RATE: 79 BPM | DIASTOLIC BLOOD PRESSURE: 70 MMHG | BODY MASS INDEX: 26.77 KG/M2 | HEIGHT: 70 IN | RESPIRATION RATE: 16 BRPM | WEIGHT: 187 LBS | OXYGEN SATURATION: 100 %

## 2017-07-07 LAB
ANION GAP SERPL CALC-SCNC: 7 MMOL/L
BASOPHILS # BLD AUTO: 0.02 K/UL
BASOPHILS NFR BLD: 0.4 %
BLD PROD TYP BPU: NORMAL
BLD PROD TYP BPU: NORMAL
BLOOD UNIT EXPIRATION DATE: NORMAL
BLOOD UNIT EXPIRATION DATE: NORMAL
BLOOD UNIT TYPE CODE: 5100
BLOOD UNIT TYPE CODE: 5100
BLOOD UNIT TYPE: NORMAL
BLOOD UNIT TYPE: NORMAL
BUN SERPL-MCNC: 9 MG/DL
CALCIUM SERPL-MCNC: 9.1 MG/DL
CHLORIDE SERPL-SCNC: 102 MMOL/L
CO2 SERPL-SCNC: 30 MMOL/L
CODING SYSTEM: NORMAL
CODING SYSTEM: NORMAL
CREAT SERPL-MCNC: 0.8 MG/DL
DIFFERENTIAL METHOD: ABNORMAL
DISPENSE STATUS: NORMAL
DISPENSE STATUS: NORMAL
EOSINOPHIL # BLD AUTO: 0.3 K/UL
EOSINOPHIL NFR BLD: 5.7 %
ERYTHROCYTE [DISTWIDTH] IN BLOOD BY AUTOMATED COUNT: 12.9 %
EST. GFR  (AFRICAN AMERICAN): >60 ML/MIN/1.73 M^2
EST. GFR  (NON AFRICAN AMERICAN): >60 ML/MIN/1.73 M^2
GLUCOSE SERPL-MCNC: 103 MG/DL
HCT VFR BLD AUTO: 24.5 %
HGB BLD-MCNC: 8.2 G/DL
LYMPHOCYTES # BLD AUTO: 1 K/UL
LYMPHOCYTES NFR BLD: 20.4 %
MAGNESIUM SERPL-MCNC: 1.5 MG/DL
MCH RBC QN AUTO: 32.5 PG
MCHC RBC AUTO-ENTMCNC: 33.5 %
MCV RBC AUTO: 97 FL
MONOCYTES # BLD AUTO: 0.7 K/UL
MONOCYTES NFR BLD: 13.9 %
NEUTROPHILS # BLD AUTO: 3 K/UL
NEUTROPHILS NFR BLD: 59 %
PHOSPHATE SERPL-MCNC: 3.9 MG/DL
PLATELET # BLD AUTO: 316 K/UL
PMV BLD AUTO: 9.1 FL
POTASSIUM SERPL-SCNC: 4.1 MMOL/L
RBC # BLD AUTO: 2.52 M/UL
SODIUM SERPL-SCNC: 139 MMOL/L
TRANS ERYTHROCYTES VOL PATIENT: NORMAL ML
TRANS ERYTHROCYTES VOL PATIENT: NORMAL ML
WBC # BLD AUTO: 5.09 K/UL

## 2017-07-07 PROCEDURE — 83735 ASSAY OF MAGNESIUM: CPT

## 2017-07-07 PROCEDURE — 25000003 PHARM REV CODE 250: Performed by: STUDENT IN AN ORGANIZED HEALTH CARE EDUCATION/TRAINING PROGRAM

## 2017-07-07 PROCEDURE — 84100 ASSAY OF PHOSPHORUS: CPT

## 2017-07-07 PROCEDURE — 36415 COLL VENOUS BLD VENIPUNCTURE: CPT

## 2017-07-07 PROCEDURE — 25000003 PHARM REV CODE 250: Performed by: ORTHOPAEDIC SURGERY

## 2017-07-07 PROCEDURE — 85025 COMPLETE CBC W/AUTO DIFF WBC: CPT

## 2017-07-07 PROCEDURE — 80048 BASIC METABOLIC PNL TOTAL CA: CPT

## 2017-07-07 RX ORDER — HYDROMORPHONE HYDROCHLORIDE 2 MG/1
2 TABLET ORAL EVERY 4 HOURS PRN
Qty: 60 TABLET | Refills: 0 | Status: SHIPPED | OUTPATIENT
Start: 2017-07-07 | End: 2017-07-18 | Stop reason: SDUPTHER

## 2017-07-07 RX ORDER — LANOLIN ALCOHOL/MO/W.PET/CERES
400 CREAM (GRAM) TOPICAL ONCE
Status: COMPLETED | OUTPATIENT
Start: 2017-07-07 | End: 2017-07-07

## 2017-07-07 RX ORDER — LORAZEPAM/0.9% SODIUM CHLORIDE 100MG/0.1L
2 PLASTIC BAG, INJECTION (ML) INTRAVENOUS ONCE
Status: DISCONTINUED | OUTPATIENT
Start: 2017-07-07 | End: 2017-07-07

## 2017-07-07 RX ORDER — OXYCODONE AND ACETAMINOPHEN 10; 325 MG/1; MG/1
1 TABLET ORAL EVERY 6 HOURS PRN
Qty: 90 TABLET | Refills: 0 | Status: SHIPPED | OUTPATIENT
Start: 2017-07-07 | End: 2017-07-19

## 2017-07-07 RX ORDER — METHOCARBAMOL 750 MG/1
750 TABLET, FILM COATED ORAL 3 TIMES DAILY
Qty: 60 TABLET | Refills: 0 | Status: SHIPPED | OUTPATIENT
Start: 2017-07-07 | End: 2017-07-26 | Stop reason: SDUPTHER

## 2017-07-07 RX ADMIN — HYDROMORPHONE HYDROCHLORIDE 0.5 MG: 1 INJECTION, SOLUTION INTRAMUSCULAR; INTRAVENOUS; SUBCUTANEOUS at 01:07

## 2017-07-07 RX ADMIN — OXYCODONE HYDROCHLORIDE 15 MG: 5 TABLET ORAL at 11:07

## 2017-07-07 RX ADMIN — POLYETHYLENE GLYCOL 3350 17 G: 17 POWDER, FOR SOLUTION ORAL at 10:07

## 2017-07-07 RX ADMIN — ACETAMINOPHEN 1000 MG: 500 TABLET ORAL at 05:07

## 2017-07-07 RX ADMIN — ACETAMINOPHEN 1000 MG: 500 TABLET ORAL at 11:07

## 2017-07-07 RX ADMIN — MAGNESIUM OXIDE TAB 400 MG (241.3 MG ELEMENTAL MG) 400 MG: 400 (241.3 MG) TAB at 11:07

## 2017-07-07 RX ADMIN — METHOCARBAMOL 750 MG: 750 TABLET ORAL at 11:07

## 2017-07-07 RX ADMIN — METHOCARBAMOL 750 MG: 750 TABLET ORAL at 05:07

## 2017-07-07 RX ADMIN — METHOCARBAMOL 750 MG: 750 TABLET ORAL at 12:07

## 2017-07-07 RX ADMIN — OXYCODONE HYDROCHLORIDE 15 MG: 5 TABLET ORAL at 02:07

## 2017-07-07 RX ADMIN — HYDROMORPHONE HYDROCHLORIDE 0.5 MG: 1 INJECTION, SOLUTION INTRAMUSCULAR; INTRAVENOUS; SUBCUTANEOUS at 05:07

## 2017-07-07 RX ADMIN — PANTOPRAZOLE SODIUM 40 MG: 40 TABLET, DELAYED RELEASE ORAL at 10:07

## 2017-07-07 RX ADMIN — HYDROMORPHONE HYDROCHLORIDE 0.5 MG: 1 INJECTION, SOLUTION INTRAMUSCULAR; INTRAVENOUS; SUBCUTANEOUS at 10:07

## 2017-07-07 RX ADMIN — ACETAMINOPHEN 1000 MG: 500 TABLET ORAL at 12:07

## 2017-07-07 RX ADMIN — OLMESARTAN MEDOXOMIL 20 MG: 20 TABLET, FILM COATED ORAL at 10:07

## 2017-07-07 RX ADMIN — HEPARIN SODIUM 5000 UNITS: 5000 INJECTION, SOLUTION INTRAVENOUS; SUBCUTANEOUS at 05:07

## 2017-07-07 RX ADMIN — OXYCODONE HYDROCHLORIDE 15 MG: 5 TABLET ORAL at 06:07

## 2017-07-07 RX ADMIN — Medication 3 ML: at 05:07

## 2017-07-07 RX ADMIN — CETIRIZINE HYDROCHLORIDE 10 MG: 10 TABLET, FILM COATED ORAL at 10:07

## 2017-07-07 RX ADMIN — STANDARDIZED SENNA CONCENTRATE AND DOCUSATE SODIUM 1 TABLET: 8.6; 5 TABLET, FILM COATED ORAL at 10:07

## 2017-07-07 NOTE — PROGRESS NOTES
CM updated [facility] via [facility] that patient requested outpatient rehab. HH referral cancelled.    Marilia Bird RN, CM  [facility]  [phone]

## 2017-07-07 NOTE — DISCHARGE SUMMARY
[facility]  Orthopedics  Discharge Summary      Patient Name: Humberto Cantor  MRN: [id number]  Admission Date: 7/3/2017  Hospital Length of Stay: 4 days  Discharge Date and Time:  07/07/2017   Attending Physician: Joaquin Navarro  Discharging Provider: Grady Mercado MD  Primary Care Provider: Saud Sosa MD    HPI:   Humberto Cantor is a 61 y.o. male who underwent an L3-5 Laminectomy and fusion for an unstable L4 burst fracture on 6/6/2017. He has developed distal hardware failure with kyphosis, low back pain and radiculopathy. He is here for revision surgery today.   The Patient denies myelopathic symptoms such as handwriting changes or difficulty with buttons/coins/keys. Denies perineal paresthesias, bowel/bladder dysfunction.    Procedure(s) (LRB):  L1-Pelvis Revision Depuy Remove/Reinsert SNS: Motors/SSEP/EMG [place] (N/A)      Hospital Course:  On 7/3/2017, the patient arrived to the [facility] for proper pre-operative management.  Upon completion of pre-operative preparation, the patient was taken back to the operative theatre.  A L2-S1 posterior spinal fusion was performed without complication and the patient was transported to the post anesthesia care unit in stable condition.  After appropriate recovery from the anaesthetic agents used during the surgery, the patient was then transported to the hospital inpatient floor.  The interim of the hospital stay from arrival on the floor up to discharge has been uncomplicated. The patient has experienced minimal electrolyte imbalances that have been repleated accordingly.  The patient's diet has progressed to a regular diet with no nausea or vomiting.  The patient's pain has been controlled using a multimodal approach with the help of the anesthesia pain service. Currently, the patient's pain is well controlled on an oral regimen.  The patient had a suresh catheter placed intraoperatively.  This suresh was discontinued  post-operatively and the patient has been voiding without difficulty ever since.  The patient began participation in physical therapy on post-operative day one and has progressed throughout the entire hospital stay.  Currently the patient is ambulating with moderate assistance and the physical therapy team feels that the patient's progress is sufficient to allow the patient to be discharged home safely.  The patient agrees with this assessment and desires a discharge to home today.        Consults         Status Ordering Provider     Inpatient consult to Hospital Medicine-Ortho Comanagement Only  Once     Provider:  (Not yet assigned)    LEVY Pabon          Significant Diagnostic Studies: Labs:   BMP:   Recent Labs  Lab 07/06/17  0531 07/07/17  0516   GLU 99 103    139   K 3.7 4.1    102   CO2 28 30*   BUN 11 9   CREATININE 0.8 0.8   CALCIUM 8.5* 9.1   MG 1.6 1.5*    and CBC   Recent Labs  Lab 07/06/17 0531 07/07/17  0516   WBC 5.24 5.09   HGB 7.9* 8.2*   HCT 23.5* 24.5*    316     Radiology: X-Ray: lumbar spine    Pending Diagnostic Studies:     None        Final Active Diagnoses:    Diagnosis Date Noted POA    PRINCIPAL PROBLEM:  Lumbar burst fracture [S32.001A] 07/03/2017 Yes    Hypomagnesemia [E83.42] 07/05/2017 No    Hypophosphatemia [E83.39] 07/05/2017 No    Hypocalcemia [E83.51] 07/05/2017 No    Essential hypertension [I10] 11/19/2013 Yes    GERD (gastroesophageal reflux disease) [K21.9]  Yes      Problems Resolved During this Admission:    Diagnosis Date Noted Date Resolved POA      Discharged Condition: stable    Disposition: Home or Self Care    Follow Up:  Follow-up Information     [facility]    Specialty:  Home Health Services  Why:  Home Health Physical Therapy  Contact information:  Jyothi GOMEZ [id number] [phone]             Alice Orlando PA-C. Go on 7/26/2017.    Specialty:  Orthopedic Surgery  Why:   Follow-Up Appointment  Contact information:  [address]  [facility] [address]  [phone]                 Patient Instructions:     Ambulatory consult to Physical Therapy   Referral Priority: Emergency Referral Type: Physical Medicine   Referral Reason: Specialty Services Required    Requested Specialty: Physical Therapy    Number of Visits Requested: 1      Diet general     Activity as tolerated     Weight bearing restrictions (specify)   Scheduling Instructions: Weightbearing as tolerated     Call MD for:  temperature >100.4     Call MD for:  persistent nausea and vomiting or diarrhea     Call MD for:  redness, tenderness, or signs of infection (pain, swelling, redness, odor or green/yellow discharge around incision site)     Call MD for:  difficulty breathing or increased cough     Call MD for:  severe persistent headache     Call MD for:  worsening rash     Call MD for:  persistent dizziness, light-headedness, or visual disturbances     Call MD for:  increased confusion or weakness     Leave dressing on - Keep it clean, dry, and intact until clinic visit       Medications:  Reconciled Home Medications:   Discharge Medication List as of 7/7/2017 10:40 AM      START taking these medications    Details   docusate sodium (COLACE) 100 MG capsule Take 1 capsule (100 mg total) by mouth 2 (two) times daily., Starting Wed 7/5/2017, Print      HYDROmorphone (DILAUDID) 2 MG tablet Take 1 tablet (2 mg total) by mouth every 4 (four) hours as needed (breakthrough pain not relieved by percocet)., Starting Fri 7/7/2017, Print      ondansetron (ZOFRAN) 8 MG tablet Take 1 tablet (8 mg total) by mouth every 8 (eight) hours as needed for Nausea., Starting Wed 7/5/2017, Print         CONTINUE these medications which have CHANGED    Details   methocarbamol (ROBAXIN) 750 MG Tab Take 1 tablet (750 mg total) by mouth 3 (three) times daily., Starting Fri 7/7/2017, Print      oxycodone-acetaminophen (PERCOCET)  mg per  tablet Take 1 tablet by mouth every 6 (six) hours as needed for Pain., Starting Fri 7/7/2017, Print         CONTINUE these medications which have NOT CHANGED    Details   alprazolam (XANAX) 0.25 MG tablet Take 1 tablet (0.25 mg total) by mouth 2 (two) times daily as needed., Starting Tue 6/20/2017, Print      fluocinonide 0.05% (LIDEX) 0.05 % cream Apply topically 2 (two) times daily., Starting 3/26/2015, Until Wed 6/29/16, Print      lansoprazole (PREVACID) 15 MG capsule Take 1 capsule (15 mg total) by mouth once daily., Starting Tue 6/20/2017, Until Wed 6/20/2018, No Print      loratadine (CLARITIN) 10 mg tablet Take 10 mg by mouth once daily., Historical Med      olmesartan (BENICAR) 20 MG tablet Take 1 tablet (20 mg total) by mouth once daily., Starting 12/19/2016, Until Discontinued, Normal      tadalafil (CIALIS) 20 MG Tab Take 1 tablet (20 mg total) by mouth once daily., Starting 10/31/2016, Until Discontinued, Print         STOP taking these medications       ibuprofen (ADVIL,MOTRIN) 200 MG tablet Comments:   Reason for Stopping:               Grady Mercado MD  Orthopedics  [facility]

## 2017-07-07 NOTE — PLAN OF CARE
[facility] Medicine Progress Note     HPI:         Alex Cantor is a 61 yr old male who presented with low back pain and bilateral lower extremity pain since roughly 3 weeks.      He underwent L3-L5 laminectomy for fusion of an unstable L4 burst fracture on 06/06/2017. Pre-surgical X-ray of the lumbar spine showed a compression fracture of L5 with decrease in height of >50%. Following the surgery he developed kyphosis, low back pain with radiation of pain down the lower limbs (L>R). The pain was was an 8 on a scale of 1-10, at its worst. He also complains of pain along the surgical incision. An X-ray of the lumbar spine done on 06/09/2017 showed worsening of the compression fracture of L4, likely due to distal hardware failure. He underwent revision surgery (L2-S1 posterior spinal fusion) yesterday.     [facility] medicine is following up for management of hypertension.     Assessment and Plan:     1) Essential Hypertension.  -BP wnl  - continue Olmesartan 20mg.  -Monitor BP     2) GERD.  - continue Pantoprazole 20mg daily.     3) hypomagnesemia,   - replace PRN     4) Lumbar Burst Fracture; S/p L2-S1 PSF following distal hardware failure after L4-L5 laminectomy done on 06/06.  -management per ortho

## 2017-07-07 NOTE — PLAN OF CARE
Problem: Patient Care Overview  Goal: Plan of Care Review  Outcome: Ongoing (interventions implemented as appropriate)  Reviewed plan of care with pt. Pt verbalized understanding. AAOx4, ambulated to bathroom 3x during shift.  Dressing (telfa ) CDI. Pain meds PRN per MAR. Regular diet tolerating well. VS stable on room air. No acute events. Will continue to monitor pt.

## 2017-07-07 NOTE — HPI
Humberto Cantor is a 61 y.o. male who underwent an L3-5 Laminectomy and fusion for an unstable L4 burst fracture on 6/6/2017. He has developed distal hardware failure with kyphosis, low back pain and radiculopathy. He is here for revision surgery today.   The Patient denies myelopathic symptoms such as handwriting changes or difficulty with buttons/coins/keys. Denies perineal paresthesias, bowel/bladder dysfunction.

## 2017-07-07 NOTE — HOSPITAL COURSE
On 7/3/2017, the patient arrived to the [facility] for proper pre-operative management.  Upon completion of pre-operative preparation, the patient was taken back to the operative theatre.  A L2-S1 posterior spinal fusion was performed without complication and the patient was transported to the post anesthesia care unit in stable condition.  After appropriate recovery from the anaesthetic agents used during the surgery, the patient was then transported to the hospital inpatient floor.  The interim of the hospital stay from arrival on the floor up to discharge has been uncomplicated. The patient has experienced minimal electrolyte imbalances that have been repleated accordingly.  The patient's diet has progressed to a regular diet with no nausea or vomiting.  The patient's pain has been controlled using a multimodal approach with the help of the anesthesia pain service. Currently, the patient's pain is well controlled on an oral regimen.  The patient had a suresh catheter placed intraoperatively.  This suresh was discontinued post-operatively and the patient has been voiding without difficulty ever since.  The patient began participation in physical therapy on post-operative day one and has progressed throughout the entire hospital stay.  Currently the patient is ambulating with moderate assistance and the physical therapy team feels that the patient's progress is sufficient to allow the patient to be discharged home safely.  The patient agrees with this assessment and desires a discharge to home today.

## 2017-07-07 NOTE — ASSESSMENT & PLAN NOTE
- Antibiotics: post-op Ancef complete  - Weight bearing status: WBAT  - Labs: reviewed  - DVT Prophylaxis: heparin q8h  - Lines/Drains: PIV  - Pain control: multimodal  - Dispo: home today

## 2017-07-07 NOTE — SUBJECTIVE & OBJECTIVE
"Principal Problem:Lumbar burst fracture    Principal Orthopedic Problem: s/p L2-S1 PSF    Interval History: AFVSS. Improved pain control. Drains discontinued yesterday. Walked 250 feet with PT yesterday. Had 2 BM yesterday.    Review of patient's allergies indicates:   Allergen Reactions    Naproxen        Current Facility-Administered Medications   Medication    0.9%  NaCl infusion    acetaminophen tablet 1,000 mg    alprazolam tablet 0.25 mg    bisacodyl suppository 10 mg    cetirizine tablet 10 mg    heparin (porcine) injection 5,000 Units    hydromorphone injection 0.5 mg    lactulose 20 gram/30 mL solution Soln 20 g    methocarbamol tablet 750 mg    mupirocin 2 % ointment 1 g    olmesartan tablet 20 mg    ondansetron injection 4 mg    oxycodone immediate release tablet 10 mg    oxycodone immediate release tablet 15 mg    oxycodone immediate release tablet 5 mg    pantoprazole EC tablet 40 mg    polyethylene glycol packet 17 g    promethazine tablet 25 mg    ramelteon tablet 8 mg    senna-docusate 8.6-50 mg per tablet 1 tablet    sodium chloride 0.9% flush 3 mL     Objective:     Vital Signs (Most Recent):  Temp: 98.4 °F (36.9 °C) (07/07/17 0422)  Pulse: 82 (07/07/17 0422)  Resp: 18 (07/07/17 0422)  BP: 119/71 (07/07/17 0422)  SpO2: 97 % (07/07/17 0422) Vital Signs (24h Range):  Temp:  [96.9 °F (36.1 °C)-98.8 °F (37.1 °C)] 98.4 °F (36.9 °C)  Pulse:  [79-96] 82  Resp:  [16-20] 18  SpO2:  [96 %-100 %] 97 %  BP: (103-138)/(61-71) 119/71     Weight: 84.8 kg (187 lb)  Height: 5' 10" (177.8 cm)  Body mass index is 26.83 kg/m².      Intake/Output Summary (Last 24 hours) at 07/07/17 0547  Last data filed at 07/07/17 0517   Gross per 24 hour   Intake             1160 ml   Output             3050 ml   Net            -1890 ml       Ortho/SPM Exam     HEENT: normocephalic, atraumatic  Resp: no increased work of breathing  CV: regular rate and rhythm  MSK: moves all extremities well  Neuro: no focal " neuro deficits  Back: dressings c/d/i    Significant Labs:   BMP:     Recent Labs  Lab 07/06/17  0531   GLU 99      K 3.7      CO2 28   BUN 11   CREATININE 0.8   CALCIUM 8.5*   MG 1.6     CBC:     Recent Labs  Lab 07/05/17 0621 07/06/17 0531   WBC 7.14 5.24   HGB 8.3* 7.9*   HCT 24.4* 23.5*    274       Significant Imaging: I have reviewed and interpreted all pertinent imaging results/findings.

## 2017-07-07 NOTE — PLAN OF CARE
POD 4 s/p L2-S1 PSF. According to MD (Gareth) patient requested outpatient rehab in [place]; patient owns all necessary DME. Plans to discharge patient home today with family support and plans for outpatient rehab at [facility]. Patient verbalized understanding of today's discharge plan. All questions and concerns addressed. SW and CM will continue to follow for any additional needs. Discharge and follow-up instructions to be completed by the bedside nurse.    Future Appointments  Date Time Provider Department Center   7/26/2017 9:30 AM Alice Orlando PA-C [facility]   9/21/2017 10:00 AM Saud Sosa MD [facility]      07/07/17 1047   Final Note   Assessment Type Final Discharge Note   Discharge Disposition Home  (outpatient rehab- [facility])   Discharge planning education complete? Yes   What phone number can be called within the next 1-3 days to see how you are doing after discharge? ([phone])   Hospital Follow Up  Appt(s) scheduled? Yes   Discharge plans and expectations educations in teach back method with documentation complete? Yes   Offered [facility] Bedside Delivery? Yes   Discharge/Hospital Encounter Summary to ([facility]) PCP n/a   Referral to Outpatient Case Management complete? n/a   Referral to / orders for Home Health Complete? n/a   30 day supply of medicines given at discharge, if documented non-compliance / non-adherence? n/a   Any social issues identified prior to discharge? No   Did you assess the readiness or willingness of the family or caregiver to support self management of care? Yes

## 2017-07-07 NOTE — NURSING
Patient discharged home per MD order. Patient and wife verbalize understanding of discharge instructions, medications, prescriptions, and follow-up appointments. Verified with Dr. Servin ambulatory referral for outpatient PT in [place]. Paper prescriptions given for Colace, Percocet, Dilaudid, Zofran, and Methocarbamol. Patient and wife deny any questions or concerns at this time. Patient taken to private vehicle via wheelchair accompanied by wife.

## 2017-07-07 NOTE — PROGRESS NOTES
"[facility]  Orthopedics  Progress Note    Patient Name: Humberto Cantor  MRN: [id number]  Admission Date: 7/3/2017  Hospital Length of Stay: 4 days  Attending Provider: Joaquin Navarro MD  Primary Care Provider: Saud Sosa MD  Follow-up For: Procedure(s) (LRB):  L1-Pelvis Revision Depuy Remove/Reinsert SNS: Motors/SSEP/EMG [place] (N/A)    Post-Operative Day: 4 Days Post-Op  Subjective:     Principal Problem:Lumbar burst fracture    Principal Orthopedic Problem: s/p L2-S1 PSF    Interval History: AFVSS. Improved pain control. Drains discontinued yesterday. Walked 250 feet with PT yesterday. Had 2 BM yesterday.    Review of patient's allergies indicates:   Allergen Reactions    Naproxen        Current Facility-Administered Medications   Medication    0.9%  NaCl infusion    acetaminophen tablet 1,000 mg    alprazolam tablet 0.25 mg    bisacodyl suppository 10 mg    cetirizine tablet 10 mg    heparin (porcine) injection 5,000 Units    hydromorphone injection 0.5 mg    lactulose 20 gram/30 mL solution Soln 20 g    methocarbamol tablet 750 mg    mupirocin 2 % ointment 1 g    olmesartan tablet 20 mg    ondansetron injection 4 mg    oxycodone immediate release tablet 10 mg    oxycodone immediate release tablet 15 mg    oxycodone immediate release tablet 5 mg    pantoprazole EC tablet 40 mg    polyethylene glycol packet 17 g    promethazine tablet 25 mg    ramelteon tablet 8 mg    senna-docusate 8.6-50 mg per tablet 1 tablet    sodium chloride 0.9% flush 3 mL     Objective:     Vital Signs (Most Recent):  Temp: 98.4 °F (36.9 °C) (07/07/17 0422)  Pulse: 82 (07/07/17 0422)  Resp: 18 (07/07/17 0422)  BP: 119/71 (07/07/17 0422)  SpO2: 97 % (07/07/17 0422) Vital Signs (24h Range):  Temp:  [96.9 °F (36.1 °C)-98.8 °F (37.1 °C)] 98.4 °F (36.9 °C)  Pulse:  [79-96] 82  Resp:  [16-20] 18  SpO2:  [96 %-100 %] 97 %  BP: (103-138)/(61-71) 119/71     Weight: 84.8 kg (187 lb)  Height: 5' 10" " (177.8 cm)  Body mass index is 26.83 kg/m².      Intake/Output Summary (Last 24 hours) at 07/07/17 0547  Last data filed at 07/07/17 0517   Gross per 24 hour   Intake             1160 ml   Output             3050 ml   Net            -1890 ml       Ortho/SPM Exam     HEENT: normocephalic, atraumatic  Resp: no increased work of breathing  CV: regular rate and rhythm  MSK: moves all extremities well  Neuro: no focal neuro deficits  Back: dressings c/d/i    Significant Labs:   BMP:     Recent Labs  Lab 07/06/17 0531   GLU 99      K 3.7      CO2 28   BUN 11   CREATININE 0.8   CALCIUM 8.5*   MG 1.6     CBC:     Recent Labs  Lab 07/05/17 0621 07/06/17 0531   WBC 7.14 5.24   HGB 8.3* 7.9*   HCT 24.4* 23.5*    274       Significant Imaging: I have reviewed and interpreted all pertinent imaging results/findings.    Assessment/Plan:     Hypocalcemia    Monitor levels        Hypophosphatemia    Oral replacement yesterday  Monitor levels        Hypomagnesemia    IV replacement yesterday  Monitor levels        Essential hypertension    Continue home meds        GERD (gastroesophageal reflux disease)    Continue home meds        * Lumbar burst fracture    - Antibiotics: post-op Ancef complete  - Weight bearing status: WBAT  - Labs: reviewed  - DVT Prophylaxis: heparin q8h  - Lines/Drains: PIV  - Pain control: multimodal  - Dispo: home today                  Grady Mercado MD  Orthopedics  [facility]

## 2017-07-07 NOTE — PT/OT/SLP PROGRESS
Physical Therapy      Humberto Cantor  MRN: [id number]    Patient not seen today secondary to hospital discharge with complete discharge summary to follow.  Nba Johnson, PTA 7/7/2017

## 2017-07-11 ENCOUNTER — CLINICAL SUPPORT (OUTPATIENT)
Dept: REHABILITATION | Facility: HOSPITAL | Age: 62
End: 2017-07-11
Attending: ORTHOPAEDIC SURGERY
Payer: COMMERCIAL

## 2017-07-11 ENCOUNTER — PATIENT OUTREACH (OUTPATIENT)
Dept: ADMINISTRATIVE | Facility: CLINIC | Age: 62
End: 2017-07-11

## 2017-07-11 DIAGNOSIS — S32.001G: Primary | ICD-10-CM

## 2017-07-11 PROCEDURE — 97161 PT EVAL LOW COMPLEX 20 MIN: CPT | Mod: PN

## 2017-07-11 NOTE — PATIENT INSTRUCTIONS
Discharge Instructions for Laminectomy  A surgeon removed a piece of bone from your spine called the lamina. This procedure is called laminectomy. Its purpose is to relieve the pressure caused by a bulging disk that painfully pushes on a nerve. Below are some care tips you can follow at home to help you feel better.  Activity  · Avoid pushing, pulling, bending, or twisting for 2 week(s) after your surgery.  · Dont sit for more than 20 to 30 minutes at a time. And when you arent sitting, lie down or walk.  · Walk as much as you can. You can walk outside or inside. If you use a treadmill, walk at a slow speed, with no incline.  · Going up and down stairs is also good for you, so do it as much as possible. Dont lift anything heavier than 10 pounds until your doctor says otherwise.  · Dont drive for 2 to 3 weeks after your surgery. And never drive if you are taking opioid pain medication. Let others drive you instead. And limit car trips to 20 to 30 minutes at a time.  · Have someone remove electrical cords, throw rugs, and anything else in your home that may cause you to fall.  · Arrange your household to keep the items you need handy.  Home care  · Take your medicine exactly as directed by your doctor.  · Check your incision daily for redness, tenderness, or drainage.  · Dont soak in a bathtub, hot tub, or pool until your doctor says its OK.  · Wait 3 day(s) after your surgery to start showering. Then shower as needed. Carefully wash your incision with soap and water. Gently pat the incision dry. Dont rub it, or apply creams or lotions.  Follow-up  · Make a follow-up appointment as directed by your doctor.  · Make an appointment to have sutures or staples removed about 2 weeks after surgery.     When to seek medical attention  Call 911 right away if you have any of the following:  · Chest pain  · Shortness of breath  · A severe headache  · Trouble controlling your bowels or bladder  Otherwise, call your  doctor right away if you have any of the following:  · Increased pain, redness, or drainage from the incision  · Fever above 100.4°F (38.0°C) or shaking chills  · New pain, weakness, warmth, or numbness in your legs  · Foot, ankle, or calf swelling that is not relieved by elevating your feet   Date Last Reviewed: 11/16/2015  © 0297-6519 [employer]. [address] [place] [address], [place] [id number]. All rights reserved. This information is not intended as a substitute for professional medical care. Always follow your healthcare professional's instructions.

## 2017-07-11 NOTE — PLAN OF CARE
TIME RECORD    Date: 07/11/2017    Start Time:  1715  Stop Time:  1755    PROCEDURES:    TIMED  Procedure Time Min.    Start:  Stop:     Start:  Stop:     Start:  Stop:     Start:  Stop:          UNTIMED  Procedure Time Min.   EVAL Start:  Stop:     Start:  Stop:      Total Timed Minutes:    Total Timed Units:    Total Untimed Units:  1  Charges Billed/# of units:  1    OUTPATIENT PHYSICAL THERAPY   PATIENT EVALUATION  Onset Date: 7/3/17 DOS revision.  Primary Diagnosis:   1. Burst fracture of lumbar vertebra with delayed healing       Treatment Diagnosis: LBP, gait abnormality,  Past Medical History:   Diagnosis Date    Dermatitis, atopic     Essential hypertension 11/19/2013    GERD (gastroesophageal reflux disease)     Hypertension      Precautions: STD,wearing LSO.  Prior Therapy: HH following   Medications: Humberto Cantor has a current medication list which includes the following prescription(s): alprazolam, docusate sodium, fluocinonide 0.05%, hydromorphone, lansoprazole, loratadine, methocarbamol, olmesartan, ondansetron, oxycodone-acetaminophen, and tadalafil.  Nutrition:  Normal  History of Present Illness: Pt  Prior Level of Function: Independent  Social History: Retired.  Place of Residence (Steps/Adaptations): In [address] house with wife.  Functional Deficits Leading to Referral/Nature of Injury: Pt fell from ~ 15' while trimming a tree and suffered burst FX of L4 6/4/17, L3-L5 L/laminectomy and fusion 6/6/17.Revision of spinal fusion l2-pelvis performed 7/3/17.  Patient Therapy Goals: Restore previous TIGRE.    Subjective     Humberto Cantor states .    Pain:  Location: back   Description: Aching, Burning, Throbbing, Tingling, Sharp and Variable  Activities Which Increase Pain: Standing, Bending, Walking, Extension, Flexing and Lifting  Activities Which Decrease Pain: relaxation, pain medication, ice, lying down and rest  Pain Scale: 6/10 at best 7/10 now  10/10 at worst    Objective     Posture:  Scoliosis, flexed hips.  Palpation: L1-S1 tender bilaterally.  Sensation: Intact  DTRs:  Range of Motion/Strength: Very limited AROM.Strength of LS ~ 1/5.    Flexibility: Both HS tight.  Gait: With AD.  Device Used -  Rolling walker  Analysis: SBA - flexed hips, slow, using RW   Bed Mobility:Assistance - min A  Transfers: Assistive Device RW.  Special Tests: Slump test neg bilaterally.SLR LT 80, RT;80 neg bilaterally.  Other: OSWESTRY 38/50 60-80% IMPAIRED.  Treatment: EVAL.    Assessment       Initial Assessment (Pertinent finding, problem list and factors affecting outcome): Pt presents with ROM and strength deficits of LS, poor posture, gait abnormality, decreased function due to LBP and above listed deficits.  Rehab Potiential: good    Short Term Goals (3 Weeks): 1.Decrease pain x 1 grade.2. Start HEP.  Long Term Goals (6 Weeks): 1.Pain 0-4/10. 2.Independent HEP.3.ROM of LS 4.Strength of LS 4- 5/5. 5.OSWESTRY 18/50. 6.Gait without SD. 7.Restore previous TIGRE.    Plan     Certification Period: 7/11/17 to 8/25/17  Recommended Treatment Plan: 2 times per week for 6 weeks: Electrical Stimulation PRN., Gait Training, Group Therapy, Manual Therapy, Moist Heat/ Ice, Patient Education, Therapeutic Activites and Therapeutic Exercise  Other Recommendations: HEP.      Therapist: Donald Hawkins, PT    I CERTIFY THE NEED FOR THESE SERVICES FURNISHED UNDER THIS PLAN OF TREATMENT AND WHILE UNDER MY CARE    Physician's comments: ________________________________________________________________________________________________________________________________________________      Physician's Name: ___________________________________

## 2017-07-13 ENCOUNTER — NURSE TRIAGE (OUTPATIENT)
Dept: ADMINISTRATIVE | Facility: CLINIC | Age: 62
End: 2017-07-13

## 2017-07-13 ENCOUNTER — TELEPHONE (OUTPATIENT)
Dept: FAMILY MEDICINE | Facility: CLINIC | Age: 62
End: 2017-07-13

## 2017-07-13 ENCOUNTER — OFFICE VISIT (OUTPATIENT)
Dept: FAMILY MEDICINE | Facility: CLINIC | Age: 62
End: 2017-07-13
Payer: COMMERCIAL

## 2017-07-13 VITALS
OXYGEN SATURATION: 98 % | HEIGHT: 70 IN | HEART RATE: 93 BPM | TEMPERATURE: 98 F | DIASTOLIC BLOOD PRESSURE: 86 MMHG | WEIGHT: 192.25 LBS | BODY MASS INDEX: 27.52 KG/M2 | SYSTOLIC BLOOD PRESSURE: 115 MMHG | RESPIRATION RATE: 16 BRPM

## 2017-07-13 DIAGNOSIS — N40.1 BPH W URINARY OBS/LUTS: ICD-10-CM

## 2017-07-13 DIAGNOSIS — R60.0 BILATERAL LEG EDEMA: Primary | ICD-10-CM

## 2017-07-13 DIAGNOSIS — N13.8 BPH W URINARY OBS/LUTS: ICD-10-CM

## 2017-07-13 PROCEDURE — 99213 OFFICE O/P EST LOW 20 MIN: CPT | Mod: S$GLB,,, | Performed by: NURSE PRACTITIONER

## 2017-07-13 PROCEDURE — 81002 URINALYSIS NONAUTO W/O SCOPE: CPT | Mod: S$GLB,,, | Performed by: NURSE PRACTITIONER

## 2017-07-13 RX ORDER — TAMSULOSIN HYDROCHLORIDE 0.4 MG/1
0.4 CAPSULE ORAL DAILY
Qty: 30 CAPSULE | Refills: 11 | Status: SHIPPED | OUTPATIENT
Start: 2017-07-13 | End: 2018-03-06 | Stop reason: SDUPTHER

## 2017-07-13 NOTE — PROGRESS NOTES
Medical/JEREMY Student  Encounter Date: 7/13/2017  Leyda Dangelo        Subjective:       Patient ID: Humberto Cantor is a 61 y.o. male.     Chief Complaint: Foot Swelling; Leg Swelling; trouble urinating (cant go then later goes alot); and penial irritation     Edema   This is a new (bilateral lower leg edema) problem. The current episode started in the past 7 days. The problem occurs constantly. The problem has been gradually worsening. Pertinent negatives include no chills, congestion, coughing, fatigue, headaches, myalgias, rash or sore throat. Associated symptoms comments: Loss of hair on shins biltatterally. Treatments tried: Elevated feet. The treatment provided no relief.  He had back surgery about 10 days ago, was in hospital for 4 days, did not have anything to prevent blood clots.   Urinary Frequency    This is a new problem. The current episode started in the past 7 days. The problem occurs intermittently. The problem has been gradually worsening. The quality of the pain is described as burning. The pain is at a severity of 3/10. The pain is mild. There has been no fever. He is sexually active. Associated symptoms include frequency, hesitancy and urgency. Pertinent negatives include no chills, discharge, constipation or rash. The treatment provided no relief.      Review of Systems   Constitutional: Negative for chills and fatigue.   HENT: Negative for congestion, sore throat and trouble swallowing.    Eyes: Negative.    Respiratory: Negative for cough, chest tightness, shortness of breath and wheezing.    Gastrointestinal: Negative for constipation.   Endocrine: Positive for polyuria. Negative for polydipsia and polyphagia.   Genitourinary: Positive for frequency, hesitancy and urgency.   Musculoskeletal: Negative for myalgias.   Skin: Negative for color change, pallor, rash and wound.   Neurological: Negative for dizziness and headaches.   Psychiatric/Behavioral: Negative for agitation and confusion.       Objective:    U/A normal  Physical Exam   Constitutional: He is oriented to person, place, and time. He appears well-developed and well-nourished.   HENT:   Head: Atraumatic.   Eyes: Conjunctivae and EOM are normal.   Neck: Neck supple. No thyromegaly present.   Cardiovascular: Normal rate, regular rhythm, normal heart sounds and intact distal pulses.    Pulses:       Dorsalis pedis pulses are 2+ on the right side, and 2+ on the left side.        Posterior tibial pulses are 2+ on the right side, and 2+ on the left side.   Pulmonary/Chest: Effort normal and breath sounds normal.   Abdominal: Soft. Bowel sounds are normal.   Musculoskeletal: Normal range of motion. He exhibits edema.   Bilateral edema, no erythema. Edema gradually increase from +1 below knee to +4 above metatarsals.   Neurological: He is alert and oriented to person, place, and time. He has normal reflexes.   Skin: Skin is warm, dry and intact. Capillary refill takes less than 2 seconds.   Psychiatric: He has a normal mood and affect. His behavior is normal. Judgment and thought content normal.       Assessment:   Urinary Urgency  Bilateral leg and foot edema  Plan:   Referral Urology for urinary urgency    Bilateral leg edema  -     US Lower Extremity Veins Left; Future; Expected date: 07/13/2017  -     US Lower Extremity Veins Right; Future; Expected date: 07/13/2017    BPH w urinary obs/LUTS  -     Ambulatory referral to Urology  -     tamsulosin (FLOMAX) 0.4 mg Cp24; Take 1 capsule (0.4 mg total) by mouth once daily.  Dispense: 30 capsule; Refill: 11    I also saw patient face to face and agree with NP student's H&P , I have diagnosed and written treatment plan.     This office visit cannot be billed incident to as it does not meet the needed criteria.       Stop claritin  Flomax 0.4 mg daily  Compression socks      Electronically signed by Leyda Pierson at 7/13/2017  4:56 PM

## 2017-07-13 NOTE — TELEPHONE ENCOUNTER
----- Message from Carmen Rogers sent at 7/13/2017  2:36 PM CDT -----  Contact: wife ambreen   States pt has surgery days ago  Legs swelling, wants to be seen today   Call back    Surgeon said to be seen today       Called pod,   Forwarded call to on call

## 2017-07-13 NOTE — MEDICAL/APP STUDENT
Subjective:       Patient ID: Humberto Cantor is a 61 y.o. male.    Chief Complaint: Foot Swelling; Leg Swelling; trouble urinating (cant go then later goes alot); and penial irritation    Edema   This is a new (bilateral lower leg edema) problem. The current episode started in the past 7 days. The problem occurs constantly. The problem has been gradually worsening. Pertinent negatives include no chills, congestion, coughing, fatigue, headaches, myalgias, rash or sore throat. Associated symptoms comments: Loss of hair on shins biltatterally. Treatments tried: Elevated feet. The treatment provided no relief.   Urinary Frequency    This is a new problem. The current episode started in the past 7 days. The problem occurs intermittently. The problem has been gradually worsening. The quality of the pain is described as burning. The pain is at a severity of 3/10. The pain is mild. There has been no fever. He is sexually active. Associated symptoms include frequency, hesitancy and urgency. Pertinent negatives include no chills, discharge, constipation or rash. The treatment provided no relief.     Review of Systems   Constitutional: Negative for chills and fatigue.   HENT: Negative for congestion, sore throat and trouble swallowing.    Eyes: Negative.    Respiratory: Negative for cough, chest tightness, shortness of breath and wheezing.    Gastrointestinal: Negative for constipation.   Endocrine: Positive for polyuria. Negative for polydipsia and polyphagia.   Genitourinary: Positive for frequency, hesitancy and urgency.   Musculoskeletal: Negative for myalgias.   Skin: Negative for color change, pallor, rash and wound.   Neurological: Negative for dizziness and headaches.   Psychiatric/Behavioral: Negative for agitation and confusion.      Objective:      Physical Exam   Constitutional: He is oriented to person, place, and time. He appears well-developed and well-nourished.   HENT:   Head: Atraumatic.   Eyes: Conjunctivae  and EOM are normal.   Neck: Neck supple. No thyromegaly present.   Cardiovascular: Normal rate, regular rhythm, normal heart sounds and intact distal pulses.    Pulses:       Dorsalis pedis pulses are 2+ on the right side, and 2+ on the left side.        Posterior tibial pulses are 2+ on the right side, and 2+ on the left side.   Pulmonary/Chest: Effort normal and breath sounds normal.   Abdominal: Soft. Bowel sounds are normal.   Musculoskeletal: Normal range of motion. He exhibits edema.   Bilateral edema, no erythema. Edema gradually increase from +1 below knee to +4 above metatarsals.   Neurological: He is alert and oriented to person, place, and time. He has normal reflexes.   Skin: Skin is warm, dry and intact. Capillary refill takes less than 2 seconds.   Psychiatric: He has a normal mood and affect. His behavior is normal. Judgment and thought content normal.       Assessment:   Urinary Urgency  Bilateral leg and foot edema  Plan:   Referral Urology for urinary urgency  Stop claritin  Flomax 0.4 mg daily  Compression socks

## 2017-07-13 NOTE — TELEPHONE ENCOUNTER
"  Reason for Disposition   [1] Follow-up call to recent contact AND [2] information only call, no triage required    Answer Assessment - Initial Assessment Questions  1. REASON FOR CALL or QUESTION: "What is your reason for calling today?" or "How can I best help you?" or "What question do you have that I can help answer?"      Patient spoke to his surgeon who advised him to be seen in the office today. Patient states he has an appointment for 3:20 pm today, no triage required.    Protocols used: ST INFORMATION ONLY CALL-A-    "

## 2017-07-13 NOTE — MEDICAL/APP STUDENT
Subjective:       Patient ID: Humberto Cantor is a 61 y.o. male.    Chief Complaint: Foot Swelling; Leg Swelling; trouble urinating (cant go then later goes alot); and penial irritation    HPI  Review of Systems    Objective:      Physical Exam    Assessment:       No diagnosis found.    Plan:   Urology referral

## 2017-07-14 ENCOUNTER — HOSPITAL ENCOUNTER (OUTPATIENT)
Dept: RADIOLOGY | Facility: HOSPITAL | Age: 62
Discharge: HOME | End: 2017-07-14
Attending: NURSE PRACTITIONER
Payer: COMMERCIAL

## 2017-07-14 ENCOUNTER — CLINICAL SUPPORT (OUTPATIENT)
Dept: REHABILITATION | Facility: HOSPITAL | Age: 62
End: 2017-07-14
Attending: ORTHOPAEDIC SURGERY
Payer: COMMERCIAL

## 2017-07-14 DIAGNOSIS — R60.0 BILATERAL LEG EDEMA: ICD-10-CM

## 2017-07-14 DIAGNOSIS — S32.001D BURST FRACTURE OF LUMBAR VERTEBRA, WITH ROUTINE HEALING, SUBSEQUENT ENCOUNTER: Primary | ICD-10-CM

## 2017-07-14 PROCEDURE — 93970 EXTREMITY STUDY: CPT | Mod: 26,,, | Performed by: RADIOLOGY

## 2017-07-14 PROCEDURE — 97110 THERAPEUTIC EXERCISES: CPT | Mod: PN

## 2017-07-14 PROCEDURE — 97530 THERAPEUTIC ACTIVITIES: CPT | Mod: PN

## 2017-07-14 PROCEDURE — 93970 EXTREMITY STUDY: CPT | Mod: TC

## 2017-07-14 NOTE — PROGRESS NOTES
"Name: Humberto MADDOX Cantor  [facility] Number: [id number]  Date of Treatment: 07/14/2017   Diagnosis:   Encounter Diagnosis   Name Primary?    Burst fracture of lumbar vertebra, with routine healing, subsequent encounter Yes       Time in: 1005  Time Out: 1100  Total Treatment Time: 55  Group Time: 25      Subjective:    Humberto reports "It's pretty consistently painful."  Patient reports their pain to be 5-6/10 on a 0-10 scale with 0 being no pain and 10 being the worst pain imaginable.    Objective    Patient received group therapy to increase strength, endurance, ROM, flexibility, posture and core stabilization with 1 patient for 25 min with activities as follows:     Humberto received therapeutic exercises to develop strength, endurance, ROM, flexibility, posture and core stabilization for 55 minutes including:  Grouped therex:   Bike 10'   Calf stretch 3/30 B   Hamstring stretch 3/30s R/L   TrA with Ball in 3/10  1-1    T/f sit to sup with CGA, with VC's for technique   PPT 3/10   LTR 3/10 with theraball and assist to get LE's on theraball   DKTC  3/10 with theraball and assist to get LE's off theraball    T/f sup to sit with SBA, log roll technique      Written Home Exercises Provided: Will distribute next visit  Pt demo good understanding of the education provided. Humberto demonstrated good return demonstration of activities.     Assessment:   Patient performed supine therex with brace off, pain with taking LE's off theraball.  Slow cautious movements.    Pt will continue to benefit from skilled PT intervention. Medical Necessity is demonstrated by:  Fall Risk, Unable to participate in daily activities, Continued inability to participate in vocational pursuits, Pain limits function of effected part for some activities, Requires skilled supervision to complete and progress HEP and Weakness.    Patient is making good progress towards established goals.    Plan:  Continue with established Plan of Care towards PT goals.   "

## 2017-07-15 NOTE — OP NOTE
DATE OF PROCEDURE:  07/03/2017.    SURGEON:  Joaquin Navarro M.D.    COSURGEON:  Trev Gregory M.D., of the Neurosurgery Service.    PREOPERATIVE DIAGNOSIS:  L4 burst fracture with acute lumbar radiculopathy,   status post L3 to L5 posterior spinal fusion with postoperative construct   failure and distal hardware pullout.    POSTOPERATIVE DIAGNOSIS:  L4 burst fracture with acute lumbar radiculopathy,   status post L3 to L5 posterior spinal fusion with postoperative construct   failure and distal hardware pullout.    PROCEDURES PERFORMED:  1.  Posterior spinal fusion, L2 to pelvis.  2.  Hardware removal and reinsertion, L3 to L5.  3.  Posterior segmental instrumentation, L2 to S1.  4.  Bilateral pelvic fixation on the right with a pelvic bolt on the left with a   S2 sacroiliac screw.  5.  Exploration of spinal fusion.  6.  Local plus cadaveric bone grafting and medium Infuse bone graft kit.    ANESTHESIA:  General endotracheal anesthesia.    ESTIMATED BLOOD LOSS:  400 mL.    IMPLANTS USED:  DePuy TNCium.    SPECIMENS:  Explanted hardware for gross pathology only.    FINDINGS:  Gross pullout of L5 screws consistent with preoperative imaging.    COMPLICATIONS:  None.    SPONGE AND NEEDLE COUNT:  Correct x2.    NEUROLOGICAL MONITORING NOTES:  Motor-evoked potentials, somatosensory-evoked   potentials, free-run EMG as well as EMG stimulation of all screws.  Please note   that there were no changes to stable and reliable bilateral upper and lower   extremity motor and SSEPs throughout the entire procedure.  All screws   stimulated at or greater than 20 milliamperes.    REASON FOR OPERATION AND BRIEF HISTORY AND PHYSICAL:  Humberto Cantor is a   61-year-old male with a history of an L4 burst fracture with acute lumbar   radiculopathy.  He underwent L3 to L5 laminectomy and fusion for this on   06/06/2017.  At this time, we recommended a limited fusion because extending the   traditional 2 levels above and below would result  in a significant fusion of   his entire lumbar spine and to the pelvis.  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  He was seen in clinic on 06/27/2017, having increasing back pain and   increasing radicular pain.  Given that his construct failure was identified, we   recommended revision surgery.  The patient is here for surgery today.    DESCRIPTION OF INFORMED CONSENT:  I had a sit down discussion with the patient and his wife regarding a revision L2-pelvis spinal fusion. We specifically discussed the risks, benefits, and alternatives to surgery. We discussed the surgical procedure including the skin incision, nerve decompression, bone fusion, allograft, iliac crest bone graft, and surgical implants including pedicle screws and interbody devices as indicated as well as infuse bone graft: they understand the risks include but are not limited to death, paralysis, blindness, bleeding, infection, damage to arteries, veins and nerves, spinal fluid leak, continued or worsening pain, no improvement in symptoms, non-union, and the possible need for more surgery in the future, the possible need for perioperative blood transfusion, as well as the possibility other unforseen and unknown complications. We talked about expected hospital stay and recovery period. All questions were answered; they understand and wish to proceed.       DESCRIPTION OF PROCEDURE:  The patient was met in the preoperative area where   the low back was confirmed as the operative site.  Subsequently, he was brought   to the Operating Room.  General endotracheal anesthesia was then induced.    Sequential compression devices were placed in the patient's bilateral calves and   run throughout the procedure.  A Sharma catheter was then placed in a standard   sterile fashion.  Morales-Wells tongs were then applied in the standard sterile   fashion.  The patient was then flipped  prone onto a Reinaldo table with four post   pads.  The head was secured with 15 pounds of inline traction.  The arms were   held in 90/90 position.  All bony prominences were padded and closely checked by   me, paying special attention to the axilla, elbows, hands, chest, hips,   genitalia, knees and feet.  Being satisfied with positioning and confirming it   to be adequate with anesthesia, the patient's lower back was prepped and draped   in normal sterile fashion.    A full timeout was then called identifying the patient, the procedural site and   levels; the availability of all instruments and implants; no specific nursing,   surgical, anesthetic or neurological monitoring concerns.  Finally, it was safe   to proceed with surgery and the patient was given a weight-appropriate dose of   Ancef by the Anesthesia Service.    I then infiltrated the planned incision with 10 mL of 1% lidocaine with   epinephrine.    Next, working in tandem with Dr. Gregory who made a midline lumbar incision   incorporating the patient's prior midline scar and extending this both   proximally and distally, we then performed a revision exposure of the lumbar   spine including the patient's prior indwelling lumbar hardware as well as what   we took to be the L2 lamina all the way down to the sacrum and the transverse   processes bilaterally.  Great care was taken not to violate the L1-L2 facet   joints proximally.  At the conclusion of our preliminarily exposure, we removed   the patient's set plugs and rods and explored the patient's prior hardware.  The   L5 screws were found to be grossly loose bilaterally and then we removed them.    We then explored the patient's spinal fusion and found there to be no solid   arthrodesis at this time.    We then placed pedicle screws at L2 and S1 bilaterally using anatomic landmarks   and a freehand technique.  We additionally placed revision L5 screws with the   use of intermittent fluoroscopy.   Finally, we placed a left S2 sacroiliac screw.    We were unable to achieve an acceptable tract with the right S2 sacroiliac   screw and, thus, elected to perform a right iliac bolt.  AP and lateral   radiographs were taken, demonstrating correct level as well as adequate position   of all hardware.  The wound was then finally irrigated.  Rods were measured,   cut, contoured and locked into place.  We did a use lateral connector at the   right-sided iliac bolt.  Final radiographs were taken, demonstrating correct   level as well as adequate position of all implants.  The patient's local bone   mixed with 30 mL of cancellous chips was laid along the transverse processes.    Please note that we had previously laid a medium Infuse bone graft kit from L2   all the way down to the sacral ala bilaterally.  Additionally, we mixed 1 g   vancomycin powder into this bone graft.  We then placed a deep drain and the   wound was closed in layers using #1 Vicryl for the deep fascia.  The superficial   layer was then irrigated, packed with vancomycin and closed over a drain using   2-0 Vicryl, 3-0 Monocryl, Dermabond and Steri-Strips.  A soft dressing was then   placed.  The drains were secured in place with 2-0 silk sutures.  The patient   was then flipped supine, extubated and transferred to the Recovery Room in   stable condition.    JUSTIFICATION OF CO-SURGEON: This was a complex revision fracture case. The combined efforts of two attending surgeons is indicated to decrease blood loss, decrease operative time, and improve outcomes.

## 2017-07-18 ENCOUNTER — CLINICAL SUPPORT (OUTPATIENT)
Dept: REHABILITATION | Facility: HOSPITAL | Age: 62
End: 2017-07-18
Attending: ORTHOPAEDIC SURGERY
Payer: COMMERCIAL

## 2017-07-18 DIAGNOSIS — Z98.890 S/P SPINAL SURGERY: Primary | ICD-10-CM

## 2017-07-18 DIAGNOSIS — S32.001G: Primary | ICD-10-CM

## 2017-07-18 PROCEDURE — 97110 THERAPEUTIC EXERCISES: CPT | Mod: PN

## 2017-07-18 NOTE — PROGRESS NOTES
TIME RECORD    Date:  07/18/2017    Start Time:  1015  Stop Time:  1100    PROCEDURES:    TIMED  Procedure Time Min.   TE Start:1015  Stop:1100 45    Start:  Stop:     Start:  Stop:    3 Start:  Stop:          UNTIMED  Procedure Time Min.    Start:  Stop:     Start:  Stop:      Total Timed Minutes:  45  Total Timed Units:  3  Total Untimed Units:  0  Charges Billed/# of units:  3      Progress/Current Status    Subjective:     Patient ID: Humberto Cantor is a 61 y.o. male.  Diagnosis:   1. Lumbar burst fracture, with delayed healing, subsequent encounter       Pain: 6-7 /10  Pain increasing without meds.    Objective:     TE for ROM,strength, stabilization.    Assessment:     Performed well.    Patient Education/Response:     Supine to sit ed.    Plans and Goals:     Cont per POC.

## 2017-07-18 NOTE — PROGRESS NOTES
07/18/17 1000   Knee Exercises   Frontal Squats Reps/Sets/Weight 30   Lumbar Exercises   Posterior Pelvic Tilts  Reps/Sets/Hold Time 3x10 2s   Double Knee To Chest Stretch Reps/Sets/Hold Time 3x10 w/ball   Hamsting Stretch Reps/Sets/Hold Time 3x30   Standing Back Bends Reps/Sets 1   Crunches  Reps/Sets 3x10 isometrics   Additional Exercises   Additional Exercise bike 12' 50s

## 2017-07-19 LAB
BILIRUB SERPL-MCNC: NORMAL MG/DL
BLOOD URINE, POC: NORMAL
COLOR, POC UA: NORMAL
GLUCOSE UR QL STRIP: NORMAL
KETONES UR QL STRIP: NORMAL
LEUKOCYTE ESTERASE URINE, POC: NORMAL
NITRITE, POC UA: NORMAL
PH, POC UA: 6
PROTEIN, POC: NORMAL
SPECIFIC GRAVITY, POC UA: 1.01
UROBILINOGEN, POC UA: NORMAL

## 2017-07-19 RX ORDER — HYDROMORPHONE HYDROCHLORIDE 2 MG/1
2 TABLET ORAL EVERY 4 HOURS PRN
Qty: 60 TABLET | Refills: 0 | Status: SHIPPED | OUTPATIENT
Start: 2017-07-19 | End: 2017-07-26 | Stop reason: SDUPTHER

## 2017-07-20 ENCOUNTER — CLINICAL SUPPORT (OUTPATIENT)
Dept: REHABILITATION | Facility: HOSPITAL | Age: 62
End: 2017-07-20
Attending: ORTHOPAEDIC SURGERY
Payer: COMMERCIAL

## 2017-07-20 DIAGNOSIS — S32.001K: ICD-10-CM

## 2017-07-20 PROCEDURE — 97110 THERAPEUTIC EXERCISES: CPT | Mod: PN

## 2017-07-20 NOTE — PROGRESS NOTES
"Name: Humberto Cantor  [facility] Number: [id number]  Date of Treatment: 07/20/2017   Diagnosis: No diagnosis found.    Time in: 1015  Time Out: 1105  Total Treatment Time: 50      Subjective:    Humberto reports "I ran out of medicine for a few days and so it's getting back into my system, I was really sore after my last visit".  Patient reports their pain to be 7/10 on a 0-10 scale with 0 being no pain and 10 being the worst pain imaginable.    Objective  Humberto received therapeutic exercises to develop strength, endurance, ROM, flexibility, posture and core stabilization for 50 minutes including:  Bike 10'  Calf stretch 3/30 B   TrA with Ball in 3/10 3 sec hold    T/f sit to sup with CGA, with VC's for technique   PPT 3/10   LTR 3/10 with theraball and assist to get LE's on theraball     T/f sup to sit with SBA, log roll technique      Written Home Exercises Provided: Cont with HEP  Pt demo good understanding of the education provided. Humberto demonstrated good return demonstration of activities.     Assessment:   Patient very slow with transfers and all activity, frequent cues for technique and for proper breathing techniques. Pain with all activity.    Pt will continue to benefit from skilled PT intervention. Medical Necessity is demonstrated by:  Fall Risk, Continued inability to participate in vocational pursuits, Pain limits function of effected part for some activities, Unable to participate fully in daily activities, Requires skilled supervision to complete and progress HEP and Weakness.    Patient is making good progress towards established goals.    Plan:  Continue with established Plan of Care towards PT goals.   "

## 2017-07-25 ENCOUNTER — CLINICAL SUPPORT (OUTPATIENT)
Dept: REHABILITATION | Facility: HOSPITAL | Age: 62
End: 2017-07-25
Attending: ORTHOPAEDIC SURGERY
Payer: COMMERCIAL

## 2017-07-25 DIAGNOSIS — S32.001G: Primary | ICD-10-CM

## 2017-07-25 PROCEDURE — 97110 THERAPEUTIC EXERCISES: CPT | Mod: PN

## 2017-07-25 NOTE — PROGRESS NOTES
"Name: Humberto Cantor  [facility] Number: [id number]  Date of Treatment: 07/25/2017   Diagnosis:   Encounter Diagnosis   Name Primary?    Lumbar burst fracture, with delayed healing, subsequent encounter Yes       Time in: 1000  Time Out: 1100  Total Treatment Time: 60      Subjective:    Humberto reports "It's hard to answer how my pain is, because my pain is getting localized.  I still have sensitivity with towards top of my spine and in my buttocks, and out away from the spine and in a spot low on my spine I still get a shooting pain.  I have learned to kneel to get things off the ground, using a wall to help assist me up.  Patient reports their pain to be 8/10 on a 0-10 scale with 0 being no pain and 10 being the worst pain imaginable at the highest when shooting pains occur, however, while sitting pain is decreased to 0/10    Objective  Humberto received therapeutic exercises to develop strength, endurance, ROM, flexibility, posture and core stabilization for 60 minutes including:   Bike 10'  Calf stretch 3/30 B  TrA with theraball in standing 3/10 3 sec hold  Hamstring stretch in sitting 3/30s R/L   T/f sit to sup with CGA, with VC's for technique  TrA in supine in hooklying with hip aDd  PPT 3/10  LTR 10x without theraball    T/f sup to sit with SBA, log roll technique  Heel raise 3/10 with RW    Written Home Exercises Provided: Cont with HEP  Pt demo good understanding of the education provided. Humberto demonstrated good return demonstration of activities.     Assessment:   Patient remains with weakness and difficulty with transfers and core therex.    Pt will continue to benefit from skilled PT intervention. Medical Necessity is demonstrated by:  Fall Risk, Continued inability to participate in vocational pursuits, Pain limits function of effected part for some activities, Unable to participate fully in daily activities, Requires skilled supervision to complete and progress HEP and Weakness.    Patient is making good " progress towards established goals.    Plan:  Continue with established Plan of Care towards PT goals.

## 2017-07-26 ENCOUNTER — OFFICE VISIT (OUTPATIENT)
Dept: ORTHOPEDICS | Facility: CLINIC | Age: 62
End: 2017-07-26
Payer: COMMERCIAL

## 2017-07-26 VITALS — WEIGHT: 190.13 LBS | BODY MASS INDEX: 27.22 KG/M2 | HEIGHT: 70 IN

## 2017-07-26 DIAGNOSIS — Z98.890 S/P SPINAL SURGERY: ICD-10-CM

## 2017-07-26 DIAGNOSIS — Z98.1 S/P LUMBAR FUSION: ICD-10-CM

## 2017-07-26 DIAGNOSIS — S32.001K: Primary | ICD-10-CM

## 2017-07-26 PROCEDURE — 99999 PR PBB SHADOW E&M-EST. PATIENT-LVL III: CPT | Mod: PBBFAC,,, | Performed by: PHYSICIAN ASSISTANT

## 2017-07-26 PROCEDURE — 99024 POSTOP FOLLOW-UP VISIT: CPT | Mod: S$GLB,,, | Performed by: PHYSICIAN ASSISTANT

## 2017-07-26 RX ORDER — HYDROMORPHONE HYDROCHLORIDE 2 MG/1
2 TABLET ORAL EVERY 4 HOURS PRN
Qty: 60 TABLET | Refills: 0 | Status: SHIPPED | OUTPATIENT
Start: 2017-07-26 | End: 2017-08-16

## 2017-07-26 RX ORDER — METHOCARBAMOL 750 MG/1
750 TABLET, FILM COATED ORAL 3 TIMES DAILY
Qty: 60 TABLET | Refills: 0 | Status: SHIPPED | OUTPATIENT
Start: 2017-07-26 | End: 2017-08-16 | Stop reason: SDUPTHER

## 2017-07-26 RX ORDER — OXYCODONE HYDROCHLORIDE 15 MG/1
15 TABLET ORAL EVERY 4 HOURS PRN
Qty: 90 TABLET | Refills: 0 | Status: SHIPPED | OUTPATIENT
Start: 2017-07-26 | End: 2017-08-16 | Stop reason: SDUPTHER

## 2017-07-27 ENCOUNTER — CLINICAL SUPPORT (OUTPATIENT)
Dept: REHABILITATION | Facility: HOSPITAL | Age: 62
End: 2017-07-27
Attending: ORTHOPAEDIC SURGERY
Payer: COMMERCIAL

## 2017-07-27 DIAGNOSIS — S32.001G: Primary | ICD-10-CM

## 2017-07-27 PROCEDURE — 97110 THERAPEUTIC EXERCISES: CPT | Mod: PN

## 2017-07-27 NOTE — PROGRESS NOTES
"Name: Humberto Cantor  [facility] Number: [id number]  Date of Treatment: 07/27/2017   Diagnosis:   Encounter Diagnosis   Name Primary?    Lumbar burst fracture, with delayed healing, subsequent encounter Yes       Time in: 0958  Time Out: 1059  Total Treatment Time: 61        Subjective:    Humberto reports improvement of symptoms and pain overall is going down, except with "wrong" movement, and that pain is localizing.  Patient reports their pain to be 6/10 on a 0-10 scale with 0 being no pain and 10 being the worst pain imaginable.    Objective  Humberto received therapeutic exercises to develop strength, endurance, ROM, flexibility, posture and core stabilization for 61 minutes including:    Bike 10'   Calf stretch 3/30 B   TrA with theraball in standing 3/10 3 sec hold   Hamstring stretch in sitting 3/30s R/L  T/f sit to sup with CGA, with VC's for technique   TrA in supine in hooklying with hip aDd   PPT 3/10   LTR 2x10 without theraball   T/f sup to sit with SBA, log roll technique   Heel raise 3/10 with RW    Written Home Exercises Provided: Cont with HEP  Pt demo good understanding of the education provided. Humberto demonstrated good return demonstration of activities.     Assessment:   Patient with increased ease of therex, increased ability to utilize targeted muscules vs. compensation techniques. Ambulating faster with increased proper posture.    Pt will continue to benefit from skilled PT intervention. Medical Necessity is demonstrated by:  Fall Risk, Continued inability to participate in vocational pursuits, Pain limits function of effected part for some activities, Unable to participate fully in daily activities, Requires skilled supervision to complete and progress HEP and Weakness.    Patient is making good progress towards established goals.    Plan:  Continue with established Plan of Care towards PT goals.   "

## 2017-07-28 NOTE — OP NOTE
DATE OF SURGERY: 7/3/17    PREOPERATIVE DIAGNOSIS:  1. History of L3-L5 posterior instrumented lumbar fusion for L4 burst fracture and acute lumbar radiculopathy  2. 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    POSTOPERATIVE DIAGNOSIS:  1. History of L3-L5 posterior instrumented lumbar fusion for L4 burst fracture and acute lumbar radiculopathy  2. 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    PROCEDURE PERFORMED:  1. Posterior lumbar fusion, L2-pelvis   2. Posterior segmental fixation with pedicle screws and rods, L2-S1 (DePuy Synthes)  3. Hardware removal and reinsertion, L3-L5  4. Pelvic fixation with left S2-Iliac screw and right iliac bolt  5.  Exploration of previous lumbar fusion, L3-L5  6.  Use of intraoperative fluoroscopy  7. Use of intraoperative neuro-monitoring with stimulation  8. Use of medium Infuse kit, local and cadaveric bone grafting    PRIMARY SURGEON: Joaquin Navarro M.D., Orthopedic Surgery    CO-SURGEON: Trev Gregory M.D.    ANESTHESIA: GETA    ESTIMATED BLOOD LOSS: 400mL    COMPLICATIONS: None    DRAINS: See Dr. Navarro's note    SPECIMENS SENT: Previous lumbar hardware    FINDINGS: L5 screw pullout bilaterally    INDICATIONS:    For detailed clinical history please see Dr. Navarro separate operative dictation.  Briefly this is a 61-year-old male with a history of an L4 burst fracture and acute lower extremity radiculopathy which was treated with a posterior instrumented fusion on 6/6/2017.  On follow-up clinic visit he reported increased axial back pain and radicular leg pain and imaging showed hardware failure with L5 screw pullout and distal junctional kyphosis. For a details about the informed consent process please see Dr. Navarro's separate operative note.    PROCEDURE:    For details about patient intubation, positioning and localization please see Dr. Navarro's separate operative note.  My  involvement began at the time of the incision. A midline incision was made through the previous scar and extended proximally and distally using a 10 blade. A midline fascial incision was made with Bovie electrocautery.  Subperiosteal dissection was carried out with Bovie electrocautery and Scott elevators to expose the posterior elements from L2 to the sacrum and the previous hardware from L3-L5.  The previous pedicle screws and rods from L3-5 were removed, and the L5 screws were noted to be pulled out and loose. L3 and L5 screws were replaced and upsized. Freehand technique and anatomic landmarks were used to place bilateral L2 and bilateral S1 screws.  A left S2 alar screw was then placed using freehand technique.  A right iliac bolt was placed as an S2 alar screw could not be placed with good purchase. The L3-5 fusion mass was explored bilaterally, and found not to be intact.  AP and lateral x-ray confirmed excellent position of the hardware. The screws were also stimulated with the neuro monitoring probe, and found to stimulate above a threshold of 20 milliamperes.    Titanium rods were then sized, contoured and reduced into the tulip heads. A side connector was used for the right iliac bolt. The wound was copiously irrigated with sterile normal saline and a dilute Betadine solution.  The transverse processes from L2 to L5 and the sacral ala were decorticated bilaterally with a high-speed drill.  A medium Infuse kit, local bone, and cancellous allograft chips were placed in the lateral gutters for arthrodesis from L2 to the pelvis. Final x-rays showed excellent position of all hardware.    This concludes my involvement in this case.  For details about drain placement, closure, extubation, and disposition from the OR please see Dr. Navarro's separate operative dictation.  During my involvement the patient appeared to tolerate the procedure well from a hemodynamic and neurologic monitoring standpoint.  I was  present from critical portions of the case and at the end of the case all counts were correct.    JUSTIFICATION OF MODIFIER 22: This was a complex revision fracture case.Two attending surgeons were felt necessary to reduce operative times, blood loss, and improve patient outcomes.

## 2017-07-28 NOTE — OP NOTE
DATE OF PROCEDURE:  06/06/2017    PREOPERATIVE DIAGNOSIS:  L4 burst fracture with left lower extremity   radiculopathy.    POSTOPERATIVE DIAGNOSIS:  L4 burst fracture with left lower extremity   radiculopathy.    PROCEDURES PERFORMED:  1.  Open reduction and internal fixation of L4 burst fracture.  2.  Posterior lumbar fusion, L3-L5.  3.  Posterior segmental instrumentation with pedicle screws and rods, L3-L5,   (DePuy Synthes system).  2.  Decompression of thecal sac and nerve roots, the L4 laminectomy.  3.  Local bone graft.  4.  Use of intraoperative fluoroscopy.  5.  Use of intraoperative neuromonitoring with stimulation.    PRIMARY SURGEON:  Joaquin Navarro M.D.    CO-SURGEON:  Trev Gregory M.D.    ANESTHESIA:  GETA.    BLOOD LOSS:  250 mL.    COMPLICATIONS:  None.    DRAINS:  One superficial, one deep.    SPECIMEN SENT:  None.    FINDINGS:  Laminar fracture at L4 with gross disruption of the L4-S1   interspinous ligament as well as dorsal and ventral epidural hematoma.    INDICATIONS:  For detailed clinical history, please see Dr. Navarro's separate   operative note.  My involvement in this case began at the time of the incision.    A linear incision was made with a #10 blade in the posterior lumbar region from   approximately L3-L5.  Supra and subfascial dissection was carried out with   Bovie electrocautery.  Subperiosteal dissection was carried out with Bovie   electrocautery and Scott elevators to expose the posterior elements from L3 to   L5.  A pedicle marker was placed into the presumed left L4 pedicle and confirmed   on AP and lateral x-ray.  Medial facetectomies were then performed bilaterally   with an osteotome at L3-L4 and L4-L5.  Pedicle screws were then placed using   free hand technique and anatomic landmarks at L3, L4 and L5.  Short   interpedicular screws were placed at L4 given the fracture.    We then turned our attention to neurological decompression.  We noted disruption   of the L4-L5  interspinous ligaments.  An L4 laminectomy was then performed with   Leksell rongeur, high-speed drill and Kerrison punches.  Ventral and dorsal   hematoma was seen and removed with suction.  A left L4-L5 foraminotomy was   performed to expose the exiting nerve root at L4.  At the end of this   decompression, we could see that the thecal sac was adequately decompressed   dorsally.  We reduced retropulsed fracture fragments at L4 anteriorly with a   downgoing curette.  A [place] probe was then used to confirm that there was   adequate ventral decompression as well, both of the thecal sac and the exiting   L4 nerve root.  Titanium rods were sized, contoured and reduced into the tulip   heads.  Set screws were finally tightened.  The wound was copiously irrigated   with sterile normal saline and a dilute Betadine solution.  AP and lateral   x-rays showed excellent position of all implants.  The transverse processes   bilaterally from L3-L5 were decorticated with the high-speed drill.  Locally   harvested bone from the laminectomy was morcellized and placed in the lateral   gutters for arthrodesis from L3-L5.    This concludes my involvement in this case.  During my involvement, the patient   appeared to tolerate the procedure well from a hemodynamic and neuromonitoring   standpoint.  I was present for all critical portions of the case.  At the end of   the case, all counts were correct.  For details about closure, patient   extubation and disposition from the OR, please see Dr. Navarro's separate   operative dictation.    JUSTIFICATION OF CO-SURGEON:  This was a complex spinal fracture.  It was felt   that two attending surgeons would reduce operative times, blood loss and improve   patient outcomes.      ROSALBA  dd: 07/28/2017 14:07:01 (CDT)  td: 07/28/2017 15:05:33 (CDT)  Doc ID   #[id number]  Job ID #[id number]    CC:

## 2017-08-01 ENCOUNTER — CLINICAL SUPPORT (OUTPATIENT)
Dept: REHABILITATION | Facility: HOSPITAL | Age: 62
End: 2017-08-01
Attending: ORTHOPAEDIC SURGERY
Payer: COMMERCIAL

## 2017-08-01 DIAGNOSIS — S32.001G: Primary | ICD-10-CM

## 2017-08-01 PROCEDURE — 97110 THERAPEUTIC EXERCISES: CPT | Mod: PN

## 2017-08-01 NOTE — PROGRESS NOTES
Name: Humberto Cantor  [facility] Number: [id number]  Date of Treatment: 08/01/2017   Diagnosis:   Encounter Diagnosis   Name Primary?    Lumbar burst fracture, with delayed healing, subsequent encounter Yes       Time in: 1000  Time Out: 1100  Total Treatment Time: 60      Subjective:    Humberto reports trying to increase daily activity, moving a bit easier.  Patient reports their pain to be 7/10 on a 0-10 scale with 0 being no pain and 10 being the worst pain imaginable.    Objective  Humberto received therapeutic exercises to develop strength, endurance, ROM, flexibility, posture and core stabilization for 60 minutes including:  Bike 10'  Calf stretch 3/30 B  TrA with theraball in standing 3/10 3 sec hold  Hamstring stretch in sitting 3/30s R/L  T/f sit to sup with CGA, with VC's for technique  TrA in supine in hooklying with hip aDd  PPT 3/10  LTR 2x10 without theraball   Bridging 3x10  Supine hooklying marching with TrA sets  T/f sup to sit with SBA, log roll technique  Heel raise 3/10 with RW  Mini squats with RW 3/10    Written Home Exercises Provided: Cont with HEP  Pt demo good understanding of the education provided. Humberto demonstrated good return demonstration of activities.     Assessment:   Patient with increased pace with ambulation, taking steps (still short) but longer distances ~5ft without RW. Remains with difficulty with bridging, but tolerated without extreme difficulty as he wasn't able to perform previous visits. Tolerated all other added therex without increase in s/s.    Pt will continue to benefit from skilled PT intervention. Medical Necessity is demonstrated by:  Fall Risk, Continued inability to participate in vocational pursuits, Pain limits function of effected part for some activities, Unable to participate fully in daily activities, Requires skilled supervision to complete and progress HEP and Weakness.    Patient is making good progress towards established goals.    Plan:  Continue with  established Plan of Care towards PT goals.

## 2017-08-03 ENCOUNTER — CLINICAL SUPPORT (OUTPATIENT)
Dept: REHABILITATION | Facility: HOSPITAL | Age: 62
End: 2017-08-03
Attending: ORTHOPAEDIC SURGERY
Payer: COMMERCIAL

## 2017-08-03 DIAGNOSIS — S32.001G: Primary | ICD-10-CM

## 2017-08-03 PROCEDURE — 97110 THERAPEUTIC EXERCISES: CPT | Mod: PN

## 2017-08-03 NOTE — PROGRESS NOTES
"Name: Humberto Cantor  [facility] Number: [id number]  Date of Treatment: 08/03/2017   Diagnosis:   Encounter Diagnosis   Name Primary?    Lumbar burst fracture, with delayed healing, subsequent encounter Yes       Time in: 1011  Time Out: 1102  Total Treatment Time: 51      Subjective:    Humberto reports, "Eh, it's getting better."  Patient reports their pain to be 5/10 on a 0-10 scale with 0 being no pain and 10 being the worst pain imaginable.    Objective  Humberto received therapeutic exercises to develop strength, endurance, ROM, flexibility, posture and core stabilization for 51 minutes including:   Bike 10'   Calf stretch 3/30 B   TrA with theraball in standing 3/10 3 sec hold   Hamstring stretch in sitting 3/30s R/L   T/f sit to sup with CGA, with VC's for technique   TrA in supine in hooklying with hip aDd   PPT 3/10   LTR 2x10 without theraball    Bridging 3x10   Supine hooklying marching with TrA sets   SKTC 3/30s R/L  T/f sup to sit with SBA, log roll technique   Heel raise 3/10    Mini squats 3/10    Written Home Exercises Provided: Cont with HEP  Pt demo good understanding of the education provided. Humberto demonstrated good return demonstration of activities.     Assessment:   Increased pace with ambulation as well as transfers, less guarded movement.    Pt will continue to benefit from skilled PT intervention. Medical Necessity is demonstrated by:  Continued inability to participate in vocational pursuits, Pain limits function of effected part for some activities, Unable to participate fully in daily activities, Requires skilled supervision to complete and progress HEP and Weakness.    Patient is making good progress towards established goals.        Plan:  Continue with established Plan of Care towards PT goals.   "

## 2017-08-08 ENCOUNTER — CLINICAL SUPPORT (OUTPATIENT)
Dept: REHABILITATION | Facility: HOSPITAL | Age: 62
End: 2017-08-08
Attending: ORTHOPAEDIC SURGERY
Payer: COMMERCIAL

## 2017-08-08 DIAGNOSIS — G89.29 CHRONIC LOW BACK PAIN, UNSPECIFIED BACK PAIN LATERALITY, WITH SCIATICA PRESENCE UNSPECIFIED: Primary | ICD-10-CM

## 2017-08-08 DIAGNOSIS — M54.5 CHRONIC LOW BACK PAIN, UNSPECIFIED BACK PAIN LATERALITY, WITH SCIATICA PRESENCE UNSPECIFIED: Primary | ICD-10-CM

## 2017-08-08 PROBLEM — M54.50 CHRONIC LOW BACK PAIN: Status: ACTIVE | Noted: 2017-08-08

## 2017-08-08 PROCEDURE — 97110 THERAPEUTIC EXERCISES: CPT | Mod: PN

## 2017-08-08 NOTE — PROGRESS NOTES
08/08/17 1000   Ankle Exercises   Double Leg Calf Raises Reps/Sets/Weight 30   Standing Dorsiflexion Stretch(Gastroc) Reps/Sets/Hold Time 3X30   Lumbar Exercises   Posterior Pelvic Tilts  Reps/Sets/Hold Time 30   Double Knee To Chest Stretch Reps/Sets/Hold Time 30   Hamsting Stretch Reps/Sets/Hold Time 3X30   Crunches  Reps/Sets 3X10 ISOMETRIC   Additional Exercises   Additional Exercise NUSTEP 15'

## 2017-08-08 NOTE — PROGRESS NOTES
TIME RECORD    Date:  08/08/2017    Start Time:  1000  Stop Time:  1053    PROCEDURES:    TIMED  Procedure Time Min.   TE Start:1000  Stop:1053 53    Start:  Stop:     Start:  Stop:     Start:  Stop:          UNTIMED  Procedure Time Min.    Start:  Stop:     Start:  Stop:      Total Timed Minutes:  53  Total Timed Units:  4  Total Untimed Units:  0  Charges Billed/# of units:  4      Progress/Current Status    Subjective:     Patient ID: Humberto Cantor is a 61 y.o. male.  Diagnosis:   1. Chronic low back pain, unspecified back pain laterality, with sciatica presence unspecified       Pain: 5-6 /10      Objective:     TE for ROM, strength, stabilization.    Assessment:     Performed ex well.    Patient Education/Response:     Supine to sit.    Plans and Goals:     Cont per POC.

## 2017-08-10 ENCOUNTER — CLINICAL SUPPORT (OUTPATIENT)
Dept: REHABILITATION | Facility: HOSPITAL | Age: 62
End: 2017-08-10
Attending: ORTHOPAEDIC SURGERY
Payer: COMMERCIAL

## 2017-08-10 DIAGNOSIS — S32.001G: Primary | ICD-10-CM

## 2017-08-10 PROCEDURE — 97110 THERAPEUTIC EXERCISES: CPT | Mod: PN

## 2017-08-10 NOTE — PROGRESS NOTES
"Name: Humberto MADDOX Cantor  [facility] Number: [id number]  Date of Treatment: 08/10/2017   Diagnosis:   Encounter Diagnosis   Name Primary?    Lumbar burst fracture, with delayed healing, subsequent encounter Yes       Time in: 1008  Time Out: 1100  Total Treatment Time: 52  1-1 1199-1248, NC 22'      Subjective:    Humberto reports "I have tried to space out my medication further apart, but, my pain was worse and I couldn't tolerate much.  Patient reports their pain to be 6/10 on a 0-10 scale with 0 being no pain and 10 being the worst pain imaginable.    Objective  Humberto received therapeutic exercises to develop strength, endurance, ROM, flexibility, posture and core stabilization for 52 minutes including:   Bike 10'  Calf stretch 3/30 B  Hamstring stretch in sitting 3/30s R/L  T/f sit to sup with CGA, with VC's for technique  TrA in supine in hooklying with hip aDd  PPT 3/10  LTR 2x10 without theraball   Bridging 3x10  T/f sup to sit with SBA, log roll technique  Heel raise 3/10   Mini squats 3/10    Written Home Exercises Provided: Cont with HEP  Pt demo good understanding of the education provided. Humberto demonstrated good return demonstration of activities.     Assessment:   Patient with good tolerance of therex. Remains with weakness when ambulating without AD    Pt will continue to benefit from skilled PT intervention. Medical Necessity is demonstrated by:  Requires skilled supervision to complete and progress HEP and Weakness.    Patient is making good progress towards established goals.    Plan:  Continue with established Plan of Care towards PT goals.   "

## 2017-08-15 ENCOUNTER — TELEPHONE (OUTPATIENT)
Dept: ORTHOPEDICS | Facility: CLINIC | Age: 62
End: 2017-08-15

## 2017-08-15 ENCOUNTER — CLINICAL SUPPORT (OUTPATIENT)
Dept: REHABILITATION | Facility: HOSPITAL | Age: 62
End: 2017-08-15
Attending: ORTHOPAEDIC SURGERY
Payer: COMMERCIAL

## 2017-08-15 DIAGNOSIS — S32.001K: ICD-10-CM

## 2017-08-15 PROCEDURE — 97110 THERAPEUTIC EXERCISES: CPT | Mod: PN

## 2017-08-15 NOTE — TELEPHONE ENCOUNTER
Spoke to pt regarding appt Wednesday 930a with Alice Orlando PA-C. Pt was informed to arrive on the 2nd floor at 9a, then up to floor 5 to see Alice. Pt verbalized understanding. Phone number was provided for any further questions.

## 2017-08-15 NOTE — PROGRESS NOTES
"Name: Humberto Cantor  [facility] Number: [id number]  Date of Treatment: 08/15/2017   Diagnosis: No diagnosis found.    Time in: 1003  Time Out: 1100  Total Treatment Time: 57      Subjective:    Humberto reports "I want to start weaning off the dilaudid, I have started spacing my medicine out more. I see the Dr tomorrow and I'll talk with them about it.  I forgot to wear my brace today."  Patient reports their pain to be 6/10 on a 0-10 scale with 0 being no pain and 10 being the worst pain imaginable.    Objective  uHmberto received therapeutic exercises to develop strength, endurance, ROM, flexibility, posture and core stabilization for 57 minutes including:   Bike 10'   Calf stretch 3/30 B   Hamstring stretch in sitting 3/30s R/L   T/f sit to sup with CGA, with VC's for technique   TrA in supine in hooklying with ball squeeze for hip aDd 3x10   PPT 3/10   LTR 2x10 without theraball    Bridging 3x10   T/f sup to sit with SBA, log roll technique   Heel raise 3/10    Mini squats 3/10 with tactile cues for correction of technique    Written Home Exercises Provided: Cont with HEP  Pt demo good understanding of the education provided. Humberto demonstrated good return demonstration of activities.     Assessment:   Patient remains requiring VC's for technique with therex, Increased pace with movement with decreased guarding. Difficulty with bridging remains.    Pt will continue to benefit from skilled PT intervention. Medical Necessity is demonstrated by:  Fall Risk, Continued inability to participate in vocational pursuits, Pain limits function of effected part for some activities, Unable to participate fully in daily activities, Requires skilled supervision to complete and progress HEP and Weakness.    Patient is making good progress towards established goals.    Plan:  Continue with established Plan of Care towards PT goals.   "

## 2017-08-16 ENCOUNTER — OFFICE VISIT (OUTPATIENT)
Dept: ORTHOPEDICS | Facility: CLINIC | Age: 62
End: 2017-08-16
Payer: COMMERCIAL

## 2017-08-16 ENCOUNTER — HOSPITAL ENCOUNTER (OUTPATIENT)
Dept: RADIOLOGY | Facility: HOSPITAL | Age: 62
Discharge: HOME | End: 2017-08-16
Attending: ORTHOPAEDIC SURGERY
Payer: COMMERCIAL

## 2017-08-16 VITALS — BODY MASS INDEX: 27.35 KG/M2 | HEIGHT: 70 IN | WEIGHT: 191 LBS

## 2017-08-16 DIAGNOSIS — S32.001D BURST FRACTURE OF LUMBAR VERTEBRA, WITH ROUTINE HEALING, SUBSEQUENT ENCOUNTER: ICD-10-CM

## 2017-08-16 DIAGNOSIS — Z98.1 S/P LUMBAR FUSION: Primary | ICD-10-CM

## 2017-08-16 PROCEDURE — 72100 X-RAY EXAM L-S SPINE 2/3 VWS: CPT | Mod: TC

## 2017-08-16 PROCEDURE — 99999 PR PBB SHADOW E&M-EST. PATIENT-LVL III: CPT | Mod: PBBFAC,,, | Performed by: PHYSICIAN ASSISTANT

## 2017-08-16 PROCEDURE — 72100 X-RAY EXAM L-S SPINE 2/3 VWS: CPT | Mod: 26,,, | Performed by: RADIOLOGY

## 2017-08-16 PROCEDURE — 99024 POSTOP FOLLOW-UP VISIT: CPT | Mod: S$GLB,,, | Performed by: PHYSICIAN ASSISTANT

## 2017-08-16 RX ORDER — OXYCODONE HYDROCHLORIDE 15 MG/1
15 TABLET ORAL EVERY 4 HOURS PRN
Qty: 90 TABLET | Refills: 0 | Status: SHIPPED | OUTPATIENT
Start: 2017-08-16 | End: 2017-09-18 | Stop reason: SDUPTHER

## 2017-08-16 RX ORDER — METHOCARBAMOL 750 MG/1
750 TABLET, FILM COATED ORAL 3 TIMES DAILY
Qty: 60 TABLET | Refills: 0 | Status: SHIPPED | OUTPATIENT
Start: 2017-08-16 | End: 2017-09-18 | Stop reason: SDUPTHER

## 2017-08-16 NOTE — PROGRESS NOTES
Date: 08/16/2017    Supervising Physician: Joaquin Navarro M.D.    Date of Surgery: 6/6/2017, 7/3/2017    Procedure: L3-5 Posterior spinal fusion, s/p L1-pelvis revision    History: Humberto Cantor is seen today for follow-up following the above listed procedure. Overall the patient is doing well but today notes he is continuing to improve.  He still has some back pain but says it is improving.   Pain is well controlled with current pain medication.  he denies fever, chills, and sweats since the time of the surgery.  He is working with PT and doing very well.  He is ambulating with a walker and is wearing a LSO.      Exam:  Incision is healed.   There is no sign of infection. Neuro exam is stable. No signs of DVT.    Radiographs: imaging today shows hardware in place, no evidence of failure.     Assessment/Plan: 6 weeks post op.    Doing well postoperatively. Refill oxycodone given today.  Continue PT.      I will plan to see the patient back for the next postop visit in 6 weeks with imaging.     Thank you for the opportunity to participate in this patient's care. Please give me a call if there are any concerns or questions.

## 2017-08-17 ENCOUNTER — CLINICAL SUPPORT (OUTPATIENT)
Dept: REHABILITATION | Facility: HOSPITAL | Age: 62
End: 2017-08-17
Attending: ORTHOPAEDIC SURGERY
Payer: COMMERCIAL

## 2017-08-17 DIAGNOSIS — Z98.1 S/P LUMBAR SPINAL FUSION: Primary | ICD-10-CM

## 2017-08-17 PROCEDURE — 97164 PT RE-EVAL EST PLAN CARE: CPT | Mod: PN

## 2017-08-17 PROCEDURE — 97110 THERAPEUTIC EXERCISES: CPT | Mod: PN

## 2017-08-17 NOTE — PROGRESS NOTES
08/17/17 1300   Lumbar Exercises   Posterior Pelvic Tilts  Reps/Sets/Hold Time 30   Standing Back Bends Reps/Sets 3   Crunches  Reps/Sets 3X10   Lower Trunk Rotation Stretch Reps/Sets/Hold Time 30/30   Additional Exercises   Additional Exercise BIKE 10'   Additional Exercise GAIT TRAINING WITHOUT AD

## 2017-08-17 NOTE — PLAN OF CARE
TIME RECORD    Date: 08/17/2017    Start Time:  1003  Stop Time:  1055    PROCEDURES:    TIMED  Procedure Time Min.   TE Start:1030  Stop:1055 25    Start:  Stop:     Start:  Stop:     Start:  Stop:          UNTIMED  Procedure Time Min.   RE-EVAL Start: 1003  Stop:1028 25    Start:  Stop:      Total Timed Minutes:  25  Total Timed Units:  2  Total Untimed Units:  1  Charges Billed/# of units:  3    PHYSICAL THERAPY UPDATED PLAN OF TREATMENT    Patient name: Humberto Cantor  Onset Date:  7/3/17 DOS revision.  SOC Date:  7/11/17  Primary Diagnosis:    1. S/P lumbar spinal fusion       Treatment Diagnosis:  Gait abnormality,LBP.    Precautions:  STD  Visits from SOC:  12  Functional Level Prior to SOC:  Independent.    Updated Assessment:  Pt reports pain level ~ 4/10.  Cervical/Thoracic/Lumbar AROM: Pain/Dysfunction with Movement:   Flexion  30    Extension  10    Right side bending   15    Left side bending  15    Right rotation   10    Left rotation   10      Strength of LS is grossly 2+/5 in all planes.  Gait with RW, antalgic  OSWESTRY 28/50 56% IMPAIRED.  Previous Short Term Goals Status:   Goals met.  New Short Term Goals Status:   1.Pt will ambulate 300' without AD.  Long Term Goal Status:   continue per initial plan of care.  Reasons for Recertification of Therapy:   Pt presents ROM and strength deficits, gait abnormality, poor posture,decreased functional status due to LBP and above listed deficits.    Certification Period: 8/21/17 to 10/5/17  Recommended Treatment Plan: 2 times per week for 6 weeks: Electrical Stimulation IFC,IMS PRN, Gait Training, Group Therapy, Manual Therapy, Moist Heat/ Ice, Therapeutic Activites and Therapeutic Exercise  Other Recommendations: Dry needling PRN.        Therapist's Name: HUSSEIN ALFARO (Cert DN)    Date: 08/17/2017    I CERTIFY THE NEED FOR THESE SERVICES FURNISHED UNDER THIS PLAN OF TREATMENT AND WHILE UNDER MY CARE    Physician's comments:  ________________________________________________________________________________________________________________________________________________      Physician's Name: ___________________________________

## 2017-08-23 DIAGNOSIS — Z98.1 S/P LUMBAR FUSION: Primary | ICD-10-CM

## 2017-08-24 DIAGNOSIS — Z12.11 COLON CANCER SCREENING: ICD-10-CM

## 2017-08-28 ENCOUNTER — CLINICAL SUPPORT (OUTPATIENT)
Dept: REHABILITATION | Facility: HOSPITAL | Age: 62
End: 2017-08-28
Attending: ORTHOPAEDIC SURGERY
Payer: COMMERCIAL

## 2017-08-28 DIAGNOSIS — M54.5 CHRONIC LOW BACK PAIN, UNSPECIFIED BACK PAIN LATERALITY, WITH SCIATICA PRESENCE UNSPECIFIED: Primary | ICD-10-CM

## 2017-08-28 DIAGNOSIS — G89.29 CHRONIC LOW BACK PAIN, UNSPECIFIED BACK PAIN LATERALITY, WITH SCIATICA PRESENCE UNSPECIFIED: Primary | ICD-10-CM

## 2017-08-28 PROCEDURE — 97110 THERAPEUTIC EXERCISES: CPT | Mod: PN

## 2017-08-28 NOTE — PROGRESS NOTES
TIME RECORD    Date:  08/28/2017    Start Time:  1005  Stop Time:  1058    PROCEDURES:    TIMED  Procedure Time Min.   TE Start:1005  Stop:1058 53    Start:  Stop:     Start:  Stop:     Start:  Stop:          UNTIMED  Procedure Time Min.    Start:  Stop:     Start:  Stop:      Total Timed Minutes:  53  Total Timed Units:  4  Total Untimed Units:  0  Charges Billed/# of units:  4      Progress/Current Status    Subjective:     Patient ID: Humberto Cantor is a 61 y.o. male.  Diagnosis:   1. Chronic low back pain, unspecified back pain laterality, with sciatica presence unspecified       Pain: 2-3 /10 without pain meds.  Better.    Objective:     TE forROM,strength.    Assessment:     Good progress.    Patient Education/Response:     Posture ed.    Plans and Goals:     Cont.

## 2017-08-29 DIAGNOSIS — F41.1 GAD (GENERALIZED ANXIETY DISORDER): ICD-10-CM

## 2017-08-29 NOTE — PROGRESS NOTES
08/28/17 1900   Ankle Exercises   Double Leg Calf Raises Reps/Sets/Weight 30   Standing Dorsiflexion Stretch(Gastroc) Reps/Sets/Hold Time 3X30   Knee Exercises   Frontal Squats Reps/Sets/Weight 30   Lumbar Exercises   Posterior Pelvic Tilts  Reps/Sets/Hold Time 30   Double Knee To Chest Stretch Reps/Sets/Hold Time 30   Hamsting Stretch Reps/Sets/Hold Time 3X30   Standing Back Bends Reps/Sets 10   Crunches  Reps/Sets 30   Lower Trunk Rotation Stretch Reps/Sets/Hold Time 30/30   Additional Exercises   Additional Exercise BIKE 15'   Additional Exercise GAIT

## 2017-08-31 ENCOUNTER — CLINICAL SUPPORT (OUTPATIENT)
Dept: REHABILITATION | Facility: HOSPITAL | Age: 62
End: 2017-08-31
Attending: ORTHOPAEDIC SURGERY
Payer: COMMERCIAL

## 2017-08-31 DIAGNOSIS — Z98.1 S/P LUMBAR FUSION: Primary | ICD-10-CM

## 2017-08-31 PROCEDURE — 97110 THERAPEUTIC EXERCISES: CPT | Mod: PN

## 2017-08-31 NOTE — PROGRESS NOTES
08/31/17 1700   Ankle Exercises   Double Leg Calf Raises Reps/Sets/Weight 30   Standing Dorsiflexion Stretch(Gastroc) Reps/Sets/Hold Time 3X30   Knee Exercises   Frontal Squats Reps/Sets/Weight 30   Lumbar Exercises   Posterior Pelvic Tilts  Reps/Sets/Hold Time 30   Double Knee To Chest Stretch Reps/Sets/Hold Time 30   Hamsting Stretch Reps/Sets/Hold Time 3X30   Standing Back Bends Reps/Sets 10   Crunches  Reps/Sets 30   Additional Exercises   Additional Exercise BIKE 15'   Additional Exercise GAIT TR,HEP

## 2017-08-31 NOTE — PROGRESS NOTES
TIME RECORD    Date:  08/31/2017    Start Time:  1000  Stop Time:  1053    PROCEDURES:    TIMED  Procedure Time Min.   TE Start:1000  Stop:1053 53    Start:  Stop:     Start:  Stop:     Start:  Stop:          UNTIMED  Procedure Time Min.    Start:  Stop:     Start:  Stop:      Total Timed Minutes:  53  Total Timed Units:  4  Total Untimed Units:  0  Charges Billed/# of units:  4      Progress/Current Status    Subjective:     Patient ID: Humberto Cantor is a 61 y.o. male.  Diagnosis:   1. S/P lumbar fusion       Pain: 2 /10      Objective:     TE for ROM,strength, stabilization.    Assessment:     Good progress.    Patient Education/Response:     Posture ed.    Plans and Goals:     Cont per POC.

## 2017-09-05 ENCOUNTER — CLINICAL SUPPORT (OUTPATIENT)
Dept: REHABILITATION | Facility: HOSPITAL | Age: 62
End: 2017-09-05
Attending: ORTHOPAEDIC SURGERY
Payer: COMMERCIAL

## 2017-09-05 ENCOUNTER — DOCUMENTATION ONLY (OUTPATIENT)
Dept: FAMILY MEDICINE | Facility: CLINIC | Age: 62
End: 2017-09-05

## 2017-09-05 DIAGNOSIS — F41.1 GAD (GENERALIZED ANXIETY DISORDER): ICD-10-CM

## 2017-09-05 DIAGNOSIS — Z98.1 S/P LUMBAR FUSION: Primary | ICD-10-CM

## 2017-09-05 NOTE — TELEPHONE ENCOUNTER
----- Message from Dontae Cruz sent at 9/5/2017  9:30 AM CDT -----  Contact: self  Patient need a refill on alprazolam, any questions please call back at [phone] (home)         [id number]  [place] - [address]  [place] [address]  Phone: [phone] Fax: [phone]

## 2017-09-05 NOTE — PROGRESS NOTES
Pre-Visit Chart Review  For Appointment Scheduled on 09/21/2017    Health Maintenance Due   Topic Date Due    Hepatitis C Screening  1955    Colonoscopy  10/12/2003    Zoster Vaccine  10/12/2013    Lipid Panel  03/29/2017    Influenza Vaccine  08/01/2017

## 2017-09-05 NOTE — PROGRESS NOTES
TIME RECORD    Date:  09/05/2017    Start Time:  1005  Stop Time:  1045    PROCEDURES:    TIMED  Procedure Time Min.   TE Start:1005  Stop:1045 40    Start:  Stop:     Start:  Stop:     Start:  Stop:          UNTIMED  Procedure Time Min.    Start:  Stop:     Start:  Stop:      Total Timed Minutes:  40  Total Timed Units:  3  Total Untimed Units:  0  Charges Billed/# of units:  3      Progress/Current Status    Subjective:     Patient ID: Humberto Cantor is a 61 y.o. male.  Diagnosis:   1. S/P lumbar fusion       Pain: 4 /10  Increased pain today after active long weekend, trip to [place].    Objective:     TE for ROM,strength, stabilization.    Assessment:     Short session due to increased pain.    Patient Education/Response:     Rest, low TIGRE.    Plans and Goals:     Cont,progress as able.

## 2017-09-07 ENCOUNTER — CLINICAL SUPPORT (OUTPATIENT)
Dept: REHABILITATION | Facility: HOSPITAL | Age: 62
End: 2017-09-07
Attending: ORTHOPAEDIC SURGERY
Payer: COMMERCIAL

## 2017-09-07 DIAGNOSIS — Z98.1 S/P LUMBAR FUSION: Primary | ICD-10-CM

## 2017-09-07 PROCEDURE — 97110 THERAPEUTIC EXERCISES: CPT | Mod: PN

## 2017-09-07 RX ORDER — ALPRAZOLAM 0.25 MG/1
TABLET ORAL
Qty: 60 TABLET | Refills: 1 | OUTPATIENT
Start: 2017-09-07

## 2017-09-07 RX ORDER — ALPRAZOLAM 0.25 MG/1
0.25 TABLET ORAL 2 TIMES DAILY PRN
Qty: 60 TABLET | Refills: 1 | Status: SHIPPED | OUTPATIENT
Start: 2017-09-07 | End: 2017-09-21 | Stop reason: SDUPTHER

## 2017-09-07 NOTE — PROGRESS NOTES
TIME RECORD    Date:  09/07/2017    Start Time:  1005  Stop Time:  1045    PROCEDURES:    TIMED  Procedure Time Min.   TE Start:1005  Stop:1045 40    Start:  Stop:     Start:  Stop:     Start:  Stop:          UNTIMED  Procedure Time Min.    Start:  Stop:     Start:  Stop:      Total Timed Minutes:  40  Total Timed Units:  3  Total Untimed Units:  0  Charges Billed/# of units:  3      Progress/Current Status    Subjective:     Patient ID: Humberto Cantor is a 61 y.o. male.  Diagnosis:   1. S/P lumbar fusion       Pain: 6 /10  In rt LB.    Objective:     TE for ROM,strength, stabilization.    Assessment:     Pain increasing with increased TIGRE and ADLs.Decreased load today due to pain.    Patient Education/Response:     Low TIGRE.    Plans and Goals:     Cont per POC.

## 2017-09-07 NOTE — PROGRESS NOTES
09/07/17 1000   Ankle Exercises   Double Leg Calf Raises Reps/Sets/Weight 30   Standing Dorsiflexion Stretch(Gastroc) Reps/Sets/Hold Time 3X30   Knee Exercises   Frontal Squats Reps/Sets/Weight 30   Lumbar Exercises   Double Knee To Chest Stretch Reps/Sets/Hold Time 30   Hamsting Stretch Reps/Sets/Hold Time 3X30   Standing Back Bends Reps/Sets 5   Crunches  Reps/Sets 3X10   Additional Exercises   Additional Exercise BIKE 18'.   Additional Exercise GAIT TRAINING

## 2017-09-11 ENCOUNTER — CLINICAL SUPPORT (OUTPATIENT)
Dept: REHABILITATION | Facility: HOSPITAL | Age: 62
End: 2017-09-11
Attending: ORTHOPAEDIC SURGERY
Payer: COMMERCIAL

## 2017-09-11 DIAGNOSIS — S32.001D BURST FRACTURE OF LUMBAR VERTEBRA, WITH ROUTINE HEALING, SUBSEQUENT ENCOUNTER: Primary | ICD-10-CM

## 2017-09-11 PROCEDURE — 97110 THERAPEUTIC EXERCISES: CPT | Mod: PN

## 2017-09-11 NOTE — PROGRESS NOTES
"Name: Humberto MADDOX Cantor  [facility] Number: [id number]  Date of Treatment: 09/11/2017   Diagnosis:   Encounter Diagnosis   Name Primary?    Burst fracture of lumbar vertebra, with routine healing, subsequent encounter Yes       Time in: 1005  Time Out: 1045  Total Treatment Time: 40        Subjective:    Humberto reports "I'm feeling better than last time".  Patient reports their pain to be 3/10 on a 0-10 scale with 0 being no pain and 10 being the worst pain imaginable.    Objective    Patient received individual therapy to increase strength, endurance, ROM, flexibility, posture and core stabilization with 0 patients with activities as follows:     Humberto received therapeutic exercises to develop strength, endurance, ROM, flexibility, posture and core stabilization for 40 minutes including:       gastreoc stretch 3 x 30 sec  HR x 30 reps  Mini squats x 30 reps  Bike x 12 min L-2  Hamstring stretch 3 x 30 sec  1/4 sit ups x 30 reps  DKTC x 30 reps  Bridging 2 x 10 reps  Ball squeezes x 30 reps        Pt demo good understanding of the education provided. Humberto demonstrated good return demonstration of activities.     Assessment:   Decreased pain in low back overall today.  Bridging exercise limited to 20 due to pt having pain beginning to radiate down R LE    Pt will continue to benefit from skilled PT intervention. Medical Necessity is demonstrated by:  Pain limits function of effected part for some activities, Unable to participate fully in daily activities, Requires skilled supervision to complete and progress HEP and Weakness.    Patient is making good progress towards established goals.          Plan:  Continue with established Plan of Care towards PT goals.   "

## 2017-09-14 ENCOUNTER — CLINICAL SUPPORT (OUTPATIENT)
Dept: REHABILITATION | Facility: HOSPITAL | Age: 62
End: 2017-09-14
Attending: ORTHOPAEDIC SURGERY
Payer: COMMERCIAL

## 2017-09-14 DIAGNOSIS — Z98.1 S/P LUMBAR FUSION: Primary | ICD-10-CM

## 2017-09-14 PROCEDURE — 97110 THERAPEUTIC EXERCISES: CPT | Mod: PN

## 2017-09-14 NOTE — PROGRESS NOTES
TIME RECORD    Date:  09/14/2017    Start Time:  1016  Stop Time:  1056    PROCEDURES:    TIMED  Procedure Time Min.   TE Start:1016  Stop:1056 40    Start:  Stop:     Start:  Stop:     Start:  Stop:          UNTIMED  Procedure Time Min.    Start:  Stop:     Start:  Stop:      Total Timed Minutes:  40  Total Timed Units:  3  Total Untimed Units:  0  Charges Billed/# of units:  3      Progress/Current Status    Subjective:     Patient ID: Humberto Cantor is a 61 y.o. male.  Diagnosis:   1. S/P lumbar fusion       Pain: 5 /10      Objective:     TE for ROM, strength,stabilization.    Assessment:     Pt late.    Patient Education/Response:     Posture ed.    Plans and Goals:     Cont per POC.

## 2017-09-14 NOTE — PROGRESS NOTES
09/14/17 0900   Ankle Exercises   Double Leg Calf Raises Reps/Sets/Weight 30   Standing Dorsiflexion Stretch(Gastroc) Reps/Sets/Hold Time 3x30   Knee Exercises   Frontal Squats Reps/Sets/Weight 30   Lumbar Exercises   Posterior Pelvic Tilts  Reps/Sets/Hold Time 3x10   Double Knee To Chest Stretch Reps/Sets/Hold Time 30   Hamsting Stretch Reps/Sets/Hold Time 3x30   Standing Back Bends Reps/Sets 5   Crunches  Reps/Sets 30   Lower Trunk Rotation Stretch Reps/Sets/Hold Time 30/30   Additional Exercises   Additional Exercise bike 12'

## 2017-09-18 ENCOUNTER — CLINICAL SUPPORT (OUTPATIENT)
Dept: REHABILITATION | Facility: HOSPITAL | Age: 62
End: 2017-09-18
Attending: ORTHOPAEDIC SURGERY
Payer: COMMERCIAL

## 2017-09-18 DIAGNOSIS — Z98.1 S/P LUMBAR FUSION: Primary | ICD-10-CM

## 2017-09-18 PROCEDURE — 97110 THERAPEUTIC EXERCISES: CPT | Mod: PN

## 2017-09-18 RX ORDER — OXYCODONE HYDROCHLORIDE 15 MG/1
15 TABLET ORAL EVERY 4 HOURS PRN
Qty: 90 TABLET | Refills: 0 | Status: SHIPPED | OUTPATIENT
Start: 2017-09-18 | End: 2017-10-11 | Stop reason: SDUPTHER

## 2017-09-18 RX ORDER — METHOCARBAMOL 750 MG/1
750 TABLET, FILM COATED ORAL 3 TIMES DAILY
Qty: 60 TABLET | Refills: 0 | Status: SHIPPED | OUTPATIENT
Start: 2017-09-18 | End: 2017-10-11

## 2017-09-18 NOTE — PROGRESS NOTES
09/18/17 1000   Ankle Exercises   Standing Dorsiflexion Stretch(Gastroc) Reps/Sets/Hold Time 3X30   Knee Exercises   Frontal Squats Reps/Sets/Weight 30   Additional Exercises   Additional Exercise BIKE 15'.

## 2017-09-18 NOTE — PROGRESS NOTES
TIME RECORD    Date:  09/18/2017    Start Time:  1014  Stop Time:  1038    PROCEDURES:    TIMED  Procedure Time Min.   TE Start:1014  Stop:1038 24    Start:  Stop:     Start:  Stop:     Start:  Stop:          UNTIMED  Procedure Time Min.    Start:  Stop:     Start:  Stop:      Total Timed Minutes:  25  Total Timed Units:  2  Total Untimed Units:  0  Charges Billed/# of units:  2      Progress/Current Status    Subjective:     Patient ID: Humberto Cantor is a 61 y.o. male.  Diagnosis:   1. S/P lumbar fusion       Pain: 5-6 /10  Increased pain today.    Objective:     TE for strength,endurance.    Assessment:     Short session due to increased pain.    Patient Education/Response:     Low TIGRE    Plans and Goals:     See MD ASAP.

## 2017-09-21 ENCOUNTER — OFFICE VISIT (OUTPATIENT)
Dept: FAMILY MEDICINE | Facility: CLINIC | Age: 62
End: 2017-09-21
Payer: COMMERCIAL

## 2017-09-21 ENCOUNTER — LAB VISIT (OUTPATIENT)
Dept: LAB | Facility: HOSPITAL | Age: 62
End: 2017-09-21
Attending: FAMILY MEDICINE
Payer: COMMERCIAL

## 2017-09-21 VITALS
WEIGHT: 185.19 LBS | BODY MASS INDEX: 26.51 KG/M2 | HEART RATE: 60 BPM | SYSTOLIC BLOOD PRESSURE: 120 MMHG | HEIGHT: 70 IN | DIASTOLIC BLOOD PRESSURE: 71 MMHG | TEMPERATURE: 98 F

## 2017-09-21 DIAGNOSIS — D50.8 IRON DEFICIENCY ANEMIA SECONDARY TO INADEQUATE DIETARY IRON INTAKE: ICD-10-CM

## 2017-09-21 DIAGNOSIS — Z98.1 S/P LUMBAR SPINAL FUSION: ICD-10-CM

## 2017-09-21 DIAGNOSIS — S32.001G: Primary | ICD-10-CM

## 2017-09-21 DIAGNOSIS — I10 ESSENTIAL HYPERTENSION: ICD-10-CM

## 2017-09-21 DIAGNOSIS — F41.1 GAD (GENERALIZED ANXIETY DISORDER): ICD-10-CM

## 2017-09-21 DIAGNOSIS — Z12.11 COLON CANCER SCREENING: ICD-10-CM

## 2017-09-21 DIAGNOSIS — F41.9 ANXIETY: Chronic | ICD-10-CM

## 2017-09-21 LAB
BASOPHILS # BLD AUTO: 0.02 K/UL
BASOPHILS NFR BLD: 0.4 %
DIFFERENTIAL METHOD: ABNORMAL
EOSINOPHIL # BLD AUTO: 0.1 K/UL
EOSINOPHIL NFR BLD: 1.1 %
ERYTHROCYTE [DISTWIDTH] IN BLOOD BY AUTOMATED COUNT: 13.8 %
FERRITIN SERPL-MCNC: 62 NG/ML
HCT VFR BLD AUTO: 39.6 %
HGB BLD-MCNC: 13.3 G/DL
IRON SERPL-MCNC: 80 UG/DL
LYMPHOCYTES # BLD AUTO: 0.8 K/UL
LYMPHOCYTES NFR BLD: 17.3 %
MCH RBC QN AUTO: 31 PG
MCHC RBC AUTO-ENTMCNC: 33.6 G/DL
MCV RBC AUTO: 92 FL
MONOCYTES # BLD AUTO: 0.5 K/UL
MONOCYTES NFR BLD: 9.9 %
NEUTROPHILS # BLD AUTO: 3.4 K/UL
NEUTROPHILS NFR BLD: 71.1 %
PLATELET # BLD AUTO: 297 K/UL
PMV BLD AUTO: 9.8 FL
RBC # BLD AUTO: 4.29 M/UL
SATURATED IRON: 24 %
TOTAL IRON BINDING CAPACITY: 339 UG/DL
TRANSFERRIN SERPL-MCNC: 229 MG/DL
WBC # BLD AUTO: 4.74 K/UL

## 2017-09-21 PROCEDURE — 36415 COLL VENOUS BLD VENIPUNCTURE: CPT | Mod: PO

## 2017-09-21 PROCEDURE — 83540 ASSAY OF IRON: CPT

## 2017-09-21 PROCEDURE — 82728 ASSAY OF FERRITIN: CPT

## 2017-09-21 PROCEDURE — 99999 PR PBB SHADOW E&M-EST. PATIENT-LVL III: CPT | Mod: PBBFAC,,, | Performed by: FAMILY MEDICINE

## 2017-09-21 PROCEDURE — 85025 COMPLETE CBC W/AUTO DIFF WBC: CPT

## 2017-09-21 PROCEDURE — 99214 OFFICE O/P EST MOD 30 MIN: CPT | Mod: S$GLB,,, | Performed by: FAMILY MEDICINE

## 2017-09-21 RX ORDER — ALPRAZOLAM 0.25 MG/1
0.25 TABLET ORAL 2 TIMES DAILY PRN
Qty: 60 TABLET | Refills: 3 | Status: SHIPPED | OUTPATIENT
Start: 2017-09-21 | End: 2018-03-22 | Stop reason: SDUPTHER

## 2017-09-21 NOTE — PROGRESS NOTES
Subjective:       Patient ID: Humberto Cantor is a 61 y.o. male.    Chief Complaint: Hypertension    Hypertension   This is a chronic problem. The problem has been resolved since onset. The problem is controlled. Associated symptoms include anxiety. Pertinent negatives include no blurred vision, chest pain, headaches, palpitations or shortness of breath. Risk factors for coronary artery disease include male gender and sedentary lifestyle.     Review of Systems   Constitutional: Negative for fatigue and unexpected weight change.   Eyes: Negative for blurred vision.   Respiratory: Negative for chest tightness and shortness of breath.    Cardiovascular: Negative for chest pain, palpitations and leg swelling.   Gastrointestinal: Negative for abdominal pain.   Musculoskeletal: Negative for arthralgias.   Neurological: Negative for dizziness, syncope, light-headedness and headaches.       Patient Active Problem List   Diagnosis    GERD (gastroesophageal reflux disease)    Essential hypertension    Anxiety    Burst fracture of lumbar vertebra    Lumbar burst fracture    Hypomagnesemia    Hypophosphatemia    Hypocalcemia    Chronic low back pain    S/P lumbar spinal fusion   Recent back surgery.    Objective:      Physical Exam   Constitutional: He is oriented to person, place, and time. He appears well-developed and well-nourished. No distress.   HENT:   Head: Normocephalic and atraumatic.   Right Ear: External ear normal.   Left Ear: External ear normal.   Nose: Nose normal.   Mouth/Throat: Oropharynx is clear and moist. No oropharyngeal exudate.   Eyes: Conjunctivae and EOM are normal. Pupils are equal, round, and reactive to light. Right eye exhibits no discharge. Left eye exhibits no discharge. No scleral icterus.   Neck: Normal range of motion. Neck supple. No JVD present. No tracheal deviation present. No thyromegaly present.   Cardiovascular: Normal rate, normal heart sounds and intact distal pulses.    No  murmur heard.  Pulmonary/Chest: Effort normal and breath sounds normal. No respiratory distress. He has no wheezes. He has no rales.   Abdominal: Soft. Bowel sounds are normal. He exhibits no distension and no mass. There is no tenderness. There is no rebound and no guarding.   Musculoskeletal: He exhibits no edema or tenderness.          Lymphadenopathy:     He has no cervical adenopathy.   Neurological: He is alert and oriented to person, place, and time. No cranial nerve deficit. Coordination normal.   Skin: Skin is warm and dry. No rash noted. He is not diaphoretic. No erythema. No pallor.   Psychiatric: He has a normal mood and affect. His behavior is normal. Judgment and thought content normal.   Vitals reviewed.      Lab Results   Component Value Date    WBC 5.09 07/07/2017    HGB 8.2 (L) 07/07/2017    HCT 24.5 (L) 07/07/2017     07/07/2017    CHOL 204 (H) 03/29/2016    TRIG 44 03/29/2016    HDL 47 03/29/2016    ALT 16 07/03/2017    AST 24 07/03/2017     07/07/2017    K 4.1 07/07/2017     07/07/2017    CREATININE 0.8 07/07/2017    BUN 9 07/07/2017    CO2 30 (H) 07/07/2017    INR 1.0 06/05/2017     The 10-year ASCVD risk score ([place]rad HARDWICK Jr., et al., 2013) is: 12%    Values used to calculate the score:      Age: 61 years      Sex: Male      Is Non- : No      Diabetic: No      Tobacco smoker: No      Systolic Blood Pressure: 120 mmHg      Is BP treated: Yes      HDL Cholesterol: 47 mg/dL      Total Cholesterol: 204 mg/dL    Assessment:       1. Lumbar burst fracture, with delayed healing, subsequent encounter    2. Anxiety    3. Essential hypertension    4. S/P lumbar spinal fusion    5. TORRIE (generalized anxiety disorder)    6. Iron deficiency anemia secondary to inadequate dietary iron intake        Plan:       Lumbar burst fracture, with delayed healing, subsequent encounter    Anxiety    Essential hypertension    S/P lumbar spinal fusion    TORRIE (generalized anxiety  disorder)  -     alprazolam (XANAX) 0.25 MG tablet; Take 1 tablet (0.25 mg total) by mouth 2 (two) times daily as needed.  Dispense: 60 tablet; Refill: 3    Iron deficiency anemia secondary to inadequate dietary iron intake  -     CBC auto differential; Future; Expected date: 09/21/2017  -     Iron and TIBC; Future; Expected date: 09/21/2017  -     Ferritin; Future; Expected date: 09/21/2017      Patient readiness: eager and barriers:readiness    During the course of the visit the patient was educated and counseled about the following:     Hypertension:   Screening for causes of secondary hypertension: GFR (chronic kidney disease).  Obesity:   General weight loss/lifestyle modification strategies discussed (elicit support from others; identify saboteurs; non-food rewards, etc).    Goals: Hypertension: Reduce Blood Pressure    Did patient meet goals/outcomes: Yes    The following self management tools provided: blood pressure log  excercise log    Patient Instructions (the written plan) was given to the patient/family.     Time spent with patient: 30 minutes

## 2017-09-26 ENCOUNTER — TELEPHONE (OUTPATIENT)
Dept: ORTHOPEDICS | Facility: CLINIC | Age: 62
End: 2017-09-26

## 2017-09-26 NOTE — TELEPHONE ENCOUNTER
Pt did not answer. Left message regarding appt on 9/27/17 with Alice Orlando PA-C. Pt was informed to arrive on the 2nd floor at 930, then up to floor 5 to see Alice. Phone number was provided for any further questions.

## 2017-09-27 ENCOUNTER — HOSPITAL ENCOUNTER (OUTPATIENT)
Dept: RADIOLOGY | Facility: HOSPITAL | Age: 62
Discharge: HOME | End: 2017-09-27
Attending: PHYSICIAN ASSISTANT
Payer: COMMERCIAL

## 2017-09-27 ENCOUNTER — OFFICE VISIT (OUTPATIENT)
Dept: ORTHOPEDICS | Facility: CLINIC | Age: 62
End: 2017-09-27
Payer: COMMERCIAL

## 2017-09-27 VITALS — BODY MASS INDEX: 26.51 KG/M2 | HEIGHT: 70 IN | WEIGHT: 185.19 LBS

## 2017-09-27 DIAGNOSIS — Z98.1 S/P LUMBAR FUSION: Primary | ICD-10-CM

## 2017-09-27 DIAGNOSIS — Z98.1 S/P LUMBAR FUSION: ICD-10-CM

## 2017-09-27 PROCEDURE — 99999 PR PBB SHADOW E&M-EST. PATIENT-LVL III: CPT | Mod: PBBFAC,,, | Performed by: PHYSICIAN ASSISTANT

## 2017-09-27 PROCEDURE — 99024 POSTOP FOLLOW-UP VISIT: CPT | Mod: S$GLB,,, | Performed by: PHYSICIAN ASSISTANT

## 2017-09-27 PROCEDURE — 72100 X-RAY EXAM L-S SPINE 2/3 VWS: CPT | Mod: TC

## 2017-09-27 PROCEDURE — 72100 X-RAY EXAM L-S SPINE 2/3 VWS: CPT | Mod: 26,,, | Performed by: RADIOLOGY

## 2017-09-27 NOTE — PROGRESS NOTES
Date: 09/27/2017    Supervising Physician: Joaquin Navarro M.D.    Date of Surgery: 6/6/2017, 7/3/2017    Procedure: L3-5 Posterior spinal fusion, s/p L1-pelvis revision    History: Humberto Cantor is seen today for follow-up following the above listed procedure. Overall the patient is doing well but today notes he is continuing to improve.  He still has some back pain but says it is improving.   Pain is well controlled with current pain medication.  he denies fever, chills, and sweats since the time of the surgery.  He is working with PT and doing very well except that he did have some increased soreness one day after therapy.  The pain improved but when he returned for his next session, the pain began again.  He would like to switch therapy locations.  He is ambulating without assistance. He is wearing the LSO.      Exam:  Incision is healed.   There is no sign of infection. Neuro exam is stable. No signs of DVT.    Radiographs: imaging today shows hardware in place, no evidence of failure.     Assessment/Plan: 12 weeks post op.    Doing well postoperatively. Will switch PT locations to [facility].      I will plan to see the patient back for the next postop visit in 8 weeks with imaging.     Thank you for the opportunity to participate in this patient's care. Please give me a call if there are any concerns or questions.

## 2017-10-11 RX ORDER — METHOCARBAMOL 750 MG/1
750 TABLET, FILM COATED ORAL 3 TIMES DAILY
Qty: 60 TABLET | Refills: 0 | Status: SHIPPED | OUTPATIENT
Start: 2017-10-11 | End: 2017-11-02 | Stop reason: SDUPTHER

## 2017-10-11 RX ORDER — OXYCODONE HYDROCHLORIDE 15 MG/1
15 TABLET ORAL EVERY 4 HOURS PRN
Qty: 60 TABLET | Refills: 0 | Status: SHIPPED | OUTPATIENT
Start: 2017-10-11 | End: 2017-11-02 | Stop reason: SDUPTHER

## 2017-10-11 RX ORDER — OXYCODONE AND ACETAMINOPHEN 5; 325 MG/1; MG/1
1 TABLET ORAL EVERY 4 HOURS PRN
Qty: 90 TABLET | Refills: 0 | OUTPATIENT
Start: 2017-10-11

## 2017-11-02 ENCOUNTER — TELEPHONE (OUTPATIENT)
Dept: ORTHOPEDICS | Facility: CLINIC | Age: 62
End: 2017-11-02

## 2017-11-02 RX ORDER — METHOCARBAMOL 750 MG/1
750 TABLET, FILM COATED ORAL 3 TIMES DAILY
Qty: 60 TABLET | Refills: 0 | Status: SHIPPED | OUTPATIENT
Start: 2017-11-02 | End: 2017-12-14 | Stop reason: SDUPTHER

## 2017-11-02 RX ORDER — OXYCODONE HYDROCHLORIDE 15 MG/1
15 TABLET ORAL EVERY 4 HOURS PRN
Qty: 60 TABLET | Refills: 0 | Status: SHIPPED | OUTPATIENT
Start: 2017-11-02 | End: 2017-11-17 | Stop reason: SDUPTHER

## 2017-11-02 NOTE — TELEPHONE ENCOUNTER
Spoke to pt's wife and she requested that the refills goes to [facility]. A message will be sent to Alice kaufman. Pt's wife verbalized understanding.

## 2017-11-02 NOTE — TELEPHONE ENCOUNTER
----- Message from Melissa Meraz sent at 11/2/2017 10:09 AM CDT -----  Contact: self  Patient ask for a call on  in regards to need prescription for pain methocarbamol (ROBAXIN) 750 MG Tab and oxycodone (ROXICODONE) 15 MG Tab sent to [facility]

## 2017-11-17 ENCOUNTER — HOSPITAL ENCOUNTER (OUTPATIENT)
Dept: RADIOLOGY | Facility: HOSPITAL | Age: 62
Discharge: HOME | End: 2017-11-17
Attending: PHYSICIAN ASSISTANT
Payer: COMMERCIAL

## 2017-11-17 ENCOUNTER — OFFICE VISIT (OUTPATIENT)
Dept: ORTHOPEDICS | Facility: CLINIC | Age: 62
End: 2017-11-17
Payer: COMMERCIAL

## 2017-11-17 VITALS — HEIGHT: 70 IN | WEIGHT: 189.13 LBS | BODY MASS INDEX: 27.08 KG/M2

## 2017-11-17 DIAGNOSIS — Z98.1 S/P LUMBAR FUSION: Primary | ICD-10-CM

## 2017-11-17 DIAGNOSIS — Z98.1 S/P LUMBAR FUSION: ICD-10-CM

## 2017-11-17 PROCEDURE — 72100 X-RAY EXAM L-S SPINE 2/3 VWS: CPT | Mod: TC

## 2017-11-17 PROCEDURE — 72100 X-RAY EXAM L-S SPINE 2/3 VWS: CPT | Mod: 26,,, | Performed by: RADIOLOGY

## 2017-11-17 PROCEDURE — 99213 OFFICE O/P EST LOW 20 MIN: CPT | Mod: S$GLB,,, | Performed by: PHYSICIAN ASSISTANT

## 2017-11-17 PROCEDURE — 99999 PR PBB SHADOW E&M-EST. PATIENT-LVL III: CPT | Mod: PBBFAC,,, | Performed by: PHYSICIAN ASSISTANT

## 2017-11-17 RX ORDER — OXYCODONE HYDROCHLORIDE 15 MG/1
15 TABLET ORAL EVERY 4 HOURS PRN
Qty: 60 TABLET | Refills: 0 | Status: SHIPPED | OUTPATIENT
Start: 2017-11-17 | End: 2017-12-14 | Stop reason: SDUPTHER

## 2017-11-17 NOTE — PROGRESS NOTES
Date: 11/17/2017    Supervising Physician: Joaquin Navarro M.D.    Date of Surgery: 6/6/2017, 7/3/2017    Procedure: L3-5 Posterior spinal fusion, s/p L1-pelvis revision    History: Humberto Cantor is seen today for follow-up following the above listed procedure. Overall the patient is doing well but today notes he is continuing to improve however he says he has been overdoing it.   He has been trying to do yard work and various projects around the house which aggravates his back.  He has started swimming again and found that freestyle aggravates his back as well.  He is taking oxycodone about 3 times a day for pain.   he denies fever, chills, and sweats since the time of the surgery.  He is working with PT and doing very well.  He has discontinued the LSO.      Exam:  Incision is healed.   There is no sign of infection. Neuro exam is stable. No signs of DVT.    Radiographs: imaging today shows hardware in place, no evidence of failure.     Assessment/Plan:     Doing well postoperatively. New order for PT placed today.   reviewed.  Refill oxycodone given today.  We will decrease him to vicodin for his next prescription.       I will plan to see the patient back for the next postop visit in 8 weeks with imaging.     Thank you for the opportunity to participate in this patient's care. Please give me a call if there are any concerns or questions.

## 2017-12-15 RX ORDER — METHOCARBAMOL 750 MG/1
750 TABLET, FILM COATED ORAL 3 TIMES DAILY
Qty: 60 TABLET | Refills: 0 | Status: SHIPPED | OUTPATIENT
Start: 2017-12-15 | End: 2018-01-12 | Stop reason: SDUPTHER

## 2017-12-15 RX ORDER — OXYCODONE HYDROCHLORIDE 15 MG/1
15 TABLET ORAL EVERY 4 HOURS PRN
Qty: 60 TABLET | Refills: 0 | Status: SHIPPED | OUTPATIENT
Start: 2017-12-15 | End: 2018-01-12

## 2018-01-12 ENCOUNTER — OFFICE VISIT (OUTPATIENT)
Dept: ORTHOPEDICS | Facility: CLINIC | Age: 63
End: 2018-01-12
Payer: COMMERCIAL

## 2018-01-12 ENCOUNTER — HOSPITAL ENCOUNTER (OUTPATIENT)
Dept: RADIOLOGY | Facility: HOSPITAL | Age: 63
Discharge: HOME | End: 2018-01-12
Attending: PHYSICIAN ASSISTANT
Payer: COMMERCIAL

## 2018-01-12 VITALS — BODY MASS INDEX: 27.08 KG/M2 | HEIGHT: 70 IN | WEIGHT: 189.13 LBS

## 2018-01-12 DIAGNOSIS — Z98.1 S/P LUMBAR FUSION: ICD-10-CM

## 2018-01-12 DIAGNOSIS — Z98.1 S/P LUMBAR FUSION: Primary | ICD-10-CM

## 2018-01-12 PROCEDURE — 72100 X-RAY EXAM L-S SPINE 2/3 VWS: CPT | Mod: 26,,, | Performed by: RADIOLOGY

## 2018-01-12 PROCEDURE — 99213 OFFICE O/P EST LOW 20 MIN: CPT | Mod: S$GLB,,, | Performed by: PHYSICIAN ASSISTANT

## 2018-01-12 PROCEDURE — 72100 X-RAY EXAM L-S SPINE 2/3 VWS: CPT | Mod: TC

## 2018-01-12 PROCEDURE — 99999 PR PBB SHADOW E&M-EST. PATIENT-LVL III: CPT | Mod: PBBFAC,,, | Performed by: PHYSICIAN ASSISTANT

## 2018-01-12 RX ORDER — ONDANSETRON HYDROCHLORIDE 8 MG/1
8 TABLET, FILM COATED ORAL EVERY 8 HOURS PRN
Qty: 30 TABLET | Refills: 0 | Status: SHIPPED | OUTPATIENT
Start: 2018-01-12 | End: 2018-02-16 | Stop reason: SDUPTHER

## 2018-01-12 RX ORDER — ONDANSETRON HYDROCHLORIDE 8 MG/1
8 TABLET, FILM COATED ORAL EVERY 8 HOURS PRN
Qty: 30 TABLET | Refills: 0 | Status: SHIPPED | OUTPATIENT
Start: 2018-01-12 | End: 2018-01-12 | Stop reason: SDUPTHER

## 2018-01-12 RX ORDER — METHOCARBAMOL 750 MG/1
750 TABLET, FILM COATED ORAL 3 TIMES DAILY
Qty: 60 TABLET | Refills: 0 | Status: SHIPPED | OUTPATIENT
Start: 2018-01-12 | End: 2018-02-02 | Stop reason: SDUPTHER

## 2018-01-12 RX ORDER — OXYCODONE AND ACETAMINOPHEN 10; 325 MG/1; MG/1
1 TABLET ORAL EVERY 6 HOURS PRN
Qty: 90 TABLET | Refills: 0 | Status: SHIPPED | OUTPATIENT
Start: 2018-01-12 | End: 2018-02-02

## 2018-01-12 NOTE — PROGRESS NOTES
Date: 01/12/2018    Supervising Physician: Joaquin Navarro M.D.    Date of Surgery: 6/6/2017, 7/3/2017    Procedure: L3-5 Posterior spinal fusion, s/p L1-pelvis revision    History: Humberto Cantor is seen today for follow-up following the above listed procedure. Overall the patient is doing well but today notes he is continuing to improve.   He has completed PT and is doing the exercises on his own at home and doing well.  He has progressed to swimming as well.   He is taking oxycodone for pain.   he denies fever, chills, and sweats since the time of the surgery.        Exam:  Incision is healed.   There is no sign of infection. Neuro exam is stable. No signs of DVT.    Radiographs: imaging today shows hardware in place, no evidence of failure.     Assessment/Plan:     Doing well postoperatively. Continue home exercise program.  Progress activities as tolerated.    reviewed.  Will decrease pain medication.       I will plan to see the patient back for the next postop visit at a year from surgery.    Thank you for the opportunity to participate in this patient's care. Please give me a call if there are any concerns or questions.

## 2018-02-02 ENCOUNTER — TELEPHONE (OUTPATIENT)
Dept: ORTHOPEDICS | Facility: CLINIC | Age: 63
End: 2018-02-02

## 2018-02-02 RX ORDER — METHOCARBAMOL 750 MG/1
750 TABLET, FILM COATED ORAL 3 TIMES DAILY
Qty: 60 TABLET | Refills: 0 | Status: SHIPPED | OUTPATIENT
Start: 2018-02-02 | End: 2018-03-23

## 2018-02-02 RX ORDER — OXYCODONE AND ACETAMINOPHEN 5; 325 MG/1; MG/1
1 TABLET ORAL EVERY 4 HOURS PRN
Qty: 42 TABLET | Refills: 0 | Status: SHIPPED | OUTPATIENT
Start: 2018-02-02 | End: 2018-02-19

## 2018-02-02 NOTE — TELEPHONE ENCOUNTER
Spoke to Eleanor and she was notified the pt would only get a weeks supply of pain medications and they can pick it up at the pharmacy. Eleanor unerstood but was not to happy.

## 2018-02-02 NOTE — TELEPHONE ENCOUNTER
----- Message from Gala Briggs MA sent at 2/2/2018  9:41 AM CST -----  Contact: Self/[phone]      ----- Message -----  From: Michael Colin  Sent: 2/2/2018   9:30 AM  To: Darion Jenkins Staff    Pt called in requesting a r/f of oxyCODONE-acetaminophen (PERCOCET)  mg per tablet and methocarbamol (ROBAXIN) 750 MG Tab to be sent to:  [facility] - [place], [place] - [address]  [phone] (Phone)  [phone] (Fax)  Pt currently only has enough of bother medications to last until Saturday. Pt is in town today, but typically drives [place] miles to [place] here.  Pt can be reached at [phone].

## 2018-02-02 NOTE — TELEPHONE ENCOUNTER
----- Message from Alice Orlando PA-C sent at 2/2/2018  9:57 AM CST -----  Contact: Self/[phone]  Will you please call and tell him I spoke with Dr. Navarro and we are going to start weaning him off his pain medication?  I am going to give him a week of percocet 5-325mg and then we will decrease him again when it is time for another prescription.      ----- Message -----  From: Gala Briggs MA  Sent: 2/2/2018   9:41 AM  To: Alice Orlando PA-C        ----- Message -----  From: Michael Colin  Sent: 2/2/2018   9:30 AM  To: Darion Jenkins Staff    Pt called in requesting a r/f of oxyCODONE-acetaminophen (PERCOCET)  mg per tablet and methocarbamol (ROBAXIN) 750 MG Tab to be sent to:  [facility] - [place], [place] - [address]  [phone] (Phone)  [phone] (Fax)  Pt currently only has enough of bother medications to last until Saturday. Pt is in town today, but typically drives 65 miles to [place] here.  Pt can be reached at [phone].

## 2018-02-16 RX ORDER — OXYCODONE AND ACETAMINOPHEN 5; 325 MG/1; MG/1
1 TABLET ORAL EVERY 4 HOURS PRN
Qty: 42 TABLET | Refills: 0 | Status: CANCELLED | OUTPATIENT
Start: 2018-02-16

## 2018-02-16 RX ORDER — METHOCARBAMOL 750 MG/1
750 TABLET, FILM COATED ORAL 3 TIMES DAILY
Qty: 60 TABLET | Refills: 0 | Status: CANCELLED | OUTPATIENT
Start: 2018-02-16

## 2018-02-19 RX ORDER — HYDROCODONE BITARTRATE AND ACETAMINOPHEN 10; 325 MG/1; MG/1
1 TABLET ORAL EVERY 6 HOURS PRN
Qty: 28 TABLET | Refills: 0 | Status: SHIPPED | OUTPATIENT
Start: 2018-02-19 | End: 2018-03-22

## 2018-02-19 RX ORDER — ONDANSETRON HYDROCHLORIDE 8 MG/1
TABLET, FILM COATED ORAL
Qty: 30 TABLET | Refills: 0 | Status: SHIPPED | OUTPATIENT
Start: 2018-02-19 | End: 2018-03-22

## 2018-03-06 ENCOUNTER — OFFICE VISIT (OUTPATIENT)
Dept: FAMILY MEDICINE | Facility: CLINIC | Age: 63
End: 2018-03-06
Payer: COMMERCIAL

## 2018-03-06 ENCOUNTER — DOCUMENTATION ONLY (OUTPATIENT)
Dept: FAMILY MEDICINE | Facility: CLINIC | Age: 63
End: 2018-03-06

## 2018-03-06 VITALS
OXYGEN SATURATION: 96 % | WEIGHT: 198.88 LBS | DIASTOLIC BLOOD PRESSURE: 96 MMHG | SYSTOLIC BLOOD PRESSURE: 146 MMHG | TEMPERATURE: 98 F | BODY MASS INDEX: 28.47 KG/M2 | HEIGHT: 70 IN | HEART RATE: 74 BPM

## 2018-03-06 DIAGNOSIS — L25.5 DERMATITIS DUE TO PLANTS: ICD-10-CM

## 2018-03-06 DIAGNOSIS — K12.2 UVULITIS: ICD-10-CM

## 2018-03-06 DIAGNOSIS — B02.9 HERPES ZOSTER WITHOUT COMPLICATION: Primary | ICD-10-CM

## 2018-03-06 DIAGNOSIS — N40.1 BENIGN PROSTATIC HYPERPLASIA WITH LOWER URINARY TRACT SYMPTOMS, SYMPTOM DETAILS UNSPECIFIED: ICD-10-CM

## 2018-03-06 PROCEDURE — 3077F SYST BP >= 140 MM HG: CPT | Mod: S$GLB,,, | Performed by: NURSE PRACTITIONER

## 2018-03-06 PROCEDURE — 3080F DIAST BP >= 90 MM HG: CPT | Mod: S$GLB,,, | Performed by: NURSE PRACTITIONER

## 2018-03-06 PROCEDURE — 99213 OFFICE O/P EST LOW 20 MIN: CPT | Mod: S$GLB,,, | Performed by: NURSE PRACTITIONER

## 2018-03-06 RX ORDER — FLUOCINONIDE 0.5 MG/G
CREAM TOPICAL 2 TIMES DAILY
Qty: 60 G | Refills: 0 | Status: SHIPPED | OUTPATIENT
Start: 2018-03-06 | End: 2018-03-06 | Stop reason: SDUPTHER

## 2018-03-06 RX ORDER — VALACYCLOVIR HYDROCHLORIDE 1 G/1
1000 TABLET, FILM COATED ORAL 3 TIMES DAILY
Qty: 21 TABLET | Refills: 0 | Status: SHIPPED | OUTPATIENT
Start: 2018-03-06 | End: 2018-03-22

## 2018-03-06 RX ORDER — TAMSULOSIN HYDROCHLORIDE 0.4 MG/1
0.4 CAPSULE ORAL DAILY
Qty: 30 CAPSULE | Refills: 11 | Status: SHIPPED | OUTPATIENT
Start: 2018-03-06 | End: 2019-04-01 | Stop reason: SDUPTHER

## 2018-03-06 RX ORDER — AMOXICILLIN 875 MG/1
875 TABLET, FILM COATED ORAL EVERY 12 HOURS
Qty: 20 TABLET | Refills: 0 | Status: SHIPPED | OUTPATIENT
Start: 2018-03-06 | End: 2018-03-22

## 2018-03-06 RX ORDER — FLUOCINONIDE 0.5 MG/G
CREAM TOPICAL 2 TIMES DAILY
Qty: 60 G | Refills: 0 | Status: SHIPPED | OUTPATIENT
Start: 2018-03-06 | End: 2019-11-14 | Stop reason: SDUPTHER

## 2018-03-06 NOTE — PATIENT INSTRUCTIONS
Use [facility] on the web for information. Use Tecnu (sold over the counter) within 15 minutes of touching poison ivy or poison oak.

## 2018-03-06 NOTE — PROGRESS NOTES
Subjective:       Patient ID: Humberto Cantor is a 62 y.o. male.    Chief Complaint: Insect Bite and Sore Throat    Rash   This is a new problem. The current episode started 1 to 4 weeks ago (started about 6 days ago, noticed that it was on his back and then started to spread on his neck. ). The problem has been gradually improving since onset. The affected locations include the back and neck. The rash is characterized by pain and redness (papules on erythematous base). He was exposed to plant contact and an insect bite/sting (was outside working on painting a fence. Thought he was bit by an insect, but did not see one.). Associated symptoms include a sore throat. Past treatments include antibiotic cream and topical steroids. The treatment provided no relief.     Blood pressure is elevated, but he is in pain currently.    Has been using flomax at hs and is no longer having nocturia or overactive bladder during the day.     Noticed that his uvula was swollen several days ago, has sore throat on the left side as well. Tried rinsing mouth with H2O2, but it did not improve.     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  Review of Systems   HENT: Positive for sore throat.    Genitourinary: Negative for difficulty urinating and dysuria.   Skin: Positive for rash.       Objective:      Physical Exam   Constitutional: He is oriented to person, place, and time. He appears well-developed and well-nourished. No distress.   HENT:   Head: Normocephalic and atraumatic.   Right Ear: External ear normal.   Left Ear: External ear normal.   Mouth/Throat: No oropharyngeal exudate.   Has erythematous uvula with ulcer at tip.   Eyes: Conjunctivae are normal. Right eye exhibits no discharge. Left eye exhibits no discharge. No scleral icterus.   Neck: Normal range of motion. Neck supple.   Cardiovascular: Normal rate, regular rhythm and normal heart sounds.  Exam  reveals no gallop and no friction rub.    No murmur heard.  Pulmonary/Chest: Effort normal and breath sounds normal. No respiratory distress. He has no wheezes. He has no rales.   Lymphadenopathy:     He has no cervical adenopathy.   Neurological: He is alert and oriented to person, place, and time.   Skin: Skin is warm and dry. Rash noted. He is not diaphoretic. There is erythema.   Has cluster of vesicles on erythematous base on upper left back and along left anterior neck.    Psychiatric: He has a normal mood and affect. His behavior is normal.   Nursing note and vitals reviewed.      Assessment:       1. Herpes zoster without complication    2. Dermatitis due to plants    3. Uvulitis    4. Benign prostatic hyperplasia with lower urinary tract symptoms, symptom details unspecified        Plan:     Herpes zoster without complication  -     valACYclovir (VALTREX) 1000 MG tablet; Take 1 tablet (1,000 mg total) by mouth 3 (three) times daily.  Dispense: 21 tablet; Refill: 0    Dermatitis due to plants  -     fluocinonide 0.05% (LIDEX) 0.05 % cream; Apply topically 2 (two) times daily. Do not use longer than 2 weeks  Dispense: 60 g; Refill: 0    Uvulitis  -     amoxicillin (AMOXIL) 875 MG tablet; Take 1 tablet (875 mg total) by mouth every 12 (twelve) hours.  Dispense: 20 tablet; Refill: 0    Benign prostatic hyperplasia with lower urinary tract symptoms, symptom details unspecified  -     tamsulosin (FLOMAX) 0.4 mg Cp24; Take 1 capsule (0.4 mg total) by mouth once daily.  Dispense: 30 capsule; Refill: 11      Use [facility] on the web for information. Use Tecnu (sold over the counter) within 15 minutes of touching poison ivy or poison oak.    Nursing b/p in 2 weeks at PCP  1 week if not better

## 2018-03-07 ENCOUNTER — TELEPHONE (OUTPATIENT)
Dept: ORTHOPEDICS | Facility: CLINIC | Age: 63
End: 2018-03-07

## 2018-03-07 DIAGNOSIS — Z98.890 HISTORY OF SPINAL SURGERY: Primary | ICD-10-CM

## 2018-03-08 ENCOUNTER — TELEPHONE (OUTPATIENT)
Dept: PHYSICAL MEDICINE AND REHAB | Facility: CLINIC | Age: 63
End: 2018-03-08

## 2018-03-08 NOTE — TELEPHONE ENCOUNTER
Left msg on machine that dr prince unfortunately kim not treat the back and to get back in touch with Dr Navarro's office regarding an appt

## 2018-03-12 ENCOUNTER — TELEPHONE (OUTPATIENT)
Dept: FAMILY MEDICINE | Facility: CLINIC | Age: 63
End: 2018-03-12

## 2018-03-12 NOTE — TELEPHONE ENCOUNTER
----- Message from Mark Blair sent at 3/12/2018  2:52 PM CDT -----  Contact: Patient  Humberto, [phone], calling to make sure that the valACYclovir (VALTREX) 1000 MG tablet doesn't need to be refilled. Said he is feeling better not 100% but he is better and will be going out of town. Said he's taken the last of his 7 day supply and would like to clarify that's all he needs. Please advise. Thanks.

## 2018-03-20 ENCOUNTER — DOCUMENTATION ONLY (OUTPATIENT)
Dept: FAMILY MEDICINE | Facility: CLINIC | Age: 63
End: 2018-03-20

## 2018-03-20 NOTE — PROGRESS NOTES
Pre-Visit Chart Review  For Appointment Scheduled on 03/22/2018    Health Maintenance Due   Topic Date Due    Hepatitis C Screening  1955    Colonoscopy  10/12/2003    Zoster Vaccine  10/12/2013    Influenza Vaccine  08/01/2017

## 2018-03-22 ENCOUNTER — OFFICE VISIT (OUTPATIENT)
Dept: FAMILY MEDICINE | Facility: CLINIC | Age: 63
End: 2018-03-22
Payer: COMMERCIAL

## 2018-03-22 ENCOUNTER — LAB VISIT (OUTPATIENT)
Dept: LAB | Facility: HOSPITAL | Age: 63
End: 2018-03-22
Attending: FAMILY MEDICINE
Payer: COMMERCIAL

## 2018-03-22 VITALS
HEART RATE: 89 BPM | BODY MASS INDEX: 28.41 KG/M2 | TEMPERATURE: 98 F | WEIGHT: 198.44 LBS | SYSTOLIC BLOOD PRESSURE: 132 MMHG | HEIGHT: 70 IN | DIASTOLIC BLOOD PRESSURE: 92 MMHG

## 2018-03-22 DIAGNOSIS — I10 ESSENTIAL HYPERTENSION: Primary | ICD-10-CM

## 2018-03-22 DIAGNOSIS — M54.41 CHRONIC RIGHT-SIDED LOW BACK PAIN WITH RIGHT-SIDED SCIATICA: ICD-10-CM

## 2018-03-22 DIAGNOSIS — Z23 NEED FOR ZOSTER VACCINATION: ICD-10-CM

## 2018-03-22 DIAGNOSIS — F41.1 GAD (GENERALIZED ANXIETY DISORDER): ICD-10-CM

## 2018-03-22 DIAGNOSIS — I10 ESSENTIAL HYPERTENSION: ICD-10-CM

## 2018-03-22 DIAGNOSIS — G89.29 CHRONIC RIGHT-SIDED LOW BACK PAIN WITH RIGHT-SIDED SCIATICA: ICD-10-CM

## 2018-03-22 LAB
BASOPHILS # BLD AUTO: 0.04 K/UL
BASOPHILS NFR BLD: 1 %
DIFFERENTIAL METHOD: ABNORMAL
EOSINOPHIL # BLD AUTO: 0.1 K/UL
EOSINOPHIL NFR BLD: 1.8 %
ERYTHROCYTE [DISTWIDTH] IN BLOOD BY AUTOMATED COUNT: 13.5 %
HCT VFR BLD AUTO: 44.7 %
HGB BLD-MCNC: 14.5 G/DL
IMM GRANULOCYTES # BLD AUTO: 0.02 K/UL
IMM GRANULOCYTES NFR BLD AUTO: 0.5 %
LYMPHOCYTES # BLD AUTO: 0.9 K/UL
LYMPHOCYTES NFR BLD: 21.5 %
MCH RBC QN AUTO: 33.3 PG
MCHC RBC AUTO-ENTMCNC: 32.4 G/DL
MCV RBC AUTO: 103 FL
MONOCYTES # BLD AUTO: 0.6 K/UL
MONOCYTES NFR BLD: 15.4 %
NEUTROPHILS # BLD AUTO: 2.4 K/UL
NEUTROPHILS NFR BLD: 59.8 %
NRBC BLD-RTO: 0 /100 WBC
PLATELET # BLD AUTO: 307 K/UL
PMV BLD AUTO: 9.6 FL
RBC # BLD AUTO: 4.36 M/UL
WBC # BLD AUTO: 3.96 K/UL

## 2018-03-22 PROCEDURE — 36415 COLL VENOUS BLD VENIPUNCTURE: CPT | Mod: PO

## 2018-03-22 PROCEDURE — 99214 OFFICE O/P EST MOD 30 MIN: CPT | Mod: S$GLB,,, | Performed by: FAMILY MEDICINE

## 2018-03-22 PROCEDURE — 80053 COMPREHEN METABOLIC PANEL: CPT

## 2018-03-22 PROCEDURE — 85025 COMPLETE CBC W/AUTO DIFF WBC: CPT

## 2018-03-22 PROCEDURE — 3080F DIAST BP >= 90 MM HG: CPT | Mod: CPTII,S$GLB,, | Performed by: FAMILY MEDICINE

## 2018-03-22 PROCEDURE — 3075F SYST BP GE 130 - 139MM HG: CPT | Mod: CPTII,S$GLB,, | Performed by: FAMILY MEDICINE

## 2018-03-22 PROCEDURE — 80061 LIPID PANEL: CPT

## 2018-03-22 PROCEDURE — 99999 PR PBB SHADOW E&M-EST. PATIENT-LVL III: CPT | Mod: PBBFAC,,, | Performed by: FAMILY MEDICINE

## 2018-03-22 RX ORDER — OLMESARTAN MEDOXOMIL 20 MG/1
20 TABLET ORAL DAILY
Qty: 90 TABLET | Refills: 3 | Status: SHIPPED | OUTPATIENT
Start: 2018-03-22 | End: 2019-03-07 | Stop reason: SDUPTHER

## 2018-03-22 RX ORDER — TADALAFIL 20 MG/1
20 TABLET ORAL DAILY
Qty: 100 TABLET | Refills: 0 | Status: SHIPPED | OUTPATIENT
Start: 2018-03-22 | End: 2019-07-09 | Stop reason: SDUPTHER

## 2018-03-22 RX ORDER — ALPRAZOLAM 0.25 MG/1
0.25 TABLET ORAL 2 TIMES DAILY PRN
Qty: 60 TABLET | Refills: 3 | Status: SHIPPED | OUTPATIENT
Start: 2018-03-22 | End: 2018-07-19 | Stop reason: SDUPTHER

## 2018-03-22 NOTE — PATIENT INSTRUCTIONS
General Neck and Back Pain    Both neck and back pain are usually caused by injury to the muscles or ligaments of the spine. Sometimes the disks that separate each bone of the spine may cause pain by pressing on a nearby nerve. Back and neck pain may appear after a sudden twisting or bending force (such as in a car accident), or sometimes after a simple awkward movement. In either case, muscle spasm is often present and adds to the pain.  Acute neck and back pain usually gets better in 1 to 2 weeks. Pain related to disk disease, arthritis in the spinal joints or spinal stenosis (narrowing of the spinal canal) can become chronic and last for months or years.  Back and neck pain are common problems. Most people feel better in 1 or 2 weeks, and most of the rest in 1 to 2 months. Most people can remain active.  People experience and describe pain differently.  · Pain can be sharp, stabbing, shooting, aching, cramping, or burning  · Movement, standing, bending, lifting, sitting, or walking may worsen the pain  · Pain can be localized to one spot or area, or it can be more generalized  · Pain can spread or radiate upwards, downwards, to the front, or go down your arms  · Muscle spasm may occur.  Most of the time mechanical problems with the muscles or spine cause the pain. it is usually caused by an injury, whether known or not, to the muscles or ligaments. While illnesses can cause back pain, it is usually not caused by a serious illness. Pain is usually related to physical activity, whether sports, exercise, work, or normal activity. Sometimes it can occur without an identifiable cause. This can happen simply by stretching or moving wrong, without noting pain at the time. Other causes include:  · Overexertion, lifting, pushing, pulling incorrectly or too aggressively.  · Sudden twisting, bending or stretching from an accident (car or fall), or accidental movement.  · Poor posture  · Poor conditioning, lack of regular  exercise  · Spinal disc disease or arthritis  · Stress  · Pregnancy, or illness like appendicitis, bladder or kidney infection, pelvic infections   Home care  · For neck pain: Use a comfortable pillow that supports the head and keeps the spine in a neutral position. The position of the head should not be tilted forward or backward.  · When in bed, try to find a position of comfort. A firm mattress is best. Try lying flat on your back with pillows under your knees. You can also try lying on your side with your knees bent up towards your chest and a pillow between your knees.  · At first, do not try to stretch out the sore spots. If there is a strain, it is not like the good soreness you get after exercising without an injury. In this case, stretching may make it worse.  · Avoid prolonged sitting, long car rides or travel. This puts more stress on the lower back than standing or walking.  · During the first 24 to 72 hours after an injury, apply an ice pack to the painful area for 20 minutes and then remove it for 20 minutes over a period of 60 to 90 minutes or several times a day.   · You can alternate ice and heat therapies. Talk with your healthcare provider about the best treatment for your back or neck pain. As a safety precaution, do not use a heating pad at bedtime. Sleeping with a heating pad can lead to skin burns or tissue damage.  · Therapeutic massage can help relax the back and neck muscles without stretching them.  · Be aware of safe lifting methods and do not lift anything over 15 pounds until all the pain is gone.  Medications  Talk to your healthcare provider before using medicine, especially if you have other medical problems or are taking other medicines.  · You may use over-the-counter medicine to control pain, unless another pain medicine was prescribed. If you have chronic conditions like diabetes, liver or kidney disease, stomach ulcers,  gastrointestinal bleeding, or are taking blood thinner  medicines.  · Be careful if you are given pain medicines, narcotics, or medicine for muscle spasm. They can cause drowsiness, and can affect your coordination, reflexes, and judgment. Do not drive or operate heavy machinery.  Follow-up care  Follow up with your healthcare provider, or as advised. Physical therapy or further tests may be needed.  If X-rays were taken, you will be notified of any new findings that may affect your care.  Call 911  Seek emergency medical care if any of the following occur:  · Trouble breathing  · Confusion  · Very drowsy or trouble awakening  · Fainting or loss of consciousness  · Rapid or very slow heart rate  · Loss of bowel or bladder control  When to seek medical advice  Call your healthcare provider right away if any of these occur:  · Pain becomes worse or spreads into your arms or legs  · Weakness, numbness or pain in one or both arms or legs  · Numbness in the groin area  · Difficulty walking  · Fever of 100.4ºF (38ºC) or higher, or as directed by your healthcare provider  Date Last Reviewed: 7/1/2016 © 2000-2017 [employer]. [address] [place], [place] [id number]. All rights reserved. This information is not intended as a substitute for professional medical care. Always follow your healthcare professional's instructions.        Exercises to Strengthen Your Lower Back  Strong lower back and abdominal muscles work together to support your spine. The exercises below will help strengthen the lower back. It is important that you begin exercising slowly and increase levels gradually.  Always begin any exercise program with stretching. If you feel pain while doing any of these exercises, stop and talk to your doctor about a more specific exercise program that better suits your condition.   Low back stretch  The point of stretching is to make you more flexible and increase your range of motion. Stretch only as much as you are able. Stretch slowly. Do not push your  stretch to the limit. If at any point you feel pain while stretching, this is your (temporary) limit.  · Lie on your back with your knees bent and both feet on the ground.  · Slowly raise your left knee to your chest as you flatten your lower back against the floor. Hold for 5 seconds.  · Relax and repeat the exercise with your right knee.  · Do 10 of these exercises for each leg.  · Repeat hugging both knees to your chest at the same time.  Building lower back strength  Start your exercise routine with 10 to 30 minutes a day, 1 to 3 times a day.  Initial exercises  Lying on your back:  1. Ankle pumps: Move your foot up and down, towards your head, and then away. Repeat 10 times with each foot.  2. Heel slides: Slowly bend your knee, drawing the heel of your foot towards you. Then slide your heel/foot from you, straightening your knee. Do not lift your foot off the floor (this is not a leg lift).  3. Abdominal contraction: Bend your knees and put your hands on your stomach. Tighten your stomach muscles. Hold for 5 seconds, then relax. Repeat 10 times.  4. Straight leg raise: Bend one leg at the knee and keep the other leg straight. Tighten your stomach muscles. Slowly lift your straight leg 6 to 12 inches off the floor and hold for up to 5 seconds. Repeat 10 times on each side.  Standin. Wall squats: Stand with your back against the wall. Move your feet about 12 inches away from the wall. Tighten your stomach muscles, and slowly bend your knees until they are at about a 45 degree angle. Do not go down too far. Hold about 5 seconds. Then slowly return to your starting position. Repeat 10 times.  2. Heel raises: Stand facing the wall. Slowly raise the heels of your feet up and down, while keeping your toes on the floor. If you have trouble balancing, you can touch the wall with your hands. Repeat 10 times.  More advanced exercises  When you feel comfortable enough, try these exercises.  1. Kneeling lumbar  extension: Begin on your hands and knees. At the same time, raise and straighten your right arm and left leg until they are parallel to the ground. Hold for 2 seconds and come back slowly to a starting position. Repeat with left arm and right leg, alternating 10 times.  2. Prone lumbar extension: Lie face down, arms extended overhead, palms on the floor. At the same time, raise your right arm and left leg as high as comfortably possible. Hold for 10 seconds and slowly return to start. Repeat with left arm and right leg, alternating 10 times. Gradually build up to 20 times. (Advanced: Repeat this exercise raising both arms and both legs a few inches off the floor at the same time. Hold for 5 seconds and release.)  3. Pelvic tilt: Lie on the floor on your back with your knees bent at 90 degrees. Your feet should be flat on the floor. Inhale, exhale, then slowly contract your abdominal muscles bringing your navel toward your spine. Let your pelvis rock back until your lower back is flat on the floor. Hold for 10 seconds while breathing smoothly.  4. Abdominal crunch: Perform a pelvic tilt (above) flattening your lower back against the floor. Holding the tension in your abdominal muscles, take another breath and raise your shoulder blades off the ground (this is not a full sit-up). Keep your head in line with your body (dont bend your neck forward). Hold for 2 seconds, then slowly lower.  Date Last Reviewed: 6/1/2016  © 3420-2591 [employer]. [address] [place] [address], [place] [address]. All rights reserved. This information is not intended as a substitute for professional medical care. Always follow your healthcare professional's instructions.

## 2018-03-23 ENCOUNTER — INITIAL CONSULT (OUTPATIENT)
Dept: PHYSICAL MEDICINE AND REHAB | Facility: CLINIC | Age: 63
End: 2018-03-23
Payer: COMMERCIAL

## 2018-03-23 VITALS
SYSTOLIC BLOOD PRESSURE: 136 MMHG | HEART RATE: 84 BPM | DIASTOLIC BLOOD PRESSURE: 86 MMHG | BODY MASS INDEX: 28.35 KG/M2 | HEIGHT: 70 IN | WEIGHT: 198 LBS

## 2018-03-23 DIAGNOSIS — M79.10 MYALGIA: ICD-10-CM

## 2018-03-23 DIAGNOSIS — M54.50 CHRONIC BILATERAL LOW BACK PAIN WITHOUT SCIATICA: ICD-10-CM

## 2018-03-23 DIAGNOSIS — G89.29 CHRONIC BILATERAL LOW BACK PAIN WITHOUT SCIATICA: ICD-10-CM

## 2018-03-23 LAB
ALBUMIN SERPL BCP-MCNC: 4.1 G/DL
ALP SERPL-CCNC: 76 U/L
ALT SERPL W/O P-5'-P-CCNC: 20 U/L
ANION GAP SERPL CALC-SCNC: 9 MMOL/L
AST SERPL-CCNC: 27 U/L
BILIRUB SERPL-MCNC: 0.5 MG/DL
BUN SERPL-MCNC: 16 MG/DL
CALCIUM SERPL-MCNC: 9.9 MG/DL
CHLORIDE SERPL-SCNC: 104 MMOL/L
CHOLEST SERPL-MCNC: 225 MG/DL
CHOLEST/HDLC SERPL: 4.5 {RATIO}
CO2 SERPL-SCNC: 27 MMOL/L
CREAT SERPL-MCNC: 1 MG/DL
EST. GFR  (AFRICAN AMERICAN): >60 ML/MIN/1.73 M^2
EST. GFR  (NON AFRICAN AMERICAN): >60 ML/MIN/1.73 M^2
GLUCOSE SERPL-MCNC: 97 MG/DL
HDLC SERPL-MCNC: 50 MG/DL
HDLC SERPL: 22.2 %
LDLC SERPL CALC-MCNC: 163.6 MG/DL
NONHDLC SERPL-MCNC: 175 MG/DL
POTASSIUM SERPL-SCNC: 4.5 MMOL/L
PROT SERPL-MCNC: 7.1 G/DL
SODIUM SERPL-SCNC: 140 MMOL/L
TRIGL SERPL-MCNC: 57 MG/DL

## 2018-03-23 PROCEDURE — 3075F SYST BP GE 130 - 139MM HG: CPT | Mod: CPTII,S$GLB,, | Performed by: PHYSICAL MEDICINE & REHABILITATION

## 2018-03-23 PROCEDURE — 99999 PR PBB SHADOW E&M-EST. PATIENT-LVL III: CPT | Mod: PBBFAC,,, | Performed by: PHYSICAL MEDICINE & REHABILITATION

## 2018-03-23 PROCEDURE — 99204 OFFICE O/P NEW MOD 45 MIN: CPT | Mod: 25,S$GLB,, | Performed by: PHYSICAL MEDICINE & REHABILITATION

## 2018-03-23 PROCEDURE — 3079F DIAST BP 80-89 MM HG: CPT | Mod: CPTII,S$GLB,, | Performed by: PHYSICAL MEDICINE & REHABILITATION

## 2018-03-23 PROCEDURE — 20553 NJX 1/MLT TRIGGER POINTS 3/>: CPT | Mod: S$GLB,,, | Performed by: PHYSICAL MEDICINE & REHABILITATION

## 2018-03-23 RX ORDER — BACLOFEN 10 MG/1
10 TABLET ORAL NIGHTLY
Qty: 30 TABLET | Refills: 6 | Status: SHIPPED | OUTPATIENT
Start: 2018-03-23 | End: 2018-07-19

## 2018-03-23 RX ORDER — LIDOCAINE HYDROCHLORIDE 10 MG/ML
3 INJECTION INFILTRATION; PERINEURAL ONCE
Status: COMPLETED | OUTPATIENT
Start: 2018-03-23 | End: 2018-03-23

## 2018-03-23 RX ORDER — DULOXETIN HYDROCHLORIDE 20 MG/1
20 CAPSULE, DELAYED RELEASE ORAL DAILY
Qty: 30 CAPSULE | Refills: 11 | Status: SHIPPED | OUTPATIENT
Start: 2018-03-23 | End: 2018-07-19

## 2018-03-23 RX ADMIN — LIDOCAINE HYDROCHLORIDE 3 ML: 10 INJECTION INFILTRATION; PERINEURAL at 02:03

## 2018-03-23 NOTE — PROGRESS NOTES
HPI:  Patient is a 62 y.o. year old male s/p lumbar fusion in June 2017 secondary to burst fracture at L4/L5 w. Severe spinal canal stenosis. Injury occurred by a fall while triming a tree branch. He ended up having a decompressive surgery w. Fusion.  Recovery had complications which ended up with a second surgery.  He has been in pain since stopping pain medications. Prolonged sitting or standing gives him pain. He is not able to do what he enjoys because of the chronic pain.      Labs  egfr cr lft's gluc nl    Imaging  views: There are pedicle screws and fixation rods throughout the lumbosacral spine. No hardware failure or complication seen. There is moderate DJD. There is a severe compression of L4. There is moderate DJD. Bones show good alignment and no complication or hardware failure seen.    Postoperative changes of spinal fusion identified.  Mild DJD.  No fracture or dislocation.  No bone Destruction identified    CT of the lumbar spine without contrast.    Comparison: Lumbar radiograph on 6/27/2017, MRI of the lumbar spine on 6/5/2017    Technique: Axial 1.25-mm images the lumbar spine were obtained.  No IV contrast.  Coronal and sagittal reformations are generated.    Findings:    Redemonstration of a L4 burst fracture status post L4 laminectomy and posterior lumbar fusion of L3-L5.  The hardware appears intact with no evidence of fracture or loosening.  Bilateral L5 pars defects with grade 2 anterolisthesis of L5 on S1, slightly progressed since prior MRI on 6/5/2017. The remainder of the vertebral bodies appear intact with no evidence of new fracture.    T12-L1: No focal disc herniation, spinal canal or neural foraminal stenosis.    L1-2:No focal disc herniation, spinal canal or neural foraminal stenosis.    L2-3:No focal disc herniation, spinal canal or neural foraminal stenosis.    L3-4:Small circumferential disc bulge and mild bilateral facet arthropathy without spinal canal stenosis or right neural  foraminal narrowing.  There is mild left neural foraminal narrowing.    L4-5:L4 burst fracture status post decompressive L4 laminectomy.  Retropulsion of the fracture fragments without significant spinal canal stenosis.  There is moderate bilateral neural foraminal narrowing.    L5-S1:Bilateral L5 pars defects with grade 2 anterolisthesis of L5 on S1 and uncovering of the disc without significant spinal canal stenosis.  There is mild right and moderate left neural foraminal narrowing.     Sacroiliac joints are normal in appearance.  Examination of the surrounding soft tissues demonstrate a 1.3 cm right renal cyst with additional subcentimeter right renal hypodensities, too small to characterize.  There are sub-centimeter hyperdense lesions in both kidneys, likely hyperdense cysts.  Mild aortic atherosclerotic calcification.   Impression         Postoperative changes compatible with L4 decompressive laminectomy and posterior lumbar fusion of L3-L5 in this patient with a known L4 burst fracture.    L5 pars defects with grade 2 spondylolisthesis, slightly progressed since prior exam.    Mild to moderate bilateral neural foraminal narrowing as detailed above.  No significant spinal canal stenosis.         Findings:    There is grade 1 anterolisthesis of L5 on S1 due to bilateral L5 pars defects.  There is severe compression deformity of the L4 vertebral body with osseous retropulsion of the posterior vertebral body resulting in severe spinal canal stenosis. There is significant marrow edema of the L4 vertebral body suggesting that this fracture is acute in nature. Fracture of the L4 left lamina is also noted although better evaluated on recent CT. there is heterogeneous signal material posterior to the L3, and L4-L5 vertebral bodies suggesting hematoma within the epidural space, contributing to spinal canal stenosis in these regions, and measures at most 0 0.4 cm in thickness.  Additionally, there is a high T2 signal  focus with low central T2 signal involving the medial aspect of the right psoas muscle, originating as it abuts the compression fracture of L4, and continues caudally throughout the plane of imaging, suggesting hematoma.  The vertebral body heights are otherwise well-maintained.  There is no marrow signal abnormality suspicious for an infiltrative process.  There is multilevel facet arthropathy with fluid in several facets.    The conus is normal in appearance and terminates at the L1-L2 level.      L1-L2: There is no significant spinal canal stenosis or neural foraminal narrowing.    L2-L3: Diffuse disc bulge slightly asymmetric to the left without significant spinal canal stenosis.  There is mild left neuroforaminal narrowing.    L3-L4: Diffuse disc bulge slightly asymmetric to the left resulting in mild left neural foraminal narrowing and mild spinal canal stenosis.    L4-L5: Minimal disc bulge and mild bilateral facet arthropathy resulting in moderate left and mild right neuroforaminal narrowing.    L5-S1: Grade 1 anterolisthesis of L5 on S1 resulting in unroofing of the disc and mild bilateral neuroforaminal narrowing. 0.5 cm cystic lesion in the left lateral recess likely synovial cyst, and contributes to mild spinal canal stenosis.    The adjacent soft tissue structures show a simple cyst in the upper pole of the right kidney and probable hemorrhagic/high protein content cyst in the midpole of the left kidney.   Impression         Acute burst fracture of the L4 vertebral body resulting in severe spinal canal stenosis as above noting that involvement of the posterior elements is better evaluated on recent CT lumbar spine.  There is suspected associated within epidural hematoma primarily posterior to the L4-L5 vertebral body and disc, as well as suspected right psoas hematoma extending caudally from the level of the fracture through the caudal aspect of the imaged pelvis.    Degenerative changes as detailed  above.      Bilateral L5 pars defects resulting in grade 1 anterolisthesis of L5 on S1.     Labs  egfr cr lfts gluc nl    Past Medical History:   Diagnosis Date    Dermatitis, atopic     Essential hypertension 11/19/2013    GERD (gastroesophageal reflux disease)     Hypertension      Past Surgical History:   Procedure Laterality Date    HERNIA REPAIR      WISDOM TOOTH EXTRACTION       Family History   Problem Relation Age of Onset    Early death Brother     Arthritis Mother     Cancer Maternal Grandmother      abdominal    Cancer Maternal Grandfather 60     lung    Liver disease Paternal Grandfather      Social History     Social History    Marital status:      Spouse name: N/A    Number of children: N/A    Years of education: N/A     Occupational History     [employer]     Social History Main Topics    Smoking status: Former Smoker    Smokeless tobacco: Never Used    Alcohol use 1.2 oz/week     2 Glasses of wine per week    Drug use: No    Sexual activity: Yes     Partners: Female     Other Topics Concern    Not on file     Social History Narrative    No narrative on file       Review of patient's allergies indicates:   Allergen Reactions    Naproxen      Side effect of GIB       Current Outpatient Prescriptions:     ALPRAZolam (XANAX) 0.25 MG tablet, Take 1 tablet (0.25 mg total) by mouth 2 (two) times daily as needed., Disp: 60 tablet, Rfl: 3    lansoprazole (PREVACID) 15 MG capsule, Take 1 capsule (15 mg total) by mouth once daily., Disp: 30 capsule, Rfl: 11    methocarbamol (ROBAXIN) 750 MG Tab, Take 1 tablet (750 mg total) by mouth 3 (three) times daily., Disp: 60 tablet, Rfl: 0    olmesartan (BENICAR) 20 MG tablet, Take 1 tablet (20 mg total) by mouth once daily., Disp: 90 tablet, Rfl: 3    tadalafil (CIALIS) 20 MG Tab, Take 1 tablet (20 mg total) by mouth once daily., Disp: 100 tablet, Rfl: 0    tamsulosin (FLOMAX) 0.4 mg Cp24, Take 1 capsule (0.4 mg total) by mouth  once daily., Disp: 30 capsule, Rfl: 11    fluocinonide 0.05% (LIDEX) 0.05 % cream, Apply topically 2 (two) times daily. Do not use longer than 2 weeks, Disp: 60 g, Rfl: 0      Review of Systems:  No nausea, vomiting, fevers, chills , contipation, diarrhea or sweats,no weight change, occ back stiffness, no chest pain, no sob, no change of bowel or bladder habits,no coordination issues        Physical Exam:      Vitals:    03/23/18 1056   BP: 136/86   Pulse: 84     alert and oriented ×4 follows commands answers all questions appropriately,emotionally labile  Manual muscle test 5 out of 5 sensation to light touch grossly intact  +mild tenderness right greater troch and R QL and right iliac crest  Antalgic gait  -slR  +WESLEY b/l  unlevelled iliac crest right iliac crest lower  Full hip ROM  babinsky down  No clonus  DTR's symmetric 2+  No C/C/E  +leg length discrepancy    Assessment:  S/p L4/5 burst fracture w. Severe spinal canal stenosis +recurrent musc spasms   sijt dysfunction    Plan:  tpi's to low back today  Schedule right cluneal nerve block+hydrodissection therapeutic/diagnostic  Recommended heel lift on the right but pt. States his back pain worsened in past when he did this  Trial of cymbalta  Trial of baclofen  Stop robaxin  Once pain improves will need physical therapy for pelvic realignment    Thank you for this interesting referral

## 2018-03-28 ENCOUNTER — TELEPHONE (OUTPATIENT)
Dept: PHYSICAL MEDICINE AND REHAB | Facility: CLINIC | Age: 63
End: 2018-03-28

## 2018-03-28 NOTE — TELEPHONE ENCOUNTER
----- Message from Delmi Small sent at 3/28/2018  2:19 PM CDT -----  Contact: patient   Patient calling to inform Dr. Dvaid that he will be discontinuing Cymbalta and Baclofen due to negative reactions. Thanks!

## 2018-03-29 NOTE — TELEPHONE ENCOUNTER
Ok. We can discuss other non narcotic medication at his follow up, or he can go to pain management to see what they can offer

## 2018-04-06 ENCOUNTER — TELEPHONE (OUTPATIENT)
Dept: FAMILY MEDICINE | Facility: CLINIC | Age: 63
End: 2018-04-06

## 2018-04-06 DIAGNOSIS — M54.41 CHRONIC RIGHT-SIDED LOW BACK PAIN WITH RIGHT-SIDED SCIATICA: ICD-10-CM

## 2018-04-06 DIAGNOSIS — Z98.1 S/P LUMBAR SPINAL FUSION: ICD-10-CM

## 2018-04-06 DIAGNOSIS — S32.001D CLOSED BURST FRACTURE OF LUMBAR VERTEBRA WITH ROUTINE HEALING, SUBSEQUENT ENCOUNTER: Primary | ICD-10-CM

## 2018-04-06 DIAGNOSIS — G89.29 CHRONIC RIGHT-SIDED LOW BACK PAIN WITH RIGHT-SIDED SCIATICA: ICD-10-CM

## 2018-04-06 NOTE — TELEPHONE ENCOUNTER
----- Message from Emerald Dobbs sent at 4/6/2018 12:44 PM CDT -----  Contact: self  Patient needs a outside referral for pain management to Dr Gordon Nance with [facility]. Please call patient at [phone]. Thanks!

## 2018-04-09 NOTE — TELEPHONE ENCOUNTER
Referral to pain clinic placed by Dr. Sosa.  Referral faxed to Dr. lForencio Nance at [phone].

## 2018-04-15 NOTE — PROGRESS NOTES
Subjective:       Patient ID: Humberto Cantor is a 62 y.o. male.    Chief Complaint: Hypertension    Hypertension   This is a chronic problem. The problem is controlled. Pertinent negatives include no anxiety, blurred vision, chest pain, headaches, palpitations or shortness of breath. Agents associated with hypertension include NSAIDs. Risk factors for coronary artery disease include male gender, sedentary lifestyle and stress.     Review of Systems   Constitutional: Negative for fatigue and unexpected weight change.   Eyes: Negative for blurred vision.   Respiratory: Negative for chest tightness and shortness of breath.    Cardiovascular: Negative for chest pain, palpitations and leg swelling.   Gastrointestinal: Negative for abdominal pain.   Musculoskeletal: Negative for arthralgias.   Neurological: Negative for dizziness, syncope, light-headedness and headaches.       Patient Active Problem List   Diagnosis    GERD (gastroesophageal reflux disease)    Essential hypertension    Anxiety    Burst fracture of lumbar vertebra    Lumbar burst fracture    Hypomagnesemia    Hypophosphatemia    Hypocalcemia    Chronic low back pain    S/P lumbar spinal fusion       Objective:      Physical Exam   Constitutional: He is oriented to person, place, and time. He appears well-developed and well-nourished.   Cardiovascular: Normal rate, regular rhythm and normal heart sounds.    Pulmonary/Chest: Effort normal and breath sounds normal.   Musculoskeletal: He exhibits no edema.   Neurological: He is alert and oriented to person, place, and time.   Skin: Skin is warm and dry.   Psychiatric: He has a normal mood and affect.   Nursing note and vitals reviewed.      Lab Results   Component Value Date    WBC 3.96 03/22/2018    HGB 14.5 03/22/2018    HCT 44.7 03/22/2018     03/22/2018    CHOL 225 (H) 03/22/2018    TRIG 57 03/22/2018    HDL 50 03/22/2018    ALT 20 03/22/2018    AST 27 03/22/2018     03/22/2018    K  4.5 03/22/2018     03/22/2018    CREATININE 1.0 03/22/2018    BUN 16 03/22/2018    CO2 27 03/22/2018    INR 1.0 06/05/2017     The 10-year ASCVD risk score (Greg HARDWICK Jr., et al., 2013) is: 16.5%    Values used to calculate the score:      Age: 62 years      Sex: Male      Is Non- : No      Diabetic: No      Tobacco smoker: No      Systolic Blood Pressure: 136 mmHg      Is BP treated: Yes      HDL Cholesterol: 50 mg/dL      Total Cholesterol: 225 mg/dL    Assessment:       1. Essential hypertension    2. Need for zoster vaccination    3. Chronic right-sided low back pain with right-sided sciatica    4. TORRIE (generalized anxiety disorder)        Plan:       Essential hypertension  -     Lipid panel; Future; Expected date: 03/22/2018  -     Comprehensive metabolic panel; Future; Expected date: 03/22/2018  -     CBC auto differential; Future; Expected date: 03/22/2018  -     tadalafil (CIALIS) 20 MG Tab; Take 1 tablet (20 mg total) by mouth once daily.  Dispense: 100 tablet; Refill: 0    Need for zoster vaccination  -     varicella-zoster [id number], PF, (SHINGRIX, PF,) 50 mcg/0.5 mL injection; Inject 0.5 mLs into the muscle once.  Dispense: 0.5 mL; Refill: 1    Chronic right-sided low back pain with right-sided sciatica  -     Ambulatory referral to Pain Clinic    TORRIE (generalized anxiety disorder)  -     ALPRAZolam (XANAX) 0.25 MG tablet; Take 1 tablet (0.25 mg total) by mouth 2 (two) times daily as needed.  Dispense: 60 tablet; Refill: 3    Other orders  -     olmesartan (BENICAR) 20 MG tablet; Take 1 tablet (20 mg total) by mouth once daily.  Dispense: 90 tablet; Refill: 3

## 2018-07-19 ENCOUNTER — DOCUMENTATION ONLY (OUTPATIENT)
Dept: FAMILY MEDICINE | Facility: CLINIC | Age: 63
End: 2018-07-19

## 2018-07-19 ENCOUNTER — OFFICE VISIT (OUTPATIENT)
Dept: FAMILY MEDICINE | Facility: CLINIC | Age: 63
End: 2018-07-19
Payer: COMMERCIAL

## 2018-07-19 VITALS
WEIGHT: 194 LBS | HEART RATE: 85 BPM | TEMPERATURE: 99 F | HEIGHT: 70 IN | DIASTOLIC BLOOD PRESSURE: 77 MMHG | BODY MASS INDEX: 27.77 KG/M2 | SYSTOLIC BLOOD PRESSURE: 119 MMHG

## 2018-07-19 DIAGNOSIS — E78.1 PURE HYPERGLYCERIDEMIA: ICD-10-CM

## 2018-07-19 DIAGNOSIS — I10 ESSENTIAL HYPERTENSION: ICD-10-CM

## 2018-07-19 DIAGNOSIS — F41.1 GAD (GENERALIZED ANXIETY DISORDER): ICD-10-CM

## 2018-07-19 DIAGNOSIS — Z12.11 COLON CANCER SCREENING: ICD-10-CM

## 2018-07-19 DIAGNOSIS — M54.40 CHRONIC BILATERAL LOW BACK PAIN WITH SCIATICA, SCIATICA LATERALITY UNSPECIFIED: Primary | ICD-10-CM

## 2018-07-19 DIAGNOSIS — G89.29 CHRONIC BILATERAL LOW BACK PAIN WITH SCIATICA, SCIATICA LATERALITY UNSPECIFIED: Primary | ICD-10-CM

## 2018-07-19 PROBLEM — M54.16 LUMBAR BACK PAIN WITH RADICULOPATHY AFFECTING RIGHT LOWER EXTREMITY: Status: ACTIVE | Noted: 2017-08-08

## 2018-07-19 PROCEDURE — 3078F DIAST BP <80 MM HG: CPT | Mod: CPTII,S$GLB,, | Performed by: FAMILY MEDICINE

## 2018-07-19 PROCEDURE — 3074F SYST BP LT 130 MM HG: CPT | Mod: CPTII,S$GLB,, | Performed by: FAMILY MEDICINE

## 2018-07-19 PROCEDURE — 99214 OFFICE O/P EST MOD 30 MIN: CPT | Mod: S$GLB,,, | Performed by: FAMILY MEDICINE

## 2018-07-19 PROCEDURE — 3008F BODY MASS INDEX DOCD: CPT | Mod: CPTII,S$GLB,, | Performed by: FAMILY MEDICINE

## 2018-07-19 PROCEDURE — 99999 PR PBB SHADOW E&M-EST. PATIENT-LVL III: CPT | Mod: PBBFAC,,, | Performed by: FAMILY MEDICINE

## 2018-07-19 RX ORDER — NAPROXEN 250 MG/1
250 TABLET ORAL 2 TIMES DAILY
COMMUNITY

## 2018-07-19 RX ORDER — ALPRAZOLAM 0.25 MG/1
0.25 TABLET ORAL 2 TIMES DAILY PRN
Qty: 60 TABLET | Refills: 3 | Status: SHIPPED | OUTPATIENT
Start: 2018-07-19 | End: 2018-11-08 | Stop reason: SDUPTHER

## 2018-07-19 NOTE — PATIENT INSTRUCTIONS
Nortriptyline capsules  What is this medicine?  NORTRIPTYLINE (nor TRIP ti caprice) is used to treat depression.  How should I use this medicine?  Take this medicine by mouth with a glass of water. Follow the directions on the prescription label. Take your doses at regular intervals. Do not take it more often than directed. Do not stop taking this medicine suddenly except upon the advice of your doctor. Stopping this medicine too quickly may cause serious side effects or your condition may worsen.  A special MedGuide will be given to you by the pharmacist with each prescription and refill. Be sure to read this information carefully each time.  Talk to your pediatrician regarding the use of this medicine in children. Special care may be needed.  What side effects may I notice from receiving this medicine?  Side effects that you should report to your doctor or health care professional as soon as possible:  · allergic reactions like skin rash, itching or hives, swelling of the face, lips, or tongue  · abnormal production of milk in females  · breast enlargement in both males and females  · breathing problems  · confusion, hallucinations  · fever with increased sweating  · irregular or fast, pounding heartbeat  · muscle stiffness, or spasms  · pain or difficulty passing urine, loss of bladder control  · seizures  · suicidal thoughts or other mood changes  · swelling of the testicles  · tingling, pain, or numbness in the feet or hands  · yellowing of the eyes or skin  Side effects that usually do not require medical attention (report to your doctor or health care professional if they continue or are bothersome):  · change in sex drive or performance  · diarrhea  · nausea, vomiting  · weight gain or loss  What may interact with this medicine?  Do not take this medicine with any of the following medications:  · arsenic trioxide  · certain medicines medicines for irregular heart  beat  · cisapride  · halofantrine  · linezolid  · MAOIs like Carbex, Eldepryl, Marplan, Nardil, and Parnate  · methylene blue (injected into a vein)  · other medicines for mental depression  · phenothiazines like perphenazine, thioridazine and chlorpromazine  · pimozide  · probucol  · procarbazine  · sparfloxacin  · Willie's Wort  · ziprasidone  This medicine may also interact with any of the following medications:  · atropine and related drugs like hyoscyamine, scopolamine, tolterodine and others  · barbiturate medicines for inducing sleep or treating seizures, such as phenobarbital  · cimetidine  · medicines for diabetes  · medicines for seizures like carbamazepine or phenytoin  · reserpine  · thyroid medicine  What if I miss a dose?  If you miss a dose, take it as soon as you can. If it is almost time for your next dose, take only that dose. Do not take double or extra doses.  Where should I keep my medicine?  Keep out of the reach of children.  Store at room temperature between 15 and 30 degrees C (59 and 86 degrees F). Keep container tightly closed. Throw away any unused medicine after the expiration date.  What should I tell my health care provider before I take this medicine?  They need to know if you have any of these conditions:  · an alcohol problem  · bipolar disorder or schizophrenia  · difficulty passing urine, prostate trouble  · glaucoma  · heart disease or recent heart attack  · liver disease  · over active thyroid  · seizures  · thoughts or plans of suicide or a previous suicide attempt or family history of suicide attempt  · an unusual or allergic reaction to nortriptyline, other medicines, foods, dyes, or preservatives  · pregnant or trying to get pregnant  · breast-feeding  What should I watch for while using this medicine?  Tell your doctor if your symptoms do not get better or if they get worse. Visit your doctor or health care professional for regular checks on your progress. Because it may  take several weeks to see the full effects of this medicine, it is important to continue your treatment as prescribed by your doctor.  Patients and their families should watch out for new or worsening thoughts of suicide or depression. Also watch out for sudden changes in feelings such as feeling anxious, agitated, panicky, irritable, hostile, aggressive, impulsive, severely restless, overly excited and hyperactive, or not being able to sleep. If this happens, especially at the beginning of treatment or after a change in dose, call your health care professional.  You may get drowsy or dizzy. Do not drive, use machinery, or do anything that needs mental alertness until you know how this medicine affects you. Do not stand or sit up quickly, especially if you are an older patient. This reduces the risk of dizzy or fainting spells. Alcohol may interfere with the effect of this medicine. Avoid alcoholic drinks.  Do not treat yourself for coughs, colds, or allergies without asking your doctor or health care professional for advice. Some ingredients can increase possible side effects.  Your mouth may get dry. Chewing sugarless gum or sucking hard candy, and drinking plenty of water may help. Contact your doctor if the problem does not go away or is severe.  This medicine may cause dry eyes and blurred vision. If you wear contact lenses you may feel some discomfort. Lubricating drops may help. See your eye doctor if the problem does not go away or is severe.  This medicine can cause constipation. Try to have a bowel movement at least every 2 to 3 days. If you do not have a bowel movement for 3 days, call your doctor or health care professional.  This medicine can make you more sensitive to the sun. Keep out of the sun. If you cannot avoid being in the sun, wear protective clothing and use sunscreen. Do not use sun lamps or tanning beds/booths.  NOTE:This sheet is a summary. It may not cover all possible information. If you  have questions about this medicine, talk to your doctor, pharmacist, or health care provider. Copyright© 2017 [employer]        Amitriptyline tablets  What is this medicine?  AMITRIPTYLINE (a maryjane TRIP ti caprice) is used to treat depression.  How should I use this medicine?  Take this medicine by mouth with a drink of water. Follow the directions on the prescription label. You can take the tablets with or without food. Take your medicine at regular intervals. Do not take it more often than directed. Do not stop taking this medicine suddenly except upon the advice of your doctor. Stopping this medicine too quickly may cause serious side effects or your condition may worsen.  A special MedGuide will be given to you by the pharmacist with each prescription and refill. Be sure to read this information carefully each time.  Talk to your pediatrician regarding the use of this medicine in children. Special care may be needed.  What side effects may I notice from receiving this medicine?  Side effects that you should report to your doctor or health care professional as soon as possible:  · allergic reactions like skin rash, itching or hives, swelling of the face, lips, or tongue  · abnormal production of milk in females  · breast enlargement in both males and females  · breathing problems  · confusion, hallucinations  · fast, irregular heartbeat  · fever with increased sweating  · muscle stiffness, or spasms  · pain or difficulty passing urine, loss of bladder control  · seizures  · suicidal thoughts or other mood changes  · swelling of the testicles  · tingling, pain, or numbness in the feet or hands  · yellowing of the eyes or skin  Side effects that usually do not require medical attention (report to your doctor or health care professional if they continue or are bothersome):  · change in sex drive or performance  · constipation or diarrhea  · nausea, vomiting  · weight gain or loss  What may interact with this  medicine?  Do not take this medicine with any of the following medications:  · arsenic trioxide  · certain medicines used to regulate abnormal heartbeat or to treat other heart conditions  · cisapride  · droperidol  · halofantrine  · linezolid  · MAOIs like Carbex, Eldepryl, Marplan, Nardil, and Parnate  · methylene blue  · other medicines for mental depression  · phenothiazines like perphenazine, thioridazine and chlorpromazine  · pimozide  · probucol  · procarbazine  · sparfloxacin  · Willie's Wort  · ziprasidone  This medicine may also interact with the following medications:  · atropine and related drugs like hyoscyamine, scopolamine, tolterodine and others  · barbiturate medicines for inducing sleep or treating seizures, like phenobarbital  · cimetidine  · disulfiram  · ethchlorvynol  · thyroid hormones such as levothyroxine  What if I miss a dose?  If you miss a dose, take it as soon as you can. If it is almost time for your next dose, take only that dose. Do not take double or extra doses.  Where should I keep my medicine?  Keep out of the reach of children.  Store at room temperature between 20 and 25 degrees C (68 and 77 degrees F). Throw away any unused medicine after the expiration date.  What should I tell my health care provider before I take this medicine?  They need to know if you have any of these conditions:  · an alcohol problem  · asthma, difficulty breathing  · bipolar disorder or schizophrenia  · difficulty passing urine, prostate trouble  · glaucoma  · heart disease or previous heart attack  · liver disease  · over active thyroid  · seizures  · thoughts or plans of suicide, a previous suicide attempt, or family history of suicide attempt  · an unusual or allergic reaction to amitriptyline, other medicines, foods, dyes, or preservatives  · pregnant or trying to get pregnant  · breast-feeding  What should I watch for while using this medicine?  Tell your doctor if your symptoms do not get  better or if they get worse. Visit your doctor or health care professional for regular checks on your progress. Because it may take several weeks to see the full effects of this medicine, it is important to continue your treatment as prescribed by your doctor.  Patients and their families should watch out for new or worsening thoughts of suicide or depression. Also watch out for sudden changes in feelings such as feeling anxious, agitated, panicky, irritable, hostile, aggressive, impulsive, severely restless, overly excited and hyperactive, or not being able to sleep. If this happens, especially at the beginning of treatment or after a change in dose, call your health care professional.  You may get drowsy or dizzy. Do not drive, use machinery, or do anything that needs mental alertness until you know how this medicine affects you. Do not stand or sit up quickly, especially if you are an older patient. This reduces the risk of dizzy or fainting spells. Alcohol may interfere with the effect of this medicine. Avoid alcoholic drinks.  Do not treat yourself for coughs, colds, or allergies without asking your doctor or health care professional for advice. Some ingredients can increase possible side effects.  Your mouth may get dry. Chewing sugarless gum or sucking hard candy, and drinking plenty of water will help. Contact your doctor if the problem does not go away or is severe.  This medicine may cause dry eyes and blurred vision. If you wear contact lenses you may feel some discomfort. Lubricating drops may help. See your eye doctor if the problem does not go away or is severe.  This medicine can cause constipation. Try to have a bowel movement at least every 2 to 3 days. If you do not have a bowel movement for 3 days, call your doctor or health care professional.  This medicine can make you more sensitive to the sun. Keep out of the sun. If you cannot avoid being in the sun, wear protective clothing and use  sunscreen. Do not use sun lamps or tanning beds/booths.  NOTE:This sheet is a summary. It may not cover all possible information. If you have questions about this medicine, talk to your doctor, pharmacist, or health care provider. Copyright© 2017 [employer]        Topiramate tablets  What is this medicine?  TOPIRAMATE (toe PYRE a mate) is used to treat seizures in adults or children with epilepsy. It is also used for the prevention of migraine headaches.  How should I use this medicine?  Take this medicine by mouth with a glass of water. Follow the directions on the prescription label. Do not crush or chew. You may take this medicine with meals. Take your medicine at regular intervals. Do not take it more often than directed.  Talk to your pediatrician regarding the use of this medicine in children. Special care may be needed. While this drug may be prescribed for children as young as 2 years of age for selected conditions, precautions do apply.  What side effects may I notice from receiving this medicine?  Side effects that you should report to your doctor or health care professional as soon as possible:  · allergic reactions like skin rash, itching or hives, swelling of the face, lips, or tongue  · decreased sweating and/or rise in body temperature  · depression  · difficulty breathing, fast or irregular breathing patterns  · difficulty speaking  · difficulty walking or controlling muscle movements  · hearing impairment  · redness, blistering, peeling or loosening of the skin, including inside the mouth  · tingling, pain or numbness in the hands or feet  · unusual bleeding or bruising  · unusually weak or tired  · worsening of mood, thoughts or actions of suicide or dying  Side effects that usually do not require medical attention (report to your doctor or health care professional if they continue or are bothersome):  · altered taste  · back pain, joint or muscle aches and pains  · diarrhea, or  constipation  · headache  · loss of appetite  · nausea  · stomach upset, indigestion  · tremors  What may interact with this medicine?  Do not take this medicine with any of the following medications:  · probenecid  This medicine may also interact with the following medications:  · acetazolamide  · alcohol  · amitriptyline  · aspirin and aspirin-like medicines  · birth control pills  · certain medicines for depression  · certain medicines for seizures  · certain medicines that treat or prevent blood clots like warfarin, enoxaparin, dalteparin, apixaban, dabigatran, and rivaroxaban  · digoxin  · hydrochlorothiazide  · lithium  · medicines for pain, sleep, or muscle relaxation  · metformin  · methazolamide  · NSAIDS, medicines for pain and inflammation, like ibuprofen or naproxen  · pioglitazone  · risperidone  What if I miss a dose?  If you miss a dose, take it as soon as you can. If your next dose is to be taken in less than 6 hours, then do not take the missed dose. Take the next dose at your regular time. Do not take double or extra doses.  Where should I keep my medicine?  Keep out of the reach of children.  Store at room temperature between 15 and 30 degrees C (59 and 86 degrees F) in a tightly closed container. Protect from moisture. Throw away any unused medicine after the expiration date.  What should I tell my health care provider before I take this medicine?  They need to know if you have any of these conditions:  · bleeding disorders  · cirrhosis of the liver or liver disease  · diarrhea  · glaucoma  · kidney stones or kidney disease  · low blood counts, like low white cell, platelet, or red cell counts  · lung disease like asthma, obstructive pulmonary disease, emphysema  · metabolic acidosis  · on a ketogenic diet  · schedule for surgery or a procedure  · suicidal thoughts, plans, or attempt; a previous suicide attempt by you or a family member  · an unusual or allergic reaction to topiramate, other  medicines, foods, dyes, or preservatives  · pregnant or trying to get pregnant  · breast-feeding  What should I watch for while using this medicine?  Visit your doctor or health care professional for regular checks on your progress. Do not stop taking this medicine suddenly. This increases the risk of seizures if you are using this medicine to control epilepsy. Wear a medical identification bracelet or chain to say you have epilepsy or seizures, and carry a card that lists all your medicines.  This medicine can decrease sweating and increase your body temperature. Watch for signs of  sweating or fever, especially in children. Avoid extreme heat, hot baths, and saunas. Be careful about exercising, especially in hot weather. Contact your health care provider right away if you notice a fever or decrease in sweating.  You should drink plenty of fluids while taking this medicine. If you have had kidney stones in the past, this will help to reduce your chances of forming kidney stones.  If you have stomach pain, with nausea or vomiting and yellowing of your eyes or skin, call your doctor immediately.  You may get drowsy, dizzy, or have blurred vision. Do not drive, use machinery, or do anything that needs mental alertness until you know how this medicine affects you. To reduce dizziness, do not sit or stand up quickly, especially if you are an older patient. Alcohol can increase drowsiness and dizziness. Avoid alcoholic drinks.  If you notice blurred vision, eye pain, or other eye problems, seek medical attention at once for an eye exam.  The use of this medicine may increase the chance of suicidal thoughts or actions. Pay special attention to how you are responding while on this medicine. Any worsening of mood, or thoughts of suicide or dying should be reported to your health care professional right away.  This medicine may increase the chance of developing metabolic acidosis. If left untreated, this can cause  kidney stones, bone disease, or slowed growth in children. Symptoms include breathing fast, fatigue, loss of appetite, irregular heartbeat, or loss of consciousness. Call your doctor immediately if you experience any of these side effects. Also, tell your doctor about any surgery you plan on having while taking this medicine since this may increase your risk for metabolic acidosis.  Birth control pills may not work properly while you are taking this medicine. Talk to your doctor about using an extra method of birth control.  Women who become pregnant while using this medicine may enroll in the [place] Antiepileptic Drug Pregnancy Registry by calling 1-927.265.7386. This registry collects information about the safety of antiepileptic drug use during pregnancy.  NOTE:This sheet is a summary. It may not cover all possible information. If you have questions about this medicine, talk to your doctor, pharmacist, or health care provider. Copyright© 2017 [employer]        EMG and NCS Tests  Electromyography (EMG) and nerve conduction studies (NCS) are tests that measure muscle and nerve function. In most cases, both tests are performed. NCS is most often done first. You will be asked to lie on an exam table with a blanket over you. You may  have one or more of the following.  Nerve conduction study (NCS)    During NCS, mild electrical currents are used to test how fast impulses move along your nerves. Small metal disks (electrodes) will be attached to your skin on the area of your body being tested. This will be done using water-based gel or paste. A doctor or technologist will apply mild electrical currents to your skin. Your muscles will twitch, but the test wont harm you. Currents are usually applied to the same area several times. Usually the intensity of the electrical stimulation is increased on each body part. Despite some increasing discomfort that varies from person to person, the electrical shock is not  dangerous. The test may continue on other parts of your body unless the reason for doing the test is limited to a small part of the body.  Electromyography (EMG)    Most of the electrodes will be removed for EMG. The doctor will clean the area being tested with alcohol. A very fine needle will be inserted into the muscles in this region. When the needle is inserted, you may feel as if your skin is being pinched. Try to relax and do as instructed, since you will be asked to relax and contract the muscle being tested. Following instructions will allow your doctor to interpret the test results.  During each test, wavy lines (waveforms) appear on a screen or on paper. These lines show how well your nerves and muscles work. These waveforms help to determine your test results.  Before the test  Prepare for your test as instructed. Shower or bathe, but don't use powder, oil, or lotion. Your skin should be clean and free of excess oil. Wear loose clothes. But know that you may be asked to change into a hospital gown. The entire test will take about 60 minutes. Be sure to allow extra time to check in.  Let the technologist know  For your safety and for the success of your test, tell the technologist if you:  · Have any bleeding problems.  · Take blood thinners (anticoagulants) or other medicines, including aspirin.  · Have any immune system problems.  · Have had neck or back surgery.  You may also be asked questions about your overall health.   After the test  Before you leave, all electrodes will be removed. You can then get right back to your normal routine. If you feel tired or have some discomfort, take it easy. If you were told to stop taking any medications for your test, ask when you can start taking them again. Your doctor will let you know when your test results are ready.  Date Last Reviewed: 9/12/2015  © 6054-6176 [employer]. [address] [place] [address], [place] [address]. All rights reserved. This  information is not intended as a substitute for professional medical care. Always follow your healthcare professional's instructions.

## 2018-07-19 NOTE — PROGRESS NOTES
Subjective:       Patient ID: Humberto Cantor is a 62 y.o. male.    Chief Complaint: Hypertension    Hypertension   This is a chronic problem. The problem has been resolved since onset. The problem is controlled. Pertinent negatives include no chest pain, headaches, palpitations or shortness of breath. Agents associated with hypertension include NSAIDs. Risk factors for coronary artery disease include male gender and sedentary lifestyle.     Review of Systems   Constitutional: Negative for fatigue and unexpected weight change.   Respiratory: Negative for chest tightness and shortness of breath.    Cardiovascular: Negative for chest pain, palpitations and leg swelling.   Gastrointestinal: Negative for abdominal pain.   Musculoskeletal: Positive for arthralgias and back pain.   Neurological: Negative for dizziness, syncope, light-headedness and headaches.       Patient Active Problem List   Diagnosis    GERD (gastroesophageal reflux disease)    Essential hypertension    Anxiety    Burst fracture of lumbar vertebra    Lumbar burst fracture    Hypomagnesemia    Hypophosphatemia    Hypocalcemia    Lumbar back pain with radiculopathy affecting right lower extremity    S/P lumbar spinal fusion       Objective:      Physical Exam   Constitutional: He is oriented to person, place, and time. He appears well-developed and well-nourished.   Neck: Normal range of motion.   Cardiovascular: Normal rate, regular rhythm and normal heart sounds.    Pulmonary/Chest: Effort normal and breath sounds normal.   Abdominal: Soft. Bowel sounds are normal.   Musculoskeletal: He exhibits no edema.        Lumbar back: He exhibits decreased range of motion and tenderness.   Back exam: antalgic gait, limited range of motion, pain with motion noted during exam, negative straight-leg raise bilaterally , normal reflexes and strength bilateral lower extremities, sensory exam intact bilateral lower extremities.     Neurological: He is alert  and oriented to person, place, and time.   Skin: Skin is warm and dry.   Psychiatric: He has a normal mood and affect.   Nursing note and vitals reviewed.      Lab Results   Component Value Date    WBC 3.96 03/22/2018    HGB 14.5 03/22/2018    HCT 44.7 03/22/2018     03/22/2018    CHOL 225 (H) 03/22/2018    TRIG 57 03/22/2018    HDL 50 03/22/2018    ALT 20 03/22/2018    AST 27 03/22/2018     03/22/2018    K 4.5 03/22/2018     03/22/2018    CREATININE 1.0 03/22/2018    BUN 16 03/22/2018    CO2 27 03/22/2018    INR 1.0 06/05/2017     The 10-year ASCVD risk score (Greg HARDWICK Jr., et al., 2013) is: 13.2%    Values used to calculate the score:      Age: 62 years      Sex: Male      Is Non- : No      Diabetic: No      Tobacco smoker: No      Systolic Blood Pressure: 119 mmHg      Is BP treated: Yes      HDL Cholesterol: 50 mg/dL      Total Cholesterol: 225 mg/dL    Assessment:       1. Chronic bilateral low back pain with sciatica, sciatica laterality unspecified    2. Essential hypertension    3. TORRIE (generalized anxiety disorder)    4. Pure hyperglyceridemia    5. Colon cancer screening        Plan:       Chronic bilateral low back pain with sciatica, sciatica laterality unspecified    Essential hypertension  -     Basic metabolic panel; Future; Expected date: 07/19/2018  -     CBC auto differential; Future; Expected date: 07/19/2018    TORRIE (generalized anxiety disorder)  -     ALPRAZolam (XANAX) 0.25 MG tablet; Take 1 tablet (0.25 mg total) by mouth 2 (two) times daily as needed.  Dispense: 60 tablet; Refill: 3    Pure hyperglyceridemia  -     Lipid panel; Future; Expected date: 07/19/2018    Colon cancer screening  -     Fecal Immunochemical Test (iFOBT); Future; Expected date: 07/19/2018    Other orders  -     magnesium chloride (SLOW-MAG) 71.5 mg TbEC; Take 72 mg by mouth once. for 1 dose  Dispense: 90 tablet; Refill: 2      Patient readiness: eager and barriers:none    During  the course of the visit the patient was educated and counseled about the following:     Hypertension:   Dietary sodium restriction.  Regular aerobic exercise.  Check blood pressures daily and record.  Follow up: 3 months and as needed.    Goals: Hypertension: Reduce Blood Pressure    Did patient meet goals/outcomes: Yes    The following self management tools provided: blood pressure log  excercise log    Patient Instructions (the written plan) was given to the patient/family.     Time spent with patient: 30 minutes    Barriers to medications present (yes )    Adverse reactions to current medications (no)    Over the counter medications reviewed (No)        30-minute visit. 20 minutes spent counseling patient on diet, exercise, and weight loss.  This has been fully explained to the patient, who indicates understanding.

## 2018-07-24 ENCOUNTER — LAB VISIT (OUTPATIENT)
Dept: LAB | Facility: HOSPITAL | Age: 63
End: 2018-07-24
Attending: FAMILY MEDICINE
Payer: COMMERCIAL

## 2018-07-24 DIAGNOSIS — Z12.11 COLON CANCER SCREENING: ICD-10-CM

## 2018-07-24 LAB — HEMOCCULT STL QL IA: NEGATIVE

## 2018-07-24 PROCEDURE — 82274 ASSAY TEST FOR BLOOD FECAL: CPT

## 2018-08-10 ENCOUNTER — OFFICE VISIT (OUTPATIENT)
Dept: ORTHOPEDICS | Facility: CLINIC | Age: 63
End: 2018-08-10
Payer: COMMERCIAL

## 2018-08-10 ENCOUNTER — HOSPITAL ENCOUNTER (OUTPATIENT)
Dept: RADIOLOGY | Facility: HOSPITAL | Age: 63
Discharge: HOME | End: 2018-08-10
Attending: PHYSICIAN ASSISTANT
Payer: COMMERCIAL

## 2018-08-10 VITALS
WEIGHT: 195.19 LBS | HEIGHT: 70 IN | HEART RATE: 74 BPM | BODY MASS INDEX: 27.94 KG/M2 | DIASTOLIC BLOOD PRESSURE: 90 MMHG | SYSTOLIC BLOOD PRESSURE: 134 MMHG

## 2018-08-10 DIAGNOSIS — Z98.1 S/P LUMBAR FUSION: ICD-10-CM

## 2018-08-10 DIAGNOSIS — Z98.1 S/P LUMBAR SPINAL FUSION: Primary | ICD-10-CM

## 2018-08-10 PROCEDURE — 3080F DIAST BP >= 90 MM HG: CPT | Mod: CPTII,S$GLB,, | Performed by: ORTHOPAEDIC SURGERY

## 2018-08-10 PROCEDURE — 99999 PR PBB SHADOW E&M-EST. PATIENT-LVL III: CPT | Mod: PBBFAC,,, | Performed by: ORTHOPAEDIC SURGERY

## 2018-08-10 PROCEDURE — 3008F BODY MASS INDEX DOCD: CPT | Mod: CPTII,S$GLB,, | Performed by: ORTHOPAEDIC SURGERY

## 2018-08-10 PROCEDURE — 72100 X-RAY EXAM L-S SPINE 2/3 VWS: CPT | Mod: TC

## 2018-08-10 PROCEDURE — 3075F SYST BP GE 130 - 139MM HG: CPT | Mod: CPTII,S$GLB,, | Performed by: ORTHOPAEDIC SURGERY

## 2018-08-10 PROCEDURE — 72100 X-RAY EXAM L-S SPINE 2/3 VWS: CPT | Mod: 26,,, | Performed by: RADIOLOGY

## 2018-08-10 PROCEDURE — 99213 OFFICE O/P EST LOW 20 MIN: CPT | Mod: S$GLB,,, | Performed by: ORTHOPAEDIC SURGERY

## 2018-08-10 NOTE — PROGRESS NOTES
Date: 08/10/2018    Supervising Physician: Joaquin Navarro M.D.  Date of Surgery: 6/6/2017, 7/3/2017    Procedure: L3-5 Posterior spinal fusion, s/p L1-pelvis revision    History: Humberto Cantor is seen today for follow-up following the above listed procedure. Overall the patient is doing well but today notes he is continuing to improve.   He has completed PT and is doing the exercises on his own at home and doing well. He is taking oxycodone for pain and is seeing pain mgmt. Angela leg symptoms that he attributes to lidocaine gel, he ha since stopped this. He takes 12 naproxen a day at time.     Exam:  Incision is healed.   There is no sign of infection. Neuro exam is stable. No signs of DVT.    Radiographs: imaging today shows hardware in place, no evidence of failure.     Assessment/Plan:     Doing well postoperatively. Continue home exercise program.  Progress activities as tolerated.   Will continue to follow with pain management. Advised patient to decrease naproxen useas he is at maria elena risk for GI ulcer taking the current amount he is taking.     I will plan to see the patient back for the next postop in one year.     Thank you for the opportunity to participate in this patient's care. Please give me a call if there are any concerns or questions.    I spent 15 minutes with the patient of which greater than 1/2 the time was devoted to counciling the patient regarding treatment options.

## 2018-08-16 DIAGNOSIS — Z11.59 NEED FOR HEPATITIS C SCREENING TEST: ICD-10-CM

## 2018-10-24 ENCOUNTER — PATIENT OUTREACH (OUTPATIENT)
Dept: ADMINISTRATIVE | Facility: HOSPITAL | Age: 63
End: 2018-10-24

## 2018-10-24 DIAGNOSIS — Z13.6 SCREENING FOR AAA (ABDOMINAL AORTIC ANEURYSM): Primary | ICD-10-CM

## 2018-10-24 NOTE — LETTER
October 24, 2018    Humberto Cantor  [address]  [place] [address]             [facility]  [address]  [place] [address]  Phone: [phone] Dear [facility] is committed to your overall health and would like to ensure that you are up to date on your recommended test and/or procedures.   Saud Sosa MD  has found that your chart shows you may be due for the following:    ABDOMINAL AORTA ANEURYSM SCREENING      If you have had any of the above done at another facility, please let us know so that we may obtain copies from that facility.  If you have a copy of these records, please provide a copy for us to scan into your chart.  You are welcome to request that the report be faxed to us at  ([phone]).     Otherwise, please schedule these appointments at your earliest convenience by calling [phone] or going to [email or web address].        Sincerely,  Your [facility]  MD Leyda Nguyen LPN Clinical Care Coordinator  [facility] [facility] [address] [place] [address]  Phone [phone]  Fax [phone]

## 2018-10-31 ENCOUNTER — LAB VISIT (OUTPATIENT)
Dept: LAB | Facility: HOSPITAL | Age: 63
End: 2018-10-31
Attending: FAMILY MEDICINE
Payer: MEDICARE

## 2018-10-31 DIAGNOSIS — E78.1 PURE HYPERGLYCERIDEMIA: ICD-10-CM

## 2018-10-31 DIAGNOSIS — I10 ESSENTIAL HYPERTENSION: ICD-10-CM

## 2018-10-31 DIAGNOSIS — Z11.59 NEED FOR HEPATITIS C SCREENING TEST: ICD-10-CM

## 2018-10-31 LAB
ANION GAP SERPL CALC-SCNC: 5 MMOL/L
BASOPHILS # BLD AUTO: 0.05 K/UL
BASOPHILS NFR BLD: 1 %
BUN SERPL-MCNC: 32 MG/DL
CALCIUM SERPL-MCNC: 9.9 MG/DL
CHLORIDE SERPL-SCNC: 105 MMOL/L
CHOLEST SERPL-MCNC: 204 MG/DL
CHOLEST/HDLC SERPL: 3 {RATIO}
CO2 SERPL-SCNC: 28 MMOL/L
CREAT SERPL-MCNC: 1 MG/DL
DIFFERENTIAL METHOD: ABNORMAL
EOSINOPHIL # BLD AUTO: 0.2 K/UL
EOSINOPHIL NFR BLD: 3.1 %
ERYTHROCYTE [DISTWIDTH] IN BLOOD BY AUTOMATED COUNT: 13.7 %
EST. GFR  (AFRICAN AMERICAN): >60 ML/MIN/1.73 M^2
EST. GFR  (NON AFRICAN AMERICAN): >60 ML/MIN/1.73 M^2
GLUCOSE SERPL-MCNC: 88 MG/DL
HCT VFR BLD AUTO: 40.4 %
HDLC SERPL-MCNC: 69 MG/DL
HDLC SERPL: 33.8 %
HGB BLD-MCNC: 13.3 G/DL
IMM GRANULOCYTES # BLD AUTO: 0.01 K/UL
IMM GRANULOCYTES NFR BLD AUTO: 0.2 %
LDLC SERPL CALC-MCNC: 126 MG/DL
LYMPHOCYTES # BLD AUTO: 1 K/UL
LYMPHOCYTES NFR BLD: 20.1 %
MCH RBC QN AUTO: 33 PG
MCHC RBC AUTO-ENTMCNC: 32.9 G/DL
MCV RBC AUTO: 100 FL
MONOCYTES # BLD AUTO: 0.6 K/UL
MONOCYTES NFR BLD: 13 %
NEUTROPHILS # BLD AUTO: 3 K/UL
NEUTROPHILS NFR BLD: 62.6 %
NONHDLC SERPL-MCNC: 135 MG/DL
NRBC BLD-RTO: 0 /100 WBC
PLATELET # BLD AUTO: 302 K/UL
PMV BLD AUTO: 9.9 FL
POTASSIUM SERPL-SCNC: 4.7 MMOL/L
RBC # BLD AUTO: 4.03 M/UL
SODIUM SERPL-SCNC: 138 MMOL/L
TRIGL SERPL-MCNC: 45 MG/DL
WBC # BLD AUTO: 4.77 K/UL

## 2018-10-31 PROCEDURE — 85025 COMPLETE CBC W/AUTO DIFF WBC: CPT

## 2018-10-31 PROCEDURE — 80061 LIPID PANEL: CPT

## 2018-10-31 PROCEDURE — 36415 COLL VENOUS BLD VENIPUNCTURE: CPT | Mod: PO

## 2018-10-31 PROCEDURE — 86803 HEPATITIS C AB TEST: CPT

## 2018-10-31 PROCEDURE — 80048 BASIC METABOLIC PNL TOTAL CA: CPT

## 2018-11-01 LAB — HCV AB SERPL QL IA: NEGATIVE

## 2018-11-05 ENCOUNTER — TELEPHONE (OUTPATIENT)
Dept: FAMILY MEDICINE | Facility: CLINIC | Age: 63
End: 2018-11-05

## 2018-11-05 NOTE — TELEPHONE ENCOUNTER
----- Message from Mindi Sanchez sent at 11/5/2018 12:44 PM CST -----  Contact: pt  Pt states that he received a phone requesting a call back concerning lab results..[phone] (home)

## 2018-11-08 ENCOUNTER — OFFICE VISIT (OUTPATIENT)
Dept: FAMILY MEDICINE | Facility: CLINIC | Age: 63
End: 2018-11-08
Payer: MEDICARE

## 2018-11-08 VITALS
SYSTOLIC BLOOD PRESSURE: 131 MMHG | BODY MASS INDEX: 28.09 KG/M2 | HEIGHT: 70 IN | TEMPERATURE: 98 F | WEIGHT: 196.19 LBS | DIASTOLIC BLOOD PRESSURE: 85 MMHG | HEART RATE: 81 BPM

## 2018-11-08 DIAGNOSIS — F41.9 ANXIETY: Chronic | ICD-10-CM

## 2018-11-08 DIAGNOSIS — Z23 NEED FOR PNEUMOCOCCAL VACCINATION: ICD-10-CM

## 2018-11-08 DIAGNOSIS — I10 ESSENTIAL HYPERTENSION: ICD-10-CM

## 2018-11-08 DIAGNOSIS — F41.1 GAD (GENERALIZED ANXIETY DISORDER): ICD-10-CM

## 2018-11-08 DIAGNOSIS — M54.16 LUMBAR BACK PAIN WITH RADICULOPATHY AFFECTING RIGHT LOWER EXTREMITY: Primary | ICD-10-CM

## 2018-11-08 PROBLEM — E83.42 HYPOMAGNESEMIA: Status: RESOLVED | Noted: 2017-07-05 | Resolved: 2018-11-08

## 2018-11-08 PROBLEM — E83.39 HYPOPHOSPHATEMIA: Status: RESOLVED | Noted: 2017-07-05 | Resolved: 2018-11-08

## 2018-11-08 PROBLEM — E83.51 HYPOCALCEMIA: Status: RESOLVED | Noted: 2017-07-05 | Resolved: 2018-11-08

## 2018-11-08 PROCEDURE — 1101F PT FALLS ASSESS-DOCD LE1/YR: CPT | Mod: CPTII,S$GLB,, | Performed by: FAMILY MEDICINE

## 2018-11-08 PROCEDURE — 3075F SYST BP GE 130 - 139MM HG: CPT | Mod: CPTII,S$GLB,, | Performed by: FAMILY MEDICINE

## 2018-11-08 PROCEDURE — 90670 PCV13 VACCINE IM: CPT | Mod: S$GLB,,, | Performed by: FAMILY MEDICINE

## 2018-11-08 PROCEDURE — 3008F BODY MASS INDEX DOCD: CPT | Mod: CPTII,S$GLB,, | Performed by: FAMILY MEDICINE

## 2018-11-08 PROCEDURE — G0009 ADMIN PNEUMOCOCCAL VACCINE: HCPCS | Mod: S$GLB,,, | Performed by: FAMILY MEDICINE

## 2018-11-08 PROCEDURE — 3079F DIAST BP 80-89 MM HG: CPT | Mod: CPTII,S$GLB,, | Performed by: FAMILY MEDICINE

## 2018-11-08 PROCEDURE — 99214 OFFICE O/P EST MOD 30 MIN: CPT | Mod: S$GLB,,, | Performed by: FAMILY MEDICINE

## 2018-11-08 PROCEDURE — 99999 PR PBB SHADOW E&M-EST. PATIENT-LVL III: CPT | Mod: PBBFAC,,, | Performed by: FAMILY MEDICINE

## 2018-11-08 RX ORDER — ONDANSETRON HYDROCHLORIDE 8 MG/1
8 TABLET, FILM COATED ORAL
Qty: 21 TABLET | Refills: 0 | Status: SHIPPED | OUTPATIENT
Start: 2018-11-08 | End: 2019-11-08

## 2018-11-08 RX ORDER — METHOCARBAMOL 750 MG/1
750 TABLET, FILM COATED ORAL 2 TIMES DAILY PRN
Qty: 40 TABLET | Refills: 0
Start: 2018-11-08 | End: 2018-11-18

## 2018-11-08 RX ORDER — ALPRAZOLAM 0.25 MG/1
0.25 TABLET ORAL 2 TIMES DAILY PRN
Qty: 60 TABLET | Refills: 3 | Status: SHIPPED | OUTPATIENT
Start: 2018-11-08 | End: 2019-03-07 | Stop reason: SDUPTHER

## 2018-11-08 NOTE — PROGRESS NOTES
Subjective:       Patient ID: Humberto Cantor is a 63 y.o. male.    Chief Complaint: Hypertension    HPI  Review of Systems   Constitutional: Negative for fatigue and unexpected weight change.   Respiratory: Negative for chest tightness and shortness of breath.    Cardiovascular: Negative for chest pain, palpitations and leg swelling.   Gastrointestinal: Negative for abdominal pain.   Musculoskeletal: Positive for arthralgias, back pain and myalgias.   Neurological: Negative for dizziness, syncope, light-headedness and headaches.       Patient Active Problem List   Diagnosis    GERD (gastroesophageal reflux disease)    Essential hypertension    Anxiety    Burst fracture of lumbar vertebra    Lumbar burst fracture    Lumbar back pain with radiculopathy affecting right lower extremity    S/P lumbar spinal fusion       Objective:      Physical Exam   Constitutional: He is oriented to person, place, and time. He appears well-developed and well-nourished.   Cardiovascular: Normal rate, regular rhythm and normal heart sounds.   Pulmonary/Chest: Effort normal and breath sounds normal.   Musculoskeletal: He exhibits no edema.        Lumbar back: He exhibits decreased range of motion and tenderness.   Neurological: He is alert and oriented to person, place, and time.   Skin: Skin is warm and dry.   Psychiatric: He has a normal mood and affect.   Nursing note and vitals reviewed.      Lab Results   Component Value Date    WBC 4.77 10/31/2018    HGB 13.3 (L) 10/31/2018    HCT 40.4 10/31/2018     10/31/2018    CHOL 204 (H) 10/31/2018    TRIG 45 10/31/2018    HDL 69 10/31/2018    ALT 20 03/22/2018    AST 27 03/22/2018     10/31/2018    K 4.7 10/31/2018     10/31/2018    CREATININE 1.0 10/31/2018    BUN 32 (H) 10/31/2018    CO2 28 10/31/2018    INR 1.0 06/05/2017     The 10-year ASCVD risk score (Gregconrad HARDWICK Jr., et al., 2013) is: 13%    Values used to calculate the score:      Age: 63 years      Sex: Male       Is Non- : No      Diabetic: No      Tobacco smoker: No      Systolic Blood Pressure: 131 mmHg      Is BP treated: Yes      HDL Cholesterol: 69 mg/dL      Total Cholesterol: 204 mg/dL    Assessment:       1. Lumbar back pain with radiculopathy affecting right lower extremity    2. Anxiety    3. Essential hypertension        Plan:       Lumbar back pain with radiculopathy affecting right lower extremity    Anxiety    Essential hypertension      Patient readiness: eager and barriers:social stressors and environmental    During the course of the visit the patient was educated and counseled about the following:     Hypertension:   Dietary sodium restriction.  Regular aerobic exercise.    Goals: Hypertension: Reduce Blood Pressure    Did patient meet goals/outcomes: Yes    The following self management tools provided: blood pressure log  excercise log    Patient Instructions (the written plan) was given to the patient/family.     Time spent with patient: 30 minutes    Barriers to medications present (no )    Adverse reactions to current medications (no)    Over the counter medications reviewed (No)        30-minute visit. 15 minutes spent counseling patient on diet, exercise, and weight loss.  This has been fully explained to the patient, who indicates understanding.

## 2018-11-08 NOTE — PATIENT INSTRUCTIONS

## 2018-11-19 ENCOUNTER — TELEPHONE (OUTPATIENT)
Dept: FAMILY MEDICINE | Facility: CLINIC | Age: 63
End: 2018-11-19

## 2018-11-19 NOTE — TELEPHONE ENCOUNTER
----- Message from Jalyn Ferro sent at 11/19/2018  1:51 PM CST -----  Contact: Ronda with [facility] pharmacy  Type:  Pharmacy Calling to Clarify an RX    Name of Caller:  Ronda  Pharmacy Name:    [id number] [address] - [address]  [place] [address]  Phone: [phone] Fax: [phone]    Prescription Name:  ALPRAZolam (XANAX) 0.25 MG tablet  What do they need to clarify?:  Drug interaction between pain medicine and Xanax  Best Call Back Number:  [phone]  Additional Information:  na

## 2018-11-19 NOTE — TELEPHONE ENCOUNTER
Please see attached message. Spoke to Ronda (Pharmacist) at [facility] who states patient recently filled a prescription at [facility] for [place]. States the prescription for Norco was written by Dr. Gordon Nance. Requesting to notify PCP of possible interaction between Norco and Alprazolam (possible respiratory depression). Please advise if Alprazolam can be filled by pharmacy.

## 2018-11-20 ENCOUNTER — TELEPHONE (OUTPATIENT)
Dept: FAMILY MEDICINE | Facility: CLINIC | Age: 63
End: 2018-11-20

## 2018-11-20 NOTE — TELEPHONE ENCOUNTER
Please see encounter dated 11-19-18.  Jayce with [facility] notified Dr. Sosa authorized refill of Alprazolam and Norco.             ----- Message from Bernie Gamez sent at 11/20/2018 12:14 PM CST -----  Contact: Jayce/[facility]  ..Type: Needs Medical Advice    Who Called:  Jayce with [facility]  Pharmacy name and phone #:  ..  [facility] [address] [place], [place] [address] [place] [address]  Phone: [phone] Fax: [phone]    Best Call Back Number: [phone]  Additional Information: Jayce is requesting speak with a nurse to get authorization to filled pt's medicationALPRAZolam (XANAX) 0.25 MG tablet and Norco at the same time  Please call to advise  Thanks

## 2018-11-20 NOTE — TELEPHONE ENCOUNTER
Yes , he has chronic TORRIE and is under therapy.  Please refill his xanax.I am aware of Dr Nance rx.  Thanks

## 2019-01-01 NOTE — ED PROVIDER NOTES
Encounter Date: 6/5/2017    SCRIBE #1 NOTE: I, Shayne Chow, am scribing for, and in the presence of,  Dr. Cali. I have scribed the entire note.       History     Chief Complaint   Patient presents with    L4 Fracture -[facility] Transfer     Pt fell approx 10 feet from scaffolding yesterday. Pt here for Neuro eval.       Review of patient's allergies indicates:   Allergen Reactions    Naproxen      Time patient was seen by the provider: 10:40 PM      The patient is a 61 y.o. male who has a PMHx of GERD; and HTN is presenting as transfer to [facility] for definitive spine surgery evaluation and management. Pt reports falling 10 ft off a scaffolding and landed on his right side and right arm. He c/o left sided back pain radiating to left buttocks and left arm. He states that his pain worsens when he walks. Currently, he denies any bowel/bladder incontinence or urinary retention. no saddle anethesia no LE weakness.           Past Medical History:   Diagnosis Date    Dermatitis, atopic     GERD (gastroesophageal reflux disease)     Hypertension      Past Surgical History:   Procedure Laterality Date    HERNIA REPAIR      WISDOM TOOTH EXTRACTION       Family History   Problem Relation Age of Onset    Early death Brother     Arthritis Mother     Cancer Maternal Grandmother      abdominal    Cancer Maternal Grandfather 60     lung    Liver disease Paternal Grandfather      Social History   Substance Use Topics    Smoking status: Former Smoker    Smokeless tobacco: Not on file    Alcohol use 1.2 oz/week     2 Glasses of wine per week     Review of Systems   Constitutional: Negative for fever.   HENT: Negative for sore throat.    Eyes: Negative for visual disturbance.   Respiratory: Negative for shortness of breath.    Cardiovascular: Negative for chest pain.   Gastrointestinal:        Negative bowel incontinence     Genitourinary:        Negative urinary incontinence     Musculoskeletal: Positive for back pain (L  side).        L sided arm pain     Skin: Negative for rash.   Neurological: Negative for headaches.       Physical Exam     Initial Vitals [06/05/17 2225]   BP Pulse Resp Temp SpO2   (!) 163/93 76 18 98.1 °F (36.7 °C) 98 %     Physical Exam    Nursing note and vitals reviewed.  Constitutional: He appears well-developed and well-nourished. He is not diaphoretic. No distress.   HENT:   Head: Normocephalic and atraumatic.   Eyes: EOM are normal.   Neck: Normal range of motion. Neck supple. No JVD present.   Cardiovascular: Normal rate, regular rhythm, normal heart sounds and intact distal pulses.   Pulmonary/Chest: Breath sounds normal. No stridor. No respiratory distress. He has no wheezes. He has no rhonchi. He has no rales.   Abdominal: Soft. Bowel sounds are normal. He exhibits no distension. There is no tenderness. There is no rebound and no guarding.   Musculoskeletal: Normal range of motion. He exhibits no edema.   Neurological: He is alert and oriented to person, place, and time. No cranial nerve deficit or sensory deficit.   Skin: Skin is warm and dry.   Psychiatric: He has a normal mood and affect. His behavior is normal. Judgment and thought content normal.         ED Course   Procedures  Labs Reviewed   CBC W/ AUTO DIFFERENTIAL   COMPREHENSIVE METABOLIC PANEL   PROTIME-INR   TYPE & SCREEN     EKG Readings: (Independently Interpreted)   Rhythm: Normal Sinus Rhythm. Heart Rate: 80.   No st elevation or depression          Medical Decision Making:   History:   Old Medical Records: I decided to obtain old medical records.  Initial Assessment:   62 yo male with hx of GERD, HTN presents as transfer after diagnosis of L spine burst fx causing potential nerve root compression and severe spinal stenosis. Despite this, he is neurologically intact in LE and there is no sign of cauda equina syndrome. Orthopedics consulted upon arrival. Will obtain further imaging, treat pt's sx and re-assess.   Independently  Interpreted Test(s):   I have ordered and independently interpreted EKG Reading(s) - see prior notes  Clinical Tests:   Lab Tests: Ordered and Reviewed  Medical Tests: Ordered and Reviewed  ED Management:  MRI reviewed.  Orthopedics will admit.    Other:   I have discussed this case with another health care provider.            Scribe Attestation:   Scribe #1: I performed the above scribed service and the documentation accurately describes the services I performed. I attest to the accuracy of the note.    Attending Attestation:           Physician Attestation for Scribe:  Physician Attestation Statement for Scribe #1: I, Dr. Cali, reviewed documentation, as scribed by Shayne Chow in my presence, and it is both accurate and complete.                 ED Course     Clinical Impression:   The encounter diagnosis was Burst fracture of lumbar vertebra.    Disposition:   Disposition: Admitted       Nicko Cali MD  06/06/17 2214       Nicko Cali MD  06/06/17 1972     70 immune

## 2019-03-07 ENCOUNTER — OFFICE VISIT (OUTPATIENT)
Dept: FAMILY MEDICINE | Facility: CLINIC | Age: 64
End: 2019-03-07
Payer: MEDICARE

## 2019-03-07 VITALS
TEMPERATURE: 98 F | SYSTOLIC BLOOD PRESSURE: 160 MMHG | DIASTOLIC BLOOD PRESSURE: 82 MMHG | HEIGHT: 70 IN | WEIGHT: 202.63 LBS | HEART RATE: 64 BPM | BODY MASS INDEX: 29.01 KG/M2

## 2019-03-07 DIAGNOSIS — D64.9 ANEMIA, UNSPECIFIED TYPE: ICD-10-CM

## 2019-03-07 DIAGNOSIS — F41.9 ANXIETY: Chronic | ICD-10-CM

## 2019-03-07 DIAGNOSIS — M54.16 LUMBAR BACK PAIN WITH RADICULOPATHY AFFECTING RIGHT LOWER EXTREMITY: ICD-10-CM

## 2019-03-07 DIAGNOSIS — F41.1 GAD (GENERALIZED ANXIETY DISORDER): ICD-10-CM

## 2019-03-07 DIAGNOSIS — I10 ESSENTIAL HYPERTENSION: Primary | ICD-10-CM

## 2019-03-07 PROCEDURE — 3077F SYST BP >= 140 MM HG: CPT | Mod: CPTII,S$GLB,, | Performed by: NURSE PRACTITIONER

## 2019-03-07 PROCEDURE — 3008F PR BODY MASS INDEX (BMI) DOCUMENTED: ICD-10-PCS | Mod: CPTII,S$GLB,, | Performed by: NURSE PRACTITIONER

## 2019-03-07 PROCEDURE — 3077F PR MOST RECENT SYSTOLIC BLOOD PRESSURE >= 140 MM HG: ICD-10-PCS | Mod: CPTII,S$GLB,, | Performed by: NURSE PRACTITIONER

## 2019-03-07 PROCEDURE — 3079F DIAST BP 80-89 MM HG: CPT | Mod: CPTII,S$GLB,, | Performed by: NURSE PRACTITIONER

## 2019-03-07 PROCEDURE — 3079F PR MOST RECENT DIASTOLIC BLOOD PRESSURE 80-89 MM HG: ICD-10-PCS | Mod: CPTII,S$GLB,, | Performed by: NURSE PRACTITIONER

## 2019-03-07 PROCEDURE — 1101F PT FALLS ASSESS-DOCD LE1/YR: CPT | Mod: CPTII,S$GLB,, | Performed by: NURSE PRACTITIONER

## 2019-03-07 PROCEDURE — 99999 PR PBB SHADOW E&M-EST. PATIENT-LVL IV: ICD-10-PCS | Mod: PBBFAC,,, | Performed by: NURSE PRACTITIONER

## 2019-03-07 PROCEDURE — 99214 PR OFFICE/OUTPT VISIT, EST, LEVL IV, 30-39 MIN: ICD-10-PCS | Mod: S$GLB,,, | Performed by: NURSE PRACTITIONER

## 2019-03-07 PROCEDURE — 99999 PR PBB SHADOW E&M-EST. PATIENT-LVL IV: CPT | Mod: PBBFAC,,, | Performed by: NURSE PRACTITIONER

## 2019-03-07 PROCEDURE — 3008F BODY MASS INDEX DOCD: CPT | Mod: CPTII,S$GLB,, | Performed by: NURSE PRACTITIONER

## 2019-03-07 PROCEDURE — 1101F PR PT FALLS ASSESS DOC 0-1 FALLS W/OUT INJ PAST YR: ICD-10-PCS | Mod: CPTII,S$GLB,, | Performed by: NURSE PRACTITIONER

## 2019-03-07 PROCEDURE — 99214 OFFICE O/P EST MOD 30 MIN: CPT | Mod: S$GLB,,, | Performed by: NURSE PRACTITIONER

## 2019-03-07 RX ORDER — OLMESARTAN MEDOXOMIL 20 MG/1
20 TABLET ORAL DAILY
Qty: 90 TABLET | Refills: 3 | Status: SHIPPED | OUTPATIENT
Start: 2019-03-07 | End: 2019-04-01 | Stop reason: SDUPTHER

## 2019-03-07 RX ORDER — HYDROCODONE BITARTRATE AND ACETAMINOPHEN 10; 325 MG/1; MG/1
1 TABLET ORAL 3 TIMES DAILY PRN
COMMUNITY

## 2019-03-07 NOTE — PATIENT INSTRUCTIONS
Alprazolam tablets  What is this medicine?  ALPRAZOLAM (al PRAY myke haji) is a benzodiazepine. It is used to treat anxiety and panic attacks.  How should I use this medicine?  Take this medicine by mouth with a glass of water. Follow the directions on the prescription label. Take your medicine at regular intervals. Do not take it more often than directed. Do not stop taking except on your doctor's advice.  A special MedGuide will be given to you by the pharmacist with each prescription and refill. Be sure to read this information carefully each time.  Talk to your pediatrician regarding the use of this medicine in children. Special care may be needed.  What side effects may I notice from receiving this medicine?  Side effects that you should report to your doctor or health care professional as soon as possible:  · allergic reactions like skin rash, itching or hives, swelling of the face, lips, or tongue  · breathing problems  · confusion  · loss of balance or coordination  · signs and symptoms of low blood pressure like dizziness; feeling faint or lightheaded, falls; unusually weak or tired  · suicidal thoughts or other mood changes  Side effects that usually do not require medical attention (report to your doctor or health care professional if they continue or are bothersome):  · dizziness  · dry mouth  · nausea, vomiting  · tiredness  What may interact with this medicine?  Do not take this medicine with any of the following medications:  · certain antiviral medicines for HIV or AIDS like delavirdine, indinavir  · certain medicines for fungal infections like ketoconazole and itraconazole  · narcotic medicines for cough  · sodium oxybate  This medicine may also interact with the following medications:  · alcohol  · antihistamines for allergy, cough and cold  · certain antibiotics like clarithromycin, erythromycin, isoniazid, rifampin, rifapentine, rifabutin, and troleandomycin  · certain medicines for blood  pressure, heart disease, irregular heart beat  · certain medicines for depression, like amitriptyline, fluoxetine, sertraline  · certain medicines for seizures like carbamazepine, oxcarbazepine, phenobarbital, phenytoin, primidone  · cimetidine  · cyclosporine  · female hormones, like estrogens or progestins and birth control pills, patches, rings, or injections  · general anesthetics like halothane, isoflurane, methoxyflurane, propofol  · grapefruit juice  · local anesthetics like lidocaine, pramoxine, tetracaine  · medicines that relax muscles for surgery  · narcotic medicines for pain  · other antiviral medicines for HIV or AIDS  · phenothiazines like chlorpromazine, mesoridazine, prochlorperazine, thioridazine  What if I miss a dose?  If you miss a dose, take it as soon as you can. If it is almost time for your next dose, take only that dose. Do not take double or extra doses.  Where should I keep my medicine?  Keep out of the reach of children. This medicine can be abused. Keep your medicine in a safe place to protect it from theft. Do not share this medicine with anyone. Selling or giving away this medicine is dangerous and against the law.  Store at room temperature between 20 and 25 degrees C (68 and 77 degrees F). This medicine may cause accidental overdose and death if taken by other adults, children, or pets. Mix any unused medicine with a substance like cat litter or coffee grounds. Then throw the medicine away in a sealed container like a sealed bag or a coffee can with a lid. Do not use the medicine after the expiration date.  What should I tell my health care provider before I take this medicine?  They need to know if you have any of these conditions:  · an alcohol or drug abuse problem  · bipolar disorder, depression, psychosis or other mental health conditions  · glaucoma  · kidney or liver disease  · lung or breathing disease  · myasthenia gravis  · Parkinson's disease  · porphyria  · seizures or a  history of seizures  · suicidal thoughts  · an unusual or allergic reaction to alprazolam, other benzodiazepines, foods, dyes, or preservatives  · pregnant or trying to get pregnant  · breast-feeding  What should I watch for while using this medicine?  Tell your doctor or health care professional if your symptoms do not start to get better or if they get worse.  Do not stop taking except on your doctor's advice. You may develop a severe reaction. Your doctor will tell you how much medicine to take.  You may get drowsy or dizzy. Do not drive, use machinery, or do anything that needs mental alertness until you know how this medicine affects you. To reduce the risk of dizzy and fainting spells, do not stand or sit up quickly, especially if you are an older patient. Alcohol may increase dizziness and drowsiness. Avoid alcoholic drinks.  If you are taking another medicine that also causes drowsiness, you may have more side effects. Give your health care provider a list of all medicines you use. Your doctor will tell you how much medicine to take. Do not take more medicine than directed. Call emergency for help if you have problems breathing or unusual sleepiness.  NOTE:This sheet is a summary. It may not cover all possible information. If you have questions about this medicine, talk to your doctor, pharmacist, or health care provider. Copyright© 2017 [employer]

## 2019-03-07 NOTE — PROGRESS NOTES
"Subjective:       Patient ID: Humberto Cantor is a 63 y.o. male.    Chief Complaint: No chief complaint on file.    Mr. Cantor presents to the clinic today for refill of Xanax. He takes this for anxiety.  He has hypertension and blood pressure is elevated today.  He states he is having back pain more than usual today because he did some physical work the other day.  He sees Dr. Nance for his back pain.  He has not been checking BP at home.  States he is still taking Claritin D despite high BP because "it is a must this time of year".  He is due for AAA screen but declines because he would not have any intervention done if he does have an aneurysm.        Review of Systems   Constitutional: Negative for chills and fever.   HENT: Negative for congestion, ear pain and sinus pressure.    Respiratory: Negative for cough, shortness of breath and wheezing.    Cardiovascular: Negative for chest pain, palpitations and leg swelling.   Gastrointestinal: Negative for abdominal pain, constipation and diarrhea.   Musculoskeletal: Positive for back pain and gait problem.   Allergic/Immunologic: Positive for environmental allergies.   Psychiatric/Behavioral: Negative for dysphoric mood. The patient is nervous/anxious.        Objective:      Physical Exam   Constitutional: He is oriented to person, place, and time. He appears well-developed and well-nourished. No distress.   HENT:   Head: Normocephalic and atraumatic.   Right Ear: External ear normal.   Left Ear: External ear normal.   Mouth/Throat: Oropharynx is clear and moist. No oropharyngeal exudate.   Eyes: Pupils are equal, round, and reactive to light. Right eye exhibits no discharge. Left eye exhibits no discharge.   Neck: Neck supple. No thyromegaly present.   Cardiovascular: Normal rate and regular rhythm. Exam reveals no gallop and no friction rub.   No murmur heard.  Pulmonary/Chest: Effort normal and breath sounds normal. No respiratory distress. He has no wheezes. He " has no rales.   Abdominal: Soft. He exhibits no distension. There is no tenderness.   Lymphadenopathy:     He has no cervical adenopathy.   Neurological: He is alert and oriented to person, place, and time. Coordination normal.   Skin: Skin is warm and dry.   Psychiatric: He has a normal mood and affect. His behavior is normal. Thought content normal.   Vitals reviewed.          Current Outpatient Medications:     ALPRAZolam (XANAX) 0.25 MG tablet, Take 1 tablet (0.25 mg total) by mouth 2 (two) times daily as needed., Disp: 60 tablet, Rfl: 3    fluocinonide 0.05% (LIDEX) 0.05 % cream, Apply topically 2 (two) times daily. Do not use longer than 2 weeks, Disp: 60 g, Rfl: 0    lansoprazole (PREVACID) 15 MG capsule, Take 1 capsule (15 mg total) by mouth once daily., Disp: 30 capsule, Rfl: 11    loratadine-pseudoephedrine  mg (CLARITIN-D 24-HOUR)  mg per 24 hr tablet, Take 1 tablet by mouth once daily., Disp: , Rfl:     naproxen (NAPROSYN) 250 MG tablet, Take 250 mg by mouth 2 (two) times daily., Disp: , Rfl:     olmesartan (BENICAR) 20 MG tablet, Take 1 tablet (20 mg total) by mouth once daily., Disp: 90 tablet, Rfl: 3    ondansetron (ZOFRAN) 8 MG tablet, take 1 tablet by mouth every 8 hours as needed, Disp: 21 tablet, Rfl: 0    tadalafil (CIALIS) 20 MG Tab, Take 1 tablet (20 mg total) by mouth once daily., Disp: 100 tablet, Rfl: 0    tamsulosin (FLOMAX) 0.4 mg Cp24, Take 1 capsule (0.4 mg total) by mouth once daily., Disp: 30 capsule, Rfl: 11  Assessment:       1. Essential hypertension    2. Lumbar back pain with radiculopathy affecting right lower extremity    3. Anemia, unspecified type    4. Anxiety        Plan:       Essential hypertension  BP high today.    -     olmesartan (BENICAR) 20 MG tablet; Take 1 tablet (20 mg total) by mouth once daily.  Dispense: 90 tablet; Refill: 3  -     Comprehensive metabolic panel; Future; Expected date: 03/07/2019  -     Lipid panel; Future; Expected date:  03/07/2019  BP log daily x 2 weeks, RTC nurse visit with log    Lumbar back pain with radiculopathy affecting right lower extremity  Continue follow up with Pain Management.    Anemia, unspecified type  -     CBC auto differential; Future; Expected date: 03/07/2019    Anxiety  Stable, will send refill of Xanax to PCP    Patient readiness: acceptance and barriers:none    During the course of the visit the patient was educated and counseled about the following:     Hypertension:   Medication: no change.    Goals: Hypertension: Reduce Blood Pressure    Did patient meet goals/outcomes: No    The following self management tools provided: blood pressure log    Patient Instructions (the written plan) was given to the patient/family.     Time spent with patient: 30 minutes    Barriers to medications present (no )    Adverse reactions to current medications (no)    Over the counter medications reviewed (Yes)

## 2019-03-07 NOTE — TELEPHONE ENCOUNTER
Seen 3/7/19  [facility] reviewed- gets hydrocodone from Dr. Nance, pain management.  No other xanax/benzo prescriptions

## 2019-03-08 RX ORDER — ALPRAZOLAM 0.25 MG/1
0.25 TABLET ORAL 2 TIMES DAILY PRN
Qty: 60 TABLET | Refills: 3 | Status: SHIPPED | OUTPATIENT
Start: 2019-03-08 | End: 2019-07-09 | Stop reason: SDUPTHER

## 2019-03-21 ENCOUNTER — CLINICAL SUPPORT (OUTPATIENT)
Dept: FAMILY MEDICINE | Facility: CLINIC | Age: 64
End: 2019-03-21
Payer: MEDICARE

## 2019-03-21 VITALS — SYSTOLIC BLOOD PRESSURE: 124 MMHG | HEART RATE: 69 BPM | DIASTOLIC BLOOD PRESSURE: 64 MMHG

## 2019-03-21 PROCEDURE — 99999 PR PBB SHADOW E&M-EST. PATIENT-LVL I: CPT | Mod: PBBFAC,,, | Performed by: NURSE PRACTITIONER

## 2019-03-21 PROCEDURE — 99999 PR PBB SHADOW E&M-EST. PATIENT-LVL I: ICD-10-PCS | Mod: PBBFAC,,, | Performed by: NURSE PRACTITIONER

## 2019-03-21 NOTE — PROGRESS NOTES
Patient came to clinic for bp check. Patient bought in readings,patient requested to re write bp from home on another paper. Patient states he has never bought machine in to check for accuracy patient states his bp machine is about 40 years states he has to tape machine back together when using it, advised patient he will need to bring machine in for accuracy notified can not expect readings from home if this is not done. First check manual left arm 124/64 patient states his readings at home as been higher advise to let this nurse look at readings patient decline to give this nurse readings states he will get a new machine and discuss with Dr Sosa. Recheck bp 127/78 pulse 69

## 2019-03-22 ENCOUNTER — TELEPHONE (OUTPATIENT)
Dept: FAMILY MEDICINE | Facility: CLINIC | Age: 64
End: 2019-03-22

## 2019-03-22 NOTE — TELEPHONE ENCOUNTER
Please let patient know I reviewed his BP log.  Congratulate him on increased exercise and low sodium diet.  Since the BP is high we need to do one of two things--we can stop the Claritin D which is probably making BP higher (my preference), or increase the Benicar.

## 2019-03-26 NOTE — TELEPHONE ENCOUNTER
"Patient came to clinic today have his bp check and have his machine checked.  Advised  patient will need appointment and that this nurse has tried calling but unable to reach him.  staff states patient states not to call because he can not hear and left the office.    Called patient notified of recommendations below patient states he would rather increase the medication dosage. Patient states he was only coming in to have bp check and checked his machine for accuracy notified patient he will need appointment for this; notified patient he will need a f/u appointment after increasing the benicar and can bring in machine to office visit at that time, patient then states his bp leave my medication right were it is I will f/u with Dr Sosa in July. Notified patient that this nurse did not recommend waiting until July patient then states he never had to come to the doctor until he got hurt and that he is a "bad" patient. Patient continue to decline   "

## 2019-04-01 DIAGNOSIS — I10 ESSENTIAL HYPERTENSION: ICD-10-CM

## 2019-04-01 DIAGNOSIS — N40.1 BENIGN PROSTATIC HYPERPLASIA WITH LOWER URINARY TRACT SYMPTOMS, SYMPTOM DETAILS UNSPECIFIED: ICD-10-CM

## 2019-04-01 RX ORDER — OLMESARTAN MEDOXOMIL 20 MG/1
20 TABLET ORAL DAILY
Qty: 90 TABLET | Refills: 3 | Status: SHIPPED | OUTPATIENT
Start: 2019-04-01 | End: 2020-04-24 | Stop reason: SDUPTHER

## 2019-04-01 RX ORDER — TAMSULOSIN HYDROCHLORIDE 0.4 MG/1
CAPSULE ORAL
Qty: 90 CAPSULE | Refills: 3 | Status: SHIPPED | OUTPATIENT
Start: 2019-04-01 | End: 2020-03-11

## 2019-04-01 NOTE — TELEPHONE ENCOUNTER
----- Message from Natalie Theodore RT sent at 4/1/2019  8:17 AM CDT -----  Contact: pt    pt , requesting on medication renewal for refill: Olmesartan 20 mg tablets and tamsulosin .4 mg capsules, thanks.

## 2019-04-25 ENCOUNTER — HOSPITAL ENCOUNTER (OUTPATIENT)
Dept: RADIOLOGY | Facility: CLINIC | Age: 64
Discharge: HOME | End: 2019-04-25
Attending: FAMILY MEDICINE
Payer: MEDICARE

## 2019-04-25 DIAGNOSIS — Z13.6 SCREENING FOR AAA (ABDOMINAL AORTIC ANEURYSM): ICD-10-CM

## 2019-04-25 PROCEDURE — 76775 US EXAM ABDO BACK WALL LIM: CPT | Mod: 26,,, | Performed by: RADIOLOGY

## 2019-04-25 PROCEDURE — 76775 US EXAM ABDO BACK WALL LIM: CPT | Mod: TC,PO

## 2019-04-25 PROCEDURE — 76775 US ABDOMINAL AORTA: ICD-10-PCS | Mod: 26,,, | Performed by: RADIOLOGY

## 2019-05-02 ENCOUNTER — TELEPHONE (OUTPATIENT)
Dept: FAMILY MEDICINE | Facility: CLINIC | Age: 64
End: 2019-05-02

## 2019-05-02 NOTE — TELEPHONE ENCOUNTER
----- Message from Emerald Dobbs sent at 5/2/2019  4:06 PM CDT -----  Contact: self  Type:  Patient Returning Call    Who Called:  self  Who Left Message for Patient:  Not sure  Does the patient know what this is regarding?:  yes  Best Call Back Number:  [phone]  Additional Information:  Patient is calling back for his results. Thanks!

## 2019-05-02 NOTE — TELEPHONE ENCOUNTER
----- Message from Emerald Dobbs sent at 5/2/2019  4:06 PM CDT -----  Contact: self  Type:  Patient Returning Call    Who Called:  self  Who Left Message for Patient:  Not sure  Does the patient know what this is regarding?:  yes  Best Call Back Number:  [phone]  Additional Information:  Patient is calling back for his results. Thanks!

## 2019-06-25 ENCOUNTER — PATIENT OUTREACH (OUTPATIENT)
Dept: ADMINISTRATIVE | Facility: HOSPITAL | Age: 64
End: 2019-06-25

## 2019-07-02 ENCOUNTER — LAB VISIT (OUTPATIENT)
Dept: LAB | Facility: HOSPITAL | Age: 64
End: 2019-07-02
Attending: NURSE PRACTITIONER
Payer: MEDICARE

## 2019-07-02 DIAGNOSIS — I10 ESSENTIAL HYPERTENSION: ICD-10-CM

## 2019-07-02 DIAGNOSIS — D64.9 ANEMIA, UNSPECIFIED TYPE: ICD-10-CM

## 2019-07-02 LAB
ALBUMIN SERPL BCP-MCNC: 3.9 G/DL (ref 3.5–5.2)
ALP SERPL-CCNC: 64 U/L (ref 55–135)
ALT SERPL W/O P-5'-P-CCNC: 18 U/L (ref 10–44)
ANION GAP SERPL CALC-SCNC: 7 MMOL/L (ref 8–16)
AST SERPL-CCNC: 26 U/L (ref 10–40)
BASOPHILS # BLD AUTO: 0.05 K/UL (ref 0–0.2)
BASOPHILS NFR BLD: 1.3 % (ref 0–1.9)
BILIRUB SERPL-MCNC: 0.5 MG/DL (ref 0.1–1)
BUN SERPL-MCNC: 22 MG/DL (ref 8–23)
CALCIUM SERPL-MCNC: 10 MG/DL (ref 8.7–10.5)
CHLORIDE SERPL-SCNC: 107 MMOL/L (ref 95–110)
CHOLEST SERPL-MCNC: 195 MG/DL (ref 120–199)
CHOLEST/HDLC SERPL: 3.7 {RATIO} (ref 2–5)
CO2 SERPL-SCNC: 28 MMOL/L (ref 23–29)
CREAT SERPL-MCNC: 1 MG/DL (ref 0.5–1.4)
DIFFERENTIAL METHOD: ABNORMAL
EOSINOPHIL # BLD AUTO: 0.3 K/UL (ref 0–0.5)
EOSINOPHIL NFR BLD: 8.9 % (ref 0–8)
ERYTHROCYTE [DISTWIDTH] IN BLOOD BY AUTOMATED COUNT: 12.6 % (ref 11.5–14.5)
EST. GFR  (AFRICAN AMERICAN): >60 ML/MIN/1.73 M^2
EST. GFR  (NON AFRICAN AMERICAN): >60 ML/MIN/1.73 M^2
GLUCOSE SERPL-MCNC: 103 MG/DL (ref 70–110)
HCT VFR BLD AUTO: 40.7 % (ref 40–54)
HDLC SERPL-MCNC: 53 MG/DL (ref 40–75)
HDLC SERPL: 27.2 % (ref 20–50)
HGB BLD-MCNC: 13.2 G/DL (ref 14–18)
IMM GRANULOCYTES # BLD AUTO: 0.01 K/UL (ref 0–0.04)
IMM GRANULOCYTES NFR BLD AUTO: 0.3 % (ref 0–0.5)
LDLC SERPL CALC-MCNC: 124.4 MG/DL (ref 63–159)
LYMPHOCYTES # BLD AUTO: 0.9 K/UL (ref 1–4.8)
LYMPHOCYTES NFR BLD: 23.8 % (ref 18–48)
MCH RBC QN AUTO: 33.1 PG (ref 27–31)
MCHC RBC AUTO-ENTMCNC: 32.4 G/DL (ref 32–36)
MCV RBC AUTO: 102 FL (ref 82–98)
MONOCYTES # BLD AUTO: 0.4 K/UL (ref 0.3–1)
MONOCYTES NFR BLD: 11.5 % (ref 4–15)
NEUTROPHILS # BLD AUTO: 2.1 K/UL (ref 1.8–7.7)
NEUTROPHILS NFR BLD: 54.2 % (ref 38–73)
NONHDLC SERPL-MCNC: 142 MG/DL
NRBC BLD-RTO: 0 /100 WBC
PLATELET # BLD AUTO: 276 K/UL (ref 150–350)
PMV BLD AUTO: 9.9 FL (ref 9.2–12.9)
POTASSIUM SERPL-SCNC: 4.6 MMOL/L (ref 3.5–5.1)
PROT SERPL-MCNC: 6.6 G/DL (ref 6–8.4)
RBC # BLD AUTO: 3.99 M/UL (ref 4.6–6.2)
SODIUM SERPL-SCNC: 142 MMOL/L (ref 136–145)
TRIGL SERPL-MCNC: 88 MG/DL (ref 30–150)
WBC # BLD AUTO: 3.82 K/UL (ref 3.9–12.7)

## 2019-07-02 PROCEDURE — 80061 LIPID PANEL: CPT

## 2019-07-02 PROCEDURE — 36415 COLL VENOUS BLD VENIPUNCTURE: CPT | Mod: PO

## 2019-07-02 PROCEDURE — 80053 COMPREHEN METABOLIC PANEL: CPT

## 2019-07-02 PROCEDURE — 85025 COMPLETE CBC W/AUTO DIFF WBC: CPT

## 2019-07-03 ENCOUNTER — TELEPHONE (OUTPATIENT)
Dept: FAMILY MEDICINE | Facility: CLINIC | Age: 64
End: 2019-07-03

## 2019-07-03 NOTE — TELEPHONE ENCOUNTER
----- Message from Kathleen Fernandes sent at 7/3/2019  9:49 AM CDT -----  Type:  Test Results    Who Called:  patient  Name of Test (Lab/Mammo/Etc):  Blood lab results  Date of Test:  07 02 19  Ordering Provider:  Dr Saud Vicente  Where the test was performed:  [facility]  Best Call Back Number:  [phone]  Additional Information:  Returning call to Nurse for blood lab results

## 2019-07-09 ENCOUNTER — OFFICE VISIT (OUTPATIENT)
Dept: FAMILY MEDICINE | Facility: CLINIC | Age: 64
End: 2019-07-09
Payer: MEDICARE

## 2019-07-09 VITALS
OXYGEN SATURATION: 98 % | HEIGHT: 70 IN | TEMPERATURE: 98 F | BODY MASS INDEX: 27.46 KG/M2 | SYSTOLIC BLOOD PRESSURE: 110 MMHG | DIASTOLIC BLOOD PRESSURE: 70 MMHG | HEART RATE: 75 BPM | WEIGHT: 191.81 LBS

## 2019-07-09 DIAGNOSIS — I10 ESSENTIAL HYPERTENSION: Primary | ICD-10-CM

## 2019-07-09 DIAGNOSIS — Z12.5 PROSTATE CANCER SCREENING: ICD-10-CM

## 2019-07-09 DIAGNOSIS — F41.1 GAD (GENERALIZED ANXIETY DISORDER): ICD-10-CM

## 2019-07-09 PROCEDURE — 99214 PR OFFICE/OUTPT VISIT, EST, LEVL IV, 30-39 MIN: ICD-10-PCS | Mod: S$GLB,,, | Performed by: FAMILY MEDICINE

## 2019-07-09 PROCEDURE — 3078F PR MOST RECENT DIASTOLIC BLOOD PRESSURE < 80 MM HG: ICD-10-PCS | Mod: CPTII,S$GLB,, | Performed by: FAMILY MEDICINE

## 2019-07-09 PROCEDURE — 3078F DIAST BP <80 MM HG: CPT | Mod: CPTII,S$GLB,, | Performed by: FAMILY MEDICINE

## 2019-07-09 PROCEDURE — 1101F PR PT FALLS ASSESS DOC 0-1 FALLS W/OUT INJ PAST YR: ICD-10-PCS | Mod: CPTII,S$GLB,, | Performed by: FAMILY MEDICINE

## 2019-07-09 PROCEDURE — 99214 OFFICE O/P EST MOD 30 MIN: CPT | Mod: S$GLB,,, | Performed by: FAMILY MEDICINE

## 2019-07-09 PROCEDURE — 3074F PR MOST RECENT SYSTOLIC BLOOD PRESSURE < 130 MM HG: ICD-10-PCS | Mod: CPTII,S$GLB,, | Performed by: FAMILY MEDICINE

## 2019-07-09 PROCEDURE — 3074F SYST BP LT 130 MM HG: CPT | Mod: CPTII,S$GLB,, | Performed by: FAMILY MEDICINE

## 2019-07-09 PROCEDURE — 3008F PR BODY MASS INDEX (BMI) DOCUMENTED: ICD-10-PCS | Mod: CPTII,S$GLB,, | Performed by: FAMILY MEDICINE

## 2019-07-09 PROCEDURE — 1101F PT FALLS ASSESS-DOCD LE1/YR: CPT | Mod: CPTII,S$GLB,, | Performed by: FAMILY MEDICINE

## 2019-07-09 PROCEDURE — 99999 PR PBB SHADOW E&M-EST. PATIENT-LVL IV: CPT | Mod: PBBFAC,,, | Performed by: FAMILY MEDICINE

## 2019-07-09 PROCEDURE — 99999 PR PBB SHADOW E&M-EST. PATIENT-LVL IV: ICD-10-PCS | Mod: PBBFAC,,, | Performed by: FAMILY MEDICINE

## 2019-07-09 PROCEDURE — 3008F BODY MASS INDEX DOCD: CPT | Mod: CPTII,S$GLB,, | Performed by: FAMILY MEDICINE

## 2019-07-09 RX ORDER — METHOCARBAMOL 750 MG/1
500 TABLET, FILM COATED ORAL 3 TIMES DAILY
COMMUNITY
End: 2022-03-29 | Stop reason: ALTCHOICE

## 2019-07-09 RX ORDER — ALPRAZOLAM 0.25 MG/1
0.25 TABLET ORAL 2 TIMES DAILY PRN
Qty: 60 TABLET | Refills: 3 | Status: SHIPPED | OUTPATIENT
Start: 2019-07-09 | End: 2019-11-11 | Stop reason: SDUPTHER

## 2019-07-09 RX ORDER — TADALAFIL 20 MG/1
20 TABLET ORAL DAILY
Qty: 100 TABLET | Refills: 0 | Status: SHIPPED | OUTPATIENT
Start: 2019-07-09 | End: 2021-05-19 | Stop reason: SDUPTHER

## 2019-07-09 NOTE — PATIENT INSTRUCTIONS

## 2019-07-11 NOTE — PROGRESS NOTES
Subjective:       Patient ID: Humberto Cantor is a 63 y.o. male.    Chief Complaint: Annual Exam    He has HT more than ten yrs. He has been envolved in stressful exercises.    Hypertension   This is a chronic problem. The current episode started more than 1 year ago. The problem has been resolved since onset. The problem is controlled. Associated symptoms include anxiety and malaise/fatigue. Pertinent negatives include no chest pain, headaches, orthopnea, palpitations, peripheral edema, PND, shortness of breath or sweats. There are no associated agents to hypertension. Risk factors for coronary artery disease include male gender, sedentary lifestyle and family history. Past treatments include angiotensin blockers. The current treatment provides significant improvement. Identifiable causes of hypertension include sleep apnea.     Review of Systems   Constitutional: Positive for malaise/fatigue. Negative for fatigue and unexpected weight change.   Respiratory: Negative for chest tightness and shortness of breath.    Cardiovascular: Negative for chest pain, palpitations, orthopnea, leg swelling and PND.   Gastrointestinal: Negative for abdominal pain.   Musculoskeletal: Negative for arthralgias.   Neurological: Negative for dizziness, syncope, light-headedness and headaches.       Patient Active Problem List   Diagnosis    GERD (gastroesophageal reflux disease)    Essential hypertension    Anxiety    Burst fracture of lumbar vertebra    Lumbar burst fracture    Lumbar back pain with radiculopathy affecting right lower extremity    S/P lumbar spinal fusion       Objective:      Physical Exam   Constitutional: He is oriented to person, place, and time. He appears well-developed. No distress.   HENT:   Head: Normocephalic and atraumatic.   Right Ear: External ear normal.   Left Ear: External ear normal.   Nose: Nose normal.   Mouth/Throat: Oropharynx is clear and moist. No oropharyngeal exudate.   Eyes: Pupils are  equal, round, and reactive to light. Conjunctivae and EOM are normal. Right eye exhibits no discharge. Left eye exhibits no discharge. No scleral icterus.   Neck: Normal range of motion. Neck supple. No JVD present. No tracheal deviation present. No thyromegaly present.   Cardiovascular: Normal rate, normal heart sounds and intact distal pulses.   No murmur heard.  Pulmonary/Chest: Effort normal and breath sounds normal. No respiratory distress. He has no wheezes. He has no rales.   Abdominal: Soft. Bowel sounds are normal. He exhibits no distension and no mass. There is no tenderness. There is no rebound and no guarding.   Musculoskeletal: He exhibits no edema or tenderness.          Lymphadenopathy:     He has no cervical adenopathy.   Neurological: He is alert and oriented to person, place, and time. No cranial nerve deficit. Coordination normal.   Skin: Skin is warm and dry. No rash noted. He is not diaphoretic. No erythema. No pallor.   Psychiatric: He has a normal mood and affect. His behavior is normal. Judgment and thought content normal.   Vitals reviewed.      Lab Results   Component Value Date    WBC 3.82 (L) 07/02/2019    HGB 13.2 (L) 07/02/2019    HCT 40.7 07/02/2019     07/02/2019    CHOL 195 07/02/2019    TRIG 88 07/02/2019    HDL 53 07/02/2019    ALT 18 07/02/2019    AST 26 07/02/2019     07/02/2019    K 4.6 07/02/2019     07/02/2019    CREATININE 1.0 07/02/2019    BUN 22 07/02/2019    CO2 28 07/02/2019    INR 1.0 06/05/2017     The 10-year ASCVD risk score (Greg MULU Jr., et al., 2013) is: 10.9%    Values used to calculate the score:      Age: 63 years      Sex: Male      Is Non- : No      Diabetic: No      Tobacco smoker: No      Systolic Blood Pressure: 110 mmHg      Is BP treated: Yes      HDL Cholesterol: 53 mg/dL      Total Cholesterol: 195 mg/dL    Assessment:       1. Essential hypertension    2. TORRIE (generalized anxiety disorder)    3. Prostate cancer  screening        Plan:       Essential hypertension  -     Lipid panel; Future; Expected date: 07/09/2019  -     Comprehensive metabolic panel; Future; Expected date: 07/09/2019  -     CBC auto differential; Future; Expected date: 07/09/2019  -     tadalafil (CIALIS) 20 MG Tab; Take 1 tablet (20 mg total) by mouth once daily.  Dispense: 100 tablet; Refill: 0    TORRIE (generalized anxiety disorder)  -     ALPRAZolam (XANAX) 0.25 MG tablet; Take 1 tablet (0.25 mg total) by mouth 2 (two) times daily as needed.  Dispense: 60 tablet; Refill: 3    Prostate cancer screening  -     PSA, Screening; Future; Expected date: 07/09/2019      Patient readiness: acceptance and barriers:readiness and social stressors    During the course of the visit the patient was educated and counseled about the following:     Hypertension:   Dietary sodium restriction.  Regular aerobic exercise.  Check blood pressures daily and record.  Obesity:   General weight loss/lifestyle modification strategies discussed (elicit support from others; identify saboteurs; non-food rewards, etc).  Informal exercise measures discussed, e.g. taking stairs instead of elevator.  Regular aerobic exercise program discussed.    Goals: Hypertension: Reduce Blood Pressure and Obesity: Reduce calorie intake and BMI    Did patient meet goals/outcomes: Yes    The following self management tools provided: blood pressure log    Patient Instructions (the written plan) was given to the patient/family.     Time spent with patient: 30 minutes    Barriers to medications present (no )    Adverse reactions to current medications (no)    Over the counter medications reviewed (Yes)        30 minutes spent counseling patient on diet, exercise, and weight loss.  This has been fully explained to the patient, who indicates understanding.

## 2019-07-29 DIAGNOSIS — Z12.11 COLON CANCER SCREENING: ICD-10-CM

## 2019-10-21 ENCOUNTER — LAB VISIT (OUTPATIENT)
Dept: LAB | Facility: HOSPITAL | Age: 64
End: 2019-10-21
Attending: FAMILY MEDICINE
Payer: MEDICARE

## 2019-10-21 DIAGNOSIS — Z12.11 COLON CANCER SCREENING: ICD-10-CM

## 2019-10-21 PROCEDURE — 82274 ASSAY TEST FOR BLOOD FECAL: CPT | Mod: HCNC

## 2019-10-25 LAB — HEMOCCULT STL QL IA: NEGATIVE

## 2019-11-01 ENCOUNTER — PATIENT OUTREACH (OUTPATIENT)
Dept: ADMINISTRATIVE | Facility: HOSPITAL | Age: 64
End: 2019-11-01

## 2019-11-04 ENCOUNTER — LAB VISIT (OUTPATIENT)
Dept: LAB | Facility: HOSPITAL | Age: 64
End: 2019-11-04
Attending: FAMILY MEDICINE
Payer: MEDICARE

## 2019-11-04 DIAGNOSIS — Z12.5 PROSTATE CANCER SCREENING: ICD-10-CM

## 2019-11-04 DIAGNOSIS — I10 ESSENTIAL HYPERTENSION: ICD-10-CM

## 2019-11-04 LAB
ALBUMIN SERPL BCP-MCNC: 3.8 G/DL (ref 3.5–5.2)
ALP SERPL-CCNC: 63 U/L (ref 55–135)
ALT SERPL W/O P-5'-P-CCNC: 18 U/L (ref 10–44)
ANION GAP SERPL CALC-SCNC: 4 MMOL/L (ref 8–16)
AST SERPL-CCNC: 20 U/L (ref 10–40)
BASOPHILS # BLD AUTO: 0.05 K/UL (ref 0–0.2)
BASOPHILS NFR BLD: 1.4 % (ref 0–1.9)
BILIRUB SERPL-MCNC: 0.5 MG/DL (ref 0.1–1)
BUN SERPL-MCNC: 22 MG/DL (ref 8–23)
CALCIUM SERPL-MCNC: 9.5 MG/DL (ref 8.7–10.5)
CHLORIDE SERPL-SCNC: 106 MMOL/L (ref 95–110)
CHOLEST SERPL-MCNC: 181 MG/DL (ref 120–199)
CHOLEST/HDLC SERPL: 3.2 {RATIO} (ref 2–5)
CO2 SERPL-SCNC: 28 MMOL/L (ref 23–29)
COMPLEXED PSA SERPL-MCNC: 0.78 NG/ML (ref 0–4)
CREAT SERPL-MCNC: 1 MG/DL (ref 0.5–1.4)
DIFFERENTIAL METHOD: ABNORMAL
EOSINOPHIL # BLD AUTO: 0.2 K/UL (ref 0–0.5)
EOSINOPHIL NFR BLD: 5.2 % (ref 0–8)
ERYTHROCYTE [DISTWIDTH] IN BLOOD BY AUTOMATED COUNT: 13 % (ref 11.5–14.5)
EST. GFR  (AFRICAN AMERICAN): >60 ML/MIN/1.73 M^2
EST. GFR  (NON AFRICAN AMERICAN): >60 ML/MIN/1.73 M^2
GLUCOSE SERPL-MCNC: 106 MG/DL (ref 70–110)
HCT VFR BLD AUTO: 39.7 % (ref 40–54)
HDLC SERPL-MCNC: 56 MG/DL (ref 40–75)
HDLC SERPL: 30.9 % (ref 20–50)
HGB BLD-MCNC: 12.7 G/DL (ref 14–18)
IMM GRANULOCYTES # BLD AUTO: 0.01 K/UL (ref 0–0.04)
IMM GRANULOCYTES NFR BLD AUTO: 0.3 % (ref 0–0.5)
LDLC SERPL CALC-MCNC: 112.4 MG/DL (ref 63–159)
LYMPHOCYTES # BLD AUTO: 0.8 K/UL (ref 1–4.8)
LYMPHOCYTES NFR BLD: 22.3 % (ref 18–48)
MCH RBC QN AUTO: 33.2 PG (ref 27–31)
MCHC RBC AUTO-ENTMCNC: 32 G/DL (ref 32–36)
MCV RBC AUTO: 104 FL (ref 82–98)
MONOCYTES # BLD AUTO: 0.3 K/UL (ref 0.3–1)
MONOCYTES NFR BLD: 9.4 % (ref 4–15)
NEUTROPHILS # BLD AUTO: 2.2 K/UL (ref 1.8–7.7)
NEUTROPHILS NFR BLD: 61.4 % (ref 38–73)
NONHDLC SERPL-MCNC: 125 MG/DL
NRBC BLD-RTO: 0 /100 WBC
PLATELET # BLD AUTO: 274 K/UL (ref 150–350)
PMV BLD AUTO: 10.2 FL (ref 9.2–12.9)
POTASSIUM SERPL-SCNC: 4.4 MMOL/L (ref 3.5–5.1)
PROT SERPL-MCNC: 6.4 G/DL (ref 6–8.4)
RBC # BLD AUTO: 3.82 M/UL (ref 4.6–6.2)
SODIUM SERPL-SCNC: 138 MMOL/L (ref 136–145)
TRIGL SERPL-MCNC: 63 MG/DL (ref 30–150)
WBC # BLD AUTO: 3.63 K/UL (ref 3.9–12.7)

## 2019-11-04 PROCEDURE — 36415 COLL VENOUS BLD VENIPUNCTURE: CPT | Mod: HCNC,PO

## 2019-11-04 PROCEDURE — 85025 COMPLETE CBC W/AUTO DIFF WBC: CPT | Mod: HCNC

## 2019-11-04 PROCEDURE — 80053 COMPREHEN METABOLIC PANEL: CPT | Mod: HCNC

## 2019-11-04 PROCEDURE — 80061 LIPID PANEL: CPT | Mod: HCNC

## 2019-11-04 PROCEDURE — 84153 ASSAY OF PSA TOTAL: CPT | Mod: HCNC

## 2019-11-08 ENCOUNTER — DOCUMENTATION ONLY (OUTPATIENT)
Dept: FAMILY MEDICINE | Facility: CLINIC | Age: 64
End: 2019-11-08

## 2019-11-08 NOTE — PROGRESS NOTES
Pre-Visit Chart Review  For Appointment Scheduled on (11/11/19)    Health Maintenance Due   Topic Date Due    Pneumococcal Vaccine (65+ Low/Medium Risk) (2 of 2 - PPSV23) 11/08/2019

## 2019-11-11 ENCOUNTER — OFFICE VISIT (OUTPATIENT)
Dept: FAMILY MEDICINE | Facility: CLINIC | Age: 64
End: 2019-11-11
Payer: MEDICARE

## 2019-11-11 VITALS
OXYGEN SATURATION: 95 % | BODY MASS INDEX: 28.12 KG/M2 | HEART RATE: 94 BPM | DIASTOLIC BLOOD PRESSURE: 82 MMHG | TEMPERATURE: 98 F | SYSTOLIC BLOOD PRESSURE: 132 MMHG | HEIGHT: 70 IN | WEIGHT: 196.44 LBS

## 2019-11-11 DIAGNOSIS — F41.9 ANXIETY: Chronic | ICD-10-CM

## 2019-11-11 DIAGNOSIS — I10 ESSENTIAL HYPERTENSION: Primary | ICD-10-CM

## 2019-11-11 DIAGNOSIS — F41.1 GAD (GENERALIZED ANXIETY DISORDER): ICD-10-CM

## 2019-11-11 DIAGNOSIS — Z87.81 HISTORY OF COMPRESSION FRACTURE OF VERTEBRAL COLUMN: ICD-10-CM

## 2019-11-11 DIAGNOSIS — K21.9 GASTROESOPHAGEAL REFLUX DISEASE, ESOPHAGITIS PRESENCE NOT SPECIFIED: ICD-10-CM

## 2019-11-11 PROBLEM — S32.001A BURST FRACTURE OF LUMBAR VERTEBRA: Status: RESOLVED | Noted: 2017-06-06 | Resolved: 2019-11-11

## 2019-11-11 PROBLEM — S32.001A LUMBAR BURST FRACTURE: Status: RESOLVED | Noted: 2017-07-03 | Resolved: 2019-11-11

## 2019-11-11 PROBLEM — M54.16 LUMBAR BACK PAIN WITH RADICULOPATHY AFFECTING RIGHT LOWER EXTREMITY: Status: RESOLVED | Noted: 2017-08-08 | Resolved: 2019-11-11

## 2019-11-11 PROCEDURE — 1101F PR PT FALLS ASSESS DOC 0-1 FALLS W/OUT INJ PAST YR: ICD-10-PCS | Mod: HCNC,CPTII,S$GLB, | Performed by: PHYSICIAN ASSISTANT

## 2019-11-11 PROCEDURE — 99214 PR OFFICE/OUTPT VISIT, EST, LEVL IV, 30-39 MIN: ICD-10-PCS | Mod: HCNC,S$GLB,, | Performed by: PHYSICIAN ASSISTANT

## 2019-11-11 PROCEDURE — 99999 PR PBB SHADOW E&M-EST. PATIENT-LVL IV: ICD-10-PCS | Mod: PBBFAC,HCNC,, | Performed by: PHYSICIAN ASSISTANT

## 2019-11-11 PROCEDURE — 3075F PR MOST RECENT SYSTOLIC BLOOD PRESS GE 130-139MM HG: ICD-10-PCS | Mod: HCNC,CPTII,S$GLB, | Performed by: PHYSICIAN ASSISTANT

## 2019-11-11 PROCEDURE — 3079F PR MOST RECENT DIASTOLIC BLOOD PRESSURE 80-89 MM HG: ICD-10-PCS | Mod: HCNC,CPTII,S$GLB, | Performed by: PHYSICIAN ASSISTANT

## 2019-11-11 PROCEDURE — 1101F PT FALLS ASSESS-DOCD LE1/YR: CPT | Mod: HCNC,CPTII,S$GLB, | Performed by: PHYSICIAN ASSISTANT

## 2019-11-11 PROCEDURE — 99214 OFFICE O/P EST MOD 30 MIN: CPT | Mod: HCNC,S$GLB,, | Performed by: PHYSICIAN ASSISTANT

## 2019-11-11 PROCEDURE — 99999 PR PBB SHADOW E&M-EST. PATIENT-LVL IV: CPT | Mod: PBBFAC,HCNC,, | Performed by: PHYSICIAN ASSISTANT

## 2019-11-11 PROCEDURE — 3075F SYST BP GE 130 - 139MM HG: CPT | Mod: HCNC,CPTII,S$GLB, | Performed by: PHYSICIAN ASSISTANT

## 2019-11-11 PROCEDURE — 3079F DIAST BP 80-89 MM HG: CPT | Mod: HCNC,CPTII,S$GLB, | Performed by: PHYSICIAN ASSISTANT

## 2019-11-11 NOTE — PROGRESS NOTES
Subjective:       Patient ID: Humberto Cantor is a 64 y.o. male.    Chief Complaint: Hypertension    Patient is new to me.  Patient presents for routine follow-up of hypertension controlled with current medications.  Patient exercises regularly by swimming 1800 m 3-4 days per week.  He eats a heart healthy diet.  Patient has anxiety and takes Xanax 0.25 mg twice daily.  He is also on hydrocodone chronically for chronic back pain. He declines influenza vaccine.  Shingles and pneumonia vaccines were done at local pharmacy.  Patients patient medical/surgical, social and family histories have been reviewed       Review of Systems   Constitutional: Negative for appetite change, chills, diaphoresis, fatigue and fever.   Respiratory: Negative for shortness of breath.    Cardiovascular: Negative for chest pain, palpitations and leg swelling.   Musculoskeletal: Positive for back pain.   Psychiatric/Behavioral: The patient is nervous/anxious.        Objective:      Physical Exam   Constitutional: He appears well-developed and well-nourished. He is cooperative. No distress.   HENT:   Head: Normocephalic and atraumatic.   Cardiovascular: Normal rate, regular rhythm and normal heart sounds.   Pulmonary/Chest: Effort normal and breath sounds normal.   Musculoskeletal:        Right lower leg: He exhibits no edema.        Left lower leg: He exhibits no edema.   Neurological: He is alert.   Skin: Skin is warm and dry. Capillary refill takes less than 2 seconds.       Assessment:       1. Essential hypertension    2. Gastroesophageal reflux disease, esophagitis presence not specified    3. Anxiety    4. History of compression fracture of vertebral column        Plan:       Humberto was seen today for hypertension.    Diagnoses and all orders for this visit:    Essential hypertension stable  Continue current medication  Gastroesophageal reflux disease, esophagitis presence not specified controlled  Continue current medication  Anxiety  controlled  Defer to PCP  History of compression fracture of vertebral column  Continue per pain management         Follow up for april with pcp/team .   DISCLAIMER: This note was prepared with [employer] voice recognition transcription software. Garbled syntax, mangled pronouns, and other bizarre constructions may be attributed to that software system   Patient readiness: eager and barriers:none    During the course of the visit the patient was educated and counseled about the following:     Hypertension:   Medication: no change.    Goals: Hypertension: Reduce Blood Pressure    Did patient meet goals/outcomes: Yes    The following self management tools provided: declined    Patient Instructions (the written plan) was given to the patient/family.     Time spent with patient: 30 minutes    Barriers to medications present (no )    Adverse reactions to current medications (no)    Over the counter medications reviewed (Yes)

## 2019-11-12 NOTE — TELEPHONE ENCOUNTER
----- Message from Desiree Quiñonez sent at 11/12/2019 12:03 PM CST -----  Contact: patient  Type:  RX Refill Request    Who Called:  patient  Refill or New Rx:  refill  RX Name and Strength:  fluocinonide 0.05% (LIDEX) 0.05 % cream and ALPRAZolam (XANAX) 0.25 MG tablet, and   How is the patient currently taking it? (ex. 1XDay):    Is this a 30 day or 90 day RX:    Preferred Pharmacy with phone number: [facility] pharmacy  Local or Mail Order:  local  Ordering Provider:    Best Call Back Number:  [phone]  Additional Information:  Requesting a call back when script has been sent

## 2019-11-13 RX ORDER — ALPRAZOLAM 0.25 MG/1
0.25 TABLET ORAL 2 TIMES DAILY PRN
Qty: 60 TABLET | Refills: 3 | Status: SHIPPED | OUTPATIENT
Start: 2019-11-13 | End: 2020-03-11

## 2019-11-14 DIAGNOSIS — K21.9 GASTROESOPHAGEAL REFLUX DISEASE WITHOUT ESOPHAGITIS: ICD-10-CM

## 2019-11-14 DIAGNOSIS — L25.5 DERMATITIS DUE TO PLANTS: ICD-10-CM

## 2019-11-14 RX ORDER — CALC/MAG/B COMPLEX/D3/HERB 61
15 TABLET ORAL DAILY
Qty: 30 CAPSULE | Refills: 11
Start: 2019-11-14 | End: 2024-02-14

## 2019-11-14 RX ORDER — FLUOCINONIDE 0.5 MG/G
CREAM TOPICAL 2 TIMES DAILY
Qty: 60 G | Refills: 0 | Status: SHIPPED | OUTPATIENT
Start: 2019-11-14 | End: 2020-05-18 | Stop reason: SDUPTHER

## 2020-01-02 ENCOUNTER — PES CALL (OUTPATIENT)
Dept: ADMINISTRATIVE | Facility: CLINIC | Age: 65
End: 2020-01-02

## 2020-03-10 DIAGNOSIS — F41.1 GAD (GENERALIZED ANXIETY DISORDER): ICD-10-CM

## 2020-03-11 DIAGNOSIS — N40.1 BENIGN PROSTATIC HYPERPLASIA WITH LOWER URINARY TRACT SYMPTOMS, SYMPTOM DETAILS UNSPECIFIED: ICD-10-CM

## 2020-03-11 RX ORDER — ALPRAZOLAM 0.25 MG/1
TABLET ORAL
Qty: 60 TABLET | Refills: 1 | Status: SHIPPED | OUTPATIENT
Start: 2020-03-11 | End: 2020-05-11 | Stop reason: SDUPTHER

## 2020-03-11 RX ORDER — TAMSULOSIN HYDROCHLORIDE 0.4 MG/1
CAPSULE ORAL
Qty: 90 CAPSULE | Refills: 2 | Status: SHIPPED | OUTPATIENT
Start: 2020-03-11 | End: 2020-11-17

## 2020-03-12 ENCOUNTER — TELEPHONE (OUTPATIENT)
Dept: FAMILY MEDICINE | Facility: CLINIC | Age: 65
End: 2020-03-12

## 2020-03-12 NOTE — TELEPHONE ENCOUNTER
Prescription for Alprazolam e-scribed 3-. Call placed to patient for notification. No answer received. Left message requesting return call to office.       ----- Message from Hailey Castro sent at 3/12/2020 10:56 AM CDT -----  Pt came in to the clinic, Would like refill of rx Alprazolam, would like it sent to the [facility] please call patient when done.

## 2020-04-24 DIAGNOSIS — I10 ESSENTIAL HYPERTENSION: ICD-10-CM

## 2020-04-24 RX ORDER — OLMESARTAN MEDOXOMIL 20 MG/1
20 TABLET ORAL DAILY
Qty: 90 TABLET | Refills: 11 | Status: SHIPPED | OUTPATIENT
Start: 2020-04-24 | End: 2021-06-30

## 2020-05-11 DIAGNOSIS — F41.1 GAD (GENERALIZED ANXIETY DISORDER): ICD-10-CM

## 2020-05-11 RX ORDER — ALPRAZOLAM 0.25 MG/1
TABLET ORAL
Qty: 60 TABLET | Refills: 1 | Status: SHIPPED | OUTPATIENT
Start: 2020-05-11 | End: 2020-07-07 | Stop reason: SDUPTHER

## 2020-05-11 NOTE — TELEPHONE ENCOUNTER
Handled in a different encounter.     ----- Message from Cristal Leslie sent at 5/11/2020  9:57 AM CDT -----  Contact: pt [phone]  Refill   ALPRAZolam (XANAX) 0.25 MG tablet  Pt is asking if you will call into [email or web address]   Takes twice a day

## 2020-05-18 ENCOUNTER — OFFICE VISIT (OUTPATIENT)
Dept: FAMILY MEDICINE | Facility: CLINIC | Age: 65
End: 2020-05-18
Payer: MEDICARE

## 2020-05-18 VITALS
DIASTOLIC BLOOD PRESSURE: 90 MMHG | TEMPERATURE: 98 F | OXYGEN SATURATION: 95 % | SYSTOLIC BLOOD PRESSURE: 144 MMHG | BODY MASS INDEX: 28.95 KG/M2 | RESPIRATION RATE: 18 BRPM | WEIGHT: 202.19 LBS | HEART RATE: 83 BPM | HEIGHT: 70 IN

## 2020-05-18 DIAGNOSIS — I10 ESSENTIAL HYPERTENSION: Primary | ICD-10-CM

## 2020-05-18 DIAGNOSIS — Z98.1 S/P LUMBAR SPINAL FUSION: ICD-10-CM

## 2020-05-18 DIAGNOSIS — K21.9 GASTROESOPHAGEAL REFLUX DISEASE, ESOPHAGITIS PRESENCE NOT SPECIFIED: ICD-10-CM

## 2020-05-18 DIAGNOSIS — L25.5 DERMATITIS DUE TO PLANTS: ICD-10-CM

## 2020-05-18 PROCEDURE — 99214 OFFICE O/P EST MOD 30 MIN: CPT | Mod: HCNC,S$GLB,, | Performed by: PHYSICIAN ASSISTANT

## 2020-05-18 PROCEDURE — 99214 PR OFFICE/OUTPT VISIT, EST, LEVL IV, 30-39 MIN: ICD-10-PCS | Mod: HCNC,S$GLB,, | Performed by: PHYSICIAN ASSISTANT

## 2020-05-18 PROCEDURE — 1159F PR MEDICATION LIST DOCUMENTED IN MEDICAL RECORD: ICD-10-PCS | Mod: HCNC,S$GLB,, | Performed by: PHYSICIAN ASSISTANT

## 2020-05-18 PROCEDURE — 99999 PR PBB SHADOW E&M-EST. PATIENT-LVL V: ICD-10-PCS | Mod: PBBFAC,HCNC,, | Performed by: PHYSICIAN ASSISTANT

## 2020-05-18 PROCEDURE — 3077F PR MOST RECENT SYSTOLIC BLOOD PRESSURE >= 140 MM HG: ICD-10-PCS | Mod: HCNC,CPTII,S$GLB, | Performed by: PHYSICIAN ASSISTANT

## 2020-05-18 PROCEDURE — 99999 PR PBB SHADOW E&M-EST. PATIENT-LVL V: CPT | Mod: PBBFAC,HCNC,, | Performed by: PHYSICIAN ASSISTANT

## 2020-05-18 PROCEDURE — 1101F PR PT FALLS ASSESS DOC 0-1 FALLS W/OUT INJ PAST YR: ICD-10-PCS | Mod: HCNC,CPTII,S$GLB, | Performed by: PHYSICIAN ASSISTANT

## 2020-05-18 PROCEDURE — 99499 UNLISTED E&M SERVICE: CPT | Mod: S$GLB,,, | Performed by: PHYSICIAN ASSISTANT

## 2020-05-18 PROCEDURE — 3077F SYST BP >= 140 MM HG: CPT | Mod: HCNC,CPTII,S$GLB, | Performed by: PHYSICIAN ASSISTANT

## 2020-05-18 PROCEDURE — 3080F PR MOST RECENT DIASTOLIC BLOOD PRESSURE >= 90 MM HG: ICD-10-PCS | Mod: HCNC,CPTII,S$GLB, | Performed by: PHYSICIAN ASSISTANT

## 2020-05-18 PROCEDURE — 1125F AMNT PAIN NOTED PAIN PRSNT: CPT | Mod: HCNC,S$GLB,, | Performed by: PHYSICIAN ASSISTANT

## 2020-05-18 PROCEDURE — 1125F PR PAIN SEVERITY QUANTIFIED, PAIN PRESENT: ICD-10-PCS | Mod: HCNC,S$GLB,, | Performed by: PHYSICIAN ASSISTANT

## 2020-05-18 PROCEDURE — 3080F DIAST BP >= 90 MM HG: CPT | Mod: HCNC,CPTII,S$GLB, | Performed by: PHYSICIAN ASSISTANT

## 2020-05-18 PROCEDURE — 99499 RISK ADDL DX/OHS AUDIT: ICD-10-PCS | Mod: S$GLB,,, | Performed by: PHYSICIAN ASSISTANT

## 2020-05-18 PROCEDURE — 1159F MED LIST DOCD IN RCRD: CPT | Mod: HCNC,S$GLB,, | Performed by: PHYSICIAN ASSISTANT

## 2020-05-18 PROCEDURE — 1101F PT FALLS ASSESS-DOCD LE1/YR: CPT | Mod: HCNC,CPTII,S$GLB, | Performed by: PHYSICIAN ASSISTANT

## 2020-05-18 RX ORDER — FLUOCINONIDE 0.5 MG/G
CREAM TOPICAL 2 TIMES DAILY
Qty: 60 G | Refills: 0 | Status: SHIPPED | OUTPATIENT
Start: 2020-05-18 | End: 2020-07-07 | Stop reason: ALTCHOICE

## 2020-05-18 NOTE — PROGRESS NOTES
Subjective:       Patient ID: Humberto Cantor is a 64 y.o. male.    Chief Complaint: Follow-up    Mr. Cantor comes to clinic today for follow up of chronic conditions. The patient reports that he recently started swimming again for exercise. The patient has been biking recently while the pool has been closed. The patient requests refill of lidex; he recently used up his prescription due to poison ivy. The patient does have chronic back pain and previous spinal surgeries. He is followed by Dr. Nance for pain management. The patient will be following up with PCP, Dr. Sosa in July.     Review of patient's allergies indicates:  No Known Allergies      Current Outpatient Medications:     ALPRAZolam (XANAX) 0.25 MG tablet, TAKE 1 TABLET BY MOUTH TWO TIMES A DAY AS NEEDED, Disp: 60 tablet, Rfl: 1    ferrous sulfate, dried (SLOW REL IRON ORAL), Take by mouth., Disp: , Rfl:     HYDROcodone-acetaminophen (NORCO)  mg per tablet, Take 1 tablet by mouth 2 (two) times daily as needed., Disp: , Rfl:     lansoprazole (PREVACID) 15 MG capsule, Take 1 capsule (15 mg total) by mouth once daily., Disp: 30 capsule, Rfl: 11    magnesium chloride (SLOW-MAG) 71.5 mg TbEC, Take 2 tablets by mouth once., Disp: , Rfl:     methocarbamol (ROBAXIN) 750 MG Tab, Take 500 mg by mouth 3 (three) times daily., Disp: , Rfl:     naproxen (NAPROSYN) 250 MG tablet, Take 250 mg by mouth 2 (two) times daily., Disp: , Rfl:     olmesartan (BENICAR) 20 MG tablet, Take 1 tablet (20 mg total) by mouth once daily., Disp: 90 tablet, Rfl: 11    tadalafil (CIALIS) 20 MG Tab, Take 1 tablet (20 mg total) by mouth once daily., Disp: 100 tablet, Rfl: 0    tamsulosin (FLOMAX) 0.4 mg Cap, TAKE ONE CAPSULE BY MOUTH EVERY DAY, Disp: 90 capsule, Rfl: 2    VITAMIN B COMPLEX-VIT B12 SL, Place under the tongue., Disp: , Rfl:     VITAMIN K2 ORAL, Take by mouth., Disp: , Rfl:     fluocinonide 0.05% (LIDEX) 0.05 % cream, Apply topically 2 (two) times daily. Do  not use longer than 2 weeks for 10 days, Disp: 60 g, Rfl: 0    Lab Results   Component Value Date    WBC 3.63 (L) 11/04/2019    HGB 12.7 (L) 11/04/2019    HCT 39.7 (L) 11/04/2019     11/04/2019    CHOL 181 11/04/2019    TRIG 63 11/04/2019    HDL 56 11/04/2019    ALT 18 11/04/2019    AST 20 11/04/2019     11/04/2019    K 4.4 11/04/2019     11/04/2019    CREATININE 1.0 11/04/2019    BUN 22 11/04/2019    CO2 28 11/04/2019    PSA 0.78 11/04/2019    INR 1.0 06/05/2017       Review of Systems   Constitutional: Negative for activity change, appetite change, fatigue and fever.   HENT: Negative for congestion, ear pain, postnasal drip and rhinorrhea.    Eyes: Negative for pain, itching and visual disturbance.   Respiratory: Negative for shortness of breath and wheezing.    Cardiovascular: Negative for chest pain.   Gastrointestinal: Negative for abdominal distention, abdominal pain, constipation, diarrhea and nausea.   Genitourinary: Negative for difficulty urinating, dysuria, frequency, hematuria and urgency.   Musculoskeletal: Positive for arthralgias and back pain. Negative for myalgias and neck pain.   Skin: Negative for color change, pallor and rash.   Neurological: Negative for dizziness, syncope and headaches.   Hematological: Negative for adenopathy.   Psychiatric/Behavioral: Negative for behavioral problems. The patient is not nervous/anxious.        Objective:      Physical Exam   Constitutional: He is oriented to person, place, and time. Vital signs are normal. He appears well-nourished. No distress.   HENT:   Head: Normocephalic.   Mouth/Throat: Oropharynx is clear and moist.   Eyes: Pupils are equal, round, and reactive to light. Conjunctivae are normal.   Cardiovascular: Normal rate and regular rhythm.   Pulmonary/Chest: Effort normal and breath sounds normal. No respiratory distress. He has no wheezes.   Abdominal: Soft. Bowel sounds are normal. There is no tenderness.   Musculoskeletal:  Normal range of motion. He exhibits no edema.   Neurological: He is alert and oriented to person, place, and time.   Skin: Skin is warm and dry.   Psychiatric: He has a normal mood and affect.       Assessment:       1. Essential hypertension    2. Dermatitis due to plants    3. Gastroesophageal reflux disease, esophagitis presence not specified    4. S/P lumbar spinal fusion        Plan:   Humberto was seen today for follow-up.    Diagnoses and all orders for this visit:    Essential hypertension  Elevated  Low salt diet  Continue current medication  Restart exercise  Reevaluate at follow up in July  Dermatitis due to plants  -     fluocinonide 0.05% (LIDEX) 0.05 % cream; Apply topically 2 (two) times daily. Do not use longer than 2 weeks for 10 days    Gastroesophageal reflux disease, esophagitis presence not specified  Controlled  Avoid trigger foods  Stop eating 2-3 hours before bed  S/P lumbar spinal fusion  Continue follow up with Dr. Nance

## 2020-05-28 ENCOUNTER — TELEPHONE (OUTPATIENT)
Dept: FAMILY MEDICINE | Facility: CLINIC | Age: 65
End: 2020-05-28

## 2020-05-28 NOTE — TELEPHONE ENCOUNTER
Patient has been scheduled for b/p check 6/2/2020 at 10:00. This has been fully explained to the patient, who indicates understanding.      ----- Message from Maryanne Valera sent at 5/28/2020  1:37 PM CDT -----  Contact: patient  Type: Needs Medical Advice  Who Called:  patient  Best Call Back Number: [phone]  Additional Information: patient states that he would like to schedule. BP check. Please call to advise.  Allie

## 2020-06-02 ENCOUNTER — CLINICAL SUPPORT (OUTPATIENT)
Dept: FAMILY MEDICINE | Facility: CLINIC | Age: 65
End: 2020-06-02
Payer: MEDICARE

## 2020-06-02 ENCOUNTER — TELEPHONE (OUTPATIENT)
Dept: FAMILY MEDICINE | Facility: CLINIC | Age: 65
End: 2020-06-02

## 2020-06-02 NOTE — TELEPHONE ENCOUNTER
/92 PULSE 71.  Patient states he had just finished swimming at the gym and attributes this to the elevated blood pressure. States blood pressure doesn't normally be that high. Patient brought in home blood pressure machine, same blood pressure received when checked with home monitor. Please advise.

## 2020-06-02 NOTE — PROGRESS NOTES
/92 PULSE 71.  Patient states he had just finished swimming at the gym and attributes this to the elevated blood pressure. States blood pressure doesn't normally be that high. Patient brought in home blood pressure machine, same blood pressure received when checked with home monitor.

## 2020-06-03 NOTE — TELEPHONE ENCOUNTER
The blood pressure is moderate elevated.  Please continue same medications.  I would like to see blood pressures done at home . Please bring back blood pressure log in a month.

## 2020-06-23 ENCOUNTER — PATIENT OUTREACH (OUTPATIENT)
Dept: ADMINISTRATIVE | Facility: HOSPITAL | Age: 65
End: 2020-06-23

## 2020-06-23 NOTE — PROGRESS NOTES
Chart review completed 06/23/2020.  Care Everywhere updates requested and reviewed.  Immunizations reconciled. Media reviewed.

## 2020-07-07 ENCOUNTER — OFFICE VISIT (OUTPATIENT)
Dept: FAMILY MEDICINE | Facility: CLINIC | Age: 65
End: 2020-07-07
Payer: MEDICARE

## 2020-07-07 VITALS
OXYGEN SATURATION: 98 % | HEART RATE: 70 BPM | SYSTOLIC BLOOD PRESSURE: 140 MMHG | DIASTOLIC BLOOD PRESSURE: 80 MMHG | HEIGHT: 71 IN | RESPIRATION RATE: 16 BRPM | BODY MASS INDEX: 28.05 KG/M2 | WEIGHT: 200.38 LBS | TEMPERATURE: 98 F

## 2020-07-07 DIAGNOSIS — M54.40 CHRONIC BILATERAL LOW BACK PAIN WITH SCIATICA, SCIATICA LATERALITY UNSPECIFIED: ICD-10-CM

## 2020-07-07 DIAGNOSIS — F41.1 GAD (GENERALIZED ANXIETY DISORDER): ICD-10-CM

## 2020-07-07 DIAGNOSIS — L25.5 DERMATITIS DUE TO PLANTS: ICD-10-CM

## 2020-07-07 DIAGNOSIS — Z51.89 ENCOUNTER FOR PATIENT COMPLIANCE MONITORING IN DRUG TREATMENT PROGRAM: Primary | ICD-10-CM

## 2020-07-07 DIAGNOSIS — G89.29 CHRONIC BILATERAL LOW BACK PAIN WITH SCIATICA, SCIATICA LATERALITY UNSPECIFIED: ICD-10-CM

## 2020-07-07 DIAGNOSIS — I10 ESSENTIAL HYPERTENSION: ICD-10-CM

## 2020-07-07 DIAGNOSIS — E78.1 PURE HYPERGLYCERIDEMIA: ICD-10-CM

## 2020-07-07 PROCEDURE — 99499 UNLISTED E&M SERVICE: CPT | Mod: HCNC,S$GLB,, | Performed by: FAMILY MEDICINE

## 2020-07-07 PROCEDURE — 1159F PR MEDICATION LIST DOCUMENTED IN MEDICAL RECORD: ICD-10-PCS | Mod: HCNC,S$GLB,, | Performed by: FAMILY MEDICINE

## 2020-07-07 PROCEDURE — 3077F PR MOST RECENT SYSTOLIC BLOOD PRESSURE >= 140 MM HG: ICD-10-PCS | Mod: HCNC,CPTII,S$GLB, | Performed by: FAMILY MEDICINE

## 2020-07-07 PROCEDURE — 3079F PR MOST RECENT DIASTOLIC BLOOD PRESSURE 80-89 MM HG: ICD-10-PCS | Mod: HCNC,CPTII,S$GLB, | Performed by: FAMILY MEDICINE

## 2020-07-07 PROCEDURE — 3008F BODY MASS INDEX DOCD: CPT | Mod: HCNC,CPTII,S$GLB, | Performed by: FAMILY MEDICINE

## 2020-07-07 PROCEDURE — 3079F DIAST BP 80-89 MM HG: CPT | Mod: HCNC,CPTII,S$GLB, | Performed by: FAMILY MEDICINE

## 2020-07-07 PROCEDURE — 99999 PR PBB SHADOW E&M-EST. PATIENT-LVL IV: CPT | Mod: PBBFAC,HCNC,, | Performed by: FAMILY MEDICINE

## 2020-07-07 PROCEDURE — 1159F MED LIST DOCD IN RCRD: CPT | Mod: HCNC,S$GLB,, | Performed by: FAMILY MEDICINE

## 2020-07-07 PROCEDURE — 1125F PR PAIN SEVERITY QUANTIFIED, PAIN PRESENT: ICD-10-PCS | Mod: HCNC,S$GLB,, | Performed by: FAMILY MEDICINE

## 2020-07-07 PROCEDURE — 1101F PR PT FALLS ASSESS DOC 0-1 FALLS W/OUT INJ PAST YR: ICD-10-PCS | Mod: HCNC,CPTII,S$GLB, | Performed by: FAMILY MEDICINE

## 2020-07-07 PROCEDURE — 1125F AMNT PAIN NOTED PAIN PRSNT: CPT | Mod: HCNC,S$GLB,, | Performed by: FAMILY MEDICINE

## 2020-07-07 PROCEDURE — 99214 PR OFFICE/OUTPT VISIT, EST, LEVL IV, 30-39 MIN: ICD-10-PCS | Mod: HCNC,S$GLB,, | Performed by: FAMILY MEDICINE

## 2020-07-07 PROCEDURE — 1101F PT FALLS ASSESS-DOCD LE1/YR: CPT | Mod: HCNC,CPTII,S$GLB, | Performed by: FAMILY MEDICINE

## 2020-07-07 PROCEDURE — 99499 RISK ADDL DX/OHS AUDIT: ICD-10-PCS | Mod: HCNC,S$GLB,, | Performed by: FAMILY MEDICINE

## 2020-07-07 PROCEDURE — 3077F SYST BP >= 140 MM HG: CPT | Mod: HCNC,CPTII,S$GLB, | Performed by: FAMILY MEDICINE

## 2020-07-07 PROCEDURE — 99214 OFFICE O/P EST MOD 30 MIN: CPT | Mod: HCNC,S$GLB,, | Performed by: FAMILY MEDICINE

## 2020-07-07 PROCEDURE — 99999 PR PBB SHADOW E&M-EST. PATIENT-LVL IV: ICD-10-PCS | Mod: PBBFAC,HCNC,, | Performed by: FAMILY MEDICINE

## 2020-07-07 PROCEDURE — 3008F PR BODY MASS INDEX (BMI) DOCUMENTED: ICD-10-PCS | Mod: HCNC,CPTII,S$GLB, | Performed by: FAMILY MEDICINE

## 2020-07-07 RX ORDER — FLUOCINONIDE 0.5 MG/G
CREAM TOPICAL 2 TIMES DAILY
Qty: 60 G | Refills: 0 | Status: SHIPPED | OUTPATIENT
Start: 2020-07-07 | End: 2022-03-29 | Stop reason: SDUPTHER

## 2020-07-07 RX ORDER — ALPRAZOLAM 0.25 MG/1
TABLET ORAL
Qty: 60 TABLET | Refills: 3 | Status: SHIPPED | OUTPATIENT
Start: 2020-07-07 | End: 2020-11-06 | Stop reason: SDUPTHER

## 2020-07-07 NOTE — PROGRESS NOTES
Subjective:       Patient ID: Humberto Cantor is a 64 y.o. male.    Chief Complaint: No chief complaint on file.    Hypertension  This is a chronic problem. The problem has been resolved since onset. The problem is controlled. Pertinent negatives include no anxiety, blurred vision, chest pain, headaches, malaise/fatigue, neck pain, orthopnea, palpitations, peripheral edema, PND or shortness of breath. There are no associated agents to hypertension. Risk factors for coronary artery disease include male gender and obesity. Past treatments include angiotensin blockers. The current treatment provides moderate improvement.     Review of Systems   Constitutional: Negative for fatigue, malaise/fatigue and unexpected weight change.   Eyes: Negative for blurred vision.   Respiratory: Negative for chest tightness and shortness of breath.    Cardiovascular: Negative for chest pain, palpitations, orthopnea, leg swelling and PND.   Gastrointestinal: Negative for abdominal pain.   Musculoskeletal: Negative for arthralgias and neck pain.   Neurological: Negative for dizziness, syncope, light-headedness and headaches.       Patient Active Problem List   Diagnosis    GERD (gastroesophageal reflux disease)    Essential hypertension    Anxiety    S/P lumbar spinal fusion    History of compression fracture of vertebral column       Objective:      Physical Exam  Vitals signs reviewed.   Constitutional:       General: He is not in acute distress.     Appearance: Normal appearance. He is well-developed. He is not diaphoretic.   HENT:      Head: Normocephalic and atraumatic.      Right Ear: External ear normal.      Left Ear: External ear normal.      Nose: Nose normal.      Mouth/Throat:      Pharynx: No oropharyngeal exudate.   Eyes:      General: No scleral icterus.        Right eye: No discharge.         Left eye: No discharge.      Conjunctiva/sclera: Conjunctivae normal.      Pupils: Pupils are equal, round, and reactive to  light.   Neck:      Musculoskeletal: Normal range of motion and neck supple.      Thyroid: No thyromegaly.      Vascular: No JVD.      Trachea: No tracheal deviation.   Cardiovascular:      Rate and Rhythm: Normal rate.      Heart sounds: Normal heart sounds. No murmur.   Pulmonary:      Effort: Pulmonary effort is normal. No respiratory distress.      Breath sounds: Normal breath sounds. No wheezing or rales.   Abdominal:      General: Bowel sounds are normal. There is no distension.      Palpations: Abdomen is soft. There is no mass.      Tenderness: There is no abdominal tenderness. There is no guarding or rebound.   Musculoskeletal:         General: No tenderness.      Comments:        Lymphadenopathy:      Cervical: No cervical adenopathy.   Skin:     General: Skin is warm and dry.      Coloration: Skin is not pale.      Findings: No erythema or rash.   Neurological:      Mental Status: He is alert and oriented to person, place, and time.      Cranial Nerves: No cranial nerve deficit.      Coordination: Coordination normal.   Psychiatric:         Behavior: Behavior normal.         Thought Content: Thought content normal.         Judgment: Judgment normal.         Lab Results   Component Value Date    WBC 3.63 (L) 11/04/2019    HGB 12.7 (L) 11/04/2019    HCT 39.7 (L) 11/04/2019     11/04/2019    CHOL 181 11/04/2019    TRIG 63 11/04/2019    HDL 56 11/04/2019    ALT 18 11/04/2019    AST 20 11/04/2019     11/04/2019    K 4.4 11/04/2019     11/04/2019    CREATININE 1.0 11/04/2019    BUN 22 11/04/2019    CO2 28 11/04/2019    PSA 0.78 11/04/2019    INR 1.0 06/05/2017     The 10-year ASCVD risk score (Greg MULU Jr., et al., 2013) is: 16.3%    Values used to calculate the score:      Age: 64 years      Sex: Male      Is Non- : No      Diabetic: No      Tobacco smoker: No      Systolic Blood Pressure: 140 mmHg      Is BP treated: Yes      HDL Cholesterol: 56 mg/dL      Total  Cholesterol: 181 mg/dL    Assessment:       1. Encounter for patient compliance monitoring in drug treatment program    2. Essential hypertension    3. Chronic bilateral low back pain with sciatica, sciatica laterality unspecified    4. Pure hyperglyceridemia    5. TORRIE (generalized anxiety disorder)    6. Dermatitis due to plants        Plan:       Encounter for patient compliance monitoring in drug treatment program  -     TOXICOLOGY SCREEN, URINE, RANDOM (COMPLIANCE)    Essential hypertension    Chronic bilateral low back pain with sciatica, sciatica laterality unspecified    Pure hyperglyceridemia  -     Lipid Panel; Future; Expected date: 07/07/2020  -     Comprehensive metabolic panel; Future; Expected date: 07/07/2020  -     CBC auto differential; Future; Expected date: 07/07/2020    TORRIE (generalized anxiety disorder)  -     ALPRAZolam (XANAX) 0.25 MG tablet; TAKE 1 TABLET BY MOUTH TWO TIMES A DAY AS NEEDED  Dispense: 60 tablet; Refill: 3    Dermatitis due to plants  -     fluocinonide 0.05% (LIDEX) 0.05 % cream; Apply topically 2 (two) times daily. Do not use longer than 2 weeks for 10 days  Dispense: 60 g; Refill: 0      Patient readiness: acceptance and barriers:readiness and social stressors    During the course of the visit the patient was educated and counseled about the following:     Hypertension:   Dietary sodium restriction.  Regular aerobic exercise.  Check blood pressures 3 times weekly and record.    Goals: Hypertension: Reduce Blood Pressure    Did patient meet goals/outcomes: Yes    The following self management tools provided: excercise log    Patient Instructions (the written plan) was given to the patient/family.     Time spent with patient: 30 minutes    Barriers to medications present (no )    Adverse reactions to current medications (yes)    Over the counter medications reviewed (Yes)        30-minute visit. 10 minutes spent counseling patient on diet, exercise, and weight loss.  This has  been fully explained to the patient, who indicates understanding.    Discussed health maintenance guidelines appropriate for age.

## 2020-07-07 NOTE — PATIENT INSTRUCTIONS

## 2020-07-10 ENCOUNTER — PES CALL (OUTPATIENT)
Dept: ADMINISTRATIVE | Facility: CLINIC | Age: 65
End: 2020-07-10

## 2020-10-27 ENCOUNTER — PES CALL (OUTPATIENT)
Dept: ADMINISTRATIVE | Facility: CLINIC | Age: 65
End: 2020-10-27

## 2020-10-30 DIAGNOSIS — Z12.11 COLON CANCER SCREENING: ICD-10-CM

## 2020-11-03 ENCOUNTER — PATIENT OUTREACH (OUTPATIENT)
Dept: ADMINISTRATIVE | Facility: HOSPITAL | Age: 65
End: 2020-11-03

## 2020-11-03 NOTE — PROGRESS NOTES
Chart review completed 2020.  Care Everywhere updates requested and reviewed.  Immunizations reconciled. Media reports reviewed.  Duplicate HM overrides and  orders removed.  Overdue HM topic chart audit and/or requested.  Overdue lab testing linked to upcoming lab appointments if applies.    Patient has declined Colonoscopy  Health Maintenance Due   Topic Date Due    Shingles Vaccine (2 of 2) 2018    Pneumococcal Vaccine (65+ Low/Medium Risk) (2 of 2 - PPSV23) 2019    Influenza Vaccine (1) 2020    Colorectal Cancer Screening  10/22/2020

## 2020-11-06 ENCOUNTER — TELEPHONE (OUTPATIENT)
Dept: FAMILY MEDICINE | Facility: CLINIC | Age: 65
End: 2020-11-06

## 2020-11-06 DIAGNOSIS — F41.1 GAD (GENERALIZED ANXIETY DISORDER): ICD-10-CM

## 2020-11-06 RX ORDER — ALPRAZOLAM 0.25 MG/1
TABLET ORAL
Qty: 60 TABLET | Refills: 2 | Status: SHIPPED | OUTPATIENT
Start: 2020-11-06 | End: 2021-02-05 | Stop reason: SDUPTHER

## 2020-11-06 NOTE — TELEPHONE ENCOUNTER
----- Message from Solomon Reich sent at 11/6/2020  3:08 PM CST -----  Regarding: pt  Type:  RX Refill Request    Who Called:  pt  Refill or New Rx:  refill  RX Name and Strength:  ALPRAZolam (XANAX) 0.25 MG tablet 60 tablet 3 7/7/2020  How is the patient currently taking it? (ex. 1XDay):  Sig: TAKE 1 TABLET BY MOUTH TWO TIMES A DAY AS NEEDED   Is this a 30 day or 90 day RX:  30  Preferred Pharmacy with phone number:    [facility] - [place], [place] - [address] [facility] [address] [id number] [facility] [address]  [place] [address]  Phone: [phone] Fax: [phone]    Local or Mail Order:  local  Ordering Provider:  Dr Ian Sales Call Back Number:  [phone]  Additional Information:  PT has labs and appt next two weeks. Please send refill as pt will be out of meds over weekend before next appt.

## 2020-11-10 ENCOUNTER — LAB VISIT (OUTPATIENT)
Dept: LAB | Facility: HOSPITAL | Age: 65
End: 2020-11-10
Attending: FAMILY MEDICINE
Payer: MEDICARE

## 2020-11-10 DIAGNOSIS — E78.1 PURE HYPERGLYCERIDEMIA: ICD-10-CM

## 2020-11-10 LAB
ALBUMIN SERPL BCP-MCNC: 3.9 G/DL (ref 3.5–5.2)
ALP SERPL-CCNC: 61 U/L (ref 55–135)
ALT SERPL W/O P-5'-P-CCNC: 16 U/L (ref 10–44)
ANION GAP SERPL CALC-SCNC: 7 MMOL/L (ref 8–16)
AST SERPL-CCNC: 20 U/L (ref 10–40)
BASOPHILS # BLD AUTO: 0.04 K/UL (ref 0–0.2)
BASOPHILS NFR BLD: 1.1 % (ref 0–1.9)
BILIRUB SERPL-MCNC: 0.4 MG/DL (ref 0.1–1)
BUN SERPL-MCNC: 30 MG/DL (ref 8–23)
CALCIUM SERPL-MCNC: 9.5 MG/DL (ref 8.7–10.5)
CHLORIDE SERPL-SCNC: 106 MMOL/L (ref 95–110)
CHOLEST SERPL-MCNC: 197 MG/DL (ref 120–199)
CHOLEST/HDLC SERPL: 3.9 {RATIO} (ref 2–5)
CO2 SERPL-SCNC: 26 MMOL/L (ref 23–29)
CREAT SERPL-MCNC: 1.1 MG/DL (ref 0.5–1.4)
DIFFERENTIAL METHOD: ABNORMAL
EOSINOPHIL # BLD AUTO: 0.2 K/UL (ref 0–0.5)
EOSINOPHIL NFR BLD: 4.8 % (ref 0–8)
ERYTHROCYTE [DISTWIDTH] IN BLOOD BY AUTOMATED COUNT: 12.5 % (ref 11.5–14.5)
EST. GFR  (AFRICAN AMERICAN): >60 ML/MIN/1.73 M^2
EST. GFR  (NON AFRICAN AMERICAN): >60 ML/MIN/1.73 M^2
GLUCOSE SERPL-MCNC: 106 MG/DL (ref 70–110)
HCT VFR BLD AUTO: 41.2 % (ref 40–54)
HDLC SERPL-MCNC: 50 MG/DL (ref 40–75)
HDLC SERPL: 25.4 % (ref 20–50)
HGB BLD-MCNC: 13.5 G/DL (ref 14–18)
IMM GRANULOCYTES # BLD AUTO: 0.02 K/UL (ref 0–0.04)
IMM GRANULOCYTES NFR BLD AUTO: 0.5 % (ref 0–0.5)
LDLC SERPL CALC-MCNC: 135.2 MG/DL (ref 63–159)
LYMPHOCYTES # BLD AUTO: 0.7 K/UL (ref 1–4.8)
LYMPHOCYTES NFR BLD: 19.7 % (ref 18–48)
MCH RBC QN AUTO: 33.8 PG (ref 27–31)
MCHC RBC AUTO-ENTMCNC: 32.8 G/DL (ref 32–36)
MCV RBC AUTO: 103 FL (ref 82–98)
MONOCYTES # BLD AUTO: 0.5 K/UL (ref 0.3–1)
MONOCYTES NFR BLD: 12.8 % (ref 4–15)
NEUTROPHILS # BLD AUTO: 2.3 K/UL (ref 1.8–7.7)
NEUTROPHILS NFR BLD: 61.1 % (ref 38–73)
NONHDLC SERPL-MCNC: 147 MG/DL
NRBC BLD-RTO: 0 /100 WBC
PLATELET # BLD AUTO: 283 K/UL (ref 150–350)
PMV BLD AUTO: 10.2 FL (ref 9.2–12.9)
POTASSIUM SERPL-SCNC: 4.6 MMOL/L (ref 3.5–5.1)
PROT SERPL-MCNC: 6.7 G/DL (ref 6–8.4)
RBC # BLD AUTO: 4 M/UL (ref 4.6–6.2)
SODIUM SERPL-SCNC: 139 MMOL/L (ref 136–145)
TRIGL SERPL-MCNC: 59 MG/DL (ref 30–150)
WBC # BLD AUTO: 3.76 K/UL (ref 3.9–12.7)

## 2020-11-10 PROCEDURE — 80061 LIPID PANEL: CPT | Mod: HCNC

## 2020-11-10 PROCEDURE — 36415 COLL VENOUS BLD VENIPUNCTURE: CPT | Mod: HCNC,PO

## 2020-11-10 PROCEDURE — 80053 COMPREHEN METABOLIC PANEL: CPT | Mod: HCNC

## 2020-11-10 PROCEDURE — 85025 COMPLETE CBC W/AUTO DIFF WBC: CPT | Mod: HCNC

## 2020-11-12 ENCOUNTER — DOCUMENTATION ONLY (OUTPATIENT)
Dept: FAMILY MEDICINE | Facility: CLINIC | Age: 65
End: 2020-11-12

## 2020-11-12 ENCOUNTER — PATIENT OUTREACH (OUTPATIENT)
Dept: ADMINISTRATIVE | Facility: HOSPITAL | Age: 65
End: 2020-11-12

## 2020-11-12 NOTE — PROGRESS NOTES
Patient returned call.  He would like a Fit Kit.  He will  packet at his visit that is scheduled for 11/17/20.

## 2020-11-17 ENCOUNTER — LAB VISIT (OUTPATIENT)
Dept: LAB | Facility: HOSPITAL | Age: 65
End: 2020-11-17
Attending: FAMILY MEDICINE
Payer: MEDICARE

## 2020-11-17 ENCOUNTER — OFFICE VISIT (OUTPATIENT)
Dept: FAMILY MEDICINE | Facility: CLINIC | Age: 65
End: 2020-11-17
Payer: MEDICARE

## 2020-11-17 VITALS
HEART RATE: 78 BPM | WEIGHT: 197.56 LBS | HEIGHT: 71 IN | TEMPERATURE: 98 F | DIASTOLIC BLOOD PRESSURE: 86 MMHG | SYSTOLIC BLOOD PRESSURE: 136 MMHG | RESPIRATION RATE: 16 BRPM | BODY MASS INDEX: 27.66 KG/M2 | OXYGEN SATURATION: 97 %

## 2020-11-17 DIAGNOSIS — Z12.5 PROSTATE CANCER SCREENING: ICD-10-CM

## 2020-11-17 DIAGNOSIS — Z12.11 COLON CANCER SCREENING: ICD-10-CM

## 2020-11-17 DIAGNOSIS — M54.16 LUMBAR BACK PAIN WITH RADICULOPATHY AFFECTING RIGHT LOWER EXTREMITY: ICD-10-CM

## 2020-11-17 DIAGNOSIS — G89.29 CHRONIC BILATERAL LOW BACK PAIN WITH SCIATICA, SCIATICA LATERALITY UNSPECIFIED: ICD-10-CM

## 2020-11-17 DIAGNOSIS — M54.40 CHRONIC BILATERAL LOW BACK PAIN WITH SCIATICA, SCIATICA LATERALITY UNSPECIFIED: ICD-10-CM

## 2020-11-17 DIAGNOSIS — N40.1 BENIGN PROSTATIC HYPERPLASIA WITH LOWER URINARY TRACT SYMPTOMS, SYMPTOM DETAILS UNSPECIFIED: ICD-10-CM

## 2020-11-17 DIAGNOSIS — I10 ESSENTIAL HYPERTENSION: Primary | ICD-10-CM

## 2020-11-17 PROCEDURE — 3288F FALL RISK ASSESSMENT DOCD: CPT | Mod: HCNC,CPTII,S$GLB, | Performed by: FAMILY MEDICINE

## 2020-11-17 PROCEDURE — 36415 COLL VENOUS BLD VENIPUNCTURE: CPT | Mod: HCNC,PO

## 2020-11-17 PROCEDURE — 1101F PR PT FALLS ASSESS DOC 0-1 FALLS W/OUT INJ PAST YR: ICD-10-PCS | Mod: HCNC,CPTII,S$GLB, | Performed by: FAMILY MEDICINE

## 2020-11-17 PROCEDURE — 99214 PR OFFICE/OUTPT VISIT, EST, LEVL IV, 30-39 MIN: ICD-10-PCS | Mod: HCNC,S$GLB,, | Performed by: FAMILY MEDICINE

## 2020-11-17 PROCEDURE — 99999 PR PBB SHADOW E&M-EST. PATIENT-LVL V: CPT | Mod: PBBFAC,HCNC,, | Performed by: FAMILY MEDICINE

## 2020-11-17 PROCEDURE — 3075F PR MOST RECENT SYSTOLIC BLOOD PRESS GE 130-139MM HG: ICD-10-PCS | Mod: HCNC,CPTII,S$GLB, | Performed by: FAMILY MEDICINE

## 2020-11-17 PROCEDURE — 1125F PR PAIN SEVERITY QUANTIFIED, PAIN PRESENT: ICD-10-PCS | Mod: HCNC,S$GLB,, | Performed by: FAMILY MEDICINE

## 2020-11-17 PROCEDURE — 1125F AMNT PAIN NOTED PAIN PRSNT: CPT | Mod: HCNC,S$GLB,, | Performed by: FAMILY MEDICINE

## 2020-11-17 PROCEDURE — 3008F PR BODY MASS INDEX (BMI) DOCUMENTED: ICD-10-PCS | Mod: HCNC,CPTII,S$GLB, | Performed by: FAMILY MEDICINE

## 2020-11-17 PROCEDURE — 1101F PT FALLS ASSESS-DOCD LE1/YR: CPT | Mod: HCNC,CPTII,S$GLB, | Performed by: FAMILY MEDICINE

## 2020-11-17 PROCEDURE — 3288F PR FALLS RISK ASSESSMENT DOCUMENTED: ICD-10-PCS | Mod: HCNC,CPTII,S$GLB, | Performed by: FAMILY MEDICINE

## 2020-11-17 PROCEDURE — 3079F PR MOST RECENT DIASTOLIC BLOOD PRESSURE 80-89 MM HG: ICD-10-PCS | Mod: HCNC,CPTII,S$GLB, | Performed by: FAMILY MEDICINE

## 2020-11-17 PROCEDURE — 99214 OFFICE O/P EST MOD 30 MIN: CPT | Mod: HCNC,S$GLB,, | Performed by: FAMILY MEDICINE

## 2020-11-17 PROCEDURE — 82274 ASSAY TEST FOR BLOOD FECAL: CPT | Mod: HCNC

## 2020-11-17 PROCEDURE — 3079F DIAST BP 80-89 MM HG: CPT | Mod: HCNC,CPTII,S$GLB, | Performed by: FAMILY MEDICINE

## 2020-11-17 PROCEDURE — 3075F SYST BP GE 130 - 139MM HG: CPT | Mod: HCNC,CPTII,S$GLB, | Performed by: FAMILY MEDICINE

## 2020-11-17 PROCEDURE — 3008F BODY MASS INDEX DOCD: CPT | Mod: HCNC,CPTII,S$GLB, | Performed by: FAMILY MEDICINE

## 2020-11-17 PROCEDURE — 84153 ASSAY OF PSA TOTAL: CPT | Mod: HCNC

## 2020-11-17 PROCEDURE — 1159F PR MEDICATION LIST DOCUMENTED IN MEDICAL RECORD: ICD-10-PCS | Mod: HCNC,S$GLB,, | Performed by: FAMILY MEDICINE

## 2020-11-17 PROCEDURE — 99999 PR PBB SHADOW E&M-EST. PATIENT-LVL V: ICD-10-PCS | Mod: PBBFAC,HCNC,, | Performed by: FAMILY MEDICINE

## 2020-11-17 PROCEDURE — 1159F MED LIST DOCD IN RCRD: CPT | Mod: HCNC,S$GLB,, | Performed by: FAMILY MEDICINE

## 2020-11-17 RX ORDER — LIDOCAINE AND PRILOCAINE 25; 25 MG/G; MG/G
CREAM TOPICAL
COMMUNITY
Start: 2020-10-14

## 2020-11-17 RX ORDER — TAMSULOSIN HYDROCHLORIDE 0.4 MG/1
0.8 CAPSULE ORAL DAILY
Qty: 180 CAPSULE | Refills: 2 | Status: SHIPPED | OUTPATIENT
Start: 2020-11-17 | End: 2021-05-19 | Stop reason: SDUPTHER

## 2020-11-17 NOTE — PATIENT INSTRUCTIONS

## 2020-11-17 NOTE — PROGRESS NOTES
Subjective:       Patient ID: Humberto Cantor is a 65 y.o. male.    Chief Complaint: No chief complaint on file.    Hypertension  This is a chronic problem. The current episode started more than 1 month ago. The problem has been resolved since onset. The problem is controlled. Associated symptoms include anxiety and malaise/fatigue. Pertinent negatives include no chest pain, headaches, neck pain, orthopnea, palpitations, peripheral edema or shortness of breath. There are no associated agents to hypertension. Risk factors for coronary artery disease include male gender, obesity and sedentary lifestyle. The current treatment provides mild improvement. Compliance problems include exercise.      Review of Systems   Constitutional: Positive for malaise/fatigue. Negative for fatigue and unexpected weight change.   Respiratory: Negative for chest tightness and shortness of breath.    Cardiovascular: Negative for chest pain, palpitations, orthopnea and leg swelling.   Gastrointestinal: Negative for abdominal pain.   Musculoskeletal: Negative for arthralgias and neck pain.   Neurological: Negative for dizziness, syncope, light-headedness and headaches.       Patient Active Problem List   Diagnosis    GERD (gastroesophageal reflux disease)    Essential hypertension    Anxiety    S/P lumbar spinal fusion    History of compression fracture of vertebral column       Objective:      Physical Exam  Vitals signs reviewed.   Constitutional:       General: He is not in acute distress.     Appearance: He is well-developed. He is not diaphoretic.   HENT:      Head: Normocephalic and atraumatic.      Right Ear: External ear normal.      Left Ear: External ear normal.      Nose: Nose normal.      Mouth/Throat:      Pharynx: No oropharyngeal exudate.   Eyes:      General: No scleral icterus.        Right eye: No discharge.         Left eye: No discharge.      Conjunctiva/sclera: Conjunctivae normal.      Pupils: Pupils are equal,  round, and reactive to light.   Neck:      Musculoskeletal: Normal range of motion and neck supple.      Thyroid: No thyromegaly.      Vascular: No JVD.      Trachea: No tracheal deviation.   Cardiovascular:      Rate and Rhythm: Normal rate.      Heart sounds: Normal heart sounds. No murmur.   Pulmonary:      Effort: Pulmonary effort is normal. No respiratory distress.      Breath sounds: Normal breath sounds. No wheezing or rales.   Abdominal:      General: Bowel sounds are normal. There is no distension.      Palpations: Abdomen is soft. There is no mass.      Tenderness: There is no abdominal tenderness. There is no guarding or rebound.   Musculoskeletal:         General: No tenderness.      Comments:        Lymphadenopathy:      Cervical: No cervical adenopathy.   Skin:     General: Skin is warm and dry.      Coloration: Skin is not pale.      Findings: No erythema or rash.   Neurological:      Mental Status: He is alert and oriented to person, place, and time.      Cranial Nerves: No cranial nerve deficit.      Coordination: Coordination normal.   Psychiatric:         Behavior: Behavior normal.         Thought Content: Thought content normal.         Judgment: Judgment normal.         Lab Results   Component Value Date    WBC 3.76 (L) 11/10/2020    HGB 13.5 (L) 11/10/2020    HCT 41.2 11/10/2020     11/10/2020    CHOL 197 11/10/2020    TRIG 59 11/10/2020    HDL 50 11/10/2020    ALT 16 11/10/2020    AST 20 11/10/2020     11/10/2020    K 4.6 11/10/2020     11/10/2020    CREATININE 1.1 11/10/2020    BUN 30 (H) 11/10/2020    CO2 26 11/10/2020    PSA 0.78 11/04/2019    INR 1.0 06/05/2017     The 10-year ASCVD risk score (Greg MULU Jr., et al., 2013) is: 19.5%    Values used to calculate the score:      Age: 65 years      Sex: Male      Is Non- : No      Diabetic: No      Tobacco smoker: No      Systolic Blood Pressure: 140 mmHg      Is BP treated: Yes      HDL Cholesterol: 50  mg/dL      Total Cholesterol: 197 mg/dL    Assessment:       1. Essential hypertension    2. Benign prostatic hyperplasia with lower urinary tract symptoms, symptom details unspecified    3. Prostate cancer screening    4. Chronic bilateral low back pain with sciatica, sciatica laterality unspecified    5. Lumbar back pain with radiculopathy affecting right lower extremity        Plan:       Essential hypertension    Benign prostatic hyperplasia with lower urinary tract symptoms, symptom details unspecified  -     tamsulosin (FLOMAX) 0.4 mg Cap; Take 2 capsules (0.8 mg total) by mouth once daily.  Dispense: 180 capsule; Refill: 2    Prostate cancer screening  -     PSA, Screening; Future; Expected date: 11/17/2020    Chronic bilateral low back pain with sciatica, sciatica laterality unspecified    Lumbar back pain with radiculopathy affecting right lower extremity      Patient education given on HT, Diet, exercises and the patient expresses understanding and acceptance of instructions. Saud Sosa 12/23/2020 12:51 PM  30-minute visit. 10 minutes spent counseling patient on diet, exercise, and weight loss.  This has been fully explained to the patient, who indicates understanding.    Discussed health maintenance guidelines appropriate for age.

## 2020-11-18 LAB — COMPLEXED PSA SERPL-MCNC: 0.98 NG/ML (ref 0–4)

## 2020-11-25 LAB — HEMOCCULT STL QL IA: NEGATIVE

## 2020-12-08 NOTE — PROGRESS NOTES
I note some minor lab abnormalities that have been stable over time, these are of doubtful clinical significance.

## 2021-02-05 DIAGNOSIS — F41.1 GAD (GENERALIZED ANXIETY DISORDER): ICD-10-CM

## 2021-02-05 RX ORDER — ALPRAZOLAM 0.25 MG/1
TABLET ORAL
Qty: 60 TABLET | Refills: 0 | Status: SHIPPED | OUTPATIENT
Start: 2021-02-05 | End: 2021-03-08 | Stop reason: SDUPTHER

## 2021-03-08 DIAGNOSIS — F41.1 GAD (GENERALIZED ANXIETY DISORDER): ICD-10-CM

## 2021-03-09 RX ORDER — ALPRAZOLAM 0.25 MG/1
TABLET ORAL
Qty: 60 TABLET | Refills: 3 | Status: SHIPPED | OUTPATIENT
Start: 2021-03-09 | End: 2021-06-30

## 2021-03-15 NOTE — ASSESSMENT & PLAN NOTE
61 year old male with L4 burst fracture, retropulsion with canal stenosis--neurovascularly intact, L5 isthmic spondylolisthesis    - to OR today for L4/5 laminectomy and L2-pelvis PSF  - NPO  - pain control  - bed rest   Target Cumulative Dosage (In Mg/Kg): 135

## 2021-04-05 ENCOUNTER — PES CALL (OUTPATIENT)
Dept: ADMINISTRATIVE | Facility: CLINIC | Age: 66
End: 2021-04-05

## 2021-04-22 ENCOUNTER — PES CALL (OUTPATIENT)
Dept: ADMINISTRATIVE | Facility: CLINIC | Age: 66
End: 2021-04-22

## 2021-05-19 ENCOUNTER — OFFICE VISIT (OUTPATIENT)
Dept: FAMILY MEDICINE | Facility: CLINIC | Age: 66
End: 2021-05-19
Payer: MEDICARE

## 2021-05-19 VITALS
OXYGEN SATURATION: 97 % | RESPIRATION RATE: 12 BRPM | DIASTOLIC BLOOD PRESSURE: 74 MMHG | TEMPERATURE: 98 F | HEART RATE: 90 BPM | BODY MASS INDEX: 28.05 KG/M2 | SYSTOLIC BLOOD PRESSURE: 134 MMHG | HEIGHT: 71 IN | WEIGHT: 200.38 LBS

## 2021-05-19 DIAGNOSIS — M54.40 CHRONIC BILATERAL LOW BACK PAIN WITH SCIATICA, SCIATICA LATERALITY UNSPECIFIED: ICD-10-CM

## 2021-05-19 DIAGNOSIS — G89.29 CHRONIC BILATERAL LOW BACK PAIN WITH SCIATICA, SCIATICA LATERALITY UNSPECIFIED: ICD-10-CM

## 2021-05-19 DIAGNOSIS — Z98.1 S/P LUMBAR SPINAL FUSION: ICD-10-CM

## 2021-05-19 DIAGNOSIS — I10 ESSENTIAL HYPERTENSION: Primary | ICD-10-CM

## 2021-05-19 DIAGNOSIS — N40.1 BENIGN PROSTATIC HYPERPLASIA WITH LOWER URINARY TRACT SYMPTOMS, SYMPTOM DETAILS UNSPECIFIED: ICD-10-CM

## 2021-05-19 PROCEDURE — 99999 PR PBB SHADOW E&M-EST. PATIENT-LVL V: CPT | Mod: PBBFAC,HCNC,, | Performed by: FAMILY MEDICINE

## 2021-05-19 PROCEDURE — 99499 UNLISTED E&M SERVICE: CPT | Mod: HCNC,S$GLB,, | Performed by: FAMILY MEDICINE

## 2021-05-19 PROCEDURE — 99499 RISK ADDL DX/OHS AUDIT: ICD-10-PCS | Mod: HCNC,S$GLB,, | Performed by: FAMILY MEDICINE

## 2021-05-19 PROCEDURE — 99213 OFFICE O/P EST LOW 20 MIN: CPT | Mod: HCNC,S$GLB,, | Performed by: FAMILY MEDICINE

## 2021-05-19 PROCEDURE — 99213 PR OFFICE/OUTPT VISIT, EST, LEVL III, 20-29 MIN: ICD-10-PCS | Mod: HCNC,S$GLB,, | Performed by: FAMILY MEDICINE

## 2021-05-19 PROCEDURE — 1101F PR PT FALLS ASSESS DOC 0-1 FALLS W/OUT INJ PAST YR: ICD-10-PCS | Mod: HCNC,CPTII,S$GLB, | Performed by: FAMILY MEDICINE

## 2021-05-19 PROCEDURE — 3008F PR BODY MASS INDEX (BMI) DOCUMENTED: ICD-10-PCS | Mod: HCNC,CPTII,S$GLB, | Performed by: FAMILY MEDICINE

## 2021-05-19 PROCEDURE — 3008F BODY MASS INDEX DOCD: CPT | Mod: HCNC,CPTII,S$GLB, | Performed by: FAMILY MEDICINE

## 2021-05-19 PROCEDURE — 3078F DIAST BP <80 MM HG: CPT | Mod: HCNC,CPTII,S$GLB, | Performed by: FAMILY MEDICINE

## 2021-05-19 PROCEDURE — 99999 PR PBB SHADOW E&M-EST. PATIENT-LVL V: ICD-10-PCS | Mod: PBBFAC,HCNC,, | Performed by: FAMILY MEDICINE

## 2021-05-19 PROCEDURE — 3078F PR MOST RECENT DIASTOLIC BLOOD PRESSURE < 80 MM HG: ICD-10-PCS | Mod: HCNC,CPTII,S$GLB, | Performed by: FAMILY MEDICINE

## 2021-05-19 PROCEDURE — 3288F PR FALLS RISK ASSESSMENT DOCUMENTED: ICD-10-PCS | Mod: HCNC,CPTII,S$GLB, | Performed by: FAMILY MEDICINE

## 2021-05-19 PROCEDURE — 3075F SYST BP GE 130 - 139MM HG: CPT | Mod: HCNC,CPTII,S$GLB, | Performed by: FAMILY MEDICINE

## 2021-05-19 PROCEDURE — 1126F PR PAIN SEVERITY QUANTIFIED, NO PAIN PRESENT: ICD-10-PCS | Mod: HCNC,S$GLB,, | Performed by: FAMILY MEDICINE

## 2021-05-19 PROCEDURE — 1126F AMNT PAIN NOTED NONE PRSNT: CPT | Mod: HCNC,S$GLB,, | Performed by: FAMILY MEDICINE

## 2021-05-19 PROCEDURE — 1101F PT FALLS ASSESS-DOCD LE1/YR: CPT | Mod: HCNC,CPTII,S$GLB, | Performed by: FAMILY MEDICINE

## 2021-05-19 PROCEDURE — 3075F PR MOST RECENT SYSTOLIC BLOOD PRESS GE 130-139MM HG: ICD-10-PCS | Mod: HCNC,CPTII,S$GLB, | Performed by: FAMILY MEDICINE

## 2021-05-19 PROCEDURE — 3288F FALL RISK ASSESSMENT DOCD: CPT | Mod: HCNC,CPTII,S$GLB, | Performed by: FAMILY MEDICINE

## 2021-05-19 PROCEDURE — 1159F PR MEDICATION LIST DOCUMENTED IN MEDICAL RECORD: ICD-10-PCS | Mod: HCNC,S$GLB,, | Performed by: FAMILY MEDICINE

## 2021-05-19 PROCEDURE — 1159F MED LIST DOCD IN RCRD: CPT | Mod: HCNC,S$GLB,, | Performed by: FAMILY MEDICINE

## 2021-05-19 RX ORDER — TADALAFIL 20 MG/1
20 TABLET ORAL DAILY
Qty: 100 TABLET | Refills: 0 | OUTPATIENT
Start: 2021-05-19 | End: 2021-05-19 | Stop reason: SDUPTHER

## 2021-05-19 RX ORDER — TAMSULOSIN HYDROCHLORIDE 0.4 MG/1
0.8 CAPSULE ORAL DAILY
Qty: 180 CAPSULE | Refills: 4 | Status: SHIPPED | OUTPATIENT
Start: 2021-05-19 | End: 2022-08-10

## 2021-05-19 RX ORDER — TADALAFIL 20 MG/1
20 TABLET ORAL DAILY
Qty: 100 TABLET | Refills: 0 | Status: SHIPPED | OUTPATIENT
Start: 2021-05-19 | End: 2022-03-29 | Stop reason: SDUPTHER

## 2021-05-20 ENCOUNTER — LAB VISIT (OUTPATIENT)
Dept: LAB | Facility: HOSPITAL | Age: 66
End: 2021-05-20
Attending: FAMILY MEDICINE
Payer: MEDICARE

## 2021-05-20 DIAGNOSIS — I10 ESSENTIAL HYPERTENSION: ICD-10-CM

## 2021-05-20 LAB
BASOPHILS # BLD AUTO: 0.03 K/UL (ref 0–0.2)
BASOPHILS NFR BLD: 0.9 % (ref 0–1.9)
DIFFERENTIAL METHOD: ABNORMAL
EOSINOPHIL # BLD AUTO: 0.1 K/UL (ref 0–0.5)
EOSINOPHIL NFR BLD: 2 % (ref 0–8)
ERYTHROCYTE [DISTWIDTH] IN BLOOD BY AUTOMATED COUNT: 13.1 % (ref 11.5–14.5)
HCT VFR BLD AUTO: 40.6 % (ref 40–54)
HGB BLD-MCNC: 13.8 G/DL (ref 14–18)
IMM GRANULOCYTES # BLD AUTO: 0.01 K/UL (ref 0–0.04)
IMM GRANULOCYTES NFR BLD AUTO: 0.3 % (ref 0–0.5)
LYMPHOCYTES # BLD AUTO: 0.8 K/UL (ref 1–4.8)
LYMPHOCYTES NFR BLD: 23.1 % (ref 18–48)
MCH RBC QN AUTO: 33.8 PG (ref 27–31)
MCHC RBC AUTO-ENTMCNC: 34 G/DL (ref 32–36)
MCV RBC AUTO: 100 FL (ref 82–98)
MONOCYTES # BLD AUTO: 0.4 K/UL (ref 0.3–1)
MONOCYTES NFR BLD: 11.1 % (ref 4–15)
NEUTROPHILS # BLD AUTO: 2.1 K/UL (ref 1.8–7.7)
NEUTROPHILS NFR BLD: 62.6 % (ref 38–73)
NRBC BLD-RTO: 0 /100 WBC
PLATELET # BLD AUTO: 258 K/UL (ref 150–450)
PMV BLD AUTO: 9.5 FL (ref 9.2–12.9)
RBC # BLD AUTO: 4.08 M/UL (ref 4.6–6.2)
WBC # BLD AUTO: 3.42 K/UL (ref 3.9–12.7)

## 2021-05-20 PROCEDURE — 85025 COMPLETE CBC W/AUTO DIFF WBC: CPT | Mod: HCNC | Performed by: FAMILY MEDICINE

## 2021-05-20 PROCEDURE — 36415 COLL VENOUS BLD VENIPUNCTURE: CPT | Mod: HCNC,PO | Performed by: FAMILY MEDICINE

## 2021-05-20 PROCEDURE — 80053 COMPREHEN METABOLIC PANEL: CPT | Mod: HCNC | Performed by: FAMILY MEDICINE

## 2021-05-21 LAB
ALBUMIN SERPL BCP-MCNC: 3.9 G/DL (ref 3.5–5.2)
ALP SERPL-CCNC: 69 U/L (ref 55–135)
ALT SERPL W/O P-5'-P-CCNC: 15 U/L (ref 10–44)
ANION GAP SERPL CALC-SCNC: 8 MMOL/L (ref 8–16)
AST SERPL-CCNC: 21 U/L (ref 10–40)
BILIRUB SERPL-MCNC: 0.6 MG/DL (ref 0.1–1)
BUN SERPL-MCNC: 19 MG/DL (ref 8–23)
CALCIUM SERPL-MCNC: 9.9 MG/DL (ref 8.7–10.5)
CHLORIDE SERPL-SCNC: 105 MMOL/L (ref 95–110)
CO2 SERPL-SCNC: 26 MMOL/L (ref 23–29)
CREAT SERPL-MCNC: 0.9 MG/DL (ref 0.5–1.4)
EST. GFR  (AFRICAN AMERICAN): >60 ML/MIN/1.73 M^2
EST. GFR  (NON AFRICAN AMERICAN): >60 ML/MIN/1.73 M^2
GLUCOSE SERPL-MCNC: 109 MG/DL (ref 70–110)
POTASSIUM SERPL-SCNC: 4.2 MMOL/L (ref 3.5–5.1)
PROT SERPL-MCNC: 6.7 G/DL (ref 6–8.4)
SODIUM SERPL-SCNC: 139 MMOL/L (ref 136–145)

## 2021-05-31 ENCOUNTER — PES CALL (OUTPATIENT)
Dept: ADMINISTRATIVE | Facility: CLINIC | Age: 66
End: 2021-05-31

## 2021-09-28 ENCOUNTER — OFFICE VISIT (OUTPATIENT)
Dept: FAMILY MEDICINE | Facility: CLINIC | Age: 66
End: 2021-09-28
Payer: MEDICARE

## 2021-09-28 VITALS
DIASTOLIC BLOOD PRESSURE: 80 MMHG | HEIGHT: 71 IN | RESPIRATION RATE: 16 BRPM | HEART RATE: 92 BPM | OXYGEN SATURATION: 96 % | TEMPERATURE: 98 F | SYSTOLIC BLOOD PRESSURE: 144 MMHG | WEIGHT: 199.5 LBS | BODY MASS INDEX: 27.93 KG/M2

## 2021-09-28 DIAGNOSIS — Z87.81 HISTORY OF COMPRESSION FRACTURE OF VERTEBRAL COLUMN: ICD-10-CM

## 2021-09-28 DIAGNOSIS — D70.4 CYCLICAL NEUTROPENIA: ICD-10-CM

## 2021-09-28 DIAGNOSIS — F41.9 ANXIETY: Chronic | ICD-10-CM

## 2021-09-28 DIAGNOSIS — Z23 NEED FOR PNEUMOCOCCAL VACCINATION: Primary | ICD-10-CM

## 2021-09-28 DIAGNOSIS — F41.1 GAD (GENERALIZED ANXIETY DISORDER): ICD-10-CM

## 2021-09-28 DIAGNOSIS — Z98.1 S/P LUMBAR SPINAL FUSION: ICD-10-CM

## 2021-09-28 PROCEDURE — 90732 PPSV23 VACC 2 YRS+ SUBQ/IM: CPT | Mod: HCNC,S$GLB,, | Performed by: FAMILY MEDICINE

## 2021-09-28 PROCEDURE — 1101F PR PT FALLS ASSESS DOC 0-1 FALLS W/OUT INJ PAST YR: ICD-10-PCS | Mod: HCNC,CPTII,S$GLB, | Performed by: FAMILY MEDICINE

## 2021-09-28 PROCEDURE — 1126F PR PAIN SEVERITY QUANTIFIED, NO PAIN PRESENT: ICD-10-PCS | Mod: HCNC,CPTII,S$GLB, | Performed by: FAMILY MEDICINE

## 2021-09-28 PROCEDURE — 3079F DIAST BP 80-89 MM HG: CPT | Mod: HCNC,CPTII,S$GLB, | Performed by: FAMILY MEDICINE

## 2021-09-28 PROCEDURE — 3288F FALL RISK ASSESSMENT DOCD: CPT | Mod: HCNC,CPTII,S$GLB, | Performed by: FAMILY MEDICINE

## 2021-09-28 PROCEDURE — 1159F MED LIST DOCD IN RCRD: CPT | Mod: HCNC,CPTII,S$GLB, | Performed by: FAMILY MEDICINE

## 2021-09-28 PROCEDURE — 3008F PR BODY MASS INDEX (BMI) DOCUMENTED: ICD-10-PCS | Mod: HCNC,CPTII,S$GLB, | Performed by: FAMILY MEDICINE

## 2021-09-28 PROCEDURE — 1126F AMNT PAIN NOTED NONE PRSNT: CPT | Mod: HCNC,CPTII,S$GLB, | Performed by: FAMILY MEDICINE

## 2021-09-28 PROCEDURE — 4010F PR ACE/ARB THEARPY RXD/TAKEN: ICD-10-PCS | Mod: HCNC,CPTII,S$GLB, | Performed by: FAMILY MEDICINE

## 2021-09-28 PROCEDURE — 4010F ACE/ARB THERAPY RXD/TAKEN: CPT | Mod: HCNC,CPTII,S$GLB, | Performed by: FAMILY MEDICINE

## 2021-09-28 PROCEDURE — 3077F PR MOST RECENT SYSTOLIC BLOOD PRESSURE >= 140 MM HG: ICD-10-PCS | Mod: HCNC,CPTII,S$GLB, | Performed by: FAMILY MEDICINE

## 2021-09-28 PROCEDURE — 99999 PR PBB SHADOW E&M-EST. PATIENT-LVL IV: CPT | Mod: PBBFAC,HCNC,, | Performed by: FAMILY MEDICINE

## 2021-09-28 PROCEDURE — 1160F PR REVIEW ALL MEDS BY PRESCRIBER/CLIN PHARMACIST DOCUMENTED: ICD-10-PCS | Mod: HCNC,CPTII,S$GLB, | Performed by: FAMILY MEDICINE

## 2021-09-28 PROCEDURE — 1159F PR MEDICATION LIST DOCUMENTED IN MEDICAL RECORD: ICD-10-PCS | Mod: HCNC,CPTII,S$GLB, | Performed by: FAMILY MEDICINE

## 2021-09-28 PROCEDURE — 3079F PR MOST RECENT DIASTOLIC BLOOD PRESSURE 80-89 MM HG: ICD-10-PCS | Mod: HCNC,CPTII,S$GLB, | Performed by: FAMILY MEDICINE

## 2021-09-28 PROCEDURE — G0009 PNEUMOCOCCAL POLYSACCHARIDE VACCINE 23-VALENT =>2YO SQ IM: ICD-10-PCS | Mod: HCNC,S$GLB,, | Performed by: FAMILY MEDICINE

## 2021-09-28 PROCEDURE — 3077F SYST BP >= 140 MM HG: CPT | Mod: HCNC,CPTII,S$GLB, | Performed by: FAMILY MEDICINE

## 2021-09-28 PROCEDURE — 3008F BODY MASS INDEX DOCD: CPT | Mod: HCNC,CPTII,S$GLB, | Performed by: FAMILY MEDICINE

## 2021-09-28 PROCEDURE — 90732 PNEUMOCOCCAL POLYSACCHARIDE VACCINE 23-VALENT =>2YO SQ IM: ICD-10-PCS | Mod: HCNC,S$GLB,, | Performed by: FAMILY MEDICINE

## 2021-09-28 PROCEDURE — G0009 ADMIN PNEUMOCOCCAL VACCINE: HCPCS | Mod: HCNC,S$GLB,, | Performed by: FAMILY MEDICINE

## 2021-09-28 PROCEDURE — 99999 PR PBB SHADOW E&M-EST. PATIENT-LVL IV: ICD-10-PCS | Mod: PBBFAC,HCNC,, | Performed by: FAMILY MEDICINE

## 2021-09-28 PROCEDURE — 1101F PT FALLS ASSESS-DOCD LE1/YR: CPT | Mod: HCNC,CPTII,S$GLB, | Performed by: FAMILY MEDICINE

## 2021-09-28 PROCEDURE — 3288F PR FALLS RISK ASSESSMENT DOCUMENTED: ICD-10-PCS | Mod: HCNC,CPTII,S$GLB, | Performed by: FAMILY MEDICINE

## 2021-09-28 PROCEDURE — 99213 OFFICE O/P EST LOW 20 MIN: CPT | Mod: HCNC,S$GLB,, | Performed by: FAMILY MEDICINE

## 2021-09-28 PROCEDURE — 99213 PR OFFICE/OUTPT VISIT, EST, LEVL III, 20-29 MIN: ICD-10-PCS | Mod: HCNC,S$GLB,, | Performed by: FAMILY MEDICINE

## 2021-09-28 PROCEDURE — 1160F RVW MEDS BY RX/DR IN RCRD: CPT | Mod: HCNC,CPTII,S$GLB, | Performed by: FAMILY MEDICINE

## 2021-10-07 ENCOUNTER — OFFICE VISIT (OUTPATIENT)
Dept: FAMILY MEDICINE | Facility: CLINIC | Age: 66
End: 2021-10-07
Payer: MEDICARE

## 2021-10-07 VITALS
WEIGHT: 196.19 LBS | BODY MASS INDEX: 27.47 KG/M2 | OXYGEN SATURATION: 96 % | SYSTOLIC BLOOD PRESSURE: 144 MMHG | HEIGHT: 71 IN | HEART RATE: 83 BPM | RESPIRATION RATE: 16 BRPM | DIASTOLIC BLOOD PRESSURE: 73 MMHG

## 2021-10-07 DIAGNOSIS — H92.01 RIGHT EAR PAIN: ICD-10-CM

## 2021-10-07 DIAGNOSIS — H66.90 OTITIS MEDIA, UNSPECIFIED LATERALITY, UNSPECIFIED OTITIS MEDIA TYPE: Primary | ICD-10-CM

## 2021-10-07 PROCEDURE — 99999 PR PBB SHADOW E&M-EST. PATIENT-LVL V: ICD-10-PCS | Mod: PBBFAC,HCNC,, | Performed by: FAMILY MEDICINE

## 2021-10-07 PROCEDURE — 1101F PT FALLS ASSESS-DOCD LE1/YR: CPT | Mod: HCNC,CPTII,S$GLB, | Performed by: FAMILY MEDICINE

## 2021-10-07 PROCEDURE — 4010F ACE/ARB THERAPY RXD/TAKEN: CPT | Mod: HCNC,CPTII,S$GLB, | Performed by: FAMILY MEDICINE

## 2021-10-07 PROCEDURE — 3077F PR MOST RECENT SYSTOLIC BLOOD PRESSURE >= 140 MM HG: ICD-10-PCS | Mod: HCNC,CPTII,S$GLB, | Performed by: FAMILY MEDICINE

## 2021-10-07 PROCEDURE — 1125F AMNT PAIN NOTED PAIN PRSNT: CPT | Mod: HCNC,CPTII,S$GLB, | Performed by: FAMILY MEDICINE

## 2021-10-07 PROCEDURE — 3008F PR BODY MASS INDEX (BMI) DOCUMENTED: ICD-10-PCS | Mod: HCNC,CPTII,S$GLB, | Performed by: FAMILY MEDICINE

## 2021-10-07 PROCEDURE — 1125F PR PAIN SEVERITY QUANTIFIED, PAIN PRESENT: ICD-10-PCS | Mod: HCNC,CPTII,S$GLB, | Performed by: FAMILY MEDICINE

## 2021-10-07 PROCEDURE — 4010F PR ACE/ARB THEARPY RXD/TAKEN: ICD-10-PCS | Mod: HCNC,CPTII,S$GLB, | Performed by: FAMILY MEDICINE

## 2021-10-07 PROCEDURE — 3288F FALL RISK ASSESSMENT DOCD: CPT | Mod: HCNC,CPTII,S$GLB, | Performed by: FAMILY MEDICINE

## 2021-10-07 PROCEDURE — 3008F BODY MASS INDEX DOCD: CPT | Mod: HCNC,CPTII,S$GLB, | Performed by: FAMILY MEDICINE

## 2021-10-07 PROCEDURE — 99213 PR OFFICE/OUTPT VISIT, EST, LEVL III, 20-29 MIN: ICD-10-PCS | Mod: HCNC,S$GLB,, | Performed by: FAMILY MEDICINE

## 2021-10-07 PROCEDURE — 99999 PR PBB SHADOW E&M-EST. PATIENT-LVL V: CPT | Mod: PBBFAC,HCNC,, | Performed by: FAMILY MEDICINE

## 2021-10-07 PROCEDURE — 1101F PR PT FALLS ASSESS DOC 0-1 FALLS W/OUT INJ PAST YR: ICD-10-PCS | Mod: HCNC,CPTII,S$GLB, | Performed by: FAMILY MEDICINE

## 2021-10-07 PROCEDURE — 3078F DIAST BP <80 MM HG: CPT | Mod: HCNC,CPTII,S$GLB, | Performed by: FAMILY MEDICINE

## 2021-10-07 PROCEDURE — 3288F PR FALLS RISK ASSESSMENT DOCUMENTED: ICD-10-PCS | Mod: HCNC,CPTII,S$GLB, | Performed by: FAMILY MEDICINE

## 2021-10-07 PROCEDURE — 99213 OFFICE O/P EST LOW 20 MIN: CPT | Mod: HCNC,S$GLB,, | Performed by: FAMILY MEDICINE

## 2021-10-07 PROCEDURE — 1159F PR MEDICATION LIST DOCUMENTED IN MEDICAL RECORD: ICD-10-PCS | Mod: HCNC,CPTII,S$GLB, | Performed by: FAMILY MEDICINE

## 2021-10-07 PROCEDURE — 1159F MED LIST DOCD IN RCRD: CPT | Mod: HCNC,CPTII,S$GLB, | Performed by: FAMILY MEDICINE

## 2021-10-07 PROCEDURE — 3077F SYST BP >= 140 MM HG: CPT | Mod: HCNC,CPTII,S$GLB, | Performed by: FAMILY MEDICINE

## 2021-10-07 PROCEDURE — 3078F PR MOST RECENT DIASTOLIC BLOOD PRESSURE < 80 MM HG: ICD-10-PCS | Mod: HCNC,CPTII,S$GLB, | Performed by: FAMILY MEDICINE

## 2021-10-07 RX ORDER — AMOXICILLIN AND CLAVULANATE POTASSIUM 875; 125 MG/1; MG/1
1 TABLET, FILM COATED ORAL 2 TIMES DAILY
Qty: 14 TABLET | Refills: 0 | Status: SHIPPED | OUTPATIENT
Start: 2021-10-07 | End: 2021-10-14

## 2021-10-07 RX ORDER — CIPROFLOXACIN AND DEXAMETHASONE 3; 1 MG/ML; MG/ML
4 SUSPENSION/ DROPS AURICULAR (OTIC) 2 TIMES DAILY
Qty: 7.5 ML | Refills: 0 | Status: SHIPPED | OUTPATIENT
Start: 2021-10-07 | End: 2021-10-12

## 2021-11-04 ENCOUNTER — OFFICE VISIT (OUTPATIENT)
Dept: FAMILY MEDICINE | Facility: CLINIC | Age: 66
End: 2021-11-04
Payer: MEDICARE

## 2021-11-04 ENCOUNTER — TELEPHONE (OUTPATIENT)
Dept: FAMILY MEDICINE | Facility: CLINIC | Age: 66
End: 2021-11-04
Payer: MEDICARE

## 2021-11-04 VITALS
DIASTOLIC BLOOD PRESSURE: 74 MMHG | HEART RATE: 74 BPM | SYSTOLIC BLOOD PRESSURE: 128 MMHG | WEIGHT: 196.88 LBS | OXYGEN SATURATION: 99 % | HEIGHT: 71 IN | BODY MASS INDEX: 27.56 KG/M2 | RESPIRATION RATE: 18 BRPM

## 2021-11-04 DIAGNOSIS — H92.01 RIGHT EAR PAIN: Primary | ICD-10-CM

## 2021-11-04 PROCEDURE — 1125F AMNT PAIN NOTED PAIN PRSNT: CPT | Mod: HCNC,CPTII,S$GLB, | Performed by: FAMILY MEDICINE

## 2021-11-04 PROCEDURE — 1125F PR PAIN SEVERITY QUANTIFIED, PAIN PRESENT: ICD-10-PCS | Mod: HCNC,CPTII,S$GLB, | Performed by: FAMILY MEDICINE

## 2021-11-04 PROCEDURE — 3078F PR MOST RECENT DIASTOLIC BLOOD PRESSURE < 80 MM HG: ICD-10-PCS | Mod: HCNC,CPTII,S$GLB, | Performed by: FAMILY MEDICINE

## 2021-11-04 PROCEDURE — 4010F ACE/ARB THERAPY RXD/TAKEN: CPT | Mod: HCNC,CPTII,S$GLB, | Performed by: FAMILY MEDICINE

## 2021-11-04 PROCEDURE — 99999 PR PBB SHADOW E&M-EST. PATIENT-LVL V: ICD-10-PCS | Mod: PBBFAC,HCNC,, | Performed by: FAMILY MEDICINE

## 2021-11-04 PROCEDURE — 1159F PR MEDICATION LIST DOCUMENTED IN MEDICAL RECORD: ICD-10-PCS | Mod: HCNC,CPTII,S$GLB, | Performed by: FAMILY MEDICINE

## 2021-11-04 PROCEDURE — 3288F FALL RISK ASSESSMENT DOCD: CPT | Mod: HCNC,CPTII,S$GLB, | Performed by: FAMILY MEDICINE

## 2021-11-04 PROCEDURE — 3078F DIAST BP <80 MM HG: CPT | Mod: HCNC,CPTII,S$GLB, | Performed by: FAMILY MEDICINE

## 2021-11-04 PROCEDURE — 3074F PR MOST RECENT SYSTOLIC BLOOD PRESSURE < 130 MM HG: ICD-10-PCS | Mod: HCNC,CPTII,S$GLB, | Performed by: FAMILY MEDICINE

## 2021-11-04 PROCEDURE — 4010F PR ACE/ARB THEARPY RXD/TAKEN: ICD-10-PCS | Mod: HCNC,CPTII,S$GLB, | Performed by: FAMILY MEDICINE

## 2021-11-04 PROCEDURE — 99213 PR OFFICE/OUTPT VISIT, EST, LEVL III, 20-29 MIN: ICD-10-PCS | Mod: HCNC,S$GLB,, | Performed by: FAMILY MEDICINE

## 2021-11-04 PROCEDURE — 3008F PR BODY MASS INDEX (BMI) DOCUMENTED: ICD-10-PCS | Mod: HCNC,CPTII,S$GLB, | Performed by: FAMILY MEDICINE

## 2021-11-04 PROCEDURE — 3288F PR FALLS RISK ASSESSMENT DOCUMENTED: ICD-10-PCS | Mod: HCNC,CPTII,S$GLB, | Performed by: FAMILY MEDICINE

## 2021-11-04 PROCEDURE — 99213 OFFICE O/P EST LOW 20 MIN: CPT | Mod: HCNC,S$GLB,, | Performed by: FAMILY MEDICINE

## 2021-11-04 PROCEDURE — 3008F BODY MASS INDEX DOCD: CPT | Mod: HCNC,CPTII,S$GLB, | Performed by: FAMILY MEDICINE

## 2021-11-04 PROCEDURE — 1101F PR PT FALLS ASSESS DOC 0-1 FALLS W/OUT INJ PAST YR: ICD-10-PCS | Mod: HCNC,CPTII,S$GLB, | Performed by: FAMILY MEDICINE

## 2021-11-04 PROCEDURE — 99999 PR PBB SHADOW E&M-EST. PATIENT-LVL V: CPT | Mod: PBBFAC,HCNC,, | Performed by: FAMILY MEDICINE

## 2021-11-04 PROCEDURE — 1159F MED LIST DOCD IN RCRD: CPT | Mod: HCNC,CPTII,S$GLB, | Performed by: FAMILY MEDICINE

## 2021-11-04 PROCEDURE — 1101F PT FALLS ASSESS-DOCD LE1/YR: CPT | Mod: HCNC,CPTII,S$GLB, | Performed by: FAMILY MEDICINE

## 2021-11-04 PROCEDURE — 3074F SYST BP LT 130 MM HG: CPT | Mod: HCNC,CPTII,S$GLB, | Performed by: FAMILY MEDICINE

## 2021-11-04 RX ORDER — CIPROFLOXACIN AND DEXAMETHASONE 3; 1 MG/ML; MG/ML
4 SUSPENSION/ DROPS AURICULAR (OTIC) 2 TIMES DAILY
Qty: 7.5 ML | Refills: 3 | Status: SHIPPED | OUTPATIENT
Start: 2021-11-04 | End: 2022-12-08 | Stop reason: ALTCHOICE

## 2021-11-05 ENCOUNTER — TELEPHONE (OUTPATIENT)
Dept: FAMILY MEDICINE | Facility: CLINIC | Age: 66
End: 2021-11-05
Payer: MEDICARE

## 2021-12-01 DIAGNOSIS — Z12.11 COLON CANCER SCREENING: ICD-10-CM

## 2021-12-07 ENCOUNTER — PATIENT OUTREACH (OUTPATIENT)
Dept: ADMINISTRATIVE | Facility: OTHER | Age: 66
End: 2021-12-07
Payer: MEDICARE

## 2021-12-07 ENCOUNTER — OFFICE VISIT (OUTPATIENT)
Dept: OTOLARYNGOLOGY | Facility: CLINIC | Age: 66
End: 2021-12-07
Payer: MEDICARE

## 2021-12-07 VITALS — WEIGHT: 195.13 LBS | BODY MASS INDEX: 27.21 KG/M2

## 2021-12-07 DIAGNOSIS — H92.01 RIGHT EAR PAIN: ICD-10-CM

## 2021-12-07 DIAGNOSIS — H69.91 ETD (EUSTACHIAN TUBE DYSFUNCTION), RIGHT: Primary | ICD-10-CM

## 2021-12-07 PROCEDURE — 99999 PR PBB SHADOW E&M-EST. PATIENT-LVL IV: CPT | Mod: PBBFAC,HCNC,, | Performed by: OTOLARYNGOLOGY

## 2021-12-07 PROCEDURE — 99999 PR PBB SHADOW E&M-EST. PATIENT-LVL IV: ICD-10-PCS | Mod: PBBFAC,HCNC,, | Performed by: OTOLARYNGOLOGY

## 2021-12-07 PROCEDURE — 99203 PR OFFICE/OUTPT VISIT, NEW, LEVL III, 30-44 MIN: ICD-10-PCS | Mod: HCNC,S$GLB,, | Performed by: OTOLARYNGOLOGY

## 2021-12-07 PROCEDURE — 4010F ACE/ARB THERAPY RXD/TAKEN: CPT | Mod: HCNC,CPTII,S$GLB, | Performed by: OTOLARYNGOLOGY

## 2021-12-07 PROCEDURE — 99203 OFFICE O/P NEW LOW 30 MIN: CPT | Mod: HCNC,S$GLB,, | Performed by: OTOLARYNGOLOGY

## 2021-12-07 PROCEDURE — 4010F PR ACE/ARB THEARPY RXD/TAKEN: ICD-10-PCS | Mod: HCNC,CPTII,S$GLB, | Performed by: OTOLARYNGOLOGY

## 2021-12-07 RX ORDER — PREDNISONE 20 MG/1
40 TABLET ORAL DAILY
Qty: 14 TABLET | Refills: 0 | Status: SHIPPED | OUTPATIENT
Start: 2021-12-07 | End: 2021-12-14

## 2022-02-25 DIAGNOSIS — F41.1 GAD (GENERALIZED ANXIETY DISORDER): ICD-10-CM

## 2022-02-25 NOTE — TELEPHONE ENCOUNTER
Care Due:                  Date            Visit Type   Department     Provider  --------------------------------------------------------------------------------                                EP -                              PRIMARY      [facility]  Last Visit: 09-      CARE ([facility])   REKHA Sosa                              EP -                              PRIMARY      [facility]  Next Visit: 03-      CARE ([facility])   REKHA Sosa                                                            Last  Test          Frequency    Reason                     Performed    Due Date  --------------------------------------------------------------------------------    CMP.........  12 months..  olmesartan...............  05- 05-    Powered by [employer]. Reference number: [id number].   2/25/2022 11:54:58 AM CST

## 2022-02-28 RX ORDER — ALPRAZOLAM 0.25 MG/1
0.25 TABLET ORAL 2 TIMES DAILY PRN
Qty: 60 TABLET | Refills: 0 | Status: SHIPPED | OUTPATIENT
Start: 2022-02-28 | End: 2022-03-29 | Stop reason: SDUPTHER

## 2022-03-22 ENCOUNTER — LAB VISIT (OUTPATIENT)
Dept: LAB | Facility: HOSPITAL | Age: 67
End: 2022-03-22
Attending: FAMILY MEDICINE
Payer: MEDICARE

## 2022-03-22 DIAGNOSIS — D70.4 CYCLICAL NEUTROPENIA: ICD-10-CM

## 2022-03-22 LAB
ALBUMIN SERPL BCP-MCNC: 3.7 G/DL (ref 3.5–5.2)
ALP SERPL-CCNC: 56 U/L (ref 55–135)
ALT SERPL W/O P-5'-P-CCNC: 15 U/L (ref 10–44)
ANION GAP SERPL CALC-SCNC: 8 MMOL/L (ref 8–16)
AST SERPL-CCNC: 20 U/L (ref 10–40)
BASOPHILS # BLD AUTO: 0.03 K/UL (ref 0–0.2)
BASOPHILS NFR BLD: 1.1 % (ref 0–1.9)
BILIRUB SERPL-MCNC: 0.5 MG/DL (ref 0.1–1)
BUN SERPL-MCNC: 24 MG/DL (ref 8–23)
CALCIUM SERPL-MCNC: 10 MG/DL (ref 8.7–10.5)
CHLORIDE SERPL-SCNC: 103 MMOL/L (ref 95–110)
CO2 SERPL-SCNC: 29 MMOL/L (ref 23–29)
CREAT SERPL-MCNC: 0.8 MG/DL (ref 0.5–1.4)
DIFFERENTIAL METHOD: ABNORMAL
EOSINOPHIL # BLD AUTO: 0.1 K/UL (ref 0–0.5)
EOSINOPHIL NFR BLD: 2.5 % (ref 0–8)
ERYTHROCYTE [DISTWIDTH] IN BLOOD BY AUTOMATED COUNT: 12.7 % (ref 11.5–14.5)
EST. GFR  (AFRICAN AMERICAN): >60 ML/MIN/1.73 M^2
EST. GFR  (NON AFRICAN AMERICAN): >60 ML/MIN/1.73 M^2
GLUCOSE SERPL-MCNC: 98 MG/DL (ref 70–110)
HCT VFR BLD AUTO: 40.4 % (ref 40–54)
HGB BLD-MCNC: 13.6 G/DL (ref 14–18)
IMM GRANULOCYTES # BLD AUTO: 0 K/UL (ref 0–0.04)
IMM GRANULOCYTES NFR BLD AUTO: 0 % (ref 0–0.5)
LYMPHOCYTES # BLD AUTO: 0.7 K/UL (ref 1–4.8)
LYMPHOCYTES NFR BLD: 24.9 % (ref 18–48)
MCH RBC QN AUTO: 34.1 PG (ref 27–31)
MCHC RBC AUTO-ENTMCNC: 33.7 G/DL (ref 32–36)
MCV RBC AUTO: 101 FL (ref 82–98)
MONOCYTES # BLD AUTO: 0.4 K/UL (ref 0.3–1)
MONOCYTES NFR BLD: 13.7 % (ref 4–15)
NEUTROPHILS # BLD AUTO: 1.7 K/UL (ref 1.8–7.7)
NEUTROPHILS NFR BLD: 57.8 % (ref 38–73)
NRBC BLD-RTO: 0 /100 WBC
PLATELET # BLD AUTO: 291 K/UL (ref 150–450)
PMV BLD AUTO: 9.8 FL (ref 9.2–12.9)
POTASSIUM SERPL-SCNC: 4.7 MMOL/L (ref 3.5–5.1)
PROT SERPL-MCNC: 6.3 G/DL (ref 6–8.4)
RBC # BLD AUTO: 3.99 M/UL (ref 4.6–6.2)
SODIUM SERPL-SCNC: 140 MMOL/L (ref 136–145)
WBC # BLD AUTO: 2.85 K/UL (ref 3.9–12.7)

## 2022-03-22 PROCEDURE — 36415 COLL VENOUS BLD VENIPUNCTURE: CPT | Mod: PO | Performed by: FAMILY MEDICINE

## 2022-03-22 PROCEDURE — 85025 COMPLETE CBC W/AUTO DIFF WBC: CPT | Performed by: FAMILY MEDICINE

## 2022-03-22 PROCEDURE — 80053 COMPREHEN METABOLIC PANEL: CPT | Performed by: FAMILY MEDICINE

## 2022-03-29 ENCOUNTER — OFFICE VISIT (OUTPATIENT)
Dept: FAMILY MEDICINE | Facility: CLINIC | Age: 67
End: 2022-03-29
Payer: MEDICARE

## 2022-03-29 VITALS
WEIGHT: 198.19 LBS | OXYGEN SATURATION: 96 % | DIASTOLIC BLOOD PRESSURE: 74 MMHG | HEIGHT: 71 IN | RESPIRATION RATE: 16 BRPM | TEMPERATURE: 98 F | BODY MASS INDEX: 27.75 KG/M2 | SYSTOLIC BLOOD PRESSURE: 126 MMHG | HEART RATE: 68 BPM

## 2022-03-29 DIAGNOSIS — M77.8 TENDINITIS OF ELBOW: Primary | ICD-10-CM

## 2022-03-29 DIAGNOSIS — D70.4 CYCLICAL NEUTROPENIA: ICD-10-CM

## 2022-03-29 DIAGNOSIS — F41.1 GAD (GENERALIZED ANXIETY DISORDER): ICD-10-CM

## 2022-03-29 DIAGNOSIS — Z12.5 PROSTATE CANCER SCREENING: ICD-10-CM

## 2022-03-29 DIAGNOSIS — L25.5 DERMATITIS DUE TO PLANTS: ICD-10-CM

## 2022-03-29 DIAGNOSIS — D72.819 CHRONIC LEUKOPENIA: ICD-10-CM

## 2022-03-29 DIAGNOSIS — I10 ESSENTIAL HYPERTENSION: ICD-10-CM

## 2022-03-29 PROCEDURE — 1101F PR PT FALLS ASSESS DOC 0-1 FALLS W/OUT INJ PAST YR: ICD-10-PCS | Mod: CPTII,S$GLB,, | Performed by: FAMILY MEDICINE

## 2022-03-29 PROCEDURE — 1101F PT FALLS ASSESS-DOCD LE1/YR: CPT | Mod: CPTII,S$GLB,, | Performed by: FAMILY MEDICINE

## 2022-03-29 PROCEDURE — 3078F PR MOST RECENT DIASTOLIC BLOOD PRESSURE < 80 MM HG: ICD-10-PCS | Mod: CPTII,S$GLB,, | Performed by: FAMILY MEDICINE

## 2022-03-29 PROCEDURE — 3288F FALL RISK ASSESSMENT DOCD: CPT | Mod: CPTII,S$GLB,, | Performed by: FAMILY MEDICINE

## 2022-03-29 PROCEDURE — 1159F MED LIST DOCD IN RCRD: CPT | Mod: CPTII,S$GLB,, | Performed by: FAMILY MEDICINE

## 2022-03-29 PROCEDURE — 99999 PR PBB SHADOW E&M-EST. PATIENT-LVL V: CPT | Mod: PBBFAC,,, | Performed by: FAMILY MEDICINE

## 2022-03-29 PROCEDURE — 3008F PR BODY MASS INDEX (BMI) DOCUMENTED: ICD-10-PCS | Mod: CPTII,S$GLB,, | Performed by: FAMILY MEDICINE

## 2022-03-29 PROCEDURE — 99214 PR OFFICE/OUTPT VISIT, EST, LEVL IV, 30-39 MIN: ICD-10-PCS | Mod: S$GLB,,, | Performed by: FAMILY MEDICINE

## 2022-03-29 PROCEDURE — 3078F DIAST BP <80 MM HG: CPT | Mod: CPTII,S$GLB,, | Performed by: FAMILY MEDICINE

## 2022-03-29 PROCEDURE — 1126F AMNT PAIN NOTED NONE PRSNT: CPT | Mod: CPTII,S$GLB,, | Performed by: FAMILY MEDICINE

## 2022-03-29 PROCEDURE — 1126F PR PAIN SEVERITY QUANTIFIED, NO PAIN PRESENT: ICD-10-PCS | Mod: CPTII,S$GLB,, | Performed by: FAMILY MEDICINE

## 2022-03-29 PROCEDURE — 4010F ACE/ARB THERAPY RXD/TAKEN: CPT | Mod: CPTII,S$GLB,, | Performed by: FAMILY MEDICINE

## 2022-03-29 PROCEDURE — 3074F PR MOST RECENT SYSTOLIC BLOOD PRESSURE < 130 MM HG: ICD-10-PCS | Mod: CPTII,S$GLB,, | Performed by: FAMILY MEDICINE

## 2022-03-29 PROCEDURE — 99999 PR PBB SHADOW E&M-EST. PATIENT-LVL V: ICD-10-PCS | Mod: PBBFAC,,, | Performed by: FAMILY MEDICINE

## 2022-03-29 PROCEDURE — 1159F PR MEDICATION LIST DOCUMENTED IN MEDICAL RECORD: ICD-10-PCS | Mod: CPTII,S$GLB,, | Performed by: FAMILY MEDICINE

## 2022-03-29 PROCEDURE — 99214 OFFICE O/P EST MOD 30 MIN: CPT | Mod: S$GLB,,, | Performed by: FAMILY MEDICINE

## 2022-03-29 PROCEDURE — 4010F PR ACE/ARB THEARPY RXD/TAKEN: ICD-10-PCS | Mod: CPTII,S$GLB,, | Performed by: FAMILY MEDICINE

## 2022-03-29 PROCEDURE — 3288F PR FALLS RISK ASSESSMENT DOCUMENTED: ICD-10-PCS | Mod: CPTII,S$GLB,, | Performed by: FAMILY MEDICINE

## 2022-03-29 PROCEDURE — 3074F SYST BP LT 130 MM HG: CPT | Mod: CPTII,S$GLB,, | Performed by: FAMILY MEDICINE

## 2022-03-29 PROCEDURE — 3008F BODY MASS INDEX DOCD: CPT | Mod: CPTII,S$GLB,, | Performed by: FAMILY MEDICINE

## 2022-03-29 RX ORDER — TADALAFIL 20 MG/1
20 TABLET ORAL DAILY
Qty: 100 TABLET | Refills: 0 | OUTPATIENT
Start: 2022-03-29 | End: 2022-03-29 | Stop reason: SDUPTHER

## 2022-03-29 RX ORDER — ALPRAZOLAM 0.25 MG/1
0.25 TABLET ORAL 2 TIMES DAILY PRN
Qty: 60 TABLET | Refills: 4 | Status: SHIPPED | OUTPATIENT
Start: 2022-03-29 | End: 2022-08-27

## 2022-03-29 RX ORDER — FLUOCINONIDE 0.5 MG/G
CREAM TOPICAL 2 TIMES DAILY
Qty: 60 G | Refills: 0 | Status: SHIPPED | OUTPATIENT
Start: 2022-03-29 | End: 2023-03-24 | Stop reason: SDUPTHER

## 2022-03-29 RX ORDER — TADALAFIL 20 MG/1
20 TABLET ORAL DAILY
Qty: 100 TABLET | Refills: 0 | Status: SHIPPED | OUTPATIENT
Start: 2022-03-29 | End: 2023-03-24 | Stop reason: SDUPTHER

## 2022-03-29 NOTE — PROGRESS NOTES
[facility]     Subjective:    Chief Complaint:   Chief Complaint   Patient presents with    Follow-up       History of Present Illness:  66 y.o. male presents for multiple issues.   Elbow Pain: Patient complains of right elbow pain. Onset of the symptoms was about a month ago. Inciting event: none known. Current symptoms include point tenderness lateral tendinitis and swelling. Pain is aggravated by: lifting heavy objects, supination/pronation as when opening doors. Patient's overall course: gradually worsening. Patient has had no prior elbow problems. Evaluation to date: an Orthopedics consult, .. Treatment to date: corticosteroid injection which was not very effective, ice, OTC analgesics and PT which was not very effective.          I reviewed the patients chart dating back for the past few appointments. See above.    The following portions of the patient's history were reviewed and updated as appropriate: allergies, current medications, past family history, past medical history, past social history, past surgical history and problem list.    He denies chest pain upon exertion, dyspnea, nausea, vomiting, diaphoresis, and syncope. No pleuritic chest pain, unilateral leg swelling, calf tenderness, or calf pain.      Past Medical History:   Diagnosis Date    Dermatitis, atopic     Essential hypertension 11/19/2013    GERD (gastroesophageal reflux disease)     Hypertension        Past Surgical History:   Procedure Laterality Date    matthew      caudal     HERNIA REPAIR      WISDOM TOOTH EXTRACTION         Social History  Social History     Tobacco Use    Smoking status: Former Smoker    Smokeless tobacco: Never Used   Substance Use Topics    Alcohol use: Yes     Alcohol/week: 2.0 standard drinks     Types: 2 Glasses of wine per week    Drug use: No       Family History   Problem Relation Age of Onset    Early death Brother     Arthritis Mother     Cancer Maternal Grandmother         abdominal  "   Cancer Maternal Grandfather 60        lung    Liver disease Paternal Grandfather      Review of patient's allergies indicates:  No Known Allergies    Review of Systems [Negative unless checked off]    General ROS: []fever, []chills, []weight loss, []malaise/fatigue.  ENT ROS: []congestion, []rhinorrhea,  []sore throat, []neck pain, []hearing loss.  Ophthalmic ROS:[]blurry vision, [] double vision, []photophobia, []eye pain.  Respiratory ROS: []cough, []pleuritic chest pain, []shortness of breath, []wheezing.  CVS ROS:[]chest pain, []dyspnea on exertion, []palpitations, []orthopnea, []leg swelling, []PND.   GI ROS: []nausea, []vomiting, [] epigastric pain, []abd pain, []diarrhea, []constipation, []blood/melena in stool.   Urology ROS:[]dysuria, []frequency, []flank pain,[] trouble voiding, [] hematuria.   MSK ROS: []myalgias, []joint pain, []muscular weakness,  []back pain, [] falls.   Derm ROS: []pruritis, []rash, []jaundice.  Neurological:[]dizziness,[]numbness,[]loss of consciousness, []weakness  []headaches.   Psych ROS: []hallucinations, []depression, []anxiety, []suicidal ideation.    Physical Examination  /74   Pulse 68   Temp 98.1 °F (36.7 °C) (Oral)   Resp 16   Ht 5' 11" (1.803 m)   Wt 89.9 kg (198 lb 3.1 oz)   SpO2 96%   BMI 27.64 kg/m²   Wt Readings from Last 3 Encounters:   03/29/22 89.9 kg (198 lb 3.1 oz)   12/07/21 88.5 kg (195 lb 1.7 oz)   11/04/21 89.3 kg (196 lb 13.9 oz)     BP Readings from Last 3 Encounters:   03/29/22 126/74   11/04/21 128/74   10/07/21 (!) 144/73     Estimated body mass index is 27.64 kg/m² as calculated from the following:    Height as of this encounter: 5' 11" (1.803 m).    Weight as of this encounter: 89.9 kg (198 lb 3.1 oz).     General appearance: alert, cooperative, no distress  Eyes: pupils equal and reactive, extraocular eye movements intact   Ears: bilateral TM's and external ear canals normal   Nose: normal and patent, no erythema, discharge or polyps "   Sinuses: Normal paranasal sinuses without tenderness   Throat: mucous membranes moist, pharynx normal without lesions   Neck: no thyromegaly, trachea midline  Lungs: clear to auscultation, no wheezes, rales or rhonchi, symmetric air entry, no dullness to percussion bilaterally.  Heart: normal rate, regular rhythm, normal S1, S2, no murmurs, rubs, clicks or gallops, no displacement of the PMI.  Abdomen: soft, nontender, nondistended, no masses or organomegaly No rigidity, rebound, or guarding.   Back: full range of motion, no tenderness, palpable spasm or pain on motion   Extremities: peripheral pulses normal, no pedal edema, no clubbing or cyanosis   Feet: warm, good capillary refill.  Neurological:alert, oriented, normal speech, no focal findings or movement disorder noted, screening mental status exam normal, neck supple without rigidity   Psychiatric: alert, oriented to person, place, and time, normal mood, behavior, speech, dress, motor activity, and thought processes  Integument: normal coloration and turgor, no rashes, no suspicious skin lesions noted.    Data reviewed    Previous medical records reviewed and summarized above in HPI. 274}    Laboratory    I have reviewed old labs below:   274}  Lab Results   Component Value Date    WBC 2.85 (L) 03/22/2022    HGB 13.6 (L) 03/22/2022    HCT 40.4 03/22/2022     (H) 03/22/2022     03/22/2022     03/22/2022    K 4.7 03/22/2022     03/22/2022    CALCIUM 10.0 03/22/2022    PHOS 3.9 07/07/2017    CO2 29 03/22/2022    GLU 98 03/22/2022    BUN 24 (H) 03/22/2022    CREATININE 0.8 03/22/2022    ANIONGAP 8 03/22/2022    ESTGFRAFRICA >60.0 03/22/2022    EGFRNONAA >60.0 03/22/2022    PROT 6.3 03/22/2022    ALBUMIN 3.7 03/22/2022    BILITOT 0.5 03/22/2022    ALKPHOS 56 03/22/2022    ALT 15 03/22/2022    AST 20 03/22/2022    INR 1.0 06/05/2017    CHOL 197 11/10/2020    TRIG 59 11/10/2020    HDL 50 11/10/2020    LDLCALC 135.2 11/10/2020    PSA 0.98  11/17/2020       Imaging/Tracing: I have reviewed the pertinent results/findings and my personal findings are below:  274}    Assessment/Plan     274}    Humberto Cantor is a 66 y.o. male who presents to clinic with:    1. Achilles tendinitis of right lower extremity    2. Chronic leukopenia    3. Dermatitis due to plants    4. Essential hypertension    5. TORRIE (generalized anxiety disorder)    6. Prostate cancer screening         Diagnostic impression remarks       1. Achilles tendinitis of right lower extremity  - X-Ray Elbow Complete Right; Future  - Ambulatory referral/consult to Physiatry; Future    2. Chronic leukopenia  - Ambulatory referral/consult to Hematology / Oncology; Future    3. Dermatitis due to plants  - fluocinonide 0.05% (LIDEX) 0.05 % cream; Apply topically 2 (two) times daily. Do not use longer than 2 weeks for 10 days  Dispense: 60 g; Refill: 0    4. Essential hypertension  - tadalafiL (CIALIS) 20 MG Tab; Take 1 tablet (20 mg total) by mouth once daily.  Dispense: 100 tablet; Refill: 0    5. TORRIE (generalized anxiety disorder)  - ALPRAZolam (XANAX) 0.25 MG tablet; Take 1 tablet (0.25 mg total) by mouth 2 (two) times daily as needed for Anxiety.  Dispense: 60 tablet; Refill: 4    6. Prostate cancer screening  - PSA, Screening; Future      Medication Monitoring: In today's visit, monitoring for drug toxicity was accomplished. Proper use of medications was discussed.     Counseling: Counseling included discussion regarding imaging findings, diagnosis, possibilities, treatment options, medications, risks, and benefits. He had many questions regarding the options and long-term effects. All questions were answered. He expressed understanding after counseling regarding the diagnosis and recommendations. He was capable and demonstrated competence with understanding of these options. Shared decision making was performed resulting in him choosing the current treatment plan.     He was counseled about the  importance of healthy dietary habits as well as routine physical activity and exercise for better health outcomes. I also discussed the importance of cancer screening.     I also discussed the importance of close follow up to discuss labs, change or modify his medications if needed, monitor side effects, and further evaluation of medical problems.     Additional workup planned: see labs ordered below.    See below for labs and meds ordered with associated diagnosis      Medication List with Changes/Refills   Current Medications    CIPROFLOXACIN-DEXAMETHASONE 0.3-0.1% (CIPRODEX) 0.3-0.1 % DRPS    Place 4 drops into both ears 2 (two) times daily.    HYDROCODONE-ACETAMINOPHEN (NORCO)  MG PER TABLET    Take 1 tablet by mouth 3 (three) times daily as needed.    LANSOPRAZOLE (PREVACID) 15 MG CAPSULE    Take 1 capsule (15 mg total) by mouth once daily.    LIDOCAINE-PRILOCAINE (EMLA) CREAM        NAPROXEN (NAPROSYN) 250 MG TABLET    Take 250 mg by mouth 2 (two) times daily.    OLMESARTAN (BENICAR) 20 MG TABLET    TAKE ONE TABLET BY MOUTH ONCE A DAY    TAMSULOSIN (FLOMAX) 0.4 MG CAP    Take 2 capsules (0.8 mg total) by mouth once daily.    VITAMIN B COMPLEX-VIT B12 SL    Place under the tongue.    VITAMIN K2 ORAL    Take by mouth.   Changed and/or Refilled Medications    Modified Medication Previous Medication    ALPRAZOLAM (XANAX) 0.25 MG TABLET ALPRAZolam (XANAX) 0.25 MG tablet       Take 1 tablet (0.25 mg total) by mouth 2 (two) times daily as needed for Anxiety.    Take 1 tablet (0.25 mg total) by mouth 2 (two) times daily as needed.    FLUOCINONIDE 0.05% (LIDEX) 0.05 % CREAM fluocinonide 0.05% (LIDEX) 0.05 % cream       Apply topically 2 (two) times daily. Do not use longer than 2 weeks for 10 days    Apply topically 2 (two) times daily. Do not use longer than 2 weeks for 10 days    TADALAFIL (CIALIS) 20 MG TAB tadalafiL (CIALIS) 20 MG Tab       Take 1 tablet (20 mg total) by mouth once daily.    Take 1 tablet (20  "mg total) by mouth once daily.   Discontinued Medications    MAGNESIUM CHLORIDE (SLOW-MAG) 71.5 MG TBEC    Take 2 tablets by mouth once.    METHOCARBAMOL (ROBAXIN) 750 MG TAB    Take 500 mg by mouth 3 (three) times daily.     Modified Medications    Modified Medication Previous Medication    ALPRAZOLAM (XANAX) 0.25 MG TABLET ALPRAZolam (XANAX) 0.25 MG tablet       Take 1 tablet (0.25 mg total) by mouth 2 (two) times daily as needed for Anxiety.    Take 1 tablet (0.25 mg total) by mouth 2 (two) times daily as needed.    FLUOCINONIDE 0.05% (LIDEX) 0.05 % CREAM fluocinonide 0.05% (LIDEX) 0.05 % cream       Apply topically 2 (two) times daily. Do not use longer than 2 weeks for 10 days    Apply topically 2 (two) times daily. Do not use longer than 2 weeks for 10 days    TADALAFIL (CIALIS) 20 MG TAB tadalafiL (CIALIS) 20 MG Tab       Take 1 tablet (20 mg total) by mouth once daily.    Take 1 tablet (20 mg total) by mouth once daily.       No follow-ups on file. for further workup and reassessment if labs and tests obtained are stable or sooner as needed. He was instructed to call the clinic or go to the emergency department if his symptoms do not improve, worsens, or if new symptoms develop. Patient knows to call any time if an emergency arises. Shared decision making occurred and he verbalized understanding in agreement with this plan.     Documentation entered by me for this encounter may have been done in part using speech-recognition technology. Although I have made an effort to ensure accuracy, "sound like" errors may exist and should be interpreted in context.       Saud Sosa MD     Discussed health maintenance guidelines appropriate for age.    "

## 2022-03-30 ENCOUNTER — HOSPITAL ENCOUNTER (OUTPATIENT)
Dept: RADIOLOGY | Facility: CLINIC | Age: 67
Discharge: HOME | End: 2022-03-30
Attending: FAMILY MEDICINE
Payer: MEDICARE

## 2022-03-30 DIAGNOSIS — M76.61 ACHILLES TENDINITIS OF RIGHT LOWER EXTREMITY: ICD-10-CM

## 2022-03-30 PROCEDURE — 73080 X-RAY EXAM OF ELBOW: CPT | Mod: 26,RT,S$GLB, | Performed by: RADIOLOGY

## 2022-03-30 PROCEDURE — 73080 X-RAY EXAM OF ELBOW: CPT | Mod: TC,FY,PO,RT

## 2022-03-30 PROCEDURE — 73080 XR ELBOW COMPLETE 3 VIEW RIGHT: ICD-10-PCS | Mod: 26,RT,S$GLB, | Performed by: RADIOLOGY

## 2022-03-31 ENCOUNTER — TELEPHONE (OUTPATIENT)
Dept: HEMATOLOGY/ONCOLOGY | Facility: CLINIC | Age: 67
End: 2022-03-31
Payer: MEDICARE

## 2022-04-06 ENCOUNTER — TELEPHONE (OUTPATIENT)
Dept: HEMATOLOGY/ONCOLOGY | Facility: CLINIC | Age: 67
End: 2022-04-06
Payer: MEDICARE

## 2022-04-10 PROBLEM — D70.4 CYCLICAL NEUTROPENIA: Status: ACTIVE | Noted: 2022-04-10

## 2022-04-13 ENCOUNTER — LAB VISIT (OUTPATIENT)
Dept: LAB | Facility: HOSPITAL | Age: 67
End: 2022-04-13
Attending: FAMILY MEDICINE
Payer: MEDICARE

## 2022-04-13 ENCOUNTER — OFFICE VISIT (OUTPATIENT)
Dept: HEMATOLOGY/ONCOLOGY | Facility: CLINIC | Age: 67
End: 2022-04-13
Payer: MEDICARE

## 2022-04-13 VITALS
RESPIRATION RATE: 16 BRPM | BODY MASS INDEX: 27.67 KG/M2 | SYSTOLIC BLOOD PRESSURE: 152 MMHG | OXYGEN SATURATION: 96 % | DIASTOLIC BLOOD PRESSURE: 90 MMHG | HEART RATE: 74 BPM | TEMPERATURE: 98 F | WEIGHT: 198.44 LBS

## 2022-04-13 DIAGNOSIS — D53.9 NUTRITIONAL ANEMIA, UNSPECIFIED: ICD-10-CM

## 2022-04-13 DIAGNOSIS — D72.819 CHRONIC LEUKOPENIA: ICD-10-CM

## 2022-04-13 LAB
FOLATE SERPL-MCNC: 5.9 NG/ML (ref 4–24)
LDH SERPL L TO P-CCNC: 181 U/L (ref 110–260)
RETICS/RBC NFR AUTO: 1.6 % (ref 0.4–2)
VIT B12 SERPL-MCNC: 645 PG/ML (ref 210–950)

## 2022-04-13 PROCEDURE — 3077F SYST BP >= 140 MM HG: CPT | Mod: CPTII,S$GLB,, | Performed by: INTERNAL MEDICINE

## 2022-04-13 PROCEDURE — 99999 PR PBB SHADOW E&M-EST. PATIENT-LVL V: CPT | Mod: PBBFAC,,, | Performed by: INTERNAL MEDICINE

## 2022-04-13 PROCEDURE — 85045 AUTOMATED RETICULOCYTE COUNT: CPT | Performed by: INTERNAL MEDICINE

## 2022-04-13 PROCEDURE — 3008F PR BODY MASS INDEX (BMI) DOCUMENTED: ICD-10-PCS | Mod: CPTII,S$GLB,, | Performed by: INTERNAL MEDICINE

## 2022-04-13 PROCEDURE — 83615 LACTATE (LD) (LDH) ENZYME: CPT | Performed by: INTERNAL MEDICINE

## 2022-04-13 PROCEDURE — 4010F PR ACE/ARB THEARPY RXD/TAKEN: ICD-10-PCS | Mod: CPTII,S$GLB,, | Performed by: INTERNAL MEDICINE

## 2022-04-13 PROCEDURE — 36415 COLL VENOUS BLD VENIPUNCTURE: CPT | Mod: PO | Performed by: INTERNAL MEDICINE

## 2022-04-13 PROCEDURE — 3288F PR FALLS RISK ASSESSMENT DOCUMENTED: ICD-10-PCS | Mod: CPTII,S$GLB,, | Performed by: INTERNAL MEDICINE

## 2022-04-13 PROCEDURE — 3008F BODY MASS INDEX DOCD: CPT | Mod: CPTII,S$GLB,, | Performed by: INTERNAL MEDICINE

## 2022-04-13 PROCEDURE — 1125F PR PAIN SEVERITY QUANTIFIED, PAIN PRESENT: ICD-10-PCS | Mod: CPTII,S$GLB,, | Performed by: INTERNAL MEDICINE

## 2022-04-13 PROCEDURE — 82746 ASSAY OF FOLIC ACID SERUM: CPT | Performed by: INTERNAL MEDICINE

## 2022-04-13 PROCEDURE — 1159F MED LIST DOCD IN RCRD: CPT | Mod: CPTII,S$GLB,, | Performed by: INTERNAL MEDICINE

## 2022-04-13 PROCEDURE — 3288F FALL RISK ASSESSMENT DOCD: CPT | Mod: CPTII,S$GLB,, | Performed by: INTERNAL MEDICINE

## 2022-04-13 PROCEDURE — 84165 PATHOLOGIST INTERPRETATION SPE: ICD-10-PCS | Mod: 26,,, | Performed by: PATHOLOGY

## 2022-04-13 PROCEDURE — 84165 PROTEIN E-PHORESIS SERUM: CPT | Performed by: INTERNAL MEDICINE

## 2022-04-13 PROCEDURE — 1101F PT FALLS ASSESS-DOCD LE1/YR: CPT | Mod: CPTII,S$GLB,, | Performed by: INTERNAL MEDICINE

## 2022-04-13 PROCEDURE — 86334 IMMUNOFIX E-PHORESIS SERUM: CPT | Mod: 26,,, | Performed by: PATHOLOGY

## 2022-04-13 PROCEDURE — 99999 PR PBB SHADOW E&M-EST. PATIENT-LVL V: ICD-10-PCS | Mod: PBBFAC,,, | Performed by: INTERNAL MEDICINE

## 2022-04-13 PROCEDURE — 3077F PR MOST RECENT SYSTOLIC BLOOD PRESSURE >= 140 MM HG: ICD-10-PCS | Mod: CPTII,S$GLB,, | Performed by: INTERNAL MEDICINE

## 2022-04-13 PROCEDURE — 3080F PR MOST RECENT DIASTOLIC BLOOD PRESSURE >= 90 MM HG: ICD-10-PCS | Mod: CPTII,S$GLB,, | Performed by: INTERNAL MEDICINE

## 2022-04-13 PROCEDURE — 1160F PR REVIEW ALL MEDS BY PRESCRIBER/CLIN PHARMACIST DOCUMENTED: ICD-10-PCS | Mod: CPTII,S$GLB,, | Performed by: INTERNAL MEDICINE

## 2022-04-13 PROCEDURE — 86334 PATHOLOGIST INTERPRETATION IFE: ICD-10-PCS | Mod: 26,,, | Performed by: PATHOLOGY

## 2022-04-13 PROCEDURE — 99204 PR OFFICE/OUTPT VISIT, NEW, LEVL IV, 45-59 MIN: ICD-10-PCS | Mod: S$GLB,,, | Performed by: INTERNAL MEDICINE

## 2022-04-13 PROCEDURE — 3080F DIAST BP >= 90 MM HG: CPT | Mod: CPTII,S$GLB,, | Performed by: INTERNAL MEDICINE

## 2022-04-13 PROCEDURE — 86334 IMMUNOFIX E-PHORESIS SERUM: CPT | Performed by: INTERNAL MEDICINE

## 2022-04-13 PROCEDURE — 82607 VITAMIN B-12: CPT | Performed by: INTERNAL MEDICINE

## 2022-04-13 PROCEDURE — 1159F PR MEDICATION LIST DOCUMENTED IN MEDICAL RECORD: ICD-10-PCS | Mod: CPTII,S$GLB,, | Performed by: INTERNAL MEDICINE

## 2022-04-13 PROCEDURE — 1125F AMNT PAIN NOTED PAIN PRSNT: CPT | Mod: CPTII,S$GLB,, | Performed by: INTERNAL MEDICINE

## 2022-04-13 PROCEDURE — 1101F PR PT FALLS ASSESS DOC 0-1 FALLS W/OUT INJ PAST YR: ICD-10-PCS | Mod: CPTII,S$GLB,, | Performed by: INTERNAL MEDICINE

## 2022-04-13 PROCEDURE — 84165 PROTEIN E-PHORESIS SERUM: CPT | Mod: 26,,, | Performed by: PATHOLOGY

## 2022-04-13 PROCEDURE — 4010F ACE/ARB THERAPY RXD/TAKEN: CPT | Mod: CPTII,S$GLB,, | Performed by: INTERNAL MEDICINE

## 2022-04-13 PROCEDURE — 1160F RVW MEDS BY RX/DR IN RCRD: CPT | Mod: CPTII,S$GLB,, | Performed by: INTERNAL MEDICINE

## 2022-04-13 PROCEDURE — 99204 OFFICE O/P NEW MOD 45 MIN: CPT | Mod: S$GLB,,, | Performed by: INTERNAL MEDICINE

## 2022-04-13 NOTE — PROGRESS NOTES
Service Date:  4/13/22    Chief Complaint: Chronic Leukopenia (New pt)    Humberto Cantor is a 66 y.o. male with leukopenia and macrocytosis.  Patient was referred by his PCP.  Review of blood work shows that this issue has been present since July 2019. Patient denies any weight loss, night sweats, or fevers.  He denies any frequent illness.  He is very active with swimming and plays tennis.  He has no complaints to me today.  He denies any tobacco use.    Review of Systems   Constitutional: Negative.    HENT: Negative.    Eyes: Negative.    Respiratory: Negative.    Cardiovascular: Negative.    Gastrointestinal: Negative.    Endocrine: Negative.    Genitourinary: Negative.    Neurological: Negative.    Hematological: Negative.    Psychiatric/Behavioral: Negative.         Current Outpatient Medications   Medication Instructions    ALPRAZolam (XANAX) 0.25 mg, Oral, 2 times daily PRN    ciprofloxacin-dexamethasone 0.3-0.1% (CIPRODEX) 0.3-0.1 % DrpS 4 drops, Both Ears, 2 times daily    fluocinonide 0.05% (LIDEX) 0.05 % cream Topical (Top), 2 times daily, Do not use longer than 2 weeks    HYDROcodone-acetaminophen (NORCO)  mg per tablet 1 tablet, Oral, 3 times daily PRN    lansoprazole (PREVACID) 15 mg, Oral, Daily    lidocaine-prilocaine (EMLA) cream No dose, route, or frequency recorded.    naproxen (NAPROSYN) 250 mg, Oral, 2 times daily    olmesartan (BENICAR) 20 MG tablet TAKE ONE TABLET BY MOUTH ONCE A DAY    tadalafiL (CIALIS) 20 mg, Oral, Daily    tamsulosin (FLOMAX) 0.8 mg, Oral, Daily        Past Medical History:   Diagnosis Date    Dermatitis, atopic     Essential hypertension 11/19/2013    GERD (gastroesophageal reflux disease)     Hypertension         Past Surgical History:   Procedure Laterality Date    matthew      caudal     HERNIA REPAIR      WISDOM TOOTH EXTRACTION          Family History   Problem Relation Age of Onset    Early death Brother     Arthritis Mother     Cancer Maternal  Grandmother         abdominal    Cancer Maternal Grandfather 60        lung    Liver disease Paternal Grandfather        Social History     Tobacco Use    Smoking status: Former Smoker    Smokeless tobacco: Never Used   Substance Use Topics    Alcohol use: Yes     Alcohol/week: 2.0 standard drinks     Types: 2 Glasses of wine per week    Drug use: No         Vitals:    04/13/22 0925   BP: (!) 152/90   Pulse: 74   Resp: 16   Temp: 97.8 °F (36.6 °C)        Physical Exam:  BP (!) 152/90 (BP Location: Left arm, Patient Position: Sitting)   Pulse 74   Temp 97.8 °F (36.6 °C) (Temporal)   Resp 16   Wt 90 kg (198 lb 6.6 oz)   SpO2 96%   BMI 27.67 kg/m²     Physical Exam  Vitals and nursing note reviewed.   Constitutional:       Appearance: Normal appearance.   HENT:      Head: Normocephalic and atraumatic.      Nose: Nose normal.      Mouth/Throat:      Mouth: Mucous membranes are moist.      Pharynx: Oropharynx is clear.   Eyes:      Extraocular Movements: Extraocular movements intact.      Conjunctiva/sclera: Conjunctivae normal.   Cardiovascular:      Rate and Rhythm: Normal rate and regular rhythm.      Heart sounds: Normal heart sounds.   Pulmonary:      Effort: Pulmonary effort is normal.      Breath sounds: Normal breath sounds.   Abdominal:      General: Abdomen is flat. Bowel sounds are normal.      Palpations: Abdomen is soft.   Musculoskeletal:         General: Normal range of motion.      Cervical back: Normal range of motion and neck supple.   Skin:     General: Skin is warm and dry.   Neurological:      General: No focal deficit present.      Mental Status: He is alert and oriented to person, place, and time. Mental status is at baseline.   Psychiatric:         Mood and Affect: Mood normal.          Labs:  Lab Results   Component Value Date    WBC 2.85 (L) 03/22/2022    RBC 3.99 (L) 03/22/2022    HGB 13.6 (L) 03/22/2022    HCT 40.4 03/22/2022     (H) 03/22/2022    MCH 34.1 (H) 03/22/2022     MCHC 33.7 03/22/2022    RDW 12.7 03/22/2022     03/22/2022    MPV 9.8 03/22/2022    GRAN 1.7 (L) 03/22/2022    GRAN 57.8 03/22/2022    LYMPH 0.7 (L) 03/22/2022    LYMPH 24.9 03/22/2022    MONO 0.4 03/22/2022    MONO 13.7 03/22/2022    EOS 0.1 03/22/2022    BASO 0.03 03/22/2022    EOSINOPHIL 2.5 03/22/2022    BASOPHIL 1.1 03/22/2022     Sodium   Date Value Ref Range Status   03/22/2022 140 136 - 145 mmol/L Final     Potassium   Date Value Ref Range Status   03/22/2022 4.7 3.5 - 5.1 mmol/L Final     Chloride   Date Value Ref Range Status   03/22/2022 103 95 - 110 mmol/L Final     CO2   Date Value Ref Range Status   03/22/2022 29 23 - 29 mmol/L Final     Glucose   Date Value Ref Range Status   03/22/2022 98 70 - 110 mg/dL Final     BUN   Date Value Ref Range Status   03/22/2022 24 (H) 8 - 23 mg/dL Final     Creatinine   Date Value Ref Range Status   03/22/2022 0.8 0.5 - 1.4 mg/dL Final     Calcium   Date Value Ref Range Status   03/22/2022 10.0 8.7 - 10.5 mg/dL Final     Total Protein   Date Value Ref Range Status   03/22/2022 6.3 6.0 - 8.4 g/dL Final     Albumin   Date Value Ref Range Status   03/22/2022 3.7 3.5 - 5.2 g/dL Final     Total Bilirubin   Date Value Ref Range Status   03/22/2022 0.5 0.1 - 1.0 mg/dL Final     Comment:     For infants and newborns, interpretation of results should be based  on gestational age, weight and in agreement with clinical  observations.    Premature Infant recommended reference ranges:  Up to 24 hours.............<8.0 mg/dL  Up to 48 hours............<12.0 mg/dL  3-5 days..................<15.0 mg/dL  6-29 days.................<15.0 mg/dL       Alkaline Phosphatase   Date Value Ref Range Status   03/22/2022 56 55 - 135 U/L Final     AST   Date Value Ref Range Status   03/22/2022 20 10 - 40 U/L Final     ALT   Date Value Ref Range Status   03/22/2022 15 10 - 44 U/L Final     Anion Gap   Date Value Ref Range Status   03/22/2022 8 8 - 16 mmol/L Final     eGFR if     Date Value Ref Range Status   03/22/2022 >60.0 >60 mL/min/1.73 m^2 Final     eGFR if non    Date Value Ref Range Status   03/22/2022 >60.0 >60 mL/min/1.73 m^2 Final     Comment:     Calculation used to obtain the estimated glomerular filtration  rate (eGFR) is the CKD-EPI equation.          A/P:    Leukopenia  Macrocytosis  -noted on blood work since at least 2019, with a recent drop  -I will investigate this further with SPEP, B12, folate, and LDH  -return to clinic in 3 months with repeat CBC.  Patient may have underlying MDS, but I would like to do further workup and trend his white blood cells over his recent drop.      Aurash Khoobehi, MD  Hematology and Oncology

## 2022-04-14 LAB
ALBUMIN SERPL ELPH-MCNC: 4.21 G/DL (ref 3.35–5.55)
ALPHA1 GLOB SERPL ELPH-MCNC: 0.25 G/DL (ref 0.17–0.41)
ALPHA2 GLOB SERPL ELPH-MCNC: 0.64 G/DL (ref 0.43–0.99)
B-GLOBULIN SERPL ELPH-MCNC: 0.63 G/DL (ref 0.5–1.1)
GAMMA GLOB SERPL ELPH-MCNC: 0.77 G/DL (ref 0.67–1.58)
PROT SERPL-MCNC: 6.5 G/DL (ref 6–8.4)

## 2022-04-18 LAB
INTERPRETATION SERPL IFE-IMP: NORMAL
PATHOLOGIST INTERPRETATION IFE: NORMAL
PATHOLOGIST INTERPRETATION SPE: NORMAL

## 2022-04-28 ENCOUNTER — TELEPHONE (OUTPATIENT)
Dept: FAMILY MEDICINE | Facility: CLINIC | Age: 67
End: 2022-04-28
Payer: MEDICARE

## 2022-04-28 DIAGNOSIS — M77.8 TENDINITIS OF ELBOW: Primary | ICD-10-CM

## 2022-04-28 NOTE — TELEPHONE ENCOUNTER
----- Message from Darcy Jimenez MA sent at 4/28/2022  4:48 PM CDT -----  Spoke to pt  Had xray done 3/30/2022 and never got the results  Please call him @ [phone]   Thanks !  Please do NOT respond directly back to me, any questions, reply to staff box since this inbox is not routinely monitored

## 2022-04-28 NOTE — TELEPHONE ENCOUNTER
Spoke to pt states he does not have an Orthopedic Dr. Pt states he will not go see Dr. David as he has seen her in the past and was very unhappy. Pt states he wants to see a real MD. Pt states he was previously treated by Dr. Cedillo but he does not treat elbows. Pt requesting a new ortho referral

## 2022-04-29 ENCOUNTER — TELEPHONE (OUTPATIENT)
Dept: FAMILY MEDICINE | Facility: CLINIC | Age: 67
End: 2022-04-29
Payer: MEDICARE

## 2022-04-29 NOTE — TELEPHONE ENCOUNTER
Attempted to contact pt no answer no voicemail unable to leave message. Scheduled next available Ortho appt.

## 2022-05-30 ENCOUNTER — OFFICE VISIT (OUTPATIENT)
Dept: ORTHOPEDICS | Facility: CLINIC | Age: 67
End: 2022-05-30
Payer: MEDICARE

## 2022-05-30 VITALS — WEIGHT: 198 LBS | RESPIRATION RATE: 18 BRPM | BODY MASS INDEX: 27.72 KG/M2 | HEIGHT: 71 IN

## 2022-05-30 DIAGNOSIS — M77.8 TENDINITIS OF ELBOW: ICD-10-CM

## 2022-05-30 DIAGNOSIS — M77.01 MEDIAL EPICONDYLITIS, RIGHT: Primary | ICD-10-CM

## 2022-05-30 PROCEDURE — 3288F FALL RISK ASSESSMENT DOCD: CPT | Mod: CPTII,S$GLB,, | Performed by: ORTHOPAEDIC SURGERY

## 2022-05-30 PROCEDURE — 1101F PT FALLS ASSESS-DOCD LE1/YR: CPT | Mod: CPTII,S$GLB,, | Performed by: ORTHOPAEDIC SURGERY

## 2022-05-30 PROCEDURE — 99999 PR PBB SHADOW E&M-EST. PATIENT-LVL III: ICD-10-PCS | Mod: PBBFAC,,, | Performed by: ORTHOPAEDIC SURGERY

## 2022-05-30 PROCEDURE — 1160F PR REVIEW ALL MEDS BY PRESCRIBER/CLIN PHARMACIST DOCUMENTED: ICD-10-PCS | Mod: CPTII,S$GLB,, | Performed by: ORTHOPAEDIC SURGERY

## 2022-05-30 PROCEDURE — 99203 PR OFFICE/OUTPT VISIT, NEW, LEVL III, 30-44 MIN: ICD-10-PCS | Mod: 25,S$GLB,, | Performed by: ORTHOPAEDIC SURGERY

## 2022-05-30 PROCEDURE — 99203 OFFICE O/P NEW LOW 30 MIN: CPT | Mod: 25,S$GLB,, | Performed by: ORTHOPAEDIC SURGERY

## 2022-05-30 PROCEDURE — 3008F PR BODY MASS INDEX (BMI) DOCUMENTED: ICD-10-PCS | Mod: CPTII,S$GLB,, | Performed by: ORTHOPAEDIC SURGERY

## 2022-05-30 PROCEDURE — 99999 PR PBB SHADOW E&M-EST. PATIENT-LVL III: CPT | Mod: PBBFAC,,, | Performed by: ORTHOPAEDIC SURGERY

## 2022-05-30 PROCEDURE — 3288F PR FALLS RISK ASSESSMENT DOCUMENTED: ICD-10-PCS | Mod: CPTII,S$GLB,, | Performed by: ORTHOPAEDIC SURGERY

## 2022-05-30 PROCEDURE — 1160F RVW MEDS BY RX/DR IN RCRD: CPT | Mod: CPTII,S$GLB,, | Performed by: ORTHOPAEDIC SURGERY

## 2022-05-30 PROCEDURE — 20551 NJX 1 TENDON ORIGIN/INSJ: CPT | Mod: RT,S$GLB,, | Performed by: ORTHOPAEDIC SURGERY

## 2022-05-30 PROCEDURE — 1101F PR PT FALLS ASSESS DOC 0-1 FALLS W/OUT INJ PAST YR: ICD-10-PCS | Mod: CPTII,S$GLB,, | Performed by: ORTHOPAEDIC SURGERY

## 2022-05-30 PROCEDURE — 1159F MED LIST DOCD IN RCRD: CPT | Mod: CPTII,S$GLB,, | Performed by: ORTHOPAEDIC SURGERY

## 2022-05-30 PROCEDURE — 20551 TENDON ORIGIN: R ELBOW: ICD-10-PCS | Mod: RT,S$GLB,, | Performed by: ORTHOPAEDIC SURGERY

## 2022-05-30 PROCEDURE — 1159F PR MEDICATION LIST DOCUMENTED IN MEDICAL RECORD: ICD-10-PCS | Mod: CPTII,S$GLB,, | Performed by: ORTHOPAEDIC SURGERY

## 2022-05-30 PROCEDURE — 4010F PR ACE/ARB THEARPY RXD/TAKEN: ICD-10-PCS | Mod: CPTII,S$GLB,, | Performed by: ORTHOPAEDIC SURGERY

## 2022-05-30 PROCEDURE — 1125F AMNT PAIN NOTED PAIN PRSNT: CPT | Mod: CPTII,S$GLB,, | Performed by: ORTHOPAEDIC SURGERY

## 2022-05-30 PROCEDURE — 1125F PR PAIN SEVERITY QUANTIFIED, PAIN PRESENT: ICD-10-PCS | Mod: CPTII,S$GLB,, | Performed by: ORTHOPAEDIC SURGERY

## 2022-05-30 PROCEDURE — 4010F ACE/ARB THERAPY RXD/TAKEN: CPT | Mod: CPTII,S$GLB,, | Performed by: ORTHOPAEDIC SURGERY

## 2022-05-30 PROCEDURE — 3008F BODY MASS INDEX DOCD: CPT | Mod: CPTII,S$GLB,, | Performed by: ORTHOPAEDIC SURGERY

## 2022-05-30 RX ORDER — TRIAMCINOLONE ACETONIDE 40 MG/ML
40 INJECTION, SUSPENSION INTRA-ARTICULAR; INTRAMUSCULAR
Status: DISCONTINUED | OUTPATIENT
Start: 2022-05-30 | End: 2022-05-30 | Stop reason: HOSPADM

## 2022-05-30 RX ADMIN — TRIAMCINOLONE ACETONIDE 40 MG: 40 INJECTION, SUSPENSION INTRA-ARTICULAR; INTRAMUSCULAR at 02:05

## 2022-05-30 NOTE — PROGRESS NOTES
Past Medical History:   Diagnosis Date    Dermatitis, atopic     Essential hypertension 11/19/2013    GERD (gastroesophageal reflux disease)     Hypertension        Past Surgical History:   Procedure Laterality Date    matthew      caudal     HERNIA REPAIR      WISDOM TOOTH EXTRACTION         Current Outpatient Medications   Medication Sig    ALPRAZolam (XANAX) 0.25 MG tablet Take 1 tablet (0.25 mg total) by mouth 2 (two) times daily as needed for Anxiety.    ciprofloxacin-dexamethasone 0.3-0.1% (CIPRODEX) 0.3-0.1 % DrpS Place 4 drops into both ears 2 (two) times daily. (Patient taking differently: Place 4 drops into both ears 2 (two) times daily. PRN)    HYDROcodone-acetaminophen (NORCO)  mg per tablet Take 1 tablet by mouth 3 (three) times daily as needed.    lidocaine-prilocaine (EMLA) cream     naproxen (NAPROSYN) 250 MG tablet Take 250 mg by mouth 2 (two) times daily.    olmesartan (BENICAR) 20 MG tablet TAKE ONE TABLET BY MOUTH ONCE A DAY    tadalafiL (CIALIS) 20 MG Tab Take 1 tablet (20 mg total) by mouth once daily.    tamsulosin (FLOMAX) 0.4 mg Cap Take 2 capsules (0.8 mg total) by mouth once daily.    fluocinonide 0.05% (LIDEX) 0.05 % cream Apply topically 2 (two) times daily. Do not use longer than 2 weeks for 10 days    lansoprazole (PREVACID) 15 MG capsule Take 1 capsule (15 mg total) by mouth once daily.     No current facility-administered medications for this visit.       Review of patient's allergies indicates:  No Known Allergies    Family History   Problem Relation Age of Onset    Early death Brother     Arthritis Mother     Cancer Maternal Grandmother         abdominal    Cancer Maternal Grandfather 60        lung    Liver disease Paternal Grandfather        Social History     Socioeconomic History    Marital status:    Occupational History     Employer: [employer]   Tobacco Use    Smoking status: Former Smoker    Smokeless tobacco: Never Used   Substance  and Sexual Activity    Alcohol use: Yes     Alcohol/week: 2.0 standard drinks     Types: 2 Glasses of wine per week    Drug use: No    Sexual activity: Yes     Partners: Female       Chief Complaint:   Chief Complaint   Patient presents with    Right Elbow - Pain       History of present illness:  This is a 66-year-old right-hand-dominant male seen for medial elbow pain.  Started several months ago.  Slowly getting worse.  Hurts when he plays tennis or when he swims.  Patient has tried taping and anti-inflammatories.  Started last November but got significantly worse in March.  Rates the pain is an 8/10.      Review of Systems:    Constitution: Negative for chills, fever, and sweats.  Negative for unexplained weight loss.    HENT:  Negative for headaches and blurry vision.    Cardiovascular:Negative for chest pain or irregular heart beat. Negative for hypertension.    Respiratory:  Negative for cough and shortness of breath.    Gastrointestinal: Negative for abdominal pain, heartburn, melena, nausea, and vomitting.    Genitourinary:  Negative bladder incontinence and dysuria.    Musculoskeletal:  See HPI    Neurological: Negative for numbness.    Psychiatric/Behavioral: Negative for depression.  The patient is not nervous/anxious.      Endocrine: Negative for polyuria    Hematologic/Lymphatic: Negative for bleeding problem.  Does not bruise/bleed easily.    Skin: Negative for poor would healing and rash      Physical Examination:    Vital Signs:    Vitals:    05/30/22 1322   Resp: 18       Body mass index is 27.62 kg/m².    This a well-developed, well nourished patient in no acute distress.  They are alert and oriented and cooperative to examination.  Pt. walks without an antalgic gait.      Examination of the right elbow shows no signs of rashes or erythema. The patient has no masses, ecchymosis, or effusion. The patient has full range of motion from 0-160°. Patient has full pronation and supination. Patient is  moderately tender along the medial epicondyle and nontender over the lateral epicondyle. Nontender over the olecranon process.  Nontender along the course of the UCL. Patient has a negative valgus stress test and milking maneuver. Negative Tinel's sign over the cubital tunnel. 2+ radial pulse. Intact light touch sensation.     X-rays:  X-rays of the right elbow are available for review which show some spurring along the posterior olecranon and the lateral epicondyle     Assessment::  Right medial epicondylitis    Plan:  I reviewed the findings with him today.  I gave him an information sheet about medial epicondylitis as well as a stretching and exercise guide.  I also agreed to do a cortisone injection    This note was created using [employer] voice recognition software that occasionally misinterpreted phrases or words.    Consult note is delivered via Epic messaging service.

## 2022-05-30 NOTE — PROCEDURES
Tendon Origin: R elbow    Date/Time: 5/30/2022 2:00 PM  Performed by: Hebert Bermudez MD  Authorized by: Hebert Bermudez MD     Consent Done?:  Yes (Verbal)  Timeout: prior to procedure the correct patient, procedure, and site was verified    Indications:  Pain  Site marked: the procedure site was marked    Timeout: prior to procedure the correct patient, procedure, and site was verified    Location:  Elbow  Site:  R elbow  Prep: patient was prepped and draped in usual sterile fashion    Ultrasonic Guidance for Needle Placement?: No    Needle size:  22 G  Approach:  Anteromedial  Medications:  40 mg triamcinolone acetonide 40 mg/mL  Patient tolerance:  Patient tolerated the procedure well with no immediate complications

## 2022-06-27 ENCOUNTER — PATIENT OUTREACH (OUTPATIENT)
Dept: ADMINISTRATIVE | Facility: HOSPITAL | Age: 67
End: 2022-06-27
Payer: MEDICARE

## 2022-06-27 NOTE — PROGRESS NOTES
[employer] non-compliant report chart audits for COLON CANCER SCREENING.     Care Everywhere and media, updates requested and reviewed.      Outreach to patient in reference to colon cancer screening.    RE:  Patient needs colon cancer screening        Outreach:  Colon cancer screening

## 2022-06-27 NOTE — PROGRESS NOTES
[employer] non-compliant report chart audits for CONTROLLING HIGH BLOOD PRESSURE    Outreach to patient in reference to controlling high blood pressure    RE:  Patient controlling high blood pressure        Outreach:  Controlling High Blood Pressure

## 2022-07-11 ENCOUNTER — LAB VISIT (OUTPATIENT)
Dept: LAB | Facility: HOSPITAL | Age: 67
End: 2022-07-11
Attending: INTERNAL MEDICINE
Payer: MEDICARE

## 2022-07-11 DIAGNOSIS — D72.819 CHRONIC LEUKOPENIA: ICD-10-CM

## 2022-07-11 LAB
BASOPHILS # BLD AUTO: 0.02 K/UL (ref 0–0.2)
BASOPHILS NFR BLD: 0.4 % (ref 0–1.9)
DIFFERENTIAL METHOD: ABNORMAL
EOSINOPHIL # BLD AUTO: 0.1 K/UL (ref 0–0.5)
EOSINOPHIL NFR BLD: 1.3 % (ref 0–8)
ERYTHROCYTE [DISTWIDTH] IN BLOOD BY AUTOMATED COUNT: 12.8 % (ref 11.5–14.5)
HCT VFR BLD AUTO: 40.5 % (ref 40–54)
HGB BLD-MCNC: 13.5 G/DL (ref 14–18)
IMM GRANULOCYTES # BLD AUTO: 0.01 K/UL (ref 0–0.04)
IMM GRANULOCYTES NFR BLD AUTO: 0.2 % (ref 0–0.5)
LYMPHOCYTES # BLD AUTO: 0.2 K/UL (ref 1–4.8)
LYMPHOCYTES NFR BLD: 4.5 % (ref 18–48)
MCH RBC QN AUTO: 33.9 PG (ref 27–31)
MCHC RBC AUTO-ENTMCNC: 33.3 G/DL (ref 32–36)
MCV RBC AUTO: 102 FL (ref 82–98)
MONOCYTES # BLD AUTO: 0.4 K/UL (ref 0.3–1)
MONOCYTES NFR BLD: 9.4 % (ref 4–15)
NEUTROPHILS # BLD AUTO: 4 K/UL (ref 1.8–7.7)
NEUTROPHILS NFR BLD: 84.2 % (ref 38–73)
NRBC BLD-RTO: 0 /100 WBC
PLATELET # BLD AUTO: 208 K/UL (ref 150–450)
PMV BLD AUTO: 9.9 FL (ref 9.2–12.9)
RBC # BLD AUTO: 3.98 M/UL (ref 4.6–6.2)
WBC # BLD AUTO: 4.69 K/UL (ref 3.9–12.7)

## 2022-07-11 PROCEDURE — 85025 COMPLETE CBC W/AUTO DIFF WBC: CPT | Performed by: INTERNAL MEDICINE

## 2022-07-11 PROCEDURE — 36415 COLL VENOUS BLD VENIPUNCTURE: CPT | Mod: PO | Performed by: INTERNAL MEDICINE

## 2022-07-13 ENCOUNTER — OFFICE VISIT (OUTPATIENT)
Dept: HEMATOLOGY/ONCOLOGY | Facility: CLINIC | Age: 67
End: 2022-07-13
Payer: MEDICARE

## 2022-07-13 VITALS
SYSTOLIC BLOOD PRESSURE: 161 MMHG | RESPIRATION RATE: 16 BRPM | TEMPERATURE: 98 F | OXYGEN SATURATION: 96 % | DIASTOLIC BLOOD PRESSURE: 118 MMHG | BODY MASS INDEX: 29.67 KG/M2 | HEART RATE: 81 BPM | HEIGHT: 68 IN | WEIGHT: 195.75 LBS

## 2022-07-13 DIAGNOSIS — D53.9 MACROCYTIC ANEMIA: ICD-10-CM

## 2022-07-13 DIAGNOSIS — D72.819 CHRONIC LEUKOPENIA: Primary | ICD-10-CM

## 2022-07-13 PROCEDURE — 3288F PR FALLS RISK ASSESSMENT DOCUMENTED: ICD-10-PCS | Mod: CPTII,S$GLB,, | Performed by: INTERNAL MEDICINE

## 2022-07-13 PROCEDURE — 3080F DIAST BP >= 90 MM HG: CPT | Mod: CPTII,S$GLB,, | Performed by: INTERNAL MEDICINE

## 2022-07-13 PROCEDURE — 3077F SYST BP >= 140 MM HG: CPT | Mod: CPTII,S$GLB,, | Performed by: INTERNAL MEDICINE

## 2022-07-13 PROCEDURE — 1159F PR MEDICATION LIST DOCUMENTED IN MEDICAL RECORD: ICD-10-PCS | Mod: CPTII,S$GLB,, | Performed by: INTERNAL MEDICINE

## 2022-07-13 PROCEDURE — 3077F PR MOST RECENT SYSTOLIC BLOOD PRESSURE >= 140 MM HG: ICD-10-PCS | Mod: CPTII,S$GLB,, | Performed by: INTERNAL MEDICINE

## 2022-07-13 PROCEDURE — 99999 PR PBB SHADOW E&M-EST. PATIENT-LVL IV: CPT | Mod: PBBFAC,,, | Performed by: INTERNAL MEDICINE

## 2022-07-13 PROCEDURE — 1101F PR PT FALLS ASSESS DOC 0-1 FALLS W/OUT INJ PAST YR: ICD-10-PCS | Mod: CPTII,S$GLB,, | Performed by: INTERNAL MEDICINE

## 2022-07-13 PROCEDURE — 1101F PT FALLS ASSESS-DOCD LE1/YR: CPT | Mod: CPTII,S$GLB,, | Performed by: INTERNAL MEDICINE

## 2022-07-13 PROCEDURE — 3288F FALL RISK ASSESSMENT DOCD: CPT | Mod: CPTII,S$GLB,, | Performed by: INTERNAL MEDICINE

## 2022-07-13 PROCEDURE — 1160F RVW MEDS BY RX/DR IN RCRD: CPT | Mod: CPTII,S$GLB,, | Performed by: INTERNAL MEDICINE

## 2022-07-13 PROCEDURE — 99999 PR PBB SHADOW E&M-EST. PATIENT-LVL IV: ICD-10-PCS | Mod: PBBFAC,,, | Performed by: INTERNAL MEDICINE

## 2022-07-13 PROCEDURE — 1159F MED LIST DOCD IN RCRD: CPT | Mod: CPTII,S$GLB,, | Performed by: INTERNAL MEDICINE

## 2022-07-13 PROCEDURE — 99214 OFFICE O/P EST MOD 30 MIN: CPT | Mod: S$GLB,,, | Performed by: INTERNAL MEDICINE

## 2022-07-13 PROCEDURE — 1125F PR PAIN SEVERITY QUANTIFIED, PAIN PRESENT: ICD-10-PCS | Mod: CPTII,S$GLB,, | Performed by: INTERNAL MEDICINE

## 2022-07-13 PROCEDURE — 1160F PR REVIEW ALL MEDS BY PRESCRIBER/CLIN PHARMACIST DOCUMENTED: ICD-10-PCS | Mod: CPTII,S$GLB,, | Performed by: INTERNAL MEDICINE

## 2022-07-13 PROCEDURE — 3008F PR BODY MASS INDEX (BMI) DOCUMENTED: ICD-10-PCS | Mod: CPTII,S$GLB,, | Performed by: INTERNAL MEDICINE

## 2022-07-13 PROCEDURE — 1125F AMNT PAIN NOTED PAIN PRSNT: CPT | Mod: CPTII,S$GLB,, | Performed by: INTERNAL MEDICINE

## 2022-07-13 PROCEDURE — 4010F PR ACE/ARB THEARPY RXD/TAKEN: ICD-10-PCS | Mod: CPTII,S$GLB,, | Performed by: INTERNAL MEDICINE

## 2022-07-13 PROCEDURE — 3008F BODY MASS INDEX DOCD: CPT | Mod: CPTII,S$GLB,, | Performed by: INTERNAL MEDICINE

## 2022-07-13 PROCEDURE — 3080F PR MOST RECENT DIASTOLIC BLOOD PRESSURE >= 90 MM HG: ICD-10-PCS | Mod: CPTII,S$GLB,, | Performed by: INTERNAL MEDICINE

## 2022-07-13 PROCEDURE — 99214 PR OFFICE/OUTPT VISIT, EST, LEVL IV, 30-39 MIN: ICD-10-PCS | Mod: S$GLB,,, | Performed by: INTERNAL MEDICINE

## 2022-07-13 PROCEDURE — 4010F ACE/ARB THERAPY RXD/TAKEN: CPT | Mod: CPTII,S$GLB,, | Performed by: INTERNAL MEDICINE

## 2022-07-13 NOTE — PROGRESS NOTES
Service Date:  7/13/22    Chief Complaint: leukopenia    Humberto Cantor is a 66 y.o. male with leukopenia and macrocytosis.  Patient was referred by his PCP.     No new issues today patient is doing well.  He is here to follow-up on his CBC.    Review of Systems   Constitutional: Negative.    HENT: Negative.    Eyes: Negative.    Respiratory: Negative.    Cardiovascular: Negative.    Gastrointestinal: Negative.    Endocrine: Negative.    Genitourinary: Negative.    Neurological: Negative.    Hematological: Negative.    Psychiatric/Behavioral: Negative.         Current Outpatient Medications   Medication Instructions    ALPRAZolam (XANAX) 0.25 mg, Oral, 2 times daily PRN    ciprofloxacin-dexamethasone 0.3-0.1% (CIPRODEX) 0.3-0.1 % DrpS 4 drops, Both Ears, 2 times daily    fluocinonide 0.05% (LIDEX) 0.05 % cream Topical (Top), 2 times daily, Do not use longer than 2 weeks    HYDROcodone-acetaminophen (NORCO)  mg per tablet 1 tablet, Oral, 3 times daily PRN    lansoprazole (PREVACID) 15 mg, Oral, Daily    lidocaine-prilocaine (EMLA) cream No dose, route, or frequency recorded.    naproxen (NAPROSYN) 250 mg, Oral, 2 times daily    olmesartan (BENICAR) 20 MG tablet TAKE ONE TABLET BY MOUTH ONCE A DAY    tadalafiL (CIALIS) 20 mg, Oral, Daily    tamsulosin (FLOMAX) 0.8 mg, Oral, Daily        Past Medical History:   Diagnosis Date    Dermatitis, atopic     Essential hypertension 11/19/2013    GERD (gastroesophageal reflux disease)     Hypertension         Past Surgical History:   Procedure Laterality Date    matthew      caudal     HERNIA REPAIR      WISDOM TOOTH EXTRACTION          Family History   Problem Relation Age of Onset    Early death Brother     Arthritis Mother     Cancer Maternal Grandmother         abdominal    Cancer Maternal Grandfather 60        lung    Liver disease Paternal Grandfather        Social History     Tobacco Use    Smoking status: Former Smoker    Smokeless tobacco:  "Never Used   Substance Use Topics    Alcohol use: Yes     Alcohol/week: 2.0 standard drinks     Types: 2 Glasses of wine per week    Drug use: No         Vitals:    07/13/22 1106   BP: (!) 161/118   Pulse: 81   Resp: 16   Temp: 97.8 °F (36.6 °C)        Physical Exam:  BP (!) 161/118 (BP Location: Right arm, Patient Position: Sitting, BP Method: Medium (Automatic))   Pulse 81   Temp 97.8 °F (36.6 °C) (Temporal)   Resp 16   Ht 5' 8" (1.727 m)   Wt 88.8 kg (195 lb 12.3 oz)   SpO2 96%   BMI 29.77 kg/m²     Physical Exam  Vitals and nursing note reviewed.   Constitutional:       Appearance: Normal appearance.   HENT:      Head: Normocephalic and atraumatic.      Nose: Nose normal.      Mouth/Throat:      Mouth: Mucous membranes are moist.      Pharynx: Oropharynx is clear.   Eyes:      Extraocular Movements: Extraocular movements intact.      Conjunctiva/sclera: Conjunctivae normal.   Cardiovascular:      Rate and Rhythm: Normal rate and regular rhythm.      Heart sounds: Normal heart sounds.   Pulmonary:      Effort: Pulmonary effort is normal.      Breath sounds: Normal breath sounds.   Abdominal:      General: Abdomen is flat. Bowel sounds are normal.      Palpations: Abdomen is soft.   Musculoskeletal:         General: Normal range of motion.      Cervical back: Normal range of motion and neck supple.   Skin:     General: Skin is warm and dry.   Neurological:      General: No focal deficit present.      Mental Status: He is alert and oriented to person, place, and time. Mental status is at baseline.   Psychiatric:         Mood and Affect: Mood normal.          Labs:  Lab Results   Component Value Date    WBC 4.69 07/11/2022    RBC 3.98 (L) 07/11/2022    HGB 13.5 (L) 07/11/2022    HCT 40.5 07/11/2022     (H) 07/11/2022    MCH 33.9 (H) 07/11/2022    MCHC 33.3 07/11/2022    RDW 12.8 07/11/2022     07/11/2022    MPV 9.9 07/11/2022    GRAN 4.0 07/11/2022    GRAN 84.2 (H) 07/11/2022    LYMPH 0.2 (L) " 07/11/2022    LYMPH 4.5 (L) 07/11/2022    MONO 0.4 07/11/2022    MONO 9.4 07/11/2022    EOS 0.1 07/11/2022    BASO 0.02 07/11/2022    EOSINOPHIL 1.3 07/11/2022    BASOPHIL 0.4 07/11/2022     Sodium   Date Value Ref Range Status   03/22/2022 140 136 - 145 mmol/L Final     Potassium   Date Value Ref Range Status   03/22/2022 4.7 3.5 - 5.1 mmol/L Final     Chloride   Date Value Ref Range Status   03/22/2022 103 95 - 110 mmol/L Final     CO2   Date Value Ref Range Status   03/22/2022 29 23 - 29 mmol/L Final     Glucose   Date Value Ref Range Status   03/22/2022 98 70 - 110 mg/dL Final     BUN   Date Value Ref Range Status   03/22/2022 24 (H) 8 - 23 mg/dL Final     Creatinine   Date Value Ref Range Status   03/22/2022 0.8 0.5 - 1.4 mg/dL Final     Calcium   Date Value Ref Range Status   03/22/2022 10.0 8.7 - 10.5 mg/dL Final     Total Protein   Date Value Ref Range Status   03/22/2022 6.3 6.0 - 8.4 g/dL Final     Albumin   Date Value Ref Range Status   03/22/2022 3.7 3.5 - 5.2 g/dL Final     Total Bilirubin   Date Value Ref Range Status   03/22/2022 0.5 0.1 - 1.0 mg/dL Final     Comment:     For infants and newborns, interpretation of results should be based  on gestational age, weight and in agreement with clinical  observations.    Premature Infant recommended reference ranges:  Up to 24 hours.............<8.0 mg/dL  Up to 48 hours............<12.0 mg/dL  3-5 days..................<15.0 mg/dL  6-29 days.................<15.0 mg/dL       Alkaline Phosphatase   Date Value Ref Range Status   03/22/2022 56 55 - 135 U/L Final     AST   Date Value Ref Range Status   03/22/2022 20 10 - 40 U/L Final     ALT   Date Value Ref Range Status   03/22/2022 15 10 - 44 U/L Final     Anion Gap   Date Value Ref Range Status   03/22/2022 8 8 - 16 mmol/L Final     eGFR if    Date Value Ref Range Status   03/22/2022 >60.0 >60 mL/min/1.73 m^2 Final     eGFR if non    Date Value Ref Range Status   03/22/2022  >60.0 >60 mL/min/1.73 m^2 Final     Comment:     Calculation used to obtain the estimated glomerular filtration  rate (eGFR) is the CKD-EPI equation.          A/P:    Leukopenia  Macrocytic anemia  -workup negative  -review of last 3 years of CBCs shows that overall the patient's blood counts have been stable  -may have an underlying MDS, especially in view of the macrocytosis, but would recommend simply monitoring now in to do a bone marrow biopsy if there is any worsening  -return to clinic p.r.n., can have yearly CBCs with PCP      Aurash Khoobehi, MD  Hematology and Oncology

## 2022-08-10 DIAGNOSIS — N40.1 BENIGN PROSTATIC HYPERPLASIA WITH LOWER URINARY TRACT SYMPTOMS, SYMPTOM DETAILS UNSPECIFIED: ICD-10-CM

## 2022-08-10 RX ORDER — TAMSULOSIN HYDROCHLORIDE 0.4 MG/1
CAPSULE ORAL
Qty: 180 CAPSULE | Refills: 2 | Status: SHIPPED | OUTPATIENT
Start: 2022-08-10 | End: 2023-03-24 | Stop reason: SDUPTHER

## 2022-08-10 NOTE — TELEPHONE ENCOUNTER
Refill Decision Note   Humberto Cantor  is requesting a refill authorization.  Brief Assessment and Rationale for Refill:  Approve     Medication Therapy Plan:       Medication Reconciliation Completed: No   Comments:     No Care Gaps recommended.     Note composed:12:07 PM 08/10/2022

## 2022-09-28 ENCOUNTER — TELEPHONE (OUTPATIENT)
Dept: FAMILY MEDICINE | Facility: CLINIC | Age: 67
End: 2022-09-28
Payer: MEDICARE

## 2022-09-28 DIAGNOSIS — F41.1 GAD (GENERALIZED ANXIETY DISORDER): ICD-10-CM

## 2022-09-28 RX ORDER — ALPRAZOLAM 0.25 MG/1
TABLET ORAL
Qty: 60 TABLET | Refills: 2 | Status: SHIPPED | OUTPATIENT
Start: 2022-10-26 | End: 2023-01-19

## 2022-09-28 NOTE — TELEPHONE ENCOUNTER
Pt's 9/29 appt rescheduled due to provider being out. Next appt 12/7/2022. Pt states the only med he will need refill on prior to appt is Xanax. Pt request that provider send fill dated for 10/26 so that he has enough to get through until next appt.

## 2022-10-27 ENCOUNTER — OFFICE VISIT (OUTPATIENT)
Dept: ORTHOPEDICS | Facility: CLINIC | Age: 67
End: 2022-10-27
Payer: MEDICARE

## 2022-10-27 VITALS — HEIGHT: 68 IN | RESPIRATION RATE: 16 BRPM | BODY MASS INDEX: 29.55 KG/M2 | WEIGHT: 195 LBS

## 2022-10-27 DIAGNOSIS — M77.01 MEDIAL EPICONDYLITIS, RIGHT: Primary | ICD-10-CM

## 2022-10-27 PROCEDURE — 1159F MED LIST DOCD IN RCRD: CPT | Mod: CPTII,S$GLB,, | Performed by: ORTHOPAEDIC SURGERY

## 2022-10-27 PROCEDURE — 99999 PR PBB SHADOW E&M-EST. PATIENT-LVL III: ICD-10-PCS | Mod: PBBFAC,,, | Performed by: ORTHOPAEDIC SURGERY

## 2022-10-27 PROCEDURE — 1125F PR PAIN SEVERITY QUANTIFIED, PAIN PRESENT: ICD-10-PCS | Mod: CPTII,S$GLB,, | Performed by: ORTHOPAEDIC SURGERY

## 2022-10-27 PROCEDURE — 4010F PR ACE/ARB THEARPY RXD/TAKEN: ICD-10-PCS | Mod: CPTII,S$GLB,, | Performed by: ORTHOPAEDIC SURGERY

## 2022-10-27 PROCEDURE — 3288F PR FALLS RISK ASSESSMENT DOCUMENTED: ICD-10-PCS | Mod: CPTII,S$GLB,, | Performed by: ORTHOPAEDIC SURGERY

## 2022-10-27 PROCEDURE — 1159F PR MEDICATION LIST DOCUMENTED IN MEDICAL RECORD: ICD-10-PCS | Mod: CPTII,S$GLB,, | Performed by: ORTHOPAEDIC SURGERY

## 2022-10-27 PROCEDURE — 20551 TENDON ORIGIN: R ELBOW: ICD-10-PCS | Mod: RT,S$GLB,, | Performed by: ORTHOPAEDIC SURGERY

## 2022-10-27 PROCEDURE — 1101F PT FALLS ASSESS-DOCD LE1/YR: CPT | Mod: CPTII,S$GLB,, | Performed by: ORTHOPAEDIC SURGERY

## 2022-10-27 PROCEDURE — 1160F RVW MEDS BY RX/DR IN RCRD: CPT | Mod: CPTII,S$GLB,, | Performed by: ORTHOPAEDIC SURGERY

## 2022-10-27 PROCEDURE — 99213 OFFICE O/P EST LOW 20 MIN: CPT | Mod: 25,S$GLB,, | Performed by: ORTHOPAEDIC SURGERY

## 2022-10-27 PROCEDURE — 20551 NJX 1 TENDON ORIGIN/INSJ: CPT | Mod: RT,S$GLB,, | Performed by: ORTHOPAEDIC SURGERY

## 2022-10-27 PROCEDURE — 3288F FALL RISK ASSESSMENT DOCD: CPT | Mod: CPTII,S$GLB,, | Performed by: ORTHOPAEDIC SURGERY

## 2022-10-27 PROCEDURE — 99213 PR OFFICE/OUTPT VISIT, EST, LEVL III, 20-29 MIN: ICD-10-PCS | Mod: 25,S$GLB,, | Performed by: ORTHOPAEDIC SURGERY

## 2022-10-27 PROCEDURE — 4010F ACE/ARB THERAPY RXD/TAKEN: CPT | Mod: CPTII,S$GLB,, | Performed by: ORTHOPAEDIC SURGERY

## 2022-10-27 PROCEDURE — 1101F PR PT FALLS ASSESS DOC 0-1 FALLS W/OUT INJ PAST YR: ICD-10-PCS | Mod: CPTII,S$GLB,, | Performed by: ORTHOPAEDIC SURGERY

## 2022-10-27 PROCEDURE — 99999 PR PBB SHADOW E&M-EST. PATIENT-LVL III: CPT | Mod: PBBFAC,,, | Performed by: ORTHOPAEDIC SURGERY

## 2022-10-27 PROCEDURE — 1160F PR REVIEW ALL MEDS BY PRESCRIBER/CLIN PHARMACIST DOCUMENTED: ICD-10-PCS | Mod: CPTII,S$GLB,, | Performed by: ORTHOPAEDIC SURGERY

## 2022-10-27 PROCEDURE — 1125F AMNT PAIN NOTED PAIN PRSNT: CPT | Mod: CPTII,S$GLB,, | Performed by: ORTHOPAEDIC SURGERY

## 2022-10-27 RX ORDER — TRIAMCINOLONE ACETONIDE 40 MG/ML
40 INJECTION, SUSPENSION INTRA-ARTICULAR; INTRAMUSCULAR
Status: DISCONTINUED | OUTPATIENT
Start: 2022-10-27 | End: 2022-10-27 | Stop reason: HOSPADM

## 2022-10-27 RX ADMIN — TRIAMCINOLONE ACETONIDE 40 MG: 40 INJECTION, SUSPENSION INTRA-ARTICULAR; INTRAMUSCULAR at 02:10

## 2022-10-27 NOTE — PROGRESS NOTES
Past Medical History:   Diagnosis Date    Dermatitis, atopic     Essential hypertension 11/19/2013    GERD (gastroesophageal reflux disease)     Hypertension        Past Surgical History:   Procedure Laterality Date    matthew      caudal     HERNIA REPAIR      WISDOM TOOTH EXTRACTION         Current Outpatient Medications   Medication Sig    ALPRAZolam (XANAX) 0.25 MG tablet 1 tab oral twice a day as needed. ANXIETY    ciprofloxacin-dexamethasone 0.3-0.1% (CIPRODEX) 0.3-0.1 % DrpS Place 4 drops into both ears 2 (two) times daily. (Patient taking differently: Place 4 drops into both ears 2 (two) times daily. PRN)    HYDROcodone-acetaminophen (NORCO)  mg per tablet Take 1 tablet by mouth 3 (three) times daily as needed.    lidocaine-prilocaine (EMLA) cream     naproxen (NAPROSYN) 250 MG tablet Take 250 mg by mouth 2 (two) times daily.    olmesartan (BENICAR) 20 MG tablet TAKE ONE TABLET BY MOUTH ONCE A DAY    tadalafiL (CIALIS) 20 MG Tab Take 1 tablet (20 mg total) by mouth once daily.    tamsulosin (FLOMAX) 0.4 mg Cap TAKE TWO CAPSULES BY MOUTH ONCE A DAY    fluocinonide 0.05% (LIDEX) 0.05 % cream Apply topically 2 (two) times daily. Do not use longer than 2 weeks for 10 days    lansoprazole (PREVACID) 15 MG capsule Take 1 capsule (15 mg total) by mouth once daily.     No current facility-administered medications for this visit.       Review of patient's allergies indicates:  No Known Allergies    Family History   Problem Relation Age of Onset    Early death Brother     Arthritis Mother     Cancer Maternal Grandmother         abdominal    Cancer Maternal Grandfather 60        lung    Liver disease Paternal Grandfather        Social History     Socioeconomic History    Marital status:    Occupational History     Employer: [employer]   Tobacco Use    Smoking status: Former    Smokeless tobacco: Never   Substance and Sexual Activity    Alcohol use: Yes     Alcohol/week: 2.0 standard drinks     Types: 2  Glasses of wine per week    Drug use: No    Sexual activity: Yes     Partners: Female       Chief Complaint:   Chief Complaint   Patient presents with    Right Elbow - Pain       History of present illness:  This is a 67-year-old right-hand-dominant male seen for medial elbow pain.  Started several months ago.  Slowly getting worse.  Hurts when he plays tennis or when he swims.  Patient has tried taping and anti-inflammatories.  We injected him back in May.  Helped tremendously for about 4 months.  Pain is 6/10 but worse with certain movements      Review of Systems:    Constitution: Negative for chills, fever, and sweats.  Negative for unexplained weight loss.    HENT:  Negative for headaches and blurry vision.    Cardiovascular:Negative for chest pain or irregular heart beat. Negative for hypertension.    Respiratory:  Negative for cough and shortness of breath.    Gastrointestinal: Negative for abdominal pain, heartburn, melena, nausea, and vomitting.    Genitourinary:  Negative bladder incontinence and dysuria.    Musculoskeletal:  See HPI    Neurological: Negative for numbness.    Psychiatric/Behavioral: Negative for depression.  The patient is not nervous/anxious.      Endocrine: Negative for polyuria    Hematologic/Lymphatic: Negative for bleeding problem.  Does not bruise/bleed easily.    Skin: Negative for poor would healing and rash      Physical Examination:    Vital Signs:    Vitals:    10/27/22 1401   Resp: 16       Body mass index is 29.65 kg/m².    This a well-developed, well nourished patient in no acute distress.  They are alert and oriented and cooperative to examination.  Pt. walks without an antalgic gait.      Examination of the right elbow shows no signs of rashes or erythema. The patient has no masses, ecchymosis, or effusion. The patient has full range of motion from 0-160°. Patient has full pronation and supination. Patient is moderately tender along the medial epicondyle and nontender over  the lateral epicondyle. Nontender over the olecranon process.  Nontender along the course of the UCL. Patient has a negative valgus stress test and milking maneuver. Negative Tinel's sign over the cubital tunnel. 2+ radial pulse. Intact light touch sensation.     X-rays:  X-rays of the right elbow are available for review which show some spurring along the posterior olecranon and the lateral epicondyle     Assessment::  Right medial epicondylitis    Plan:  I reviewed the findings with him today.    I also agreed to do a cortisone injection.  Recommended physical therapy if it comes back again.    This note was created using [employer] voice recognition software that occasionally misinterpreted phrases or words.    Consult note is delivered via Epic messaging service.

## 2022-10-27 NOTE — PROCEDURES
Tendon Origin: R elbow    Date/Time: 10/27/2022 2:00 PM  Performed by: Hebert Bermudez MD  Authorized by: Hebert Bermudez MD     Consent Done?:  Yes (Verbal)  Timeout: prior to procedure the correct patient, procedure, and site was verified    Indications:  Pain  Site marked: the procedure site was marked    Timeout: prior to procedure the correct patient, procedure, and site was verified    Location:  Elbow  Site:  R elbow  Prep: patient was prepped and draped in usual sterile fashion    Ultrasonic Guidance for Needle Placement?: No    Needle size:  22 G  Approach:  Anteromedial  Medications:  40 mg triamcinolone acetonide 40 mg/mL  Patient tolerance:  Patient tolerated the procedure well with no immediate complications

## 2022-11-15 ENCOUNTER — TELEPHONE (OUTPATIENT)
Dept: FAMILY MEDICINE | Facility: CLINIC | Age: 67
End: 2022-11-15
Payer: MEDICARE

## 2022-12-08 ENCOUNTER — OFFICE VISIT (OUTPATIENT)
Dept: FAMILY MEDICINE | Facility: CLINIC | Age: 67
End: 2022-12-08
Payer: MEDICARE

## 2022-12-08 VITALS
WEIGHT: 202.38 LBS | HEIGHT: 68 IN | SYSTOLIC BLOOD PRESSURE: 136 MMHG | HEART RATE: 65 BPM | TEMPERATURE: 98 F | DIASTOLIC BLOOD PRESSURE: 76 MMHG | BODY MASS INDEX: 30.67 KG/M2 | OXYGEN SATURATION: 97 %

## 2022-12-08 DIAGNOSIS — D70.4 CYCLICAL NEUTROPENIA: ICD-10-CM

## 2022-12-08 DIAGNOSIS — Z12.11 COLON CANCER SCREENING: ICD-10-CM

## 2022-12-08 DIAGNOSIS — I10 ESSENTIAL HYPERTENSION: Primary | ICD-10-CM

## 2022-12-08 DIAGNOSIS — Z00.00 ROUTINE MEDICAL EXAM: ICD-10-CM

## 2022-12-08 DIAGNOSIS — M77.8 TENDINITIS OF ELBOW: ICD-10-CM

## 2022-12-08 DIAGNOSIS — F41.9 ANXIETY: Chronic | ICD-10-CM

## 2022-12-08 PROCEDURE — 3008F PR BODY MASS INDEX (BMI) DOCUMENTED: ICD-10-PCS | Mod: CPTII,S$GLB,, | Performed by: NURSE PRACTITIONER

## 2022-12-08 PROCEDURE — 4010F ACE/ARB THERAPY RXD/TAKEN: CPT | Mod: CPTII,S$GLB,, | Performed by: NURSE PRACTITIONER

## 2022-12-08 PROCEDURE — 99999 PR PBB SHADOW E&M-EST. PATIENT-LVL III: CPT | Mod: PBBFAC,,, | Performed by: NURSE PRACTITIONER

## 2022-12-08 PROCEDURE — 99214 OFFICE O/P EST MOD 30 MIN: CPT | Mod: S$GLB,,, | Performed by: NURSE PRACTITIONER

## 2022-12-08 PROCEDURE — 1101F PT FALLS ASSESS-DOCD LE1/YR: CPT | Mod: CPTII,S$GLB,, | Performed by: NURSE PRACTITIONER

## 2022-12-08 PROCEDURE — 1159F MED LIST DOCD IN RCRD: CPT | Mod: CPTII,S$GLB,, | Performed by: NURSE PRACTITIONER

## 2022-12-08 PROCEDURE — 3078F DIAST BP <80 MM HG: CPT | Mod: CPTII,S$GLB,, | Performed by: NURSE PRACTITIONER

## 2022-12-08 PROCEDURE — 3075F SYST BP GE 130 - 139MM HG: CPT | Mod: CPTII,S$GLB,, | Performed by: NURSE PRACTITIONER

## 2022-12-08 PROCEDURE — 99999 PR PBB SHADOW E&M-EST. PATIENT-LVL III: ICD-10-PCS | Mod: PBBFAC,,, | Performed by: NURSE PRACTITIONER

## 2022-12-08 PROCEDURE — 99214 PR OFFICE/OUTPT VISIT, EST, LEVL IV, 30-39 MIN: ICD-10-PCS | Mod: S$GLB,,, | Performed by: NURSE PRACTITIONER

## 2022-12-08 PROCEDURE — 3288F PR FALLS RISK ASSESSMENT DOCUMENTED: ICD-10-PCS | Mod: CPTII,S$GLB,, | Performed by: NURSE PRACTITIONER

## 2022-12-08 PROCEDURE — 3075F PR MOST RECENT SYSTOLIC BLOOD PRESS GE 130-139MM HG: ICD-10-PCS | Mod: CPTII,S$GLB,, | Performed by: NURSE PRACTITIONER

## 2022-12-08 PROCEDURE — 1126F PR PAIN SEVERITY QUANTIFIED, NO PAIN PRESENT: ICD-10-PCS | Mod: CPTII,S$GLB,, | Performed by: NURSE PRACTITIONER

## 2022-12-08 PROCEDURE — 1126F AMNT PAIN NOTED NONE PRSNT: CPT | Mod: CPTII,S$GLB,, | Performed by: NURSE PRACTITIONER

## 2022-12-08 PROCEDURE — 4010F PR ACE/ARB THEARPY RXD/TAKEN: ICD-10-PCS | Mod: CPTII,S$GLB,, | Performed by: NURSE PRACTITIONER

## 2022-12-08 PROCEDURE — 3078F PR MOST RECENT DIASTOLIC BLOOD PRESSURE < 80 MM HG: ICD-10-PCS | Mod: CPTII,S$GLB,, | Performed by: NURSE PRACTITIONER

## 2022-12-08 PROCEDURE — 1159F PR MEDICATION LIST DOCUMENTED IN MEDICAL RECORD: ICD-10-PCS | Mod: CPTII,S$GLB,, | Performed by: NURSE PRACTITIONER

## 2022-12-08 PROCEDURE — 3008F BODY MASS INDEX DOCD: CPT | Mod: CPTII,S$GLB,, | Performed by: NURSE PRACTITIONER

## 2022-12-08 PROCEDURE — 3288F FALL RISK ASSESSMENT DOCD: CPT | Mod: CPTII,S$GLB,, | Performed by: NURSE PRACTITIONER

## 2022-12-08 PROCEDURE — 1101F PR PT FALLS ASSESS DOC 0-1 FALLS W/OUT INJ PAST YR: ICD-10-PCS | Mod: CPTII,S$GLB,, | Performed by: NURSE PRACTITIONER

## 2022-12-08 NOTE — PROGRESS NOTES
This dictation has been generated using Modal Fluency Dictation some phonetic errors may occur. Please contact author for clarification if needed.     Problem List Items Addressed This Visit       Anxiety (Chronic)    Essential hypertension - Primary    Cyclical neutropenia     Other Visit Diagnoses       Routine medical exam        Tendinitis of elbow                     Anxiety stable. He will be due for anxiety med refill in January.  Alprazolam 0.25 mg tolerated last refill September.    Cyclic neutropenia follows with Hematology   Tendinitis of right elbow follow-up with orthopedics.I had Recommended ice and PT. Patient deferred PT.     No follow-ups on file.    ________________________________________________________________  ________________________________________________________________      Chief Complaint   Patient presents with    Follow-up     History of present illness  This 67 y.o. presents today for complaint of discussion of anxiety medication and right elbow.  Chronic medical disease cyclic neutropenia and hypertension.  Patient currently has the alprazolam.  His last refill was in September.  He uses the medicine intermittently and very sparingly.  Does not need refill at this time but expects to need refill at the end of January.  We should be able to accommodate that request and that would be our plan.  Patient notes that the alprazolam is helpful with the anxiety symptoms especially since increasing pain in right elbow.    Right elbow pain previously diagnosed with tendinitis he saw Dr. Quiros who had given him a shot.  Patient notes that that was helpful for a period of time.  Reviewing orthopedics noticed see that the next step is physical therapy.  Offer that but he would like to deferred due to current work needed.            Past Medical History:   Diagnosis Date    Dermatitis, atopic     Essential hypertension 11/19/2013    GERD (gastroesophageal reflux disease)     Hypertension         Past Surgical History:   Procedure Laterality Date    matthew      caudal     HERNIA REPAIR      WISDOM TOOTH EXTRACTION         Family History   Problem Relation Age of Onset    Early death Brother     Arthritis Mother     Cancer Maternal Grandmother         abdominal    Cancer Maternal Grandfather 60        lung    Liver disease Paternal Grandfather        Social History     Socioeconomic History    Marital status:    Occupational History     Employer: [employer]   Tobacco Use    Smoking status: Former    Smokeless tobacco: Never   Substance and Sexual Activity    Alcohol use: Yes     Alcohol/week: 2.0 standard drinks     Types: 2 Glasses of wine per week    Drug use: No    Sexual activity: Yes     Partners: Female       Current Outpatient Medications   Medication Sig Dispense Refill    ALPRAZolam (XANAX) 0.25 MG tablet 1 tab oral twice a day as needed. ANXIETY 60 tablet 2    fluocinonide 0.05% (LIDEX) 0.05 % cream Apply topically 2 (two) times daily. Do not use longer than 2 weeks for 10 days 60 g 0    HYDROcodone-acetaminophen (NORCO)  mg per tablet Take 1 tablet by mouth 3 (three) times daily as needed.      lansoprazole (PREVACID) 15 MG capsule Take 1 capsule (15 mg total) by mouth once daily. 30 capsule 11    lidocaine-prilocaine (EMLA) cream       naproxen (NAPROSYN) 250 MG tablet Take 250 mg by mouth 2 (two) times daily.      olmesartan (BENICAR) 20 MG tablet TAKE ONE TABLET BY MOUTH ONCE A DAY 90 tablet 11    tadalafiL (CIALIS) 20 MG Tab Take 1 tablet (20 mg total) by mouth once daily. 100 tablet 0    tamsulosin (FLOMAX) 0.4 mg Cap TAKE TWO CAPSULES BY MOUTH ONCE A  capsule 2     No current facility-administered medications for this visit.       Review of patient's allergies indicates:  No Known Allergies    Physical examination  Vitals Reviewed\  Vitals:    12/08/22 0735   BP: 136/76   Pulse: 65   Temp: 98 °F (36.7 °C)     Body mass index is 30.77 kg/m².   Weight: 91.8 kg (202  lb 6.1 oz)    Gen. Well-dressed well-nourished   Skin warm dry and intact.  No rashes noted.  Neck is supple without adenopathy  Chest.  Respirations are even unlabored.    Neuro. Awake alert oriented x4.  Normal judgment and cognition noted.  Extremities no clubbing cyanosis or edema noted.     Call or return to clinic prn if these symptoms worsen or fail to improve as anticipated.  In excessive of 30 minutes total time spent for evaluation and management services. Time included elements of the following: time spent preparing to see patient, obtaining and reviewing separately obtained history, exam, evaluation, counseling and education of patient/family member or care giver, documenting in the HMR, independently interpreting results and communication of results, coordination of care ordering medications, tests, or procedures, referral and communication to other health care professionals.

## 2022-12-09 ENCOUNTER — LAB VISIT (OUTPATIENT)
Dept: LAB | Facility: HOSPITAL | Age: 67
End: 2022-12-09
Payer: MEDICARE

## 2022-12-09 DIAGNOSIS — Z12.11 COLON CANCER SCREENING: ICD-10-CM

## 2022-12-09 PROCEDURE — 82274 ASSAY TEST FOR BLOOD FECAL: CPT | Performed by: NURSE PRACTITIONER

## 2022-12-15 LAB — HEMOCCULT STL QL IA: NEGATIVE

## 2023-02-06 ENCOUNTER — TELEPHONE (OUTPATIENT)
Dept: FAMILY MEDICINE | Facility: CLINIC | Age: 68
End: 2023-02-06
Payer: MEDICARE

## 2023-02-06 NOTE — TELEPHONE ENCOUNTER
Returned patients call in regards to billing concerns. No answer , left voicemail to return call.

## 2023-02-06 NOTE — TELEPHONE ENCOUNTER
----- Message from Gail Garcia sent at 2/6/2023  3:42 PM CST -----  Contact: Patient  Type: Patient Call Back         Who called: Patient         What is the request in detail: calling in regards a bill he had received from his insurance; requesting a call back from office for advise; please advise        Best call back number: [phone]         Additional Information: patient is verbally aggressive; DOS 12/6/22           Thank You

## 2023-03-24 ENCOUNTER — OFFICE VISIT (OUTPATIENT)
Dept: FAMILY MEDICINE | Facility: CLINIC | Age: 68
End: 2023-03-24
Payer: MEDICARE

## 2023-03-24 ENCOUNTER — LAB VISIT (OUTPATIENT)
Dept: LAB | Facility: HOSPITAL | Age: 68
End: 2023-03-24
Attending: FAMILY MEDICINE
Payer: MEDICARE

## 2023-03-24 VITALS
BODY MASS INDEX: 29.16 KG/M2 | HEIGHT: 70 IN | OXYGEN SATURATION: 95 % | HEART RATE: 81 BPM | WEIGHT: 203.69 LBS | TEMPERATURE: 98 F | DIASTOLIC BLOOD PRESSURE: 80 MMHG | RESPIRATION RATE: 16 BRPM | SYSTOLIC BLOOD PRESSURE: 134 MMHG

## 2023-03-24 DIAGNOSIS — H26.9 CATARACT, UNSPECIFIED CATARACT TYPE, UNSPECIFIED LATERALITY: Primary | ICD-10-CM

## 2023-03-24 DIAGNOSIS — F41.1 GAD (GENERALIZED ANXIETY DISORDER): ICD-10-CM

## 2023-03-24 DIAGNOSIS — Z91.89 AT INCREASED RISK FOR CARDIOVASCULAR DISEASE: ICD-10-CM

## 2023-03-24 DIAGNOSIS — H40.001 GLAUCOMA SUSPECT OF RIGHT EYE: ICD-10-CM

## 2023-03-24 DIAGNOSIS — N40.1 BENIGN PROSTATIC HYPERPLASIA WITH LOWER URINARY TRACT SYMPTOMS, SYMPTOM DETAILS UNSPECIFIED: ICD-10-CM

## 2023-03-24 DIAGNOSIS — I10 ESSENTIAL HYPERTENSION: ICD-10-CM

## 2023-03-24 DIAGNOSIS — L25.5 DERMATITIS DUE TO PLANTS: ICD-10-CM

## 2023-03-24 PROCEDURE — 99213 OFFICE O/P EST LOW 20 MIN: CPT | Mod: HCNC,S$GLB,, | Performed by: FAMILY MEDICINE

## 2023-03-24 PROCEDURE — 3008F BODY MASS INDEX DOCD: CPT | Mod: HCNC,CPTII,S$GLB, | Performed by: FAMILY MEDICINE

## 2023-03-24 PROCEDURE — 85025 COMPLETE CBC W/AUTO DIFF WBC: CPT | Mod: HCNC | Performed by: FAMILY MEDICINE

## 2023-03-24 PROCEDURE — 99999 PR PBB SHADOW E&M-EST. PATIENT-LVL IV: ICD-10-PCS | Mod: PBBFAC,HCNC,, | Performed by: FAMILY MEDICINE

## 2023-03-24 PROCEDURE — 4010F PR ACE/ARB THEARPY RXD/TAKEN: ICD-10-PCS | Mod: HCNC,CPTII,S$GLB, | Performed by: FAMILY MEDICINE

## 2023-03-24 PROCEDURE — 3079F PR MOST RECENT DIASTOLIC BLOOD PRESSURE 80-89 MM HG: ICD-10-PCS | Mod: HCNC,CPTII,S$GLB, | Performed by: FAMILY MEDICINE

## 2023-03-24 PROCEDURE — 1160F PR REVIEW ALL MEDS BY PRESCRIBER/CLIN PHARMACIST DOCUMENTED: ICD-10-PCS | Mod: HCNC,CPTII,S$GLB, | Performed by: FAMILY MEDICINE

## 2023-03-24 PROCEDURE — 1101F PR PT FALLS ASSESS DOC 0-1 FALLS W/OUT INJ PAST YR: ICD-10-PCS | Mod: HCNC,CPTII,S$GLB, | Performed by: FAMILY MEDICINE

## 2023-03-24 PROCEDURE — 99999 PR PBB SHADOW E&M-EST. PATIENT-LVL IV: CPT | Mod: PBBFAC,HCNC,, | Performed by: FAMILY MEDICINE

## 2023-03-24 PROCEDURE — 1125F PR PAIN SEVERITY QUANTIFIED, PAIN PRESENT: ICD-10-PCS | Mod: HCNC,CPTII,S$GLB, | Performed by: FAMILY MEDICINE

## 2023-03-24 PROCEDURE — 1159F PR MEDICATION LIST DOCUMENTED IN MEDICAL RECORD: ICD-10-PCS | Mod: HCNC,CPTII,S$GLB, | Performed by: FAMILY MEDICINE

## 2023-03-24 PROCEDURE — 1101F PT FALLS ASSESS-DOCD LE1/YR: CPT | Mod: HCNC,CPTII,S$GLB, | Performed by: FAMILY MEDICINE

## 2023-03-24 PROCEDURE — 1125F AMNT PAIN NOTED PAIN PRSNT: CPT | Mod: HCNC,CPTII,S$GLB, | Performed by: FAMILY MEDICINE

## 2023-03-24 PROCEDURE — 80061 LIPID PANEL: CPT | Mod: HCNC | Performed by: FAMILY MEDICINE

## 2023-03-24 PROCEDURE — 3075F SYST BP GE 130 - 139MM HG: CPT | Mod: HCNC,CPTII,S$GLB, | Performed by: FAMILY MEDICINE

## 2023-03-24 PROCEDURE — 3288F PR FALLS RISK ASSESSMENT DOCUMENTED: ICD-10-PCS | Mod: HCNC,CPTII,S$GLB, | Performed by: FAMILY MEDICINE

## 2023-03-24 PROCEDURE — 80053 COMPREHEN METABOLIC PANEL: CPT | Mod: HCNC | Performed by: FAMILY MEDICINE

## 2023-03-24 PROCEDURE — 3075F PR MOST RECENT SYSTOLIC BLOOD PRESS GE 130-139MM HG: ICD-10-PCS | Mod: HCNC,CPTII,S$GLB, | Performed by: FAMILY MEDICINE

## 2023-03-24 PROCEDURE — 3008F PR BODY MASS INDEX (BMI) DOCUMENTED: ICD-10-PCS | Mod: HCNC,CPTII,S$GLB, | Performed by: FAMILY MEDICINE

## 2023-03-24 PROCEDURE — 1159F MED LIST DOCD IN RCRD: CPT | Mod: HCNC,CPTII,S$GLB, | Performed by: FAMILY MEDICINE

## 2023-03-24 PROCEDURE — 3288F FALL RISK ASSESSMENT DOCD: CPT | Mod: HCNC,CPTII,S$GLB, | Performed by: FAMILY MEDICINE

## 2023-03-24 PROCEDURE — 3079F DIAST BP 80-89 MM HG: CPT | Mod: HCNC,CPTII,S$GLB, | Performed by: FAMILY MEDICINE

## 2023-03-24 PROCEDURE — 4010F ACE/ARB THERAPY RXD/TAKEN: CPT | Mod: HCNC,CPTII,S$GLB, | Performed by: FAMILY MEDICINE

## 2023-03-24 PROCEDURE — 99213 PR OFFICE/OUTPT VISIT, EST, LEVL III, 20-29 MIN: ICD-10-PCS | Mod: HCNC,S$GLB,, | Performed by: FAMILY MEDICINE

## 2023-03-24 PROCEDURE — 1160F RVW MEDS BY RX/DR IN RCRD: CPT | Mod: HCNC,CPTII,S$GLB, | Performed by: FAMILY MEDICINE

## 2023-03-24 PROCEDURE — 36415 COLL VENOUS BLD VENIPUNCTURE: CPT | Mod: HCNC,PO | Performed by: FAMILY MEDICINE

## 2023-03-24 RX ORDER — TAMSULOSIN HYDROCHLORIDE 0.4 MG/1
CAPSULE ORAL
Qty: 180 CAPSULE | Refills: 4 | Status: SHIPPED | OUTPATIENT
Start: 2023-03-24 | End: 2024-02-14 | Stop reason: SDUPTHER

## 2023-03-24 RX ORDER — FLUOCINONIDE 0.5 MG/G
CREAM TOPICAL 2 TIMES DAILY
Qty: 60 G | Refills: 1 | Status: SHIPPED | OUTPATIENT
Start: 2023-03-24 | End: 2023-04-03

## 2023-03-24 RX ORDER — TADALAFIL 20 MG/1
20 TABLET ORAL DAILY
Qty: 100 TABLET | Refills: 0 | Status: SHIPPED | OUTPATIENT
Start: 2023-03-24

## 2023-03-24 RX ORDER — ALPRAZOLAM 0.25 MG/1
TABLET ORAL
Qty: 60 TABLET | Refills: 3 | Status: SHIPPED | OUTPATIENT
Start: 2023-03-24 | End: 2023-08-16

## 2023-03-25 LAB
ALBUMIN SERPL BCP-MCNC: 4.2 G/DL (ref 3.5–5.2)
ALP SERPL-CCNC: 75 U/L (ref 55–135)
ALT SERPL W/O P-5'-P-CCNC: 18 U/L (ref 10–44)
ANION GAP SERPL CALC-SCNC: 9 MMOL/L (ref 8–16)
AST SERPL-CCNC: 25 U/L (ref 10–40)
BASOPHILS # BLD AUTO: 0.05 K/UL (ref 0–0.2)
BASOPHILS NFR BLD: 0.9 % (ref 0–1.9)
BILIRUB SERPL-MCNC: 0.6 MG/DL (ref 0.1–1)
BUN SERPL-MCNC: 17 MG/DL (ref 8–23)
CALCIUM SERPL-MCNC: 10.2 MG/DL (ref 8.7–10.5)
CHLORIDE SERPL-SCNC: 104 MMOL/L (ref 95–110)
CHOLEST SERPL-MCNC: 217 MG/DL (ref 120–199)
CHOLEST/HDLC SERPL: 3.3 {RATIO} (ref 2–5)
CO2 SERPL-SCNC: 27 MMOL/L (ref 23–29)
CREAT SERPL-MCNC: 0.9 MG/DL (ref 0.5–1.4)
DIFFERENTIAL METHOD: ABNORMAL
EOSINOPHIL # BLD AUTO: 0.1 K/UL (ref 0–0.5)
EOSINOPHIL NFR BLD: 1.7 % (ref 0–8)
ERYTHROCYTE [DISTWIDTH] IN BLOOD BY AUTOMATED COUNT: 13 % (ref 11.5–14.5)
EST. GFR  (NO RACE VARIABLE): >60 ML/MIN/1.73 M^2
GLUCOSE SERPL-MCNC: 91 MG/DL (ref 70–110)
HCT VFR BLD AUTO: 43 % (ref 40–54)
HDLC SERPL-MCNC: 66 MG/DL (ref 40–75)
HDLC SERPL: 30.4 % (ref 20–50)
HGB BLD-MCNC: 14.1 G/DL (ref 14–18)
IMM GRANULOCYTES # BLD AUTO: 0.01 K/UL (ref 0–0.04)
IMM GRANULOCYTES NFR BLD AUTO: 0.2 % (ref 0–0.5)
LDLC SERPL CALC-MCNC: 135.8 MG/DL (ref 63–159)
LYMPHOCYTES # BLD AUTO: 1 K/UL (ref 1–4.8)
LYMPHOCYTES NFR BLD: 17.6 % (ref 18–48)
MCH RBC QN AUTO: 33.3 PG (ref 27–31)
MCHC RBC AUTO-ENTMCNC: 32.8 G/DL (ref 32–36)
MCV RBC AUTO: 102 FL (ref 82–98)
MONOCYTES # BLD AUTO: 0.6 K/UL (ref 0.3–1)
MONOCYTES NFR BLD: 10.3 % (ref 4–15)
NEUTROPHILS # BLD AUTO: 3.8 K/UL (ref 1.8–7.7)
NEUTROPHILS NFR BLD: 69.3 % (ref 38–73)
NONHDLC SERPL-MCNC: 151 MG/DL
NRBC BLD-RTO: 0 /100 WBC
PLATELET # BLD AUTO: 266 K/UL (ref 150–450)
PMV BLD AUTO: 9.6 FL (ref 9.2–12.9)
POTASSIUM SERPL-SCNC: 4.1 MMOL/L (ref 3.5–5.1)
PROT SERPL-MCNC: 7.3 G/DL (ref 6–8.4)
RBC # BLD AUTO: 4.23 M/UL (ref 4.6–6.2)
SODIUM SERPL-SCNC: 140 MMOL/L (ref 136–145)
TRIGL SERPL-MCNC: 76 MG/DL (ref 30–150)
WBC # BLD AUTO: 5.45 K/UL (ref 3.9–12.7)

## 2023-03-30 ENCOUNTER — TELEPHONE (OUTPATIENT)
Dept: FAMILY MEDICINE | Facility: CLINIC | Age: 68
End: 2023-03-30
Payer: MEDICARE

## 2023-04-08 NOTE — PROGRESS NOTES
[facility]     Subjective:    Chief Complaint:   Chief Complaint   Patient presents with    Follow-up       History of Present Illness:  67 y.o. male presents for multiple issues.   Encounter Diagnoses   Name Primary?    Benign prostatic hyperplasia with lower urinary tract symptoms, symptom details unspecified     TORRIE (generalized anxiety disorder)     Essential hypertension     Cataract, unspecified cataract type, unspecified laterality Yes    Glaucoma suspect of right eye     At increased risk for cardiovascular disease     Dermatitis due to plants      He complains of urinary hesitancy, weak stream, double and incomplete voiding, intermittent urinary frequency and nocturia times 2. AUA Bother Score is 5  Anxiety better w/ Medications.  HT.Patient denies any exertional chest pain, dyspnea, palpitations, syncope, orthopnea, edema or paroxysmal nocturnal dyspnea.  .        I reviewed the patients chart dating back for the past few appointments. See above.    The following portions of the patient's history were reviewed and updated as appropriate: allergies, current medications, past family history, past medical history, past social history, past surgical history and problem list.    He denies chest pain upon exertion, dyspnea, nausea, vomiting, diaphoresis, and syncope. No pleuritic chest pain, unilateral leg swelling, calf tenderness, or calf pain.      Past Medical History:   Diagnosis Date    Dermatitis, atopic     Essential hypertension 11/19/2013    GERD (gastroesophageal reflux disease)     Hypertension        Past Surgical History:   Procedure Laterality Date    matthew      caudal     HERNIA REPAIR      WISDOM TOOTH EXTRACTION         Social History  Social History     Tobacco Use    Smoking status: Former    Smokeless tobacco: Never   Substance Use Topics    Alcohol use: Yes     Alcohol/week: 2.0 standard drinks     Types: 2 Glasses of wine per week    Drug use: No       Family History   Problem  "Relation Age of Onset    Early death Brother     Arthritis Mother     Cancer Maternal Grandmother         abdominal    Cancer Maternal Grandfather 60        lung    Liver disease Paternal Grandfather      Review of patient's allergies indicates:  No Known Allergies    Review of Systems [Negative unless checked off]    General ROS: []fever, []chills, []weight loss, []malaise/fatigue.  ENT ROS: []congestion, []rhinorrhea,  []sore throat, []neck pain, []hearing loss.  Ophthalmic ROS:[]blurry vision, [] double vision, []photophobia, []eye pain.  Respiratory ROS: []cough, []pleuritic chest pain, []shortness of breath, []wheezing.  CVS ROS:[]chest pain, []dyspnea on exertion, []palpitations, []orthopnea, []leg swelling, []PND.   GI ROS: []nausea, []vomiting, [] epigastric pain, []abd pain, []diarrhea, []constipation, []blood/melena in stool.   Urology ROS:[]dysuria, [x]frequency, []flank pain,[] trouble voiding, [] hematuria.   MSK ROS: []myalgias, []joint pain, []muscular weakness,  []back pain, [] falls.   Derm ROS: []pruritis, []rash, []jaundice.  Neurological:[]dizziness,[]numbness,[]loss of consciousness, []weakness  []headaches.   Psych ROS: []hallucinations, []depression, [x]anxiety, []suicidal ideation.    Physical Examination  /80 (BP Location: Right arm, Patient Position: Sitting, BP Method: Large (Manual))   Pulse 81   Temp 97.9 °F (36.6 °C) (Oral)   Resp 16   Ht 5' 10" (1.778 m)   Wt 92.4 kg (203 lb 11.3 oz)   SpO2 95%   BMI 29.23 kg/m²   Wt Readings from Last 3 Encounters:   03/24/23 92.4 kg (203 lb 11.3 oz)   12/08/22 91.8 kg (202 lb 6.1 oz)   10/27/22 88.5 kg (195 lb)     BP Readings from Last 3 Encounters:   03/24/23 134/80   12/08/22 136/76   07/13/22 (!) 161/118     Estimated body mass index is 29.23 kg/m² as calculated from the following:    Height as of this encounter: 5' 10" (1.778 m).    Weight as of this encounter: 92.4 kg (203 lb 11.3 oz).     General appearance: alert, cooperative, no " distress  Eyes: pupils equal and reactive, extraocular eye movements intact   Ears: bilateral TM's and external ear canals normal   Nose: normal and patent, no erythema, discharge or polyps   Sinuses: Normal paranasal sinuses without tenderness   Throat: mucous membranes moist, pharynx normal without lesions   Neck: no thyromegaly, trachea midline  Lungs: clear to auscultation, no wheezes, rales or rhonchi, symmetric air entry, no dullness to percussion bilaterally.  Heart: normal rate, regular rhythm, normal S1, S2, no murmurs, rubs, clicks or gallops, no displacement of the PMI.  Abdomen: soft, nontender, nondistended, no masses or organomegaly No rigidity, rebound, or guarding.   Back: full range of motion, no tenderness, palpable spasm or pain on motion   Extremities: peripheral pulses normal, no pedal edema, no clubbing or cyanosis   Feet: warm, good capillary refill.  Neurological:alert, oriented, normal speech, no focal findings or movement disorder noted   Psychiatric: alert, oriented to person, place, and time  Integument: normal coloration and turgor, no rashes, no suspicious skin lesions noted.    Data reviewed    Previous medical records reviewed and summarized above in HPI.    274}    Laboratory    I have reviewed old labs below:   274}  Lab Results   Component Value Date    WBC 5.45 03/24/2023    HGB 14.1 03/24/2023    HCT 43.0 03/24/2023     (H) 03/24/2023     03/24/2023     03/24/2023    K 4.1 03/24/2023     03/24/2023    CALCIUM 10.2 03/24/2023    PHOS 3.9 07/07/2017    CO2 27 03/24/2023    GLU 91 03/24/2023    BUN 17 03/24/2023    CREATININE 0.9 03/24/2023    ANIONGAP 9 03/24/2023    ESTGFRAFRICA >60.0 03/22/2022    EGFRNONAA >60.0 03/22/2022    PROT 7.3 03/24/2023    ALBUMIN 4.2 03/24/2023    BILITOT 0.6 03/24/2023    ALKPHOS 75 03/24/2023    ALT 18 03/24/2023    AST 25 03/24/2023    INR 1.0 06/05/2017    CHOL 217 (H) 03/24/2023    TRIG 76 03/24/2023    HDL 66 03/24/2023     LDLCALC 135.8 03/24/2023    PSA 0.98 11/17/2020       Imaging/Tracing: I have reviewed the pertinent results/findings and my personal findings are below:  274}    Assessment/Plan     274}    Humberto Cantor is a 67 y.o. male who presents to clinic with:    1. Cataract, unspecified cataract type, unspecified laterality    2. Benign prostatic hyperplasia with lower urinary tract symptoms, symptom details unspecified    3. TORRIE (generalized anxiety disorder)    4. Essential hypertension    5. Glaucoma suspect of right eye    6. At increased risk for cardiovascular disease    7. Dermatitis due to plants         Diagnostic impression remarks       1. Benign prostatic hyperplasia with lower urinary tract symptoms, symptom details unspecified  - tamsulosin (FLOMAX) 0.4 mg Cap; TAKE TWO CAPSULES BY MOUTH ONCE A DAY  Dispense: 180 capsule; Refill: 4    2. TORRIE (generalized anxiety disorder)  - ALPRAZolam (XANAX) 0.25 MG tablet; TAKE ONE TABLET BY MOUTH TWO TIMES A DAY AS NEEDED FOR ANXIETY  Dispense: 60 tablet; Refill: 3    3. Essential hypertension  - Lipid Panel; Future  - Comprehensive Metabolic Panel; Future  - CBC Auto Differential; Future  - tadalafiL (CIALIS) 20 MG Tab; Take 1 tablet (20 mg total) by mouth once daily.  Dispense: 100 tablet; Refill: 0    4. Cataract, unspecified cataract type, unspecified laterality    5. Glaucoma suspect of right eye  - Ambulatory referral/consult to Ophthalmology; Future    6. At increased risk for cardiovascular disease    7. Dermatitis due to plants  - fluocinonide 0.05% (LIDEX) 0.05 % cream; Apply topically 2 (two) times daily. Do not use longer than 2 weeks for 10 days  Dispense: 60 g; Refill: 1      Medication Monitoring: In today's visit, monitoring for drug toxicity was accomplished. Proper use of medications was discussed.     Counseling: Counseling included discussion regarding imaging findings, diagnosis, possibilities, treatment options, medications, risks, and benefits. He  had many questions regarding the options and long-term effects. All questions were answered. He expressed understanding after counseling regarding the diagnosis and recommendations. He was capable and demonstrated competence with understanding of these options. Shared decision making was performed resulting in him choosing the current treatment plan.     He was counseled about the importance of healthy dietary habits as well as routine physical activity and exercise for better health outcomes. I also discussed the importance of cancer screening.     I also discussed the importance of close follow up to discuss labs, change or modify his medications if needed, monitor side effects, and further evaluation of medical problems.     Additional workup planned: see labs ordered below.    See below for labs and meds ordered with associated diagnosis      Medication List with Changes/Refills   Current Medications    HYDROCODONE-ACETAMINOPHEN (NORCO)  MG PER TABLET    Take 1 tablet by mouth 3 (three) times daily as needed.    LANSOPRAZOLE (PREVACID) 15 MG CAPSULE    Take 1 capsule (15 mg total) by mouth once daily.    LIDOCAINE-PRILOCAINE (EMLA) CREAM        NAPROXEN (NAPROSYN) 250 MG TABLET    Take 250 mg by mouth 2 (two) times daily.    OLMESARTAN (BENICAR) 20 MG TABLET    TAKE ONE TABLET BY MOUTH ONCE A DAY   Changed and/or Refilled Medications    Modified Medication Previous Medication    ALPRAZOLAM (XANAX) 0.25 MG TABLET ALPRAZolam (XANAX) 0.25 MG tablet       TAKE ONE TABLET BY MOUTH TWO TIMES A DAY AS NEEDED FOR ANXIETY    TAKE ONE TABLET BY MOUTH TWO TIMES A DAY AS NEEDED FOR ANXIETY    FLUOCINONIDE 0.05% (LIDEX) 0.05 % CREAM fluocinonide 0.05% (LIDEX) 0.05 % cream       Apply topically 2 (two) times daily. Do not use longer than 2 weeks for 10 days    Apply topically 2 (two) times daily. Do not use longer than 2 weeks for 10 days    TADALAFIL (CIALIS) 20 MG TAB tadalafiL (CIALIS) 20 MG Tab       Take 1 tablet  "(20 mg total) by mouth once daily.    Take 1 tablet (20 mg total) by mouth once daily.    TAMSULOSIN (FLOMAX) 0.4 MG CAP tamsulosin (FLOMAX) 0.4 mg Cap       TAKE TWO CAPSULES BY MOUTH ONCE A DAY    TAKE TWO CAPSULES BY MOUTH ONCE A DAY     Modified Medications    Modified Medication Previous Medication    ALPRAZOLAM (XANAX) 0.25 MG TABLET ALPRAZolam (XANAX) 0.25 MG tablet       TAKE ONE TABLET BY MOUTH TWO TIMES A DAY AS NEEDED FOR ANXIETY    TAKE ONE TABLET BY MOUTH TWO TIMES A DAY AS NEEDED FOR ANXIETY    FLUOCINONIDE 0.05% (LIDEX) 0.05 % CREAM fluocinonide 0.05% (LIDEX) 0.05 % cream       Apply topically 2 (two) times daily. Do not use longer than 2 weeks for 10 days    Apply topically 2 (two) times daily. Do not use longer than 2 weeks for 10 days    TADALAFIL (CIALIS) 20 MG TAB tadalafiL (CIALIS) 20 MG Tab       Take 1 tablet (20 mg total) by mouth once daily.    Take 1 tablet (20 mg total) by mouth once daily.    TAMSULOSIN (FLOMAX) 0.4 MG CAP tamsulosin (FLOMAX) 0.4 mg Cap       TAKE TWO CAPSULES BY MOUTH ONCE A DAY    TAKE TWO CAPSULES BY MOUTH ONCE A DAY       Follow up in about 6 months (around 9/24/2023) for labs before next visit. for further workup and reassessment if labs and tests obtained are stable or sooner as needed. He was instructed to call the clinic or go to the emergency department if his symptoms do not improve, worsens, or if new symptoms develop. Patient knows to call any time if an emergency arises. Shared decision making occurred and he verbalized understanding in agreement with this plan.     Documentation entered by me for this encounter may have been done in part using speech-recognition technology. Although I have made an effort to ensure accuracy, "sound like" errors may exist and should be interpreted in context.       Saud Sosa MD     Discussed health maintenance guidelines appropriate for age.    "

## 2023-04-11 ENCOUNTER — TELEPHONE (OUTPATIENT)
Dept: OPHTHALMOLOGY | Facility: CLINIC | Age: 68
End: 2023-04-11
Payer: MEDICARE

## 2023-04-11 NOTE — TELEPHONE ENCOUNTER
----- Message from Roselia Rossi, Patient Care Assistant sent at 4/11/2023  4:20 PM CDT -----  Contact: self  Type:  Patient Returning Call    Who Called:  self  Who Left Message for Patient:  Marilia  Does the patient know what this is regarding?:  Yes he would like the sooner appt  Best Call Back Number:  [phone]  Additional Information:  thanks, please call back to advise.

## 2023-04-25 ENCOUNTER — OFFICE VISIT (OUTPATIENT)
Dept: OPHTHALMOLOGY | Facility: CLINIC | Age: 68
End: 2023-04-25
Payer: MEDICARE

## 2023-04-25 DIAGNOSIS — H43.813 POSTERIOR VITREOUS DETACHMENT OF BOTH EYES: Primary | ICD-10-CM

## 2023-04-25 DIAGNOSIS — H25.13 NUCLEAR SCLEROTIC CATARACT, BILATERAL: ICD-10-CM

## 2023-04-25 PROCEDURE — 1160F RVW MEDS BY RX/DR IN RCRD: CPT | Mod: HCNC,CPTII,S$GLB, | Performed by: OPHTHALMOLOGY

## 2023-04-25 PROCEDURE — 3288F PR FALLS RISK ASSESSMENT DOCUMENTED: ICD-10-PCS | Mod: HCNC,CPTII,S$GLB, | Performed by: OPHTHALMOLOGY

## 2023-04-25 PROCEDURE — 4010F ACE/ARB THERAPY RXD/TAKEN: CPT | Mod: HCNC,CPTII,S$GLB, | Performed by: OPHTHALMOLOGY

## 2023-04-25 PROCEDURE — 99204 PR OFFICE/OUTPT VISIT, NEW, LEVL IV, 45-59 MIN: ICD-10-PCS | Mod: HCNC,S$GLB,, | Performed by: OPHTHALMOLOGY

## 2023-04-25 PROCEDURE — 1126F PR PAIN SEVERITY QUANTIFIED, NO PAIN PRESENT: ICD-10-PCS | Mod: HCNC,CPTII,S$GLB, | Performed by: OPHTHALMOLOGY

## 2023-04-25 PROCEDURE — 1160F PR REVIEW ALL MEDS BY PRESCRIBER/CLIN PHARMACIST DOCUMENTED: ICD-10-PCS | Mod: HCNC,CPTII,S$GLB, | Performed by: OPHTHALMOLOGY

## 2023-04-25 PROCEDURE — 1101F PR PT FALLS ASSESS DOC 0-1 FALLS W/OUT INJ PAST YR: ICD-10-PCS | Mod: HCNC,CPTII,S$GLB, | Performed by: OPHTHALMOLOGY

## 2023-04-25 PROCEDURE — 99999 PR PBB SHADOW E&M-EST. PATIENT-LVL III: ICD-10-PCS | Mod: PBBFAC,HCNC,, | Performed by: OPHTHALMOLOGY

## 2023-04-25 PROCEDURE — 1159F PR MEDICATION LIST DOCUMENTED IN MEDICAL RECORD: ICD-10-PCS | Mod: HCNC,CPTII,S$GLB, | Performed by: OPHTHALMOLOGY

## 2023-04-25 PROCEDURE — 3288F FALL RISK ASSESSMENT DOCD: CPT | Mod: HCNC,CPTII,S$GLB, | Performed by: OPHTHALMOLOGY

## 2023-04-25 PROCEDURE — 1126F AMNT PAIN NOTED NONE PRSNT: CPT | Mod: HCNC,CPTII,S$GLB, | Performed by: OPHTHALMOLOGY

## 2023-04-25 PROCEDURE — 4010F PR ACE/ARB THEARPY RXD/TAKEN: ICD-10-PCS | Mod: HCNC,CPTII,S$GLB, | Performed by: OPHTHALMOLOGY

## 2023-04-25 PROCEDURE — 99999 PR PBB SHADOW E&M-EST. PATIENT-LVL III: CPT | Mod: PBBFAC,HCNC,, | Performed by: OPHTHALMOLOGY

## 2023-04-25 PROCEDURE — 99204 OFFICE O/P NEW MOD 45 MIN: CPT | Mod: HCNC,S$GLB,, | Performed by: OPHTHALMOLOGY

## 2023-04-25 PROCEDURE — 1159F MED LIST DOCD IN RCRD: CPT | Mod: HCNC,CPTII,S$GLB, | Performed by: OPHTHALMOLOGY

## 2023-04-25 PROCEDURE — 1101F PT FALLS ASSESS-DOCD LE1/YR: CPT | Mod: HCNC,CPTII,S$GLB, | Performed by: OPHTHALMOLOGY

## 2023-04-25 NOTE — PROGRESS NOTES
HPI    Pt presents for cat eval OU     Complains of trouble at distance and near vision OU.   Floaters OU, but no flashes of light.    Opcon A PRN OU     No ocular sx hx     Last edited by Carolina Mercado on 4/25/2023  1:28 PM.            Assessment /Plan     For exam results, see Encounter Report.    Posterior vitreous detachment of both eyes  -     Ambulatory referral/consult to Ophthalmology    Nuclear sclerotic cataract, bilateral      1. Posterior vitreous detachment of both eyes  Rd precautions reviewed    2. Nuclear sclerotic cataract, bilateral  Mild   New glasses RX today    F/u 1 year, routine exam

## 2023-04-25 NOTE — PATIENT INSTRUCTIONS
What are floaters?     Floaters look like small specks, dots, circles, lines or cobwebs in your field of vision. While they seem to be in front of your eye, they are floating inside. Floaters are tiny clumps of gel or cells inside the vitreous that fills your eye. What you see are the shadows these clumps cast on your retina.    You usually notice floaters when looking  at something plain, like a blank wall or a blue lisha.    As we age, our vitreous starts to thicken or shrink. Sometimes clumps or strands form in the vitreous. If the vitreous pulls away from the back of the eye, it is called posterior vitreous detachment. Floaters usually happen with posterior vitreous detachment. They are not serious, and they tend to fade or go away over time. Severe floaters can be removed by surgery, but this has risks and is seldom necessary.    You are more likely to get floaters if you:    are nearsighted (you need glasses to see far away)    have had surgery for cataracts    have had inflammation (swelling) inside the eye      What are flashes?     Flashes can look like flashing lights or lightning streaks in your field of vision. Some people compare them to seeing stars after being hit on the head. You might see flashes on and off for weeks, or even months. Flashes happen when the vitreous rubs or pulls on your retina.    As people age, it is common to see flashes occasionally.       When floaters and flashes are serious     Most floaters and flashes are not a problem. However, there are times when they can be signs of a serious condition. Here is when you should call an ophthalmologist right away:    you notice a lot of new floaters    you have a lot of flashes    a shadow appears in your peripheral (side) vision    a gray curtain covers part of  your vision    These floaters and flashes could be symptoms of a torn or detached retina. This is when the retina pulls away from the back of your eye. This is a serious  condition that needs to be treated.    Summary    Floaters are dark specks or dots in your field of vision. They are shadows you see from clumps of vitreous gel in your eye. Flashes are flashes of light that look like lightning streaks in your field of vision. Flashes occur when the vitreous gel rubs or pulls on your retina.    Floaters and flashes are quite common as people age. However, they can be signs of a retinal detachment, which is a serious problem. If you suddenly have a lot of floaters and see flashes, and you notice changes in your vision, call your ophthalmologist right away.

## 2023-06-21 ENCOUNTER — PES CALL (OUTPATIENT)
Dept: ADMINISTRATIVE | Facility: CLINIC | Age: 68
End: 2023-06-21
Payer: MEDICARE

## 2023-08-15 DIAGNOSIS — F41.1 GAD (GENERALIZED ANXIETY DISORDER): ICD-10-CM

## 2023-08-15 NOTE — TELEPHONE ENCOUNTER
No care due was identified.  [facility] Embedded Care Due Messages. Reference number: [id number].   8/15/2023 9:16:15 AM CDT

## 2023-08-16 RX ORDER — ALPRAZOLAM 0.25 MG/1
TABLET ORAL
Qty: 60 TABLET | Refills: 3 | Status: SHIPPED | OUTPATIENT
Start: 2023-08-16 | End: 2023-12-14

## 2023-09-28 DIAGNOSIS — I10 ESSENTIAL HYPERTENSION: ICD-10-CM

## 2023-09-28 RX ORDER — OLMESARTAN MEDOXOMIL 20 MG/1
TABLET ORAL
Qty: 90 TABLET | Refills: 1 | Status: SHIPPED | OUTPATIENT
Start: 2023-09-28 | End: 2024-02-14 | Stop reason: SDUPTHER

## 2023-09-28 NOTE — TELEPHONE ENCOUNTER
Refill Decision Note   Humberto Cantor  is requesting a refill authorization.  Brief Assessment and Rationale for Refill:  Approve     Medication Therapy Plan:         Comments:     Note composed:2:03 PM 09/28/2023

## 2023-09-28 NOTE — TELEPHONE ENCOUNTER
No care due was identified.  [facility] Embedded Care Due Messages. Reference number: [id number].   9/28/2023 9:03:41 AM CDT

## 2023-10-05 ENCOUNTER — TELEPHONE (OUTPATIENT)
Dept: FAMILY MEDICINE | Facility: CLINIC | Age: 68
End: 2023-10-05
Payer: MEDICARE

## 2023-10-05 NOTE — TELEPHONE ENCOUNTER
Provider will be out appointment tomorrow with Ian have to be reschedule. Can be with teri Sosa or with NP.

## 2023-10-16 ENCOUNTER — OFFICE VISIT (OUTPATIENT)
Dept: FAMILY MEDICINE | Facility: CLINIC | Age: 68
End: 2023-10-16
Payer: MEDICARE

## 2023-10-16 VITALS
OXYGEN SATURATION: 99 % | WEIGHT: 206.56 LBS | HEIGHT: 70 IN | SYSTOLIC BLOOD PRESSURE: 130 MMHG | BODY MASS INDEX: 29.57 KG/M2 | TEMPERATURE: 98 F | HEART RATE: 71 BPM | DIASTOLIC BLOOD PRESSURE: 72 MMHG

## 2023-10-16 DIAGNOSIS — E66.3 OVERWEIGHT (BMI 25.0-29.9): ICD-10-CM

## 2023-10-16 DIAGNOSIS — N40.1 BENIGN PROSTATIC HYPERPLASIA WITH LOWER URINARY TRACT SYMPTOMS, SYMPTOM DETAILS UNSPECIFIED: ICD-10-CM

## 2023-10-16 DIAGNOSIS — K21.9 GASTROESOPHAGEAL REFLUX DISEASE, UNSPECIFIED WHETHER ESOPHAGITIS PRESENT: ICD-10-CM

## 2023-10-16 DIAGNOSIS — F41.1 GAD (GENERALIZED ANXIETY DISORDER): Primary | ICD-10-CM

## 2023-10-16 DIAGNOSIS — I10 ESSENTIAL HYPERTENSION: ICD-10-CM

## 2023-10-16 DIAGNOSIS — L25.5 DERMATITIS DUE TO PLANTS: ICD-10-CM

## 2023-10-16 DIAGNOSIS — Z12.5 ENCOUNTER FOR SCREENING FOR MALIGNANT NEOPLASM OF PROSTATE: ICD-10-CM

## 2023-10-16 PROCEDURE — 1160F RVW MEDS BY RX/DR IN RCRD: CPT | Mod: HCNC,CPTII,S$GLB, | Performed by: NURSE PRACTITIONER

## 2023-10-16 PROCEDURE — 4010F ACE/ARB THERAPY RXD/TAKEN: CPT | Mod: HCNC,CPTII,S$GLB, | Performed by: NURSE PRACTITIONER

## 2023-10-16 PROCEDURE — 99214 OFFICE O/P EST MOD 30 MIN: CPT | Mod: HCNC,S$GLB,, | Performed by: NURSE PRACTITIONER

## 2023-10-16 PROCEDURE — 3075F SYST BP GE 130 - 139MM HG: CPT | Mod: HCNC,CPTII,S$GLB, | Performed by: NURSE PRACTITIONER

## 2023-10-16 PROCEDURE — 3008F PR BODY MASS INDEX (BMI) DOCUMENTED: ICD-10-PCS | Mod: HCNC,CPTII,S$GLB, | Performed by: NURSE PRACTITIONER

## 2023-10-16 PROCEDURE — 3288F PR FALLS RISK ASSESSMENT DOCUMENTED: ICD-10-PCS | Mod: HCNC,CPTII,S$GLB, | Performed by: NURSE PRACTITIONER

## 2023-10-16 PROCEDURE — 1101F PR PT FALLS ASSESS DOC 0-1 FALLS W/OUT INJ PAST YR: ICD-10-PCS | Mod: HCNC,CPTII,S$GLB, | Performed by: NURSE PRACTITIONER

## 2023-10-16 PROCEDURE — 1126F AMNT PAIN NOTED NONE PRSNT: CPT | Mod: HCNC,CPTII,S$GLB, | Performed by: NURSE PRACTITIONER

## 2023-10-16 PROCEDURE — 4010F PR ACE/ARB THEARPY RXD/TAKEN: ICD-10-PCS | Mod: HCNC,CPTII,S$GLB, | Performed by: NURSE PRACTITIONER

## 2023-10-16 PROCEDURE — 3008F BODY MASS INDEX DOCD: CPT | Mod: HCNC,CPTII,S$GLB, | Performed by: NURSE PRACTITIONER

## 2023-10-16 PROCEDURE — 1160F PR REVIEW ALL MEDS BY PRESCRIBER/CLIN PHARMACIST DOCUMENTED: ICD-10-PCS | Mod: HCNC,CPTII,S$GLB, | Performed by: NURSE PRACTITIONER

## 2023-10-16 PROCEDURE — 3288F FALL RISK ASSESSMENT DOCD: CPT | Mod: HCNC,CPTII,S$GLB, | Performed by: NURSE PRACTITIONER

## 2023-10-16 PROCEDURE — 1101F PT FALLS ASSESS-DOCD LE1/YR: CPT | Mod: HCNC,CPTII,S$GLB, | Performed by: NURSE PRACTITIONER

## 2023-10-16 PROCEDURE — 1159F PR MEDICATION LIST DOCUMENTED IN MEDICAL RECORD: ICD-10-PCS | Mod: HCNC,CPTII,S$GLB, | Performed by: NURSE PRACTITIONER

## 2023-10-16 PROCEDURE — 3078F DIAST BP <80 MM HG: CPT | Mod: HCNC,CPTII,S$GLB, | Performed by: NURSE PRACTITIONER

## 2023-10-16 PROCEDURE — 99999 PR PBB SHADOW E&M-EST. PATIENT-LVL IV: ICD-10-PCS | Mod: PBBFAC,HCNC,, | Performed by: NURSE PRACTITIONER

## 2023-10-16 PROCEDURE — 1126F PR PAIN SEVERITY QUANTIFIED, NO PAIN PRESENT: ICD-10-PCS | Mod: HCNC,CPTII,S$GLB, | Performed by: NURSE PRACTITIONER

## 2023-10-16 PROCEDURE — 3078F PR MOST RECENT DIASTOLIC BLOOD PRESSURE < 80 MM HG: ICD-10-PCS | Mod: HCNC,CPTII,S$GLB, | Performed by: NURSE PRACTITIONER

## 2023-10-16 PROCEDURE — 3075F PR MOST RECENT SYSTOLIC BLOOD PRESS GE 130-139MM HG: ICD-10-PCS | Mod: HCNC,CPTII,S$GLB, | Performed by: NURSE PRACTITIONER

## 2023-10-16 PROCEDURE — 99999 PR PBB SHADOW E&M-EST. PATIENT-LVL IV: CPT | Mod: PBBFAC,HCNC,, | Performed by: NURSE PRACTITIONER

## 2023-10-16 PROCEDURE — 99214 PR OFFICE/OUTPT VISIT, EST, LEVL IV, 30-39 MIN: ICD-10-PCS | Mod: HCNC,S$GLB,, | Performed by: NURSE PRACTITIONER

## 2023-10-16 PROCEDURE — 1159F MED LIST DOCD IN RCRD: CPT | Mod: HCNC,CPTII,S$GLB, | Performed by: NURSE PRACTITIONER

## 2023-10-16 NOTE — PATIENT INSTRUCTIONS
Duncan Paul,     If you are due for any health screening(s) below please notify me so we can arrange them to be ordered and scheduled to maintain your health. Most healthy patients complete it. Don't lose out on improving your health.     All of your core healthy metrics are met.

## 2023-10-16 NOTE — PROGRESS NOTES
Subjective:       Patient ID: Humberto Cantor is a 68 y.o. male.    Chief Complaint: Follow-up    HPI    Patient presents today for follow up visit, he is new to me. Last visit with PCP- on 3/24/23. Due for alprazolam    9/13/23 Pain Medicine--lumbar radiculopathy  4/25/23 : posterior vitreous setachment of both eyes, bilateral nuclear sclerotic  Past Medical History:   Diagnosis Date    Dermatitis, atopic     Essential hypertension 11/19/2013    GERD (gastroesophageal reflux disease)     Hypertension        Review of patient's allergies indicates:  No Known Allergies      Current Outpatient Medications:     ALPRAZolam (XANAX) 0.25 MG tablet, TAKE ONE TABLET BY MOUTH TWO TIMES A DAY AS NEEDED FOR ANXIETY, Disp: 60 tablet, Rfl: 3    HYDROcodone-acetaminophen (NORCO)  mg per tablet, Take 1 tablet by mouth 3 (three) times daily as needed., Disp: , Rfl:     lidocaine-prilocaine (EMLA) cream, , Disp: , Rfl:     naproxen (NAPROSYN) 250 MG tablet, Take 250 mg by mouth 2 (two) times daily., Disp: , Rfl:     olmesartan (BENICAR) 20 MG tablet, TAKE ONE TABLET BY MOUTH ONCE A DAY, Disp: 90 tablet, Rfl: 1    tadalafiL (CIALIS) 20 MG Tab, Take 1 tablet (20 mg total) by mouth once daily., Disp: 100 tablet, Rfl: 0    tamsulosin (FLOMAX) 0.4 mg Cap, TAKE TWO CAPSULES BY MOUTH ONCE A DAY, Disp: 180 capsule, Rfl: 4    fluocinonide 0.05% (LIDEX) 0.05 % cream, Apply topically 2 (two) times daily. Do not use longer than 2 weeks for 10 days, Disp: 60 g, Rfl: 1    lansoprazole (PREVACID) 15 MG capsule, Take 1 capsule (15 mg total) by mouth once daily., Disp: 30 capsule, Rfl: 11    Review of Systems   Constitutional:  Negative for unexpected weight change.   HENT:  Negative for trouble swallowing.    Eyes:  Negative for visual disturbance.   Respiratory:  Negative for shortness of breath.    Cardiovascular:  Negative for chest pain, palpitations and leg swelling.   Gastrointestinal:  Negative for blood in  "stool.   Genitourinary:  Negative for hematuria.   Skin:  Negative for rash.   Allergic/Immunologic: Negative for immunocompromised state.   Neurological:  Negative for headaches.   Hematological:  Does not bruise/bleed easily.   Psychiatric/Behavioral:  Negative for agitation. The patient is not nervous/anxious.        Objective:      /72 (BP Location: Right arm, Patient Position: Sitting, BP Method: Small (Manual))   Pulse 71   Temp 97.7 °F (36.5 °C) (Oral)   Ht 5' 10" (1.778 m)   Wt 93.7 kg (206 lb 9.1 oz)   SpO2 99%   BMI 29.64 kg/m²   Physical Exam  Constitutional:       Appearance: He is well-developed.   Eyes:      Conjunctiva/sclera: Conjunctivae normal.      Pupils: Pupils are equal, round, and reactive to light.   Cardiovascular:      Rate and Rhythm: Normal rate and regular rhythm.      Heart sounds: Normal heart sounds.   Pulmonary:      Effort: Pulmonary effort is normal.   Musculoskeletal:         General: Normal range of motion.   Neurological:      Mental Status: He is alert and oriented to person, place, and time.   Psychiatric:         Behavior: Behavior normal.         Thought Content: Thought content normal.         Judgment: Judgment normal.         Assessment:       1. TORRIE (generalized anxiety disorder)    2. Essential hypertension    3. Benign prostatic hyperplasia with lower urinary tract symptoms, symptom details unspecified    4. Dermatitis due to plants    5. Gastroesophageal reflux disease, unspecified whether esophagitis present    6. Encounter for screening for malignant neoplasm of prostate    7. Overweight (BMI 25.0-29.9)        Plan:       TORRIE (generalized anxiety disorder)  Patient request alprazolam refill.  checked-no inappropriate activity found.  Will send request to   Essential hypertension  -     COMPREHENSIVE METABOLIC PANEL; Future; Expected date: 10/16/2023  -     CBC W/ AUTO DIFFERENTIAL; Future; Expected date: 10/16/2023  -     Lipid Panel; Future; " "Expected date: 10/16/2023  -     Hemoglobin A1C; Future; Expected date: 10/16/2023  Stable, continue medication  Low sodium diet  BP Readings from Last 3 Encounters:   10/16/23 130/72   03/24/23 134/80   12/08/22 136/76      Dermatitis due to plants  Stable, continue cream  Gastroesophageal reflux disease, unspecified whether esophagitis present  Stable, continue medication  Encounter for screening for malignant neoplasm of prostate  -     PSA, SCREENING; Future; Expected date: 10/16/2023    Overweight (BMI 25.0-29.9)  Counseled patient on his ideal body weight, health consequences of being obese and current recommendations including weekly exercise and a heart healthy diet.  Current BMI is:Estimated body mass index is 29.64 kg/m² as calculated from the following:    Height as of this encounter: 5' 10" (1.778 m).    Weight as of this encounter: 93.7 kg (206 lb 9.1 oz)..  Patient is aware that ideal BMI < 25 or Weight in (lb) to have BMI = 25: 173.9.           Patient readiness: acceptance and barriers:none    During the course of the visit the patient was educated and counseled about the following:     Hypertension:   Dietary sodium restriction.  Regular aerobic exercise.    Goals: Hypertension: Reduce Blood Pressure    Did patient meet goals/outcomes: Yes    The following self management tools provided: declined    Patient Instructions (the written plan) was given to the patient/family.     Time spent with patient: 30 minutes    Barriers to medications present (no )    Adverse reactions to current medications (no)    Over the counter medications reviewed (Yes)          "

## 2023-12-13 ENCOUNTER — TELEPHONE (OUTPATIENT)
Dept: PRIMARY CARE CLINIC | Facility: CLINIC | Age: 68
End: 2023-12-13
Payer: MEDICARE

## 2023-12-13 DIAGNOSIS — F41.1 GAD (GENERALIZED ANXIETY DISORDER): ICD-10-CM

## 2023-12-13 NOTE — TELEPHONE ENCOUNTER
Patient requesting refill of Alprazolam.  Preferred pharmacy is [facility].   LR--8-16-23  LOV--10-16-23  FOV--2-8-24    Will defer  check to Dr. Sosa for this encounter.

## 2023-12-13 NOTE — TELEPHONE ENCOUNTER
No care due was identified.  [facility] Embedded Care Due Messages. Reference number: [id number].   12/13/2023 10:08:13 AM CST

## 2023-12-13 NOTE — TELEPHONE ENCOUNTER
Amanda Rivas Staff     ----- Message from Amanda Rivas sent at 12/13/2023  3:44 PM CST -----  Contact: self  Type:  Needs Medical Advice    Who Called: self  Symptoms (please be specific): pt needs a refill on ALPRAZolam (XANAX) 0.25 MG tablet     Pharmacy name and phone #:    [facility] - [place], [place] - [address] [facility] [address] [id number] [facility] [address]  [place] [address]  Phone: [phone] Fax: [phone]      Would the patient rather a call back or a response via [facility]? call  Best Call Back Number: [phone] (home)     Additional Information: please advise and thank you.

## 2023-12-14 RX ORDER — ALPRAZOLAM 0.25 MG/1
TABLET ORAL
Qty: 60 TABLET | Refills: 3 | Status: SHIPPED | OUTPATIENT
Start: 2023-12-14 | End: 2024-02-14 | Stop reason: SDUPTHER

## 2024-01-09 ENCOUNTER — TELEPHONE (OUTPATIENT)
Dept: PRIMARY CARE CLINIC | Facility: CLINIC | Age: 69
End: 2024-01-09
Payer: MEDICARE

## 2024-01-30 ENCOUNTER — LAB VISIT (OUTPATIENT)
Dept: LAB | Facility: HOSPITAL | Age: 69
End: 2024-01-30
Attending: NURSE PRACTITIONER
Payer: MEDICARE

## 2024-01-30 DIAGNOSIS — Z12.5 ENCOUNTER FOR SCREENING FOR MALIGNANT NEOPLASM OF PROSTATE: ICD-10-CM

## 2024-01-30 DIAGNOSIS — I10 ESSENTIAL HYPERTENSION: ICD-10-CM

## 2024-01-30 DIAGNOSIS — N40.1 BENIGN PROSTATIC HYPERPLASIA WITH LOWER URINARY TRACT SYMPTOMS, SYMPTOM DETAILS UNSPECIFIED: ICD-10-CM

## 2024-01-30 LAB
ALBUMIN SERPL BCP-MCNC: 4 G/DL (ref 3.5–5.2)
ALP SERPL-CCNC: 57 U/L (ref 55–135)
ALT SERPL W/O P-5'-P-CCNC: 45 U/L (ref 10–44)
ANION GAP SERPL CALC-SCNC: 5 MMOL/L (ref 8–16)
AST SERPL-CCNC: 39 U/L (ref 10–40)
BASOPHILS # BLD AUTO: 0.04 K/UL (ref 0–0.2)
BASOPHILS NFR BLD: 1.1 % (ref 0–1.9)
BILIRUB SERPL-MCNC: 0.7 MG/DL (ref 0.1–1)
BUN SERPL-MCNC: 22 MG/DL (ref 8–23)
CALCIUM SERPL-MCNC: 10.4 MG/DL (ref 8.7–10.5)
CHLORIDE SERPL-SCNC: 105 MMOL/L (ref 95–110)
CHOLEST SERPL-MCNC: 208 MG/DL (ref 120–199)
CHOLEST/HDLC SERPL: 2.9 {RATIO} (ref 2–5)
CO2 SERPL-SCNC: 30 MMOL/L (ref 23–29)
COMPLEXED PSA SERPL-MCNC: 1.1 NG/ML (ref 0–4)
CREAT SERPL-MCNC: 0.8 MG/DL (ref 0.5–1.4)
DIFFERENTIAL METHOD BLD: ABNORMAL
EOSINOPHIL # BLD AUTO: 0.2 K/UL (ref 0–0.5)
EOSINOPHIL NFR BLD: 6.3 % (ref 0–8)
ERYTHROCYTE [DISTWIDTH] IN BLOOD BY AUTOMATED COUNT: 13.5 % (ref 11.5–14.5)
EST. GFR  (NO RACE VARIABLE): >60 ML/MIN/1.73 M^2
ESTIMATED AVG GLUCOSE: 105 MG/DL (ref 68–131)
GLUCOSE SERPL-MCNC: 107 MG/DL (ref 70–110)
HBA1C MFR BLD: 5.3 % (ref 4–5.6)
HCT VFR BLD AUTO: 44 % (ref 40–54)
HDLC SERPL-MCNC: 71 MG/DL (ref 40–75)
HDLC SERPL: 34.1 % (ref 20–50)
HGB BLD-MCNC: 14.2 G/DL (ref 14–18)
IMM GRANULOCYTES # BLD AUTO: 0.01 K/UL (ref 0–0.04)
IMM GRANULOCYTES NFR BLD AUTO: 0.3 % (ref 0–0.5)
LDLC SERPL CALC-MCNC: 116 MG/DL (ref 63–159)
LYMPHOCYTES # BLD AUTO: 1 K/UL (ref 1–4.8)
LYMPHOCYTES NFR BLD: 26.2 % (ref 18–48)
MCH RBC QN AUTO: 34 PG (ref 27–31)
MCHC RBC AUTO-ENTMCNC: 32.3 G/DL (ref 32–36)
MCV RBC AUTO: 105 FL (ref 82–98)
MONOCYTES # BLD AUTO: 0.5 K/UL (ref 0.3–1)
MONOCYTES NFR BLD: 13.6 % (ref 4–15)
NEUTROPHILS # BLD AUTO: 1.9 K/UL (ref 1.8–7.7)
NEUTROPHILS NFR BLD: 52.5 % (ref 38–73)
NONHDLC SERPL-MCNC: 137 MG/DL
NRBC BLD-RTO: 0 /100 WBC
PLATELET # BLD AUTO: 268 K/UL (ref 150–450)
PMV BLD AUTO: 9.8 FL (ref 9.2–12.9)
POTASSIUM SERPL-SCNC: 4.4 MMOL/L (ref 3.5–5.1)
PROT SERPL-MCNC: 6.9 G/DL (ref 6–8.4)
RBC # BLD AUTO: 4.18 M/UL (ref 4.6–6.2)
SODIUM SERPL-SCNC: 140 MMOL/L (ref 136–145)
TRIGL SERPL-MCNC: 105 MG/DL (ref 30–150)
WBC # BLD AUTO: 3.67 K/UL (ref 3.9–12.7)

## 2024-01-30 PROCEDURE — 80053 COMPREHEN METABOLIC PANEL: CPT | Mod: HCNC | Performed by: NURSE PRACTITIONER

## 2024-01-30 PROCEDURE — 36415 COLL VENOUS BLD VENIPUNCTURE: CPT | Mod: HCNC,PO | Performed by: NURSE PRACTITIONER

## 2024-01-30 PROCEDURE — 83036 HEMOGLOBIN GLYCOSYLATED A1C: CPT | Mod: HCNC | Performed by: NURSE PRACTITIONER

## 2024-01-30 PROCEDURE — 80061 LIPID PANEL: CPT | Mod: HCNC | Performed by: NURSE PRACTITIONER

## 2024-01-30 PROCEDURE — 84153 ASSAY OF PSA TOTAL: CPT | Mod: HCNC | Performed by: NURSE PRACTITIONER

## 2024-01-30 PROCEDURE — 85025 COMPLETE CBC W/AUTO DIFF WBC: CPT | Mod: HCNC | Performed by: NURSE PRACTITIONER

## 2024-02-01 ENCOUNTER — TELEPHONE (OUTPATIENT)
Dept: PRIMARY CARE CLINIC | Facility: CLINIC | Age: 69
End: 2024-02-01
Payer: MEDICARE

## 2024-02-01 NOTE — TELEPHONE ENCOUNTER
----- Message from Basil Najera NP sent at 1/31/2024  4:22 PM CST -----  Please let patient know that all labs are stable. The PSA level has gradual increased but is still in normal range-Tell patient to continue all medications and call with any questions.

## 2024-02-14 ENCOUNTER — OFFICE VISIT (OUTPATIENT)
Dept: PRIMARY CARE CLINIC | Facility: CLINIC | Age: 69
End: 2024-02-14
Payer: MEDICARE

## 2024-02-14 VITALS
HEART RATE: 98 BPM | DIASTOLIC BLOOD PRESSURE: 80 MMHG | WEIGHT: 202.19 LBS | BODY MASS INDEX: 28.95 KG/M2 | OXYGEN SATURATION: 96 % | TEMPERATURE: 98 F | SYSTOLIC BLOOD PRESSURE: 134 MMHG | HEIGHT: 70 IN

## 2024-02-14 DIAGNOSIS — D70.4 CYCLICAL NEUTROPENIA: Primary | ICD-10-CM

## 2024-02-14 DIAGNOSIS — H02.402 PTOSIS OF LEFT EYELID: ICD-10-CM

## 2024-02-14 DIAGNOSIS — I10 ESSENTIAL HYPERTENSION: ICD-10-CM

## 2024-02-14 DIAGNOSIS — F41.1 GAD (GENERALIZED ANXIETY DISORDER): ICD-10-CM

## 2024-02-14 DIAGNOSIS — K21.9 GASTROESOPHAGEAL REFLUX DISEASE WITHOUT ESOPHAGITIS: ICD-10-CM

## 2024-02-14 DIAGNOSIS — N40.1 BENIGN PROSTATIC HYPERPLASIA WITH LOWER URINARY TRACT SYMPTOMS, SYMPTOM DETAILS UNSPECIFIED: ICD-10-CM

## 2024-02-14 PROCEDURE — 3008F BODY MASS INDEX DOCD: CPT | Mod: HCNC,CPTII,S$GLB, | Performed by: FAMILY MEDICINE

## 2024-02-14 PROCEDURE — 1126F AMNT PAIN NOTED NONE PRSNT: CPT | Mod: HCNC,CPTII,S$GLB, | Performed by: FAMILY MEDICINE

## 2024-02-14 PROCEDURE — 4010F ACE/ARB THERAPY RXD/TAKEN: CPT | Mod: HCNC,CPTII,S$GLB, | Performed by: FAMILY MEDICINE

## 2024-02-14 PROCEDURE — 99214 OFFICE O/P EST MOD 30 MIN: CPT | Mod: HCNC,S$GLB,, | Performed by: FAMILY MEDICINE

## 2024-02-14 PROCEDURE — 3288F FALL RISK ASSESSMENT DOCD: CPT | Mod: HCNC,CPTII,S$GLB, | Performed by: FAMILY MEDICINE

## 2024-02-14 PROCEDURE — 3075F SYST BP GE 130 - 139MM HG: CPT | Mod: HCNC,CPTII,S$GLB, | Performed by: FAMILY MEDICINE

## 2024-02-14 PROCEDURE — 3044F HG A1C LEVEL LT 7.0%: CPT | Mod: HCNC,CPTII,S$GLB, | Performed by: FAMILY MEDICINE

## 2024-02-14 PROCEDURE — 1159F MED LIST DOCD IN RCRD: CPT | Mod: HCNC,CPTII,S$GLB, | Performed by: FAMILY MEDICINE

## 2024-02-14 PROCEDURE — 99999 PR PBB SHADOW E&M-EST. PATIENT-LVL III: CPT | Mod: PBBFAC,HCNC,, | Performed by: FAMILY MEDICINE

## 2024-02-14 PROCEDURE — 1101F PT FALLS ASSESS-DOCD LE1/YR: CPT | Mod: HCNC,CPTII,S$GLB, | Performed by: FAMILY MEDICINE

## 2024-02-14 PROCEDURE — 3079F DIAST BP 80-89 MM HG: CPT | Mod: HCNC,CPTII,S$GLB, | Performed by: FAMILY MEDICINE

## 2024-02-14 RX ORDER — OLMESARTAN MEDOXOMIL 20 MG/1
20 TABLET ORAL DAILY
Qty: 90 TABLET | Refills: 2 | Status: SHIPPED | OUTPATIENT
Start: 2024-02-14

## 2024-02-14 RX ORDER — TAMSULOSIN HYDROCHLORIDE 0.4 MG/1
CAPSULE ORAL
Qty: 180 CAPSULE | Refills: 4 | Status: SHIPPED | OUTPATIENT
Start: 2024-02-14

## 2024-02-14 RX ORDER — ALPRAZOLAM 0.25 MG/1
0.25 TABLET ORAL 2 TIMES DAILY PRN
Qty: 60 TABLET | Refills: 3 | Status: SHIPPED | OUTPATIENT
Start: 2024-04-04

## 2024-02-14 RX ORDER — CALC/MAG/B COMPLEX/D3/HERB 61
30 TABLET ORAL DAILY
Qty: 30 CAPSULE | Refills: 11
Start: 2024-02-14 | End: 2025-02-13

## 2024-02-14 NOTE — PROGRESS NOTES
[facility]     Subjective:    Chief Complaint:   Chief Complaint   Patient presents with    Annual Exam     Patients gets anxiety.       History of Present Illness:  68 y.o. male presents for multiple issues.   Encounter Diagnoses   Name Primary?    Cyclical neutropenia     Benign prostatic hyperplasia with lower urinary tract symptoms, symptom details unspecified     Essential hypertension     Gastroesophageal reflux disease without esophagitis     TORRIE (generalized anxiety disorder)      He complains of urinary hesitancy, weak stream, double and incomplete voiding, intermittent urinary frequency and nocturia times 2. AUA Bother Score is 5  Anxiety better w/ Medications.  HT.Patient denies any exertional chest pain, dyspnea, palpitations, syncope, orthopnea, edema or paroxysmal nocturnal dyspnea.  .        I reviewed the patients chart dating back for the past few appointments. See above.    The following portions of the patient's history were reviewed and updated as appropriate: allergies, current medications, past family history, past medical history, past social history, past surgical history and problem list.    He denies chest pain upon exertion, dyspnea, nausea, vomiting, diaphoresis, and syncope. No pleuritic chest pain, unilateral leg swelling, calf tenderness, or calf pain.      Past Medical History:   Diagnosis Date    Dermatitis, atopic     Essential hypertension 11/19/2013    GERD (gastroesophageal reflux disease)     Hypertension        Past Surgical History:   Procedure Laterality Date    matthew      caudal     HERNIA REPAIR      WISDOM TOOTH EXTRACTION         Social History  Social History     Tobacco Use    Smoking status: Former    Smokeless tobacco: Never   Substance Use Topics    Alcohol use: Yes     Alcohol/week: 2.0 standard drinks of alcohol     Types: 2 Glasses of wine per week    Drug use: No       Family History   Problem Relation Age of Onset    Early death Brother     Arthritis  "Mother     Cancer Maternal Grandmother         abdominal    Cancer Maternal Grandfather 60        lung    Liver disease Paternal Grandfather      Review of patient's allergies indicates:  No Known Allergies    Review of Systems [Negative unless checked off]    General ROS: []fever, []chills, []weight loss, []malaise/fatigue.  ENT ROS: []congestion, []rhinorrhea,  []sore throat, []neck pain, []hearing loss.  Ophthalmic ROS:[]blurry vision, [] double vision, []photophobia, []eye pain.  Respiratory ROS: []cough, []pleuritic chest pain, []shortness of breath, []wheezing.  CVS ROS:[]chest pain, []dyspnea on exertion, []palpitations, []orthopnea, []leg swelling, []PND.   GI ROS: []nausea, []vomiting, [] epigastric pain, []abd pain, []diarrhea, []constipation, []blood/melena in stool.   Urology ROS:[]dysuria, [x]frequency, []flank pain,[] trouble voiding, [] hematuria.   MSK ROS: []myalgias, []joint pain, []muscular weakness,  []back pain, [] falls.   Derm ROS: []pruritis, []rash, []jaundice.  Neurological:[]dizziness,[]numbness,[]loss of consciousness, []weakness  []headaches.   Psych ROS: []hallucinations, []depression, [x]anxiety, []suicidal ideation.    Physical Examination  /80 (BP Location: Left arm, Patient Position: Sitting, BP Method: Medium (Manual))   Pulse 98   Temp 97.6 °F (36.4 °C) (Oral)   Ht 5' 10" (1.778 m)   Wt 91.7 kg (202 lb 2.6 oz)   SpO2 96%   BMI 29.01 kg/m²   Wt Readings from Last 3 Encounters:   02/14/24 91.7 kg (202 lb 2.6 oz)   10/16/23 93.7 kg (206 lb 9.1 oz)   03/24/23 92.4 kg (203 lb 11.3 oz)     BP Readings from Last 3 Encounters:   02/14/24 134/80   10/16/23 130/72   03/24/23 134/80     Estimated body mass index is 29.01 kg/m² as calculated from the following:    Height as of this encounter: 5' 10" (1.778 m).    Weight as of this encounter: 91.7 kg (202 lb 2.6 oz).     General appearance: alert, cooperative, no distress  Eyes: pupils equal and reactive, extraocular eye movements " intact   Ears: bilateral TM's and external ear canals normal   Nose: normal and patent, no erythema, discharge or polyps   Sinuses: Normal paranasal sinuses without tenderness   Throat: mucous membranes moist, pharynx normal without lesions   Neck: no thyromegaly, trachea midline  Lungs: clear to auscultation, no wheezes, rales or rhonchi, symmetric air entry, no dullness to percussion bilaterally.  Heart: normal rate, regular rhythm, normal S1, S2, no murmurs, rubs, clicks or gallops, no displacement of the PMI.  Abdomen: soft, nontender, nondistended, no masses or organomegaly No rigidity, rebound, or guarding.   Back: full range of motion, no tenderness, palpable spasm or pain on motion   Extremities: peripheral pulses normal, no pedal edema, no clubbing or cyanosis   Feet: warm, good capillary refill.  Neurological:alert, oriented, normal speech, no focal findings or movement disorder noted   Psychiatric: alert, oriented to person, place, and time  Integument: normal coloration and turgor, no rashes, no suspicious skin lesions noted.    Data reviewed    Previous medical records reviewed and summarized above in HPI.    274}    Laboratory    I have reviewed old labs below:   274}  Lab Results   Component Value Date    WBC 3.67 (L) 01/30/2024    HGB 14.2 01/30/2024    HCT 44.0 01/30/2024     (H) 01/30/2024     01/30/2024     01/30/2024    K 4.4 01/30/2024     01/30/2024    CALCIUM 10.4 01/30/2024    PHOS 3.9 07/07/2017    CO2 30 (H) 01/30/2024     01/30/2024    BUN 22 01/30/2024    CREATININE 0.8 01/30/2024    ANIONGAP 5 (L) 01/30/2024    ESTGFRAFRICA >60.0 03/22/2022    EGFRNONAA >60.0 03/22/2022    PROT 6.9 01/30/2024    ALBUMIN 4.0 01/30/2024    BILITOT 0.7 01/30/2024    ALKPHOS 57 01/30/2024    ALT 45 (H) 01/30/2024    AST 39 01/30/2024    INR 1.0 06/05/2017    CHOL 208 (H) 01/30/2024    TRIG 105 01/30/2024    HDL 71 01/30/2024    LDLCALC 116.0 01/30/2024    PSA 1.1 01/30/2024     HGBA1C 5.3 01/30/2024     CBC : WBC with cyclic changes.The patient denies abnormal bruising or abnormal bleeding from any body orifice such as bleeding from nose or gums, blood in urine or stool, or melena, hemoptysis or hematemesis.    Imaging/Tracing: I have reviewed the pertinent results/findings and my personal findings are below:  274}    Assessment/Plan     274}    Humberto Cantor is a 68 y.o. male who presents to clinic with:    1. Cyclical neutropenia    2. Benign prostatic hyperplasia with lower urinary tract symptoms, symptom details unspecified    3. Essential hypertension    4. Gastroesophageal reflux disease without esophagitis    5. TRORIE (generalized anxiety disorder)         Diagnostic impression remarks       1. Cyclical neutropenia    2. Benign prostatic hyperplasia with lower urinary tract symptoms, symptom details unspecified  - tamsulosin (FLOMAX) 0.4 mg Cap; TAKE TWO CAPSULES BY MOUTH ONCE A DAY  Dispense: 180 capsule; Refill: 4    3. Essential hypertension  - olmesartan (BENICAR) 20 MG tablet; Take 1 tablet (20 mg total) by mouth once daily.  Dispense: 90 tablet; Refill: 2    4. Gastroesophageal reflux disease without esophagitis  - lansoprazole (PREVACID) 15 MG capsule; Take 2 capsules (30 mg total) by mouth once daily.  Dispense: 30 capsule; Refill: 11    5. TORRIE (generalized anxiety disorder)  - ALPRAZolam (XANAX) 0.25 MG tablet; Take 1 tablet (0.25 mg total) by mouth 2 (two) times daily as needed for Anxiety.  Dispense: 60 tablet; Refill: 3      Medication Monitoring: In today's visit, monitoring for drug toxicity was accomplished. Proper use of medications was discussed.     Counseling: Counseling included discussion regarding imaging findings, diagnosis, possibilities, treatment options, medications, risks, and benefits. He had many questions regarding the options and long-term effects. All questions were answered. He expressed understanding after counseling regarding the diagnosis and  recommendations. He was capable and demonstrated competence with understanding of these options. Shared decision making was performed resulting in him choosing the current treatment plan.     He was counseled about the importance of healthy dietary habits as well as routine physical activity and exercise for better health outcomes. I also discussed the importance of cancer screening.     I also discussed the importance of close follow up to discuss labs, change or modify his medications if needed, monitor side effects, and further evaluation of medical problems.     Additional workup planned: see labs ordered below.    See below for labs and meds ordered with associated diagnosis      Medication List with Changes/Refills   Current Medications    FLUOCINONIDE 0.05% (LIDEX) 0.05 % CREAM    Apply topically 2 (two) times daily. Do not use longer than 2 weeks for 10 days    HYDROCODONE-ACETAMINOPHEN (NORCO)  MG PER TABLET    Take 1 tablet by mouth 3 (three) times daily as needed.    LIDOCAINE-PRILOCAINE (EMLA) CREAM        NAPROXEN (NAPROSYN) 250 MG TABLET    Take 250 mg by mouth 2 (two) times daily.    TADALAFIL (CIALIS) 20 MG TAB    Take 1 tablet (20 mg total) by mouth once daily.   Changed and/or Refilled Medications    Modified Medication Previous Medication    ALPRAZOLAM (XANAX) 0.25 MG TABLET ALPRAZolam (XANAX) 0.25 MG tablet       Take 1 tablet (0.25 mg total) by mouth 2 (two) times daily as needed for Anxiety.    TAKE ONE TABLET BY MOUTH TWO TIMES A DAY AS NEEDED FOR ANXIETY    LANSOPRAZOLE (PREVACID) 15 MG CAPSULE lansoprazole (PREVACID) 15 MG capsule       Take 2 capsules (30 mg total) by mouth once daily.    Take 1 capsule (15 mg total) by mouth once daily.    OLMESARTAN (BENICAR) 20 MG TABLET olmesartan (BENICAR) 20 MG tablet       Take 1 tablet (20 mg total) by mouth once daily.    TAKE ONE TABLET BY MOUTH ONCE A DAY    TAMSULOSIN (FLOMAX) 0.4 MG CAP tamsulosin (FLOMAX) 0.4 mg Cap       TAKE TWO  "CAPSULES BY MOUTH ONCE A DAY    TAKE TWO CAPSULES BY MOUTH ONCE A DAY     Modified Medications    Modified Medication Previous Medication    ALPRAZOLAM (XANAX) 0.25 MG TABLET ALPRAZolam (XANAX) 0.25 MG tablet       Take 1 tablet (0.25 mg total) by mouth 2 (two) times daily as needed for Anxiety.    TAKE ONE TABLET BY MOUTH TWO TIMES A DAY AS NEEDED FOR ANXIETY    LANSOPRAZOLE (PREVACID) 15 MG CAPSULE lansoprazole (PREVACID) 15 MG capsule       Take 2 capsules (30 mg total) by mouth once daily.    Take 1 capsule (15 mg total) by mouth once daily.    OLMESARTAN (BENICAR) 20 MG TABLET olmesartan (BENICAR) 20 MG tablet       Take 1 tablet (20 mg total) by mouth once daily.    TAKE ONE TABLET BY MOUTH ONCE A DAY    TAMSULOSIN (FLOMAX) 0.4 MG CAP tamsulosin (FLOMAX) 0.4 mg Cap       TAKE TWO CAPSULES BY MOUTH ONCE A DAY    TAKE TWO CAPSULES BY MOUTH ONCE A DAY       Follow up in about 3 months (around 5/14/2024). for further workup and reassessment if labs and tests obtained are stable or sooner as needed. He was instructed to call the clinic or go to the emergency department if his symptoms do not improve, worsens, or if new symptoms develop. Patient knows to call any time if an emergency arises. Shared decision making occurred and he verbalized understanding in agreement with this plan.     Documentation entered by me for this encounter may have been done in part using speech-recognition technology. Although I have made an effort to ensure accuracy, "sound like" errors may exist and should be interpreted in context.       Saud Sosa MD     Discussed health maintenance guidelines appropriate for age.  Discussed health maintenance guidelines appropriate for age.    "

## 2024-03-28 ENCOUNTER — PATIENT MESSAGE (OUTPATIENT)
Dept: FAMILY MEDICINE | Facility: CLINIC | Age: 69
End: 2024-03-28
Payer: MEDICARE

## 2024-04-04 DIAGNOSIS — M25.511 RIGHT SHOULDER PAIN, UNSPECIFIED CHRONICITY: Primary | ICD-10-CM

## 2024-04-11 ENCOUNTER — HOSPITAL ENCOUNTER (OUTPATIENT)
Dept: RADIOLOGY | Facility: HOSPITAL | Age: 69
Discharge: HOME | End: 2024-04-11
Attending: ORTHOPAEDIC SURGERY
Payer: MEDICARE

## 2024-04-11 ENCOUNTER — OFFICE VISIT (OUTPATIENT)
Dept: ORTHOPEDICS | Facility: CLINIC | Age: 69
End: 2024-04-11
Payer: MEDICARE

## 2024-04-11 VITALS — HEIGHT: 70 IN | BODY MASS INDEX: 30.83 KG/M2 | WEIGHT: 215.38 LBS | RESPIRATION RATE: 16 BRPM

## 2024-04-11 DIAGNOSIS — M25.511 RIGHT SHOULDER PAIN, UNSPECIFIED CHRONICITY: Primary | ICD-10-CM

## 2024-04-11 DIAGNOSIS — M75.100 ROTATOR CUFF SYNDROME, UNSPECIFIED LATERALITY: ICD-10-CM

## 2024-04-11 DIAGNOSIS — M25.511 RIGHT SHOULDER PAIN, UNSPECIFIED CHRONICITY: ICD-10-CM

## 2024-04-11 PROCEDURE — 73030 X-RAY EXAM OF SHOULDER: CPT | Mod: 26,RT,, | Performed by: RADIOLOGY

## 2024-04-11 PROCEDURE — 20610 DRAIN/INJ JOINT/BURSA W/O US: CPT | Mod: RT,S$GLB,, | Performed by: ORTHOPAEDIC SURGERY

## 2024-04-11 PROCEDURE — 99999 PR PBB SHADOW E&M-EST. PATIENT-LVL III: CPT | Mod: PBBFAC,,, | Performed by: ORTHOPAEDIC SURGERY

## 2024-04-11 PROCEDURE — 73030 X-RAY EXAM OF SHOULDER: CPT | Mod: TC,PO,RT

## 2024-04-11 PROCEDURE — 3044F HG A1C LEVEL LT 7.0%: CPT | Mod: CPTII,S$GLB,, | Performed by: ORTHOPAEDIC SURGERY

## 2024-04-11 PROCEDURE — 3008F BODY MASS INDEX DOCD: CPT | Mod: CPTII,S$GLB,, | Performed by: ORTHOPAEDIC SURGERY

## 2024-04-11 PROCEDURE — 4010F ACE/ARB THERAPY RXD/TAKEN: CPT | Mod: CPTII,S$GLB,, | Performed by: ORTHOPAEDIC SURGERY

## 2024-04-11 PROCEDURE — 1159F MED LIST DOCD IN RCRD: CPT | Mod: CPTII,S$GLB,, | Performed by: ORTHOPAEDIC SURGERY

## 2024-04-11 PROCEDURE — 99214 OFFICE O/P EST MOD 30 MIN: CPT | Mod: 25,S$GLB,, | Performed by: ORTHOPAEDIC SURGERY

## 2024-04-11 RX ORDER — TRIAMCINOLONE ACETONIDE 40 MG/ML
40 INJECTION, SUSPENSION INTRA-ARTICULAR; INTRAMUSCULAR
Status: DISCONTINUED | OUTPATIENT
Start: 2024-04-11 | End: 2024-04-11 | Stop reason: HOSPADM

## 2024-04-11 RX ADMIN — TRIAMCINOLONE ACETONIDE 40 MG: 40 INJECTION, SUSPENSION INTRA-ARTICULAR; INTRAMUSCULAR at 01:04

## 2024-04-11 NOTE — PROGRESS NOTES
Past Medical History:   Diagnosis Date    Dermatitis, atopic     Essential hypertension 11/19/2013    GERD (gastroesophageal reflux disease)     Hypertension        Past Surgical History:   Procedure Laterality Date    matthew      caudal     HERNIA REPAIR      WISDOM TOOTH EXTRACTION         Current Outpatient Medications   Medication Sig    ALPRAZolam (XANAX) 0.25 MG tablet Take 1 tablet (0.25 mg total) by mouth 2 (two) times daily as needed for Anxiety.    fluocinonide 0.05% (LIDEX) 0.05 % cream Apply topically 2 (two) times daily. Do not use longer than 2 weeks for 10 days    HYDROcodone-acetaminophen (NORCO)  mg per tablet Take 1 tablet by mouth 3 (three) times daily as needed.    lansoprazole (PREVACID) 15 MG capsule Take 2 capsules (30 mg total) by mouth once daily.    lidocaine-prilocaine (EMLA) cream     naproxen (NAPROSYN) 250 MG tablet Take 250 mg by mouth 2 (two) times daily.    olmesartan (BENICAR) 20 MG tablet Take 1 tablet (20 mg total) by mouth once daily.    tadalafiL (CIALIS) 20 MG Tab Take 1 tablet (20 mg total) by mouth once daily.    tamsulosin (FLOMAX) 0.4 mg Cap TAKE TWO CAPSULES BY MOUTH ONCE A DAY     No current facility-administered medications for this visit.       Review of patient's allergies indicates:  No Known Allergies    Family History   Problem Relation Age of Onset    Early death Brother     Arthritis Mother     Cancer Maternal Grandmother         abdominal    Cancer Maternal Grandfather 60        lung    Liver disease Paternal Grandfather        Social History     Socioeconomic History    Marital status:    Occupational History     Employer: [employer]   Tobacco Use    Smoking status: Former    Smokeless tobacco: Never   Substance and Sexual Activity    Alcohol use: Yes     Alcohol/week: 2.0 standard drinks of alcohol     Types: 2 Glasses of wine per week    Drug use: No    Sexual activity: Yes     Partners: Female       Chief Complaint:   Chief Complaint   Patient  presents with    Right Shoulder - Pain       History of present illness:  68-year-old male seen for a new complaint of right shoulder pain.  It started it mid March.  Patient denies an injury or trauma.  Patient does swim a lot.  Pain with doing freestyle.  Gets a lot of pain in the front of his shoulder as well as along the chest and into his neck.  Patient denies an acute injury or trauma.  Did state that he had a fall back in 2017 off of the ladder.  Pain is a 5/10.  Increased pain at night and while swimming.  No previous treatment.        Review of Systems:    Constitution: Negative for chills, fever, and sweats.  Negative for unexplained weight loss.    HENT:  Negative for headaches and blurry vision.    Cardiovascular:Negative for chest pain or irregular heart beat. Negative for hypertension.    Respiratory:  Negative for cough and shortness of breath.    Gastrointestinal: Negative for abdominal pain, heartburn, melena, nausea, and vomitting.    Genitourinary:  Negative bladder incontinence and dysuria.    Musculoskeletal:  See HPI    Neurological: Negative for numbness.    Psychiatric/Behavioral: Negative for depression.  The patient is not nervous/anxious.      Endocrine: Negative for polyuria    Hematologic/Lymphatic: Negative for bleeding problem.  Does not bruise/bleed easily.    Skin: Negative for poor would healing and rash      Physical Examination:    Vital Signs:  There were no vitals filed for this visit.    There is no height or weight on file to calculate BMI.    This a well-developed, well nourished patient in no acute distress.  They are alert and oriented and cooperative to examination.  Pt. walks without an antalgic gait.      Examination of the right shoulder shows no rashes or erythema. There are no masses, ecchymosis, or atrophy. The patient has full range of motion in forward flexion, external rotation, and internal rotation to the mid T-spine. The patient has Positive Suarez and Neer  test - McLennan's test. - Speeds test. Nontender to palpation over a.c. joint. Normal stability anteriorly, posteriorly, and negative sulcus sign. Passive range of motion: Forward flexion of 180°, external rotation at 90° of 90°, internal rotation of 50°, and external rotation at 0° of 50°. 2+ radial pulse. Intact axillary, radial, median and ulnar sensation. 5 out of 5 resisted forward flexion, external rotation, and negative lift off test.      X-rays:  X-rays of the right shoulder are ordered and reviewed which show some bony changes near the humeral neck possibly from some osteoarthritis          Assessment:  Right rotator cuff syndrome    Plan:  I reviewed the findings with him today.  I recommended a subacromial injection.  Also gave him a Thera-Band and a rotator cuff guide to start working on.  Follow-up if not improving.            All previous pertinent notes including ER visits, physical therapy visits, other orthopedic visits as well as other care for the same musculoskeletal problem were reviewed.  All pertinent lab values and previous imaging was reviewed pertinent to the current visit.    This note was created using [employer] voice recognition software that occasionally misinterpreted phrases or words.    Consult note is delivered via Epic messaging service.

## 2024-04-11 NOTE — PROCEDURES
Large Joint Aspiration/Injection: R subacromial bursa    Date/Time: 4/11/2024 1:30 PM    Performed by: Hebert Bermudez MD  Authorized by: Hebert Bermudez MD    Consent Done?:  Yes (Verbal)  Indications:  Pain  Site marked: the procedure site was marked    Timeout: prior to procedure the correct patient, procedure, and site was verified    Local anesthetic: Ropivicaine.  Anesthetic total (ml):  3      Details:  Needle Size:  20 G  Ultrasonic Guidance for needle placement?: No    Approach:  Posterior  Location:  Shoulder  Site:  R subacromial bursa  Medications:  40 mg triamcinolone acetonide 40 mg/mL  Patient tolerance:  Patient tolerated the procedure well with no immediate complications

## 2024-05-16 ENCOUNTER — OFFICE VISIT (OUTPATIENT)
Dept: PRIMARY CARE CLINIC | Facility: CLINIC | Age: 69
End: 2024-05-16
Payer: MEDICARE

## 2024-05-16 ENCOUNTER — TELEPHONE (OUTPATIENT)
Dept: PRIMARY CARE CLINIC | Facility: CLINIC | Age: 69
End: 2024-05-16

## 2024-05-16 VITALS
HEART RATE: 69 BPM | OXYGEN SATURATION: 97 % | SYSTOLIC BLOOD PRESSURE: 112 MMHG | HEIGHT: 70 IN | BODY MASS INDEX: 28.62 KG/M2 | TEMPERATURE: 98 F | WEIGHT: 199.94 LBS | DIASTOLIC BLOOD PRESSURE: 78 MMHG

## 2024-05-16 DIAGNOSIS — Z12.11 COLON CANCER SCREENING: Primary | ICD-10-CM

## 2024-05-16 DIAGNOSIS — L03.032 PARONYCHIA OF GREAT TOE OF LEFT FOOT: ICD-10-CM

## 2024-05-16 PROCEDURE — 3044F HG A1C LEVEL LT 7.0%: CPT | Mod: HCNC,CPTII,S$GLB, | Performed by: FAMILY MEDICINE

## 2024-05-16 PROCEDURE — 1159F MED LIST DOCD IN RCRD: CPT | Mod: HCNC,CPTII,S$GLB, | Performed by: FAMILY MEDICINE

## 2024-05-16 PROCEDURE — 1160F RVW MEDS BY RX/DR IN RCRD: CPT | Mod: HCNC,CPTII,S$GLB, | Performed by: FAMILY MEDICINE

## 2024-05-16 PROCEDURE — 3078F DIAST BP <80 MM HG: CPT | Mod: HCNC,CPTII,S$GLB, | Performed by: FAMILY MEDICINE

## 2024-05-16 PROCEDURE — 3008F BODY MASS INDEX DOCD: CPT | Mod: HCNC,CPTII,S$GLB, | Performed by: FAMILY MEDICINE

## 2024-05-16 PROCEDURE — 1101F PT FALLS ASSESS-DOCD LE1/YR: CPT | Mod: HCNC,CPTII,S$GLB, | Performed by: FAMILY MEDICINE

## 2024-05-16 PROCEDURE — 99214 OFFICE O/P EST MOD 30 MIN: CPT | Mod: HCNC,S$GLB,, | Performed by: FAMILY MEDICINE

## 2024-05-16 PROCEDURE — 1125F AMNT PAIN NOTED PAIN PRSNT: CPT | Mod: HCNC,CPTII,S$GLB, | Performed by: FAMILY MEDICINE

## 2024-05-16 PROCEDURE — 99999 PR PBB SHADOW E&M-EST. PATIENT-LVL IV: CPT | Mod: PBBFAC,HCNC,, | Performed by: FAMILY MEDICINE

## 2024-05-16 PROCEDURE — 3074F SYST BP LT 130 MM HG: CPT | Mod: HCNC,CPTII,S$GLB, | Performed by: FAMILY MEDICINE

## 2024-05-16 PROCEDURE — 4010F ACE/ARB THERAPY RXD/TAKEN: CPT | Mod: HCNC,CPTII,S$GLB, | Performed by: FAMILY MEDICINE

## 2024-05-16 PROCEDURE — 3288F FALL RISK ASSESSMENT DOCD: CPT | Mod: HCNC,CPTII,S$GLB, | Performed by: FAMILY MEDICINE

## 2024-05-16 RX ORDER — DOXYCYCLINE HYCLATE 100 MG
100 TABLET ORAL 2 TIMES DAILY
Qty: 20 TABLET | Refills: 0 | Status: SHIPPED | OUTPATIENT
Start: 2024-05-16

## 2024-05-16 NOTE — PATIENT INSTRUCTIONS
Patient Education       Type 2 Diabetes Discharge Instructions   About this topic   Type 2 diabetes is the most common type of diabetes. If you have this illness, your body does not make enough insulin or does not correctly use the insulin it does make. When you eat, your body breaks down all sugars and starches into glucose. Your body needs insulin to use glucose for energy. The insulin takes the glucose from your blood into your cells. If you do not have enough insulin, or your body does not use the insulin well, the glucose or sugar stays in your blood instead of going into your cells. This causes your blood sugar levels to be too high. Over time, this can damage your heart, nerves, eyes, kidneys, and other organs.     What care is needed at home?   Learn how to take care of your diabetes.  Ask your doctor what you need to do when you go home. Make sure you ask questions if you do not understand what the doctor says. This way you will know what you need to do.  Based on what diabetes drugs you take, you might need to check your blood sugar regularly at home. But not everyone with type 2 diabetes needs to do this. If you need to, your doctor will teach you how to check your blood sugar. Ask what the goal numbers are for your blood sugar. Keep a list of your blood sugar levels. This will help you learn what causes high or low readings and help you manage your diabetes.     Take your diabetes drugs as directed. Ask what to do if you miss a dose of your diabetes drugs.  Learn when, what, and how much to eat.  Be active. Walk, garden, or do something active for 30 minutes or more on most days of the week. This will also help you control your weight. Ask your doctor for proper food and exercise programs to follow.  Check your feet often. Look for blisters or sores. Always wear socks and shoes. Never walk barefoot, especially outdoors. Take special care around the pool and at the beach, as these surfaces may be  extremely hot and burn your feet. Report any problems with your feet to your doctor.  Wear a medical ID.  Limit or avoid beer, wine, and mixed drinks (alcohol).  If you smoke, try to quit. Your doctor or nurse can help.  Talk with your doctor about if you need to check your blood sugar at home.  If you are overweight, ask your doctor if you would be a good candidate for bariatric surgery as this can reverse diabetes in some cases.  What follow-up care is needed?   Your doctor may ask you to make visits to the office to check on your progress. Be sure to keep these visits.  What drugs may be needed?   Diabetes drugs will help control your blood sugar. You may have more than one diabetes drug. Your doctor may order drugs for you to take by mouth or insulin as a shot. You will be trained on how to give insulin shots, if needed. Talk to your doctor about your diabetes drugs and what you need to do when you go home.  Will physical activity be limited?   Being active can lower blood sugar and blood pressure. It can also help control weight. Be sure to check with your doctor before starting any exercise program.  Try to walk, bike, or swim every day. Start with 5 to 10 minutes each day. Work up to about 30 minutes most days.  Drink lots of water during workouts.  What changes to diet are needed?   Eating a healthy diet is important. This means you need to eat regularly throughout the day. You need to include a variety of foods such as fruits and vegetables, whole grains, nonfat dairy products, and lean meats. Do not eat too much food at one time and do not skip meals. Limit foods high in sugar like sweets, desserts, and fruit juices. Ask your doctor about what kind of diet is right for you.  What problems could happen?   Heart, kidney, and nerve problems  Foot problems. Sores and infection may also happen.  Eye problems  Dangerously high blood sugar levels requiring emergency treatment  Severe infections  Talk with your  doctor often. Other drugs or care may be needed to treat or prevent these problems.  When do I need to call the doctor?   You have signs of high blood sugar like you:  Are more thirsty  Are urinating more often  Have new shortness of breath  Have belly pain, an upset stomach, or are throwing up.  Are more tired than normal  Have a very dry mouth or a fruity breath odor  Signs of infection. These include a fever of 100.4°F (38°C) or higher, chills, or a wound that will not heal.  Blurred vision  Chest pain  Swelling in your legs  Change in color and odor of your feet  Blood sugar that remains high and does not respond to treatment  Helpful tips   Talk to your doctor about getting a flu shot and pneumonia shot.  Teach Back: Helping You Understand   The Teach Back Method helps you understand the information we are giving you. After you talk with the staff, tell them in your own words what you learned. This helps to make sure the staff has described each thing clearly. It also helps to explain things that may have been confusing. Before going home, make sure you can do these:  I can tell you about my condition.  I can tell you what I need to do to control my blood sugar.  I can tell you what I will do if I have signs of high blood sugar.  Where can I learn more?   Center for Disease Control and Prevention  https://www.cdc.gov/diabetes/basics/type2.html   International Diabetes Foundation  https://www.idf.org/aboutdiabetes/type-2-diabetes.html   UpToDate  https://www.[employer].com/contents/type-2-diabetes-overview-beyond-the-basics   Last Reviewed Date   2021-05-04  Consumer Information Use and Disclaimer   This information is not specific medical advice and does not replace information you receive from your health care provider. This is only a brief summary of general information. It does NOT include all information about conditions, illnesses, injuries, tests, procedures, treatments, therapies, discharge instructions or  life-style choices that may apply to you. You must talk with your health care provider for complete information about your health and treatment options. This information should not be used to decide whether or not to accept your health care providers advice, instructions or recommendations. Only your health care provider has the knowledge and training to provide advice that is right for you.  Copyright   Copyright © 2021 [employer] and its affiliates and/or licensors. All rights reserved.

## 2024-05-17 ENCOUNTER — TELEPHONE (OUTPATIENT)
Dept: PODIATRY | Facility: CLINIC | Age: 69
End: 2024-05-17
Payer: MEDICARE

## 2024-05-30 LAB — NONINV COLON CA DNA+OCC BLD SCRN STL QL: NEGATIVE

## 2024-06-01 NOTE — PROGRESS NOTES
Subjective:   Humberto Cantor is a 68 y.o. male with hypertension.  Current Outpatient Medications   Medication Sig Dispense Refill    lansoprazole (PREVACID) 15 MG capsule Take 2 capsules (30 mg total) by mouth once daily. 30 capsule 11    naproxen (NAPROSYN) 250 MG tablet Take 250 mg by mouth 2 (two) times daily.      olmesartan (BENICAR) 20 MG tablet Take 1 tablet (20 mg total) by mouth once daily. 90 tablet 2    tamsulosin (FLOMAX) 0.4 mg Cap TAKE TWO CAPSULES BY MOUTH ONCE A  capsule 4    ALPRAZolam (XANAX) 0.25 MG tablet Take 1 tablet (0.25 mg total) by mouth 2 (two) times daily as needed for Anxiety. 60 tablet 3    doxycycline (VIBRA-TABS) 100 MG tablet Take 1 tablet (100 mg total) by mouth 2 (two) times daily. 20 tablet 0    fluocinonide 0.05% (LIDEX) 0.05 % cream Apply topically 2 (two) times daily. Do not use longer than 2 weeks for 10 days 60 g 1    HYDROcodone-acetaminophen (NORCO)  mg per tablet Take 1 tablet by mouth 3 (three) times daily as needed.      lidocaine-prilocaine (EMLA) cream       tadalafiL (CIALIS) 20 MG Tab Take 1 tablet (20 mg total) by mouth once daily. 100 tablet 0     No current facility-administered medications for this visit.      Hypertension ROS: taking medications as instructed, no medication side effects noted, no TIA's, no chest pain on exertion, no dyspnea on exertion, and no swelling of ankles.   New concerns: Patient has chronic pain involving the knee, lumbar spine.  History of trauma? No.  Onset: Approximately 5 years ago.  Frequency: constant.  Progression since onset: improving.  Pain quality: 8/10.  Current treatment: Norco,  Naprosyn  Improvement on current treatment: moderate  Treatments tried: Naproxen and Norco,  xanax ; Therapy:No Therapy; Injections: None - with no improvement  Associated symptoms: weakness, numbness, tingling, leg pain, leg weakness  Previous imaging: MRI scan  Drug screen? No  Pain contract? Yes  Psychiatric disorders: Anxiety /  "nervousness and Agitation  Substance abuse history:  None  Social History: None      Erectile Dysfunction: Patient complains of erectile dysfunction.  Onset of dysfunction was several years ago and was sudden in onset.  Patient states the nature of difficulty is maintaining erection. Full erections occur on awakening. Partial erections occur with masturbation and on awakening. Libido is affected. Risk factors for ED include neurologic disease (OS spine) and antihypertensive medications, Benicar. Patient denies history of diabetes mellitus, cranial, spinal, or pelvic trauma, pelvic radiation, and beta blocker use. Patient's expectations as to sexual function full .  Patient's description of relationship w/partner good.  Previous treatment of ED includes viagra.   .     Objective:   /78 (BP Location: Left arm, Patient Position: Sitting, BP Method: Medium (Manual))   Pulse 69   Temp 97.5 °F (36.4 °C) (Oral)   Ht 5' 10" (1.778 m)   Wt 90.7 kg (199 lb 15.3 oz)   SpO2 97%   BMI 28.69 kg/m²    Appearance alert, well appearing, and in no distress, oriented to person, place, and time, overweight, and acyanotic, in no respiratory distress.  General exam BP noted to be well controlled today in office, S1, S2 normal, no gallop, no murmur, chest clear, no JVD, no HSM, no edema, fundi - no background diabetic retinopathy, no hemorrhages or exudates, no hypertensive retinopathy, normal, and normal vessels, CVS exam  - normal rate, regular rhythm, normal S1, S2, no murmurs, rubs, clicks or gallops, normal rate and regular rhythm, S1 and S2 normal, no murmurs noted, no gallops noted, no JVD.   Left 1 st toe with toenail debris, discharge and erythema of the cuticle  Lab review: labs are reviewed, up to date and normal.     Assessment:    Hypertension well controlled.   Humberto was seen today for follow-up.    Diagnoses and all orders for this visit:    Colon cancer screening  -     Cologuard Screening (Multitarget Stool " DNA); Future  -     Cologuard Screening (Multitarget Stool DNA)    Paronychia of great toe of left foot  -     doxycycline (VIBRA-TABS) 100 MG tablet; Take 1 tablet (100 mg total) by mouth 2 (two) times daily.  -     Ambulatory referral/consult to Podiatry; Future        Plan:   Current treatment plan is effective, no change in therapy.  Lab results and schedule of future lab studies reviewed with patient.  Cardiovascular risk and specific lipid/LDL goals reviewed.  Follow up: 4 months and as needed..Discussed health maintenance guidelines appropriate for age.

## 2024-07-25 DIAGNOSIS — F41.1 GAD (GENERALIZED ANXIETY DISORDER): ICD-10-CM

## 2024-08-01 ENCOUNTER — TELEPHONE (OUTPATIENT)
Dept: FAMILY MEDICINE | Facility: CLINIC | Age: 69
End: 2024-08-01
Payer: MEDICARE

## 2024-08-01 NOTE — TELEPHONE ENCOUNTER
----- Message from Priya Butler sent at 8/1/2024  2:38 PM CDT -----  Regarding: prescription  Good afternoon   Pt stated he has an appt with dr. roche in november. He said his prescription is going to run out this weekend and wanted to know if dr. roche could send another one to the pharmacy. If someone could reach out to him that would be great.     Thank you!

## 2024-08-02 RX ORDER — ALPRAZOLAM 0.25 MG/1
0.25 TABLET ORAL 2 TIMES DAILY PRN
Qty: 60 TABLET | Refills: 3 | Status: SHIPPED | OUTPATIENT
Start: 2024-08-02

## 2024-11-17 DIAGNOSIS — F41.1 GAD (GENERALIZED ANXIETY DISORDER): ICD-10-CM

## 2024-11-18 RX ORDER — ALPRAZOLAM 0.25 MG/1
0.25 TABLET ORAL 2 TIMES DAILY PRN
Qty: 60 TABLET | Refills: 3 | Status: SHIPPED | OUTPATIENT
Start: 2024-11-18

## 2024-11-19 ENCOUNTER — OFFICE VISIT (OUTPATIENT)
Dept: PRIMARY CARE CLINIC | Facility: CLINIC | Age: 69
End: 2024-11-19
Payer: MEDICARE

## 2024-11-19 VITALS
HEART RATE: 71 BPM | SYSTOLIC BLOOD PRESSURE: 152 MMHG | DIASTOLIC BLOOD PRESSURE: 90 MMHG | OXYGEN SATURATION: 84 % | HEIGHT: 70 IN | WEIGHT: 210.56 LBS | BODY MASS INDEX: 30.14 KG/M2 | TEMPERATURE: 98 F

## 2024-11-19 DIAGNOSIS — M40.15 OTHER SECONDARY KYPHOSIS, THORACOLUMBAR REGION: ICD-10-CM

## 2024-11-19 DIAGNOSIS — N18.31 STAGE 3A CHRONIC KIDNEY DISEASE: ICD-10-CM

## 2024-11-19 DIAGNOSIS — E78.2 MIXED HYPERLIPIDEMIA: ICD-10-CM

## 2024-11-19 DIAGNOSIS — I10 ESSENTIAL HYPERTENSION: Primary | ICD-10-CM

## 2024-11-19 DIAGNOSIS — F41.1 GAD (GENERALIZED ANXIETY DISORDER): ICD-10-CM

## 2024-11-19 DIAGNOSIS — M41.86 LEVOSCOLIOSIS OF LUMBAR SPINE: ICD-10-CM

## 2024-11-19 DIAGNOSIS — M51.362 DEGENERATION OF INTERVERTEBRAL DISC OF LUMBAR REGION WITH DISCOGENIC BACK PAIN AND LOWER EXTREMITY PAIN: ICD-10-CM

## 2024-11-19 PROCEDURE — 99999 PR PBB SHADOW E&M-EST. PATIENT-LVL IV: CPT | Mod: PBBFAC,HCNC,, | Performed by: FAMILY MEDICINE

## 2024-11-19 RX ORDER — TADALAFIL 20 MG/1
20 TABLET ORAL DAILY
Qty: 100 TABLET | Refills: 0 | Status: SHIPPED | OUTPATIENT
Start: 2024-11-19

## 2024-11-19 RX ORDER — ALPRAZOLAM 0.25 MG/1
0.25 TABLET ORAL 2 TIMES DAILY PRN
Qty: 60 TABLET | Refills: 3 | Status: CANCELLED | OUTPATIENT
Start: 2024-11-19

## 2024-11-19 RX ORDER — TADALAFIL 20 MG/1
20 TABLET ORAL DAILY
Qty: 100 TABLET | Refills: 0 | Status: SHIPPED | OUTPATIENT
Start: 2024-11-19 | End: 2024-11-19 | Stop reason: SDUPTHER

## 2024-11-19 RX ORDER — OLMESARTAN MEDOXOMIL 20 MG/1
20 TABLET ORAL DAILY
Qty: 90 TABLET | Refills: 2 | Status: SHIPPED | OUTPATIENT
Start: 2024-11-19

## 2024-11-19 NOTE — PATIENT INSTRUCTIONS
DASH diet for Hypertension and Healthy Eating  Provided by the [facility]    Healthy Lifestyle   Nutrition and healthy eating  The DASH diet emphasizes portion size, eating a variety of foods and getting the right amount of nutrients. Discover how DASH can improve your health and lower your blood pressure.  By [facility] Staff   DASH stands for Dietary Approaches to Stop Hypertension. The DASH diet is a lifelong approach to healthy eating that's designed to help treat or prevent high blood pressure (hypertension). The DASH diet encourages you to reduce the sodium in your diet and eat a variety of foods rich in nutrients that help lower blood pressure, such as potassium, calcium and magnesium.  By following the DASH diet, you may be able to reduce your blood pressure by a few points in just two weeks. Over time, your systolic blood pressure could drop by eight to 14 points, which can make a significant difference in your health risks.  Because the DASH diet is a healthy way of eating, it offers health benefits besides just lowering blood pressure. The DASH diet is also in line with dietary recommendations to prevent osteoporosis, cancer, heart disease, stroke and diabetes.  The DASH diet emphasizes vegetables, fruits and low-fat dairy foods -- and moderate amounts of whole grains, fish, poultry and nuts.  In addition to the standard DASH diet, there is also a lower sodium version of the diet. You can choose the version of the diet that meets your health needs:  Standard DASH diet. You can consume up to 2,300 milligrams (mg) of sodium a day.   Lower sodium DASH diet. You can consume up to 1,500 mg of sodium a day.  Both versions of the DASH diet aim to reduce the amount of sodium in your diet compared with what you might get in a typical American diet, which can amount to a whopping 3,400 mg of sodium a day or more.  The standard DASH diet meets the recommendation from the Dietary Guidelines for Americans to keep  "daily sodium intake to less than 2,300 mg a day.  The American Heart Association recommends 1,500 mg a day of sodium as an upper limit for all adults. If you aren't sure what sodium level is right for you, talk to your doctor.  Both versions of the DASH diet include lots of whole grains, fruits, vegetables and low-fat dairy products. The DASH diet also includes some fish, poultry and legumes, and encourages a small amount of nuts and seeds a few times a week.   You can eat red meat, sweets and fats in small amounts. The DASH diet is low in saturated fat, cholesterol and total fat.  Here's a look at the recommended servings from each food group for the 2,[id number]-day DASH diet.  Grains: 6 to 8 servings a day  Grains include bread, cereal, rice and pasta. Examples of one serving of grains include 1 slice whole-wheat bread, 1 ounce dry cereal, or 1/2 cup cooked cereal, rice or pasta.  Focus on whole grains because they have more fiber and nutrients than do refined grains. For instance, use brown rice instead of white rice, whole-wheat pasta instead of regular pasta and whole-grain bread instead of white bread. Look for products labeled "100 percent whole grain" or "100 percent whole wheat."   Grains are naturally low in fat. Keep them this way by avoiding butter, cream and cheese sauces.  Vegetables: 4 to 5 servings a day  Tomatoes, carrots, broccoli, sweet potatoes, greens and other vegetables are full of fiber, vitamins, and such minerals as potassium and magnesium. Examples of one serving include 1 cup raw leafy green vegetables or 1/2 cup cut-up raw or cooked vegetables.  Don't think of vegetables only as side dishes -- a hearty blend of vegetables served over brown rice or whole-wheat noodles can serve as the main dish for a meal.   Fresh and frozen vegetables are both good choices. When buying frozen and canned vegetables, choose those labeled as low sodium or without added salt.   To increase the number of " servings you fit in daily, be creative. In a stir-curiel, for instance, cut the amount of meat in half and double up on the vegetables.  Fruits: 4 to 5 servings a day  Many fruits need little preparation to become a healthy part of a meal or snack. Like vegetables, they're packed with fiber, potassium and magnesium and are typically low in fat -- coconuts are an exception. Examples of one serving include one medium fruit, 1/2 cup fresh, frozen or canned fruit, or 4 ounces of juice.  Have a piece of fruit with meals and one as a snack, then round out your day with a dessert of fresh fruits topped with a dollop of low-fat yogurt.   Leave on edible peels whenever possible. The peels of apples, pears and most fruits with pits add interesting texture to recipes and contain healthy nutrients and fiber.   Remember that citrus fruits and juices, such as grapefruit, can interact with certain medications, so check with your doctor or pharmacist to see if they're OK for you.   If you choose canned fruit or juice, make sure no sugar is added.  Dairy: 2 to 3 servings a day  Milk, yogurt, cheese and other dairy products are major sources of calcium, vitamin D and protein. But the key is to make sure that you choose dairy products that are low fat or fat-free because otherwise they can be a major source of fat -- and most of it is saturated. Examples of one serving include 1 cup skim or 1 percent milk, 1 cup low fat yogurt, or 1 1/2 ounces part-skim cheese.  Low-fat or fat-free frozen yogurt can help you boost the amount of dairy products you eat while offering a sweet treat. Add fruit for a healthy twist.   If you have trouble digesting dairy products, choose lactose-free products or consider taking an over-the-counter product that contains the enzyme lactase, which can reduce or prevent the symptoms of lactose intolerance.   Go easy on regular and even fat-free cheeses because they are typically high in sodium.  Lean meat, poultry  and fish: 6 servings or fewer a day  Meat can be a rich source of protein, B vitamins, iron and zinc. Choose lean varieties and aim for no more than 6 ounces a day. Cutting back on your meat portion will allow room for more vegetables.  Trim away skin and fat from poultry and meat and then bake, broil, grill or roast instead of frying in fat.   Eat heart-healthy fish, such as salmon, herring and tuna. These types of fish are high in omega-3 fatty acids, which can help lower your total cholesterol.  Nuts, seeds and legumes: 4 to 5 servings a week  Almonds, sunflower seeds, kidney beans, peas, lentils and other foods in this family are good sources of magnesium, potassium and protein. They're also full of fiber and phytochemicals, which are plant compounds that may protect against some cancers and cardiovascular disease.  Serving sizes are small and are intended to be consumed only a few times a week because these foods are high in calories. Examples of one serving include 1/3 cup nuts, 2 tablespoons seeds, or 1/2 cup cooked beans or peas.   Nuts sometimes get a bad rap because of their fat content, but they contain healthy types of fat -- monounsaturated fat and omega-3 fatty acids. They're high in calories, however, so eat them in moderation. Try adding them to stir-fries, salads or cereals.   Soybean-based products, such as tofu and tempeh, can be a good alternative to meat because they contain all of the amino acids your body needs to make a complete protein, just like meat.  Fats and oils: 2 to 3 servings a day  Fat helps your body absorb essential vitamins and helps your body's immune system. But too much fat increases your risk of heart disease, diabetes and obesity. The DASH diet strives for a healthy balance by limiting total fat to less than 30 percent of daily calories from fat, with a focus on the healthier monounsaturated fats.  Examples of one serving include 1 teaspoon soft margarine, 1 tablespoon  mayonnaise or 2 tablespoons salad dressing.  Saturated fat and trans fat are the main dietary culprits in increasing your risk of coronary artery disease. DASH helps keep your daily saturated fat to less than 6 percent of your total calories by limiting use of meat, butter, cheese, whole milk, cream and eggs in your diet, along with foods made from lard, solid shortenings, and palm and coconut oils.   Avoid trans fat, commonly found in such processed foods as crackers, baked goods and fried items.   Read food labels on margarine and salad dressing so that you can choose those that are lowest in saturated fat and free of trans fat.  Sweets: 5 servings or fewer a week  You don't have to banish sweets entirely while following the DASH diet -- just go easy on them. Examples of one serving include 1 tablespoon sugar, jelly or jam, 1/2 cup sorbet, or 1 cup lemonade.  When you eat sweets, choose those that are fat-free or low-fat, such as sorbets, fruit ices, jelly beans, hard candy, chao crackers or low-fat cookies.   Artificial sweeteners such as aspartame (NutraSweet, Equal) and sucralose (Splenda) may help satisfy your sweet tooth while sparing the sugar. But remember that you still must use them sensibly. It's OK to swap a diet cola for a regular cola, but not in place of a more nutritious beverage such as low-fat milk or even plain water.   Cut back on added sugar, which has no nutritional value but can pack on calories.  Drinking too much alcohol can increase blood pressure. The Dietary Guidelines for Americans recommends that men limit alcohol to no more than two drinks a day and women to one or less.  The DASH diet doesn't address caffeine consumption. The influence of caffeine on blood pressure remains unclear. But caffeine can cause your blood pressure to rise at least temporarily. If you already have high blood pressure or if you think caffeine is affecting your blood pressure, talk to your doctor about your  "caffeine consumption.  While the DASH diet is not a weight-loss program, you may indeed lose unwanted pounds because it can help guide you toward healthier food choices.  The DASH diet generally includes about 2,000 calories a day. If you're trying to lose weight, you may need to eat fewer calories. You may also need to adjust your serving goals based on your individual circumstances -- something your health care team can help you decide.  The foods at the core of the DASH diet are naturally low in sodium. So just by following the DASH diet, you're likely to reduce your sodium intake. You also reduce sodium further by:  Using sodium-free spices or flavorings with your food instead of salt   Not adding salt when cooking rice, pasta or hot cereal   Rinsing canned foods to remove some of the sodium   Buying foods labeled "no salt added," "sodium-free," "low sodium" or "very low sodium"  One teaspoon of table salt has 2,325 mg of sodium. When you read food labels, you may be surprised at just how much sodium some processed foods contain. Even low-fat soups, canned vegetables, ready-to-eat cereals and sliced turkey from the local deli -- foods you may have considered healthy -- often have lots of sodium.  You may notice a difference in taste when you choose low-sodium food and beverages. If things seem too bland, gradually introduce low-sodium foods and cut back on table salt until you reach your sodium goal. That'll give your palate time to adjust.  Using salt-free seasoning blends or herbs and spices may also ease the transition. It can take several weeks for your taste buds to get used to less salty foods.  Try these strategies to get started on the DASH diet:   Change gradually. If you now eat only one or two servings of fruits or vegetables a day, try to add a serving at lunch and one at dinner. Rather than switching to all whole grains, start by making one or two of your grain servings whole grains. Increasing " fruits, vegetables and whole grains gradually can also help prevent bloating or diarrhea that may occur if you aren't used to eating a diet with lots of fiber. You can also try over-the-counter products to help reduce gas from beans and vegetables.   Reward successes and forgive slip-ups. Reward yourself with a nonfood treat for your accomplishments -- rent a movie, purchase a book or get together with a friend. Everyone slips, especially when learning something new. Remember that changing your lifestyle is a long-term process. Find out what triggered your setback and then just  where you left off with the DASH diet.   Add physical activity. To boost your blood pressure lowering efforts even more, consider increasing your physical activity in addition to following the DASH diet. Combining both the DASH diet and physical activity makes it more likely that you'll reduce your blood pressure.   Get support if you need it. If you're having trouble sticking to your diet, talk to your doctor or dietitian about it. You might get some tips that will help you stick to the DASH diet.  Remember, healthy eating isn't an all-or-nothing proposition. What's most important is that, on average, you eat healthier foods with plenty of variety -- both to keep your diet nutritious and to avoid boredom or extremes. And with the DASH diet, you can have both.

## 2024-11-19 NOTE — PROGRESS NOTES
History of Present Illness    CHIEF COMPLAINT:  Humberto presents for follow-up of chronic conditions including benzodiazepine dependence, chronic ischemia, severe levoscoliosis, kyphosis, and hypertension.    HPI:  Humberto is a 69-year-old male being followed in the clinic for benzodiazepine dependence due to chronic ischemia. He has severe levoscoliosis and kyphosis, managed by pain management. Currently, he takes Norco 10/325 mg 4 times daily and Naprosyn for pain relief, with Naprosyn providing a partial response. He has difficulty ambulating due to levoscoliosis and thoracic kyphosis, and reports difficulty sleeping. Humberto continues swimming, albeit with difficulty. He has partially controlled hypertension and takes Benicar losartan 20 mg daily without side effects.    Humberto denies any GI problems, black stools, edema, chest pain, dyspnea, orthopnea, and hemoptysis.    MEDICATIONS:  Humberto is on Norco 10/325 mg 4 times daily for pain management. He is also taking Naprosyn (naproxen) for pain management with partial response. He is on Prevacid and Benicar (losartan) 20 mg daily for hypertension.    MEDICAL HISTORY:  Humberto has a history of chronic ischemia, levoscoliosis, thoracic kyphosis, and partially controlled hypertension. He received a pneumococcal vaccination in 2021.    TEST RESULTS:  Humberto recently underwent a Cologuard test with good results.      ROS:  General: -fever, -chills, -fatigue, -weight gain, -weight loss  Eyes: -vision changes, -redness, -discharge  ENT: -ear pain, -nasal congestion, -sore throat  Cardiovascular: -chest pain, -palpitations, -lower extremity edema  Respiratory: -cough, -shortness of breath  Gastrointestinal: -abdominal pain, -nausea, -vomiting, -diarrhea, -constipation, -blood in stool  Genitourinary: -dysuria, -hematuria, -frequency  Musculoskeletal: -joint pain, -muscle pain  Skin: -rash, -lesion  Neurological: -headache, -dizziness, -numbness, -tingling  Psychiatric: -anxiety,  "-depression, +sleep difficulty     Physical Exam  BP (!) 152/90 (BP Location: Left arm, Patient Position: Sitting)   Pulse 71   Temp 97.7 °F (36.5 °C) (Oral)   Ht 5' 10" (1.778 m)   Wt 95.5 kg (210 lb 8.6 oz)   SpO2 (!) 84%   BMI 30.21 kg/m²       General appearance - alert, well appearing, and in no distress, oriented to person, place, and time, and obese    Mental Status - alert, oriented to person, place, and time, normal mood, behavior, speech, dress, motor activity, and thought processes    Eyes - pupils equal and reactive, extraocular eye movements intact, no background diabetic retinopathy is noted     Ears - bilateral TM's and external ear canals normal     Nose - normal and patent, no erythema, discharge or polyps     Sinuses - Normal paranasal sinuses without tenderness     Throat - mucous membranes moist, pharynx normal without lesions and TMJ exam normal, no tenderness, normal excursion     Neck - supple, no significant adenopathy     Thyroid - thyroid is normal in size without nodules or tenderness, no tremor noted, no clonus noted      Chest - clear to auscultation, no wheezes, rales or rhonchi, symmetric air entry, no tachypnea, retractions or cyanosis     Heart - normal rate, regular rhythm, normal S1, S2, no murmurs, rubs, clicks or gallops, normal rate and regular rhythm, S1 and S2 normal, no murmurs noted, no gallops noted, no JVD     Abdomen - soft, nontender, nondistended, no masses or organomegaly  no bladder distension noted   Onychogryphosis of the right great toe with an improved onychomycosis, and mobility due to previous injuries and occult fractures.  Neurological - alert, oriented, normal speech, no focal findings or movement disorder noted   Severe dextroscoliosis and kyphosis of thoracic spine  Musculoskeletal - no joint tenderness, deformity or swelling     Extremities - peripheral pulses normal, no pedal edema, no clubbing or cyanosis     Skin - normal coloration and turgor, no " rashes, no suspicious skin lesions noted  1. Essential hypertension  olmesartan (BENICAR) 20 MG tablet    tadalafiL (CIALIS) 20 MG Tab    DISCONTINUED: tadalafiL (CIALIS) 20 MG Tab      2. TORRIE (generalized anxiety disorder)        3. Stage 3a chronic kidney disease        4. Mixed hyperlipidemia  Lipid Panel    Comprehensive Metabolic Panel    CBC Auto Differential      5. Degeneration of intervertebral disc of lumbar region with discogenic back pain and lower extremity pain        6. Levoscoliosis of lumbar spine        7. Other secondary kyphosis, thoracolumbar region        8. BMI 30.0-30.9,adult              Assessment & Plan    Assessed chronic ischemia with benzodiazepine dependence  Evaluated severe levoscoliosis and kyphosis, currently managed with pain medication  Considered discontinuation of naproxen due to moderate blood pressure elevation  Reviewed hypertension management, currently on losartan  Noted difficulty with ambulation and sleep due to spinal issues    ESSENTIAL HYPERTENSION:  - Humberto to maintain low-salt diet.  - Humberto to keep a blood pressure log.  - Continued Benicar (losartan) 20 mg daily.    CHRONIC PAIN:  - Discontinued Naprosyn (naproxen).  - Continued Norco 10/325 mg 4 times daily.  - Continued Prevacid.    DIFFICULTY IN WALKING:  - Recommend continuing swimming, despite difficulty.    IMMUNIZATION:  - RSV vaccination administered.  - Shingles vaccine administered.  - Influenza vaccination administered.  - COVID-19 booster administered.    FOLLOW-UP:  - Follow up in 4 months.  - Follow up in 4 months for labs to follow chemistry.   Counseled patient on habit forming potential of opiates, risk of sedation, respiratory suppression or arrest especially if used in conjunction with benzodiazepines or alcohol.  Counseled patient to avoid driving while on the medication, rules of the pain contract and need for routine 3 month follow ups.  Patient agrees to all aspects of the plan of care and  wishes to proceed with therapy.  The plan is always to use alternatives less habit forming, to utilize interventions and therapies that reduce pain as an adjunctive treatment, and limit and wean the dose of narcotics as soon as able and appropriate.  Advance Care Planning     Date: 11/19/2024     4Ms for Medical Decision-Making in Older Adults    Last Completed EAWV: None    MOBILITY:  Get Up and Go:       No data to display              Activities of Daily Living:       No data to display              Whisper Test:       No data to display              Disability Status:      7/3/2017    11:34 AM   Disability Status   Are you deaf or do you have serious difficulty hearing? Y   Are you blind or do you have serious difficulty seeing, even when wearing glasses? N   Because of a physical, mental, or emotional condition, do you have serious difficulty concentrating, remembering, or making decisions? N   Do you have serious difficulty walking or climbing stairs? Y   Do you have difficulty dressing or bathing? N   Because of a physical, mental, or emotional condition, do you have difficulty doing errands alone such as visiting a doctor's office or shopping? Y     Nutrition Screening:       No data to display             Screening Score: 0-7 Malnourished, 8-11 At Risk, 12-14 Normal  Fall Risk:      11/19/2024    11:30 AM 5/16/2024    10:30 AM 2/14/2024    11:00 AM   Fall Risk Assessment - Outpatient   Mobility Status Ambulatory Ambulatory Ambulatory   Number of falls 0 0 0   Identified as fall risk False False False           MENTATION:   Depression Patient Health Questionnaire:      2/14/2024    11:16 AM   Depression Patient Health Questionnaire   Over the last two weeks how often have you been bothered by little interest or pleasure in doing things Not at all   Over the last two weeks how often have you been bothered by feeling down, depressed or hopeless Not at all   PHQ-2 Total Score 0     Has Dementia Dx: No  Has  Anxiety Dx: Yes    Cognitive Function Screening:       No data to display              Cognitive Function Screening Total - Less than 4 = Abnormal,  Greater than or equal to 4 = Normal        MEDICATIONS:  High Risk Medications:  Total Active Medications: 2  ALPRAZolam - 0.25 MG  HYDROcodone-acetaminophen -  mg    WHAT MATTERS MOST:  Advance Care Planning   ACP Status:   Patient does not have an ACP conversation on file  Living Will: No  Power of : No  [place]: No    Advance Care Planning     Date: 11/19/2024    [facility]  I engaged the patient in a voluntary conversation about advance care planning and we specifically addressed what the goals of care would be moving forward, in light of the patient's change in clinical status, .  We did not specifically address the patient's likely prognosis, which is good .  We explored the patient's values and preferences for future care.  The patient endorses that what is most important right now is to focus on spending time at home, avoiding the hospital, remaining as independent as possible, symptom/pain control, quality of life, even if it means sacrificing a little time, and extending life as long as possible, even it it means sacrificing quality    Accordingly, we have decided that the best plan to meet the patient's goals includes continuing with treatment    A total of 25 min was spent on advance care planning, goals of care discussion, emotional support, formulating and communicating prognosis and exploring burden/benefit of various approaches of treatment. This discussion occurred on a fully voluntary basis with the verbal consent of the patient and/or family.             This note was generated with the assistance of ambient listening technology. Verbal consent was obtained by the patient and accompanying visitor(s) for the recording of patient appointment to facilitate this note. I attest to having reviewed and edited the generated note for accuracy, though  some syntax or spelling errors may persist. Please contact the author of this note for any clarification.Discussed health maintenance guidelines appropriate for age.

## 2024-11-21 ENCOUNTER — HOSPITAL ENCOUNTER (OUTPATIENT)
Dept: RADIOLOGY | Facility: HOSPITAL | Age: 69
Discharge: HOME | End: 2024-11-21
Attending: ORTHOPAEDIC SURGERY
Payer: MEDICARE

## 2024-11-21 ENCOUNTER — OFFICE VISIT (OUTPATIENT)
Dept: ORTHOPEDICS | Facility: CLINIC | Age: 69
End: 2024-11-21
Payer: MEDICARE

## 2024-11-21 DIAGNOSIS — M25.522 LEFT ELBOW PAIN: ICD-10-CM

## 2024-11-21 DIAGNOSIS — M25.522 LEFT ELBOW PAIN: Primary | ICD-10-CM

## 2024-11-21 DIAGNOSIS — M77.02 MEDIAL EPICONDYLITIS OF ELBOW, LEFT: Primary | ICD-10-CM

## 2024-11-21 PROCEDURE — 99999 PR PBB SHADOW E&M-EST. PATIENT-LVL I: CPT | Mod: PBBFAC,HCNC,, | Performed by: ORTHOPAEDIC SURGERY

## 2024-11-21 PROCEDURE — 73080 X-RAY EXAM OF ELBOW: CPT | Mod: 26,HCNC,LT, | Performed by: RADIOLOGY

## 2024-11-21 PROCEDURE — 73080 X-RAY EXAM OF ELBOW: CPT | Mod: TC,HCNC,PO,LT

## 2024-11-21 RX ORDER — TRIAMCINOLONE ACETONIDE 40 MG/ML
40 INJECTION, SUSPENSION INTRA-ARTICULAR; INTRAMUSCULAR
Status: DISCONTINUED | OUTPATIENT
Start: 2024-11-21 | End: 2024-11-21 | Stop reason: HOSPADM

## 2024-11-21 RX ADMIN — TRIAMCINOLONE ACETONIDE 40 MG: 40 INJECTION, SUSPENSION INTRA-ARTICULAR; INTRAMUSCULAR at 01:11

## 2024-11-21 NOTE — PROCEDURES
Tendon Origin: L elbow    Date/Time: 11/21/2024 1:15 PM    Performed by: Hebert Bermudez MD  Authorized by: Hebert Bermudez MD    Consent Done?:  Yes (Verbal)  Timeout: prior to procedure the correct patient, procedure, and site was verified    Indications:  Pain  Site marked: the procedure site was marked    Timeout: prior to procedure the correct patient, procedure, and site was verified    Location:  Elbow  Site:  L elbow  Prep: patient was prepped and draped in usual sterile fashion    Ultrasonic Guidance for Needle Placement?: No    Needle size:  22 G  Approach:  Anteromedial  Medications:  40 mg triamcinolone acetonide 40 mg/mL  Patient tolerance:  Patient tolerated the procedure well with no immediate complications

## 2024-11-21 NOTE — PROGRESS NOTES
Past Medical History:   Diagnosis Date    Dermatitis, atopic     Essential hypertension 11/19/2013    GERD (gastroesophageal reflux disease)     Hypertension        Past Surgical History:   Procedure Laterality Date    matthew      caudal     HERNIA REPAIR      WISDOM TOOTH EXTRACTION         Current Outpatient Medications   Medication Sig    ALPRAZolam (XANAX) 0.25 MG tablet TAKE 1 TABLET BY MOUTH 2 TIMES DAILY AS NEEDED FOR ANXIETY.    fluocinonide 0.05% (LIDEX) 0.05 % cream Apply topically 2 (two) times daily. Do not use longer than 2 weeks for 10 days    HYDROcodone-acetaminophen (NORCO)  mg per tablet Take 1 tablet by mouth 3 (three) times daily as needed.    lansoprazole (PREVACID) 15 MG capsule Take 2 capsules (30 mg total) by mouth once daily.    lidocaine-prilocaine (EMLA) cream     naproxen (NAPROSYN) 250 MG tablet Take 250 mg by mouth 2 (two) times daily.    olmesartan (BENICAR) 20 MG tablet Take 1 tablet (20 mg total) by mouth once daily.    tadalafiL (CIALIS) 20 MG Tab Take 1 tablet (20 mg total) by mouth once daily.    tamsulosin (FLOMAX) 0.4 mg Cap TAKE TWO CAPSULES BY MOUTH ONCE A DAY     No current facility-administered medications for this visit.       Review of patient's allergies indicates:  No Known Allergies    Family History   Problem Relation Name Age of Onset    Early death Brother      Arthritis Mother      Cancer Maternal Grandmother          abdominal    Cancer Maternal Grandfather  60        lung    Liver disease Paternal Grandfather         Social History     Socioeconomic History    Marital status:    Occupational History     Employer: [employer]   Tobacco Use    Smoking status: Former    Smokeless tobacco: Never   Substance and Sexual Activity    Alcohol use: Yes     Alcohol/week: 2.0 standard drinks of alcohol     Types: 2 Glasses of wine per week    Drug use: No    Sexual activity: Yes     Partners: Female       Chief Complaint:   No chief complaint on  file.      History of present illness:  This is a 69-year-old right-hand-dominant male seen for left elbow pain.  Saw him a few years ago for right medial epicondylitis.  Resolved with some anti-inflammatories and an injection.  Left elbow is very similar.  Pain along the medial aspect.  Started a few months ago.  Patient has swims a lot.  He has been trying a tennis elbow strap and recently got a wrist brace as well.  Pain can be up to a 9/10 at times.      Review of Systems:  Musculoskeletal:  See HPI          Physical Examination:    Vital Signs:    There were no vitals filed for this visit.      There is no height or weight on file to calculate BMI.    This a well-developed, well nourished patient in no acute distress.  They are alert and oriented and cooperative to examination.  Pt. walks without an antalgic gait.      Examination of the left elbow shows no signs of rashes or erythema. The patient has no masses, ecchymosis, or effusion. The patient has full range of motion from 0-160°. Patient has full pronation and supination. Patient is moderately tender along the medial epicondyle and nontender over the lateral epicondyle. Nontender over the olecranon process.  Nontender along the course of the UCL. Patient has a negative valgus stress test and milking maneuver. Negative Tinel's sign over the cubital tunnel. 2+ radial pulse. Intact light touch sensation.     X-rays:  X-rays of the left elbow is ordered and reviewed which shows mild degenerative change     Assessment::  Left medial epicondylitis    Plan:  I reviewed the findings with him today.   Agreed to do an injection since it helped him previously on the other side.  Continue the braces as needed.    This note was created using [employer] voice recognition software that occasionally misinterpreted phrases or words.    Consult note is delivered via Epic messaging service.

## 2024-11-22 DIAGNOSIS — I10 ESSENTIAL HYPERTENSION: ICD-10-CM

## 2024-11-22 RX ORDER — OLMESARTAN MEDOXOMIL 20 MG/1
20 TABLET ORAL
Qty: 90 TABLET | Refills: 2 | OUTPATIENT
Start: 2024-11-22

## 2025-02-12 ENCOUNTER — LAB VISIT (OUTPATIENT)
Dept: LAB | Facility: HOSPITAL | Age: 70
End: 2025-02-12
Attending: FAMILY MEDICINE
Payer: MEDICARE

## 2025-02-12 DIAGNOSIS — E78.2 MIXED HYPERLIPIDEMIA: ICD-10-CM

## 2025-02-12 LAB
ALBUMIN SERPL BCP-MCNC: 3.9 G/DL (ref 3.5–5.2)
ALP SERPL-CCNC: 79 U/L (ref 40–150)
ALT SERPL W/O P-5'-P-CCNC: 15 U/L (ref 10–44)
ANION GAP SERPL CALC-SCNC: 9 MMOL/L (ref 8–16)
AST SERPL-CCNC: 21 U/L (ref 10–40)
BASOPHILS # BLD AUTO: 0.03 K/UL (ref 0–0.2)
BASOPHILS NFR BLD: 0.6 % (ref 0–1.9)
BILIRUB SERPL-MCNC: 0.6 MG/DL (ref 0.1–1)
BUN SERPL-MCNC: 14 MG/DL (ref 8–23)
CALCIUM SERPL-MCNC: 10.3 MG/DL (ref 8.7–10.5)
CHLORIDE SERPL-SCNC: 100 MMOL/L (ref 95–110)
CHOLEST SERPL-MCNC: 188 MG/DL (ref 120–199)
CHOLEST/HDLC SERPL: 4.8 {RATIO} (ref 2–5)
CO2 SERPL-SCNC: 29 MMOL/L (ref 23–29)
CREAT SERPL-MCNC: 1 MG/DL (ref 0.5–1.4)
DIFFERENTIAL METHOD BLD: ABNORMAL
EOSINOPHIL # BLD AUTO: 0.2 K/UL (ref 0–0.5)
EOSINOPHIL NFR BLD: 3.1 % (ref 0–8)
ERYTHROCYTE [DISTWIDTH] IN BLOOD BY AUTOMATED COUNT: 13.3 % (ref 11.5–14.5)
EST. GFR  (NO RACE VARIABLE): >60 ML/MIN/1.73 M^2
GLUCOSE SERPL-MCNC: 106 MG/DL (ref 70–110)
HCT VFR BLD AUTO: 42.5 % (ref 40–54)
HDLC SERPL-MCNC: 39 MG/DL (ref 40–75)
HDLC SERPL: 20.7 % (ref 20–50)
HGB BLD-MCNC: 14.3 G/DL (ref 14–18)
IMM GRANULOCYTES # BLD AUTO: 0.07 K/UL (ref 0–0.04)
IMM GRANULOCYTES NFR BLD AUTO: 1.5 % (ref 0–0.5)
LDLC SERPL CALC-MCNC: 129.2 MG/DL (ref 63–159)
LYMPHOCYTES # BLD AUTO: 0.9 K/UL (ref 1–4.8)
LYMPHOCYTES NFR BLD: 18.4 % (ref 18–48)
MCH RBC QN AUTO: 34.3 PG (ref 27–31)
MCHC RBC AUTO-ENTMCNC: 33.6 G/DL (ref 32–36)
MCV RBC AUTO: 102 FL (ref 82–98)
MONOCYTES # BLD AUTO: 0.6 K/UL (ref 0.3–1)
MONOCYTES NFR BLD: 12.8 % (ref 4–15)
NEUTROPHILS # BLD AUTO: 3 K/UL (ref 1.8–7.7)
NEUTROPHILS NFR BLD: 63.6 % (ref 38–73)
NONHDLC SERPL-MCNC: 149 MG/DL
NRBC BLD-RTO: 0 /100 WBC
PLATELET # BLD AUTO: 329 K/UL (ref 150–450)
PMV BLD AUTO: 9.3 FL (ref 9.2–12.9)
POTASSIUM SERPL-SCNC: 4.5 MMOL/L (ref 3.5–5.1)
PROT SERPL-MCNC: 7.2 G/DL (ref 6–8.4)
RBC # BLD AUTO: 4.17 M/UL (ref 4.6–6.2)
SODIUM SERPL-SCNC: 138 MMOL/L (ref 136–145)
TRIGL SERPL-MCNC: 99 MG/DL (ref 30–150)
WBC # BLD AUTO: 4.77 K/UL (ref 3.9–12.7)

## 2025-02-12 PROCEDURE — 85025 COMPLETE CBC W/AUTO DIFF WBC: CPT | Performed by: FAMILY MEDICINE

## 2025-02-12 PROCEDURE — 80053 COMPREHEN METABOLIC PANEL: CPT | Performed by: FAMILY MEDICINE

## 2025-02-12 PROCEDURE — 36415 COLL VENOUS BLD VENIPUNCTURE: CPT | Mod: PO | Performed by: FAMILY MEDICINE

## 2025-02-12 PROCEDURE — 80061 LIPID PANEL: CPT | Performed by: FAMILY MEDICINE

## 2025-02-18 ENCOUNTER — RESULTS FOLLOW-UP (OUTPATIENT)
Dept: PRIMARY CARE CLINIC | Facility: CLINIC | Age: 70
End: 2025-02-18

## 2025-02-19 ENCOUNTER — OFFICE VISIT (OUTPATIENT)
Dept: PRIMARY CARE CLINIC | Facility: CLINIC | Age: 70
End: 2025-02-19
Payer: MEDICARE

## 2025-02-19 VITALS
BODY MASS INDEX: 27.69 KG/M2 | HEART RATE: 96 BPM | DIASTOLIC BLOOD PRESSURE: 80 MMHG | OXYGEN SATURATION: 97 % | HEIGHT: 70 IN | SYSTOLIC BLOOD PRESSURE: 140 MMHG | TEMPERATURE: 98 F | WEIGHT: 193.44 LBS

## 2025-02-19 DIAGNOSIS — F13.20 SEDATIVE, HYPNOTIC OR ANXIOLYTIC DEPENDENCE, UNCOMPLICATED: Primary | ICD-10-CM

## 2025-02-19 DIAGNOSIS — N18.31 STAGE 3A CHRONIC KIDNEY DISEASE: ICD-10-CM

## 2025-02-19 DIAGNOSIS — K59.02 CONSTIPATION BY OUTLET OBSTRUCTION: ICD-10-CM

## 2025-02-19 DIAGNOSIS — F41.1 GAD (GENERALIZED ANXIETY DISORDER): ICD-10-CM

## 2025-02-19 DIAGNOSIS — N40.1 BENIGN PROSTATIC HYPERPLASIA WITH LOWER URINARY TRACT SYMPTOMS, SYMPTOM DETAILS UNSPECIFIED: ICD-10-CM

## 2025-02-19 DIAGNOSIS — Z98.1 S/P LUMBAR SPINAL FUSION: ICD-10-CM

## 2025-02-19 DIAGNOSIS — G54.1 NEUROPATHY, SACRAL PLEXUS: ICD-10-CM

## 2025-02-19 DIAGNOSIS — Z12.5 PROSTATE CANCER SCREENING: ICD-10-CM

## 2025-02-19 DIAGNOSIS — D70.4 CYCLICAL NEUTROPENIA: ICD-10-CM

## 2025-02-19 DIAGNOSIS — M54.6 CHRONIC MIDLINE THORACIC BACK PAIN: ICD-10-CM

## 2025-02-19 DIAGNOSIS — G89.29 CHRONIC MIDLINE THORACIC BACK PAIN: ICD-10-CM

## 2025-02-19 PROCEDURE — 3008F BODY MASS INDEX DOCD: CPT | Mod: CPTII,S$GLB,, | Performed by: FAMILY MEDICINE

## 2025-02-19 PROCEDURE — 1101F PT FALLS ASSESS-DOCD LE1/YR: CPT | Mod: CPTII,S$GLB,, | Performed by: FAMILY MEDICINE

## 2025-02-19 PROCEDURE — 3077F SYST BP >= 140 MM HG: CPT | Mod: CPTII,S$GLB,, | Performed by: FAMILY MEDICINE

## 2025-02-19 PROCEDURE — 1125F AMNT PAIN NOTED PAIN PRSNT: CPT | Mod: CPTII,S$GLB,, | Performed by: FAMILY MEDICINE

## 2025-02-19 PROCEDURE — 1159F MED LIST DOCD IN RCRD: CPT | Mod: CPTII,S$GLB,, | Performed by: FAMILY MEDICINE

## 2025-02-19 PROCEDURE — 1160F RVW MEDS BY RX/DR IN RCRD: CPT | Mod: CPTII,S$GLB,, | Performed by: FAMILY MEDICINE

## 2025-02-19 PROCEDURE — 99214 OFFICE O/P EST MOD 30 MIN: CPT | Mod: S$GLB,,, | Performed by: FAMILY MEDICINE

## 2025-02-19 PROCEDURE — 3079F DIAST BP 80-89 MM HG: CPT | Mod: CPTII,S$GLB,, | Performed by: FAMILY MEDICINE

## 2025-02-19 PROCEDURE — G2211 COMPLEX E/M VISIT ADD ON: HCPCS | Mod: S$GLB,,, | Performed by: FAMILY MEDICINE

## 2025-02-19 PROCEDURE — 3288F FALL RISK ASSESSMENT DOCD: CPT | Mod: CPTII,S$GLB,, | Performed by: FAMILY MEDICINE

## 2025-02-19 PROCEDURE — 99999 PR PBB SHADOW E&M-EST. PATIENT-LVL III: CPT | Mod: PBBFAC,,, | Performed by: FAMILY MEDICINE

## 2025-02-19 RX ORDER — ALPRAZOLAM 0.25 MG/1
0.25 TABLET ORAL 2 TIMES DAILY PRN
Qty: 60 TABLET | Refills: 3 | Status: SHIPPED | OUTPATIENT
Start: 2025-03-18

## 2025-02-19 RX ORDER — NALOXEGOL OXALATE 25 MG/1
25 TABLET, FILM COATED ORAL DAILY
Qty: 30 TABLET | Refills: 2 | Status: SHIPPED | OUTPATIENT
Start: 2025-02-19

## 2025-02-19 RX ORDER — TAMSULOSIN HYDROCHLORIDE 0.4 MG/1
CAPSULE ORAL
Qty: 180 CAPSULE | Refills: 4 | Status: SHIPPED | OUTPATIENT
Start: 2025-02-19

## 2025-02-26 DIAGNOSIS — N18.31 STAGE 3A CHRONIC KIDNEY DISEASE: ICD-10-CM

## 2025-03-04 NOTE — PROGRESS NOTES
[facility]     Subjective:    Chief Complaint:   Chief Complaint   Patient presents with    Follow-up       History of Present Illness:  69 y.o. male presents for multiple issues.   Encounter Diagnoses   Name Primary?    Sedative, hypnotic or anxiolytic dependence, uncomplicated Yes    Stage 3a chronic kidney disease     Cyclical neutropenia     S/P lumbar spinal fusion     Chronic midline thoracic back pain     Neuropathy, sacral plexus     Prostate cancer screening     Benign prostatic hyperplasia with lower urinary tract symptoms, symptom details unspecified     TORRIE (generalized anxiety disorder)     Constipation by outlet obstruction      He complains of urinary hesitancy, weak stream, double and incomplete voiding, intermittent urinary frequency and nocturia times 2. AUA Bother Score is 5  Anxiety better w/ Medications.  HT.Patient denies any exertional chest pain, dyspnea, palpitations, syncope, orthopnea, edema or paroxysmal nocturnal dyspnea.  .        I reviewed the patients chart dating back for the past few appointments. See above.    The following portions of the patient's history were reviewed and updated as appropriate: allergies, current medications, past family history, past medical history, past social history, past surgical history and problem list.    He denies chest pain upon exertion, dyspnea, nausea, vomiting, diaphoresis, and syncope. No pleuritic chest pain, unilateral leg swelling, calf tenderness, or calf pain.      Past Medical History:   Diagnosis Date    Dermatitis, atopic     Essential hypertension 11/19/2013    GERD (gastroesophageal reflux disease)     Hypertension        Past Surgical History:   Procedure Laterality Date    matthew      caudal     HERNIA REPAIR      WISDOM TOOTH EXTRACTION         Social History  Social History     Tobacco Use    Smoking status: Former    Smokeless tobacco: Never   Substance Use Topics    Alcohol use: Yes     Alcohol/week: 2.0 standard drinks  "of alcohol     Types: 2 Glasses of wine per week    Drug use: No       Family History   Problem Relation Name Age of Onset    Early death Brother      Arthritis Mother      Cancer Maternal Grandmother          abdominal    Cancer Maternal Grandfather  60        lung    Liver disease Paternal Grandfather       Review of patient's allergies indicates:  No Known Allergies    Review of Systems [Negative unless checked off]    General ROS: []fever, []chills, []weight loss, []malaise/fatigue.  ENT ROS: []congestion, []rhinorrhea,  []sore throat, []neck pain, []hearing loss.  Ophthalmic ROS:[]blurry vision, [] double vision, []photophobia, []eye pain.  Respiratory ROS: []cough, []pleuritic chest pain, []shortness of breath, []wheezing.  CVS ROS:[]chest pain, []dyspnea on exertion, []palpitations, []orthopnea, []leg swelling, []PND.   GI ROS: []nausea, []vomiting, [] epigastric pain, []abd pain, []diarrhea, []constipation, []blood/melena in stool.   Urology ROS:[]dysuria, [x]frequency, []flank pain,[] trouble voiding, [] hematuria.   MSK ROS: []myalgias, []joint pain, []muscular weakness,  []back pain, [] falls.   Derm ROS: []pruritis, []rash, []jaundice.  Neurological:[]dizziness,[]numbness,[]loss of consciousness, []weakness  []headaches.   Psych ROS: []hallucinations, []depression, [x]anxiety, []suicidal ideation.    Physical Examination  BP (!) 140/80 (BP Location: Left arm, Patient Position: Sitting)   Pulse 96   Temp 98.3 °F (36.8 °C) (Oral)   Ht 5' 10" (1.778 m)   Wt 87.8 kg (193 lb 7.3 oz)   SpO2 97%   BMI 27.76 kg/m²   Wt Readings from Last 3 Encounters:   02/19/25 87.8 kg (193 lb 7.3 oz)   11/19/24 95.5 kg (210 lb 8.6 oz)   05/16/24 90.7 kg (199 lb 15.3 oz)     BP Readings from Last 3 Encounters:   02/19/25 (!) 140/80   11/19/24 (!) 152/90   05/16/24 112/78     Estimated body mass index is 27.76 kg/m² as calculated from the following:    Height as of this encounter: 5' 10" (1.778 m).    Weight as of this " encounter: 87.8 kg (193 lb 7.3 oz).     General appearance: alert, cooperative, no distress  Eyes: pupils equal and reactive, extraocular eye movements intact   Ears: bilateral TM's and external ear canals normal   Nose: normal and patent, no erythema, discharge or polyps   Sinuses: Normal paranasal sinuses without tenderness   Throat: mucous membranes moist, pharynx normal without lesions   Neck: no thyromegaly, trachea midline  Lungs: Severe kyphos-scoliosis  interferes with tidal volume, clear to auscultation, no wheezes, rales or rhonchi, no dullness to percussion bilaterally.  Heart: normal rate, regular rhythm, normal S1, S2, no murmurs, rubs, clicks or gallops, no displacement of the PMI.  Abdomen: soft, nontender, nondistended, no masses or organomegaly No rigidity, rebound, or guarding.   Back: full range of motion, no tenderness, palpable spasm or pain on motion   Extremities: peripheral pulses normal, no pedal edema, no clubbing or cyanosis   Feet: warm, good capillary refill.  Neurological:alert, oriented, normal speech, no focal findings or movement disorder noted   Psychiatric: alert, oriented to person, place, and time  Integument: normal coloration and turgor, no rashes, no suspicious skin lesions noted.    Data reviewed    Previous medical records reviewed and summarized above in HPI.    274}    Laboratory    I have reviewed old labs below:   274}  Lab Results   Component Value Date    WBC 4.77 02/12/2025    HGB 14.3 02/12/2025    HCT 42.5 02/12/2025     (H) 02/12/2025     02/12/2025     02/12/2025    K 4.5 02/12/2025     02/12/2025    CALCIUM 10.3 02/12/2025    PHOS 3.9 07/07/2017    CO2 29 02/12/2025     02/12/2025    BUN 14 02/12/2025    CREATININE 1.0 02/12/2025    ANIONGAP 9 02/12/2025    ESTGFRAFRICA >60.0 03/22/2022    EGFRNONAA >60.0 03/22/2022    PROT 7.2 02/12/2025    ALBUMIN 3.9 02/12/2025    BILITOT 0.6 02/12/2025    ALKPHOS 79 02/12/2025    ALT 15  02/12/2025    AST 21 02/12/2025    INR 1.0 06/05/2017    CHOL 188 02/12/2025    TRIG 99 02/12/2025    HDL 39 (L) 02/12/2025    LDLCALC 129.2 02/12/2025    PSA 1.1 01/30/2024    HGBA1C 5.3 01/30/2024     CBC : WBC with cyclic changes.The patient denies abnormal bruising or abnormal bleeding from any body orifice such as bleeding from nose or gums, blood in urine or stool, or melena, hemoptysis or hematemesis.    Imaging/Tracing: I have reviewed the pertinent results/findings and my personal findings are below:  274}    Assessment/Plan     274}    Humberto Cantor is a 69 y.o. male who presents to clinic with:    1. Sedative, hypnotic or anxiolytic dependence, uncomplicated    2. Stage 3a chronic kidney disease    3. Cyclical neutropenia    4. S/P lumbar spinal fusion    5. Chronic midline thoracic back pain    6. Neuropathy, sacral plexus    7. Prostate cancer screening    8. Benign prostatic hyperplasia with lower urinary tract symptoms, symptom details unspecified    9. TORRIE (generalized anxiety disorder)    10. Constipation by outlet obstruction         Diagnostic impression remarks       1. Sedative, hypnotic or anxiolytic dependence, uncomplicated    2. Stage 3a chronic kidney disease    3. Cyclical neutropenia    4. S/P lumbar spinal fusion    5. Chronic midline thoracic back pain    6. Neuropathy, sacral plexus  - CBC Without Differential; Future    7. Prostate cancer screening  - PSA, Screening; Future    8. Benign prostatic hyperplasia with lower urinary tract symptoms, symptom details unspecified  - tamsulosin (FLOMAX) 0.4 mg Cap; TAKE TWO CAPSULES BY MOUTH ONCE A DAY  Dispense: 180 capsule; Refill: 4    9. TORRIE (generalized anxiety disorder)  - ALPRAZolam (XANAX) 0.25 MG tablet; Take 1 tablet (0.25 mg total) by mouth 2 (two) times daily as needed for Anxiety.  Dispense: 60 tablet; Refill: 3    10. Constipation by outlet obstruction  - naloxegoL (MOVANTIK) 25 mg tablet; Take 1 tablet (25 mg total) by mouth once  daily.  Dispense: 30 tablet; Refill: 2      Medication Monitoring: In today's visit, monitoring for drug toxicity was accomplished. Proper use of medications was discussed.     Counseling: Counseling included discussion regarding imaging findings, diagnosis, possibilities, treatment options, medications, risks, and benefits. He had many questions regarding the options and long-term effects. All questions were answered. He expressed understanding after counseling regarding the diagnosis and recommendations. He was capable and demonstrated competence with understanding of these options. Shared decision making was performed resulting in him choosing the current treatment plan.     He was counseled about the importance of healthy dietary habits as well as routine physical activity and exercise for better health outcomes. I also discussed the importance of cancer screening.   4Ms for Medical Decision-Making in Older Adults    Last Completed EAWV:  None    MEDICATIONS:  High Risk Medications:  Total Active Medications: 1  ALPRAZolam - 0.25 MG  HYDROcodone-acetaminophen -  mg    MOBILITY:  Activities of Daily Living:       No data to display              Fall Risk:      2/19/2025    10:30 AM 11/19/2024    11:30 AM 5/16/2024    10:30 AM   Fall Risk Assessment - Outpatient   Mobility Status Ambulatory Ambulatory Ambulatory   Number of falls 0 0 0   Identified as fall risk False False False     Disability Status:      7/3/2017    11:34 AM   Disability Status   Are you deaf or do you have serious difficulty hearing? Y   Are you blind or do you have serious difficulty seeing, even when wearing glasses? N   Because of a physical, mental, or emotional condition, do you have serious difficulty concentrating, remembering, or making decisions? N   Do you have serious difficulty walking or climbing stairs? Y   Do you have difficulty dressing or bathing? N   Because of a physical, mental, or emotional condition, do you have  difficulty doing errands alone such as visiting a doctor's office or shopping? Y     Nutrition Screening:       No data to display             Screening Score: 0-7 Malnourished, 8-11 At Risk, 12-14 Normal  Get Up and Go:       No data to display              Whisper Test:       No data to display                    MENTATION:   Has Dementia Dx: No  Has Anxiety Dx: Yes    Depression Patient Health Questionnaire:      2/19/2025    10:41 AM   Depression Patient Health Questionnaire   Over the last two weeks how often have you been bothered by little interest or pleasure in doing things Not at all   Over the last two weeks how often have you been bothered by feeling down, depressed or hopeless Not at all   PHQ-2 Total Score 0     Cognitive Function Screening:       No data to display              Cognitive Function Screening Total - Less than 4 = Abnormal,  Greater than or equal to 4 = Normal        WHAT MATTERS MOST:  Advance Care Planning   ACP Status:   Patient has had an ACP conversation  Living Will: No  Power of : No  La[place]: No    What is most important right now is to focus on spending time at homeavoiding the hospitalremaining as independent as possiblesymptom/pain controlquality of life, even if it means sacrificing a little timeextending life as long as possible, even it it means sacrificing quality    Accordingly, we have decided that the best plan to meet the patient's goals includes continuing with treatment      What matters most to patient today is: pain free in law the           I also discussed the importance of close follow up to discuss labs, change or modify his medications if needed, monitor side effects, and further evaluation of medical problems.     Additional workup planned: see labs ordered below.    See below for labs and meds ordered with associated diagnosis      Medication List with Changes/Refills   New Medications    NALOXEGOL (MOVANTIK) 25 MG TABLET    Take 1 tablet (25 mg  total) by mouth once daily.   Current Medications    FLUOCINONIDE 0.05% (LIDEX) 0.05 % CREAM    Apply topically 2 (two) times daily. Do not use longer than 2 weeks for 10 days    HYDROCODONE-ACETAMINOPHEN (NORCO)  MG PER TABLET    Take 1 tablet by mouth 3 (three) times daily as needed.    LANSOPRAZOLE (PREVACID) 15 MG CAPSULE    Take 2 capsules (30 mg total) by mouth once daily.    LIDOCAINE-PRILOCAINE (EMLA) CREAM        NAPROXEN (NAPROSYN) 250 MG TABLET    Take 250 mg by mouth 2 (two) times daily.    OLMESARTAN (BENICAR) 20 MG TABLET    Take 1 tablet (20 mg total) by mouth once daily.    TADALAFIL (CIALIS) 20 MG TAB    Take 1 tablet (20 mg total) by mouth once daily.   Changed and/or Refilled Medications    Modified Medication Previous Medication    ALPRAZOLAM (XANAX) 0.25 MG TABLET ALPRAZolam (XANAX) 0.25 MG tablet       Take 1 tablet (0.25 mg total) by mouth 2 (two) times daily as needed for Anxiety.    TAKE 1 TABLET BY MOUTH 2 TIMES DAILY AS NEEDED FOR ANXIETY.    TAMSULOSIN (FLOMAX) 0.4 MG CAP tamsulosin (FLOMAX) 0.4 mg Cap       TAKE TWO CAPSULES BY MOUTH ONCE A DAY    TAKE TWO CAPSULES BY MOUTH ONCE A DAY     Modified Medications    Modified Medication Previous Medication    ALPRAZOLAM (XANAX) 0.25 MG TABLET ALPRAZolam (XANAX) 0.25 MG tablet       Take 1 tablet (0.25 mg total) by mouth 2 (two) times daily as needed for Anxiety.    TAKE 1 TABLET BY MOUTH 2 TIMES DAILY AS NEEDED FOR ANXIETY.    TAMSULOSIN (FLOMAX) 0.4 MG CAP tamsulosin (FLOMAX) 0.4 mg Cap       TAKE TWO CAPSULES BY MOUTH ONCE A DAY    TAKE TWO CAPSULES BY MOUTH ONCE A DAY       Follow up in about 6 months (around 8/19/2025) for labs before next visit. for further workup and reassessment if labs and tests obtained are stable or sooner as needed. He was instructed to call the clinic or go to the emergency department if his symptoms do not improve, worsens, or if new symptoms develop. Patient knows to call any time if an emergency arises.  "Shared decision making occurred and he verbalized understanding in agreement with this plan.     Documentation entered by me for this encounter may have been done in part using speech-recognition technology. Although I have made an effort to ensure accuracy, "sound like" errors may exist and should be interpreted in context.       Saud Sosa MD     Discussed health maintenance guidelines appropriate for age.  Discussed health maintenance guidelines appropriate for age.    "

## 2025-03-19 ENCOUNTER — PATIENT MESSAGE (OUTPATIENT)
Dept: ADMINISTRATIVE | Facility: HOSPITAL | Age: 70
End: 2025-03-19
Payer: MEDICARE

## 2025-03-24 DIAGNOSIS — L25.5 DERMATITIS DUE TO PLANTS: ICD-10-CM

## 2025-03-24 RX ORDER — FLUOCINONIDE 0.5 MG/G
CREAM TOPICAL 2 TIMES DAILY
Qty: 60 G | Refills: 1 | Status: SHIPPED | OUTPATIENT
Start: 2025-03-24 | End: 2025-04-03

## 2025-03-24 NOTE — TELEPHONE ENCOUNTER
----- Message from Reyna sent at 3/24/2025  2:12 PM CDT -----  Regarding: Cream for poison  Good Afternoon, pt wants a refill on Fluorocinide 0.05% and he would like the 60g please send it in to [facility].

## 2025-03-31 ENCOUNTER — OFFICE VISIT (OUTPATIENT)
Dept: FAMILY MEDICINE | Facility: CLINIC | Age: 70
End: 2025-03-31
Payer: MEDICARE

## 2025-03-31 VITALS
HEART RATE: 51 BPM | WEIGHT: 202.81 LBS | OXYGEN SATURATION: 97 % | SYSTOLIC BLOOD PRESSURE: 138 MMHG | DIASTOLIC BLOOD PRESSURE: 76 MMHG | HEIGHT: 70 IN | BODY MASS INDEX: 29.03 KG/M2 | TEMPERATURE: 98 F

## 2025-03-31 DIAGNOSIS — L29.9 ITCHING: ICD-10-CM

## 2025-03-31 DIAGNOSIS — N18.31 STAGE 3A CHRONIC KIDNEY DISEASE: ICD-10-CM

## 2025-03-31 DIAGNOSIS — R21 RASH: Primary | ICD-10-CM

## 2025-03-31 PROCEDURE — 3008F BODY MASS INDEX DOCD: CPT | Mod: CPTII,S$GLB,, | Performed by: NURSE PRACTITIONER

## 2025-03-31 PROCEDURE — 1159F MED LIST DOCD IN RCRD: CPT | Mod: CPTII,S$GLB,, | Performed by: NURSE PRACTITIONER

## 2025-03-31 PROCEDURE — 3078F DIAST BP <80 MM HG: CPT | Mod: CPTII,S$GLB,, | Performed by: NURSE PRACTITIONER

## 2025-03-31 PROCEDURE — 4010F ACE/ARB THERAPY RXD/TAKEN: CPT | Mod: CPTII,S$GLB,, | Performed by: NURSE PRACTITIONER

## 2025-03-31 PROCEDURE — 3075F SYST BP GE 130 - 139MM HG: CPT | Mod: CPTII,S$GLB,, | Performed by: NURSE PRACTITIONER

## 2025-03-31 PROCEDURE — 99214 OFFICE O/P EST MOD 30 MIN: CPT | Mod: S$GLB,,, | Performed by: NURSE PRACTITIONER

## 2025-03-31 PROCEDURE — 1101F PT FALLS ASSESS-DOCD LE1/YR: CPT | Mod: CPTII,S$GLB,, | Performed by: NURSE PRACTITIONER

## 2025-03-31 PROCEDURE — 1126F AMNT PAIN NOTED NONE PRSNT: CPT | Mod: CPTII,S$GLB,, | Performed by: NURSE PRACTITIONER

## 2025-03-31 PROCEDURE — 99999 PR PBB SHADOW E&M-EST. PATIENT-LVL III: CPT | Mod: PBBFAC,,, | Performed by: NURSE PRACTITIONER

## 2025-03-31 PROCEDURE — 3288F FALL RISK ASSESSMENT DOCD: CPT | Mod: CPTII,S$GLB,, | Performed by: NURSE PRACTITIONER

## 2025-03-31 RX ORDER — METHOCARBAMOL 750 MG/1
750 TABLET, FILM COATED ORAL
COMMUNITY
Start: 2024-11-11

## 2025-03-31 RX ORDER — OXYCODONE AND ACETAMINOPHEN 10; 325 MG/1; MG/1
0.5 TABLET ORAL 4 TIMES DAILY PRN
COMMUNITY
Start: 2025-02-19

## 2025-03-31 RX ORDER — HYDROXYZINE HYDROCHLORIDE 10 MG/1
20 TABLET, FILM COATED ORAL 3 TIMES DAILY PRN
Qty: 15 TABLET | Refills: 0 | Status: SHIPPED | OUTPATIENT
Start: 2025-03-31

## 2025-03-31 RX ORDER — CLOTRIMAZOLE AND BETAMETHASONE DIPROPIONATE 10; .5 MG/ML; MG/ML
LOTION TOPICAL 2 TIMES DAILY
Qty: 30 ML | Refills: 0 | Status: SHIPPED | OUTPATIENT
Start: 2025-03-31

## 2025-03-31 RX ORDER — MORPHINE SULFATE 30 MG/1
30 TABLET, FILM COATED, EXTENDED RELEASE ORAL EVERY 12 HOURS PRN
COMMUNITY
Start: 2025-02-04

## 2025-03-31 RX ORDER — TIZANIDINE 4 MG/1
TABLET ORAL
COMMUNITY
Start: 2025-03-19

## 2025-03-31 RX ORDER — METHYLPREDNISOLONE 4 MG/1
TABLET ORAL
Qty: 1 EACH | Refills: 0 | Status: SHIPPED | OUTPATIENT
Start: 2025-03-31

## 2025-03-31 NOTE — PATIENT INSTRUCTIONS
Risk of somnolence discussed with patient. Do not drive or operate machinery while taking hydroxyzine medication. Do not engage in task that require mental alertness.

## 2025-03-31 NOTE — PROGRESS NOTES
This dictation has been generated using Modal Fluency Dictation some phonetic errors may occur. Please contact author for clarification if needed.     1. Rash    2. Itching    3. Stage 3a chronic kidney disease    Other orders  -     methylPREDNISolone (MEDROL DOSEPACK) 4 mg tablet; use as directed  Dispense: 1 each; Refill: 0  -     clotrimazole-betamethasone (LOTRISONE) lotion; Apply topically 2 (two) times daily.  Dispense: 30 mL; Refill: 0  -     hydrOXYzine HCL (ATARAX) 10 MG Tab; Take 2 tablets (20 mg total) by mouth 3 (three) times daily as needed (itching).  Dispense: 15 tablet; Refill: 0       Assessment & Plan    IMPRESSION:  - Assessed skin condition as allergic reaction, likely due to irritation from suspenders or other environmental factors.  - Determined steroid treatment and antifungal medication appropriate for managing symptoms.    IRRITANT CONTACT DERMATITIS:   Assessed the patient's condition, noting worsening symptoms over the past week including itching, scratching, and blood on sheets from back.   Observed excoriation, raised bumps, and scratches on back, shoulders, and arms.   No blisters present.   Diagnosed the condition as a reaction to an irritant, possibly from suspenders shedding fibers or other sources.   Advised the patient to discontinue use of current suspenders that may be causing irritation.   Instructed the patient to avoid scratching affected areas to prevent further excoriation.   Prescribed a steroid Dosepak for inflammation.   Lotrisone for fungal coverage.    Prescribed hydroxyzine 10 mg, 1-2 tablets as needed for itching.   Mr. Cantor to discontinue use of current suspenders that may be causing irritation.   Mr. Cantor to avoid scratching affected areas to prevent further excoriation.    CONTACT DERMATITIS DUE TO PLANTS:   Observed small papules on the patient's hands.   Diagnosed the hand condition as contact dermatitis from handling clover.   Recommend wearing gloves when  handling plants to prevent future reactions.    MEDICATION ALLERGY STATUS:   Documented the patient's reported adverse reactions to certain pain medications.   Noted that the patient avoids morphine and Percocet due to adverse reactions and continues with hydrocodone.         Patient Instructions   Risk of somnolence discussed with patient. Do not drive or operate machinery while taking hydroxyzine medication. Do not engage in task that require mental alertness.     In excessive of 30 minutes total time spent for evaluation and management services. Time included elements of the following: time spent preparing to see patient, obtaining and reviewing separately obtained history, exam, evaluation, counseling and education of patient/family member or care giver, documenting in the HMR, independently interpreting results and communication of results, coordination of care ordering medications, tests, or procedures, referral and communication to other health care professionals.    I will review all results and address accordingly.   No follow-ups on file.    ________________________________________________________________  ________________________________________________________________      Chief Complaint   Patient presents with    Rash     History of present illness  History of Present Illness    CHIEF COMPLAINT:  Mr. Cantor presents today with rash and itching on back with small spots of blood on sheets    CURRENT SKIN SYMPTOMS:  He presents with severe pruritic rash affecting the back, upper arms, shoulders, and legs below the pants line. The rash is characterized by raised bumps, areas of excoriation, and seeping/leaking from affected areas. Symptoms are most intense at night, causing sleep disruption and resulting in blood on sheets for two consecutive nights. He denies fever or chills. Symptoms have worsened over the past week, with arms and back being most severely affected. He suspects his suspenders may be the  cause due to shedding fibers and has since changed them. He reports Benadryl dries him out too much. Notes fibers found on sheets and clothing may be peat donahue or suspender material. He threw away Suspenders.    CONTACT DERMATITIS:  He has developed small papules on his hands from pulling clover roots without gloves. He denies using chemicals for weed removal. He has previously used flucinidine for poison ivy reactions.    MEDICAL HISTORY:  He has a history of spinal surgery with 54-inch titanium implants placed during two separate operations. He has a history of significant reactions to poison ivy exposure.    MEDICATIONS:  He currently takes hydrocodone 40mg daily. He previously tried morphine 30mg twice daily but discontinued after two weeks due to lethargy, constipation, and nausea.    EXERCISE:  He swims [phone] meters daily for pain management.      ROS:  Gastrointestinal: +nausea, +constipation  Musculoskeletal: +back pain  Skin: +lesion, +easy or excessive bleeding, +unusual bleeding, +itching, +lumps/masses         Past Medical History:   Diagnosis Date    Dermatitis, atopic     Essential hypertension 11/19/2013    GERD (gastroesophageal reflux disease)     Hypertension        Past Surgical History:   Procedure Laterality Date    matthew      caudal     HERNIA REPAIR      WISDOM TOOTH EXTRACTION         Family History   Problem Relation Name Age of Onset    Early death Brother      Arthritis Mother      Cancer Maternal Grandmother          abdominal    Cancer Maternal Grandfather  60        lung    Liver disease Paternal Grandfather         Social History     Socioeconomic History    Marital status:    Occupational History     Employer: [employer]   Tobacco Use    Smoking status: Former    Smokeless tobacco: Never   Substance and Sexual Activity    Alcohol use: Yes     Alcohol/week: 2.0 standard drinks of alcohol     Types: 2 Glasses of wine per week    Drug use: No    Sexual activity: Yes      "Partners: Female       Current Medications[1]    Review of patient's allergies indicates:  No Known Allergies    Physical examination  Vitals Reviewed  /76   Pulse (!) 51   Temp 98.1 °F (36.7 °C) (Oral)   Ht 5' 10" (1.778 m)   Wt 92 kg (202 lb 13.2 oz)   SpO2 97%   BMI 29.10 kg/m²  Body mass index is 29.1 kg/m².     BP Readings from Last 3 Encounters:   03/31/25 138/76   02/19/25 (!) 140/80   11/19/24 (!) 152/90       Wt Readings from Last 3 Encounters:   03/31/25 92 kg (202 lb 13.2 oz)   02/19/25 87.8 kg (193 lb 7.3 oz)   11/19/24 95.5 kg (210 lb 8.6 oz)     Physical Exam    Skin: Excoriation on back. Raised bumps on shoulders. Papules on hands. Scratches on legs. Cut on leg.         This note was generated with the assistance of ambient listening technology. Verbal consent was obtained by the patient and accompanying visitor(s) for the recording of patient appointment to facilitate this note. I attest to having reviewed and edited the generated note for accuracy, though some syntax or spelling errors may persist. Please contact the author of this note for any clarification.      Call or return to clinic prn if these symptoms worsen or fail to improve as anticipated.           [1]   Current Outpatient Medications   Medication Sig Dispense Refill    ALPRAZolam (XANAX) 0.25 MG tablet Take 1 tablet (0.25 mg total) by mouth 2 (two) times daily as needed for Anxiety. 60 tablet 3    fluocinonide 0.05% (LIDEX) 0.05 % cream Apply topically 2 (two) times daily. Do not use longer than 2 weeks for 10 days 60 g 1    HYDROcodone-acetaminophen (NORCO)  mg per tablet Take 1 tablet by mouth 3 (three) times daily as needed.      lansoprazole (PREVACID) 15 MG capsule Take 2 capsules (30 mg total) by mouth once daily. 30 capsule 11    lidocaine-prilocaine (EMLA) cream       methocarbamoL (ROBAXIN) 750 MG Tab Take 750 mg by mouth.      naloxegoL (MOVANTIK) 25 mg tablet Take 1 tablet (25 mg total) by mouth once daily. " 30 tablet 2    naproxen (NAPROSYN) 250 MG tablet Take 250 mg by mouth 2 (two) times daily.      olmesartan (BENICAR) 20 MG tablet Take 1 tablet (20 mg total) by mouth once daily. 90 tablet 2    tadalafiL (CIALIS) 20 MG Tab Take 1 tablet (20 mg total) by mouth once daily. 100 tablet 0    tamsulosin (FLOMAX) 0.4 mg Cap TAKE TWO CAPSULES BY MOUTH ONCE A  capsule 4    tiZANidine (ZANAFLEX) 4 MG tablet SMARTSI Tablet(s) By Mouth Every Evening PRN      clotrimazole-betamethasone (LOTRISONE) lotion Apply topically 2 (two) times daily. 30 mL 0    hydrOXYzine HCL (ATARAX) 10 MG Tab Take 2 tablets (20 mg total) by mouth 3 (three) times daily as needed (itching). 15 tablet 0    methylPREDNISolone (MEDROL DOSEPACK) 4 mg tablet use as directed 1 each 0    morphine (MS CONTIN) 30 MG 12 hr tablet Take 30 mg by mouth every 12 (twelve) hours as needed.      oxyCODONE-acetaminophen (PERCOCET)  mg per tablet Take 0.5 tablets by mouth 4 (four) times daily as needed.       No current facility-administered medications for this visit.

## 2025-04-08 DIAGNOSIS — F41.1 GAD (GENERALIZED ANXIETY DISORDER): ICD-10-CM

## 2025-04-08 RX ORDER — ALPRAZOLAM 0.25 MG/1
0.25 TABLET ORAL 2 TIMES DAILY PRN
Qty: 60 TABLET | Refills: 0 | Status: SHIPPED | OUTPATIENT
Start: 2025-04-14 | End: 2025-05-14

## 2025-04-21 ENCOUNTER — TELEPHONE (OUTPATIENT)
Dept: DERMATOLOGY | Facility: CLINIC | Age: 70
End: 2025-04-21
Payer: MEDICARE

## 2025-04-21 NOTE — TELEPHONE ENCOUNTER
----- Message from Monae sent at 4/21/2025 12:27 PM CDT -----  Regarding: Same Day Appointment Request  Type:  Same Day Appointment RequestCaller is requesting a same day appointment.  Name of Caller:  Patient at [phone] is the first available appointment?  None Symptoms:  rash all over bodyAdditional Information:   Please call and advise. Thank you.

## 2025-04-29 ENCOUNTER — OFFICE VISIT (OUTPATIENT)
Dept: PRIMARY CARE CLINIC | Facility: CLINIC | Age: 70
End: 2025-04-29
Payer: MEDICARE

## 2025-04-29 ENCOUNTER — TELEPHONE (OUTPATIENT)
Dept: PRIMARY CARE CLINIC | Facility: CLINIC | Age: 70
End: 2025-04-29

## 2025-04-29 VITALS
WEIGHT: 202.38 LBS | BODY MASS INDEX: 28.97 KG/M2 | OXYGEN SATURATION: 97 % | HEIGHT: 70 IN | HEART RATE: 90 BPM | TEMPERATURE: 98 F | DIASTOLIC BLOOD PRESSURE: 70 MMHG | SYSTOLIC BLOOD PRESSURE: 132 MMHG

## 2025-04-29 DIAGNOSIS — F41.1 GAD (GENERALIZED ANXIETY DISORDER): ICD-10-CM

## 2025-04-29 DIAGNOSIS — L25.9 CONTACT DERMATITIS, UNSPECIFIED CONTACT DERMATITIS TYPE, UNSPECIFIED TRIGGER: Primary | ICD-10-CM

## 2025-04-29 DIAGNOSIS — I10 ESSENTIAL HYPERTENSION: ICD-10-CM

## 2025-04-29 DIAGNOSIS — N18.31 STAGE 3A CHRONIC KIDNEY DISEASE: ICD-10-CM

## 2025-04-29 DIAGNOSIS — Z98.1 S/P LUMBAR SPINAL FUSION: ICD-10-CM

## 2025-04-29 DIAGNOSIS — E66.3 OVERWEIGHT (BMI 25.0-29.9): ICD-10-CM

## 2025-04-29 PROCEDURE — 99999 PR PBB SHADOW E&M-EST. PATIENT-LVL V: CPT | Mod: PBBFAC,,, | Performed by: NURSE PRACTITIONER

## 2025-04-29 RX ORDER — METHYLPREDNISOLONE 4 MG/1
TABLET ORAL
Qty: 1 EACH | Refills: 0 | Status: SHIPPED | OUTPATIENT
Start: 2025-05-01

## 2025-04-29 RX ORDER — TRIAMCINOLONE ACETONIDE 40 MG/ML
40 INJECTION, SUSPENSION INTRA-ARTICULAR; INTRAMUSCULAR
Status: COMPLETED | OUTPATIENT
Start: 2025-04-29 | End: 2025-04-29

## 2025-04-29 RX ORDER — BETAMETHASONE SODIUM PHOSPHATE AND BETAMETHASONE ACETATE 3; 3 MG/ML; MG/ML
6 INJECTION, SUSPENSION INTRA-ARTICULAR; INTRALESIONAL; INTRAMUSCULAR; SOFT TISSUE ONCE
Status: COMPLETED | OUTPATIENT
Start: 2025-04-29 | End: 2025-04-29

## 2025-04-29 RX ORDER — HYDROXYZINE HYDROCHLORIDE 10 MG/1
20 TABLET, FILM COATED ORAL 3 TIMES DAILY PRN
Qty: 15 TABLET | Refills: 2 | Status: SHIPPED | OUTPATIENT
Start: 2025-04-29

## 2025-04-29 RX ADMIN — BETAMETHASONE SODIUM PHOSPHATE AND BETAMETHASONE ACETATE 6 MG: 3; 3 INJECTION, SUSPENSION INTRA-ARTICULAR; INTRALESIONAL; INTRAMUSCULAR; SOFT TISSUE at 11:04

## 2025-04-29 RX ADMIN — TRIAMCINOLONE ACETONIDE 40 MG: 40 INJECTION, SUSPENSION INTRA-ARTICULAR; INTRAMUSCULAR at 11:04

## 2025-04-29 NOTE — TELEPHONE ENCOUNTER
Pt calling VB nurse line. He states he was contacted by referral team regarding dermatology appt. He was offered appt in [place] which he declined. He states caller was unable to offer anything local for months. He is asking if there is any provider [facility] of [facility] you can recommend to him. Please advise.

## 2025-04-29 NOTE — PROGRESS NOTES
"Subjective:       Patient ID: Humberto Cantor is a 69 y.o. male.    Chief Complaint: Rash    Rash  Pertinent negatives include no shortness of breath.     Patient presents today for follow up. He c/o generalized pruritic rash that worse with heat over one month. Patient has been seen by multiple providers for this issue.    3/31/25 Jared Maharaj: back rash-medrol, lotrisone, and atarax    3/24/25  dermatitis due to plants: flucinonide                Past Medical History:   Diagnosis Date    Dermatitis, atopic     Essential hypertension 11/19/2013    GERD (gastroesophageal reflux disease)     Hypertension        Review of patient's allergies indicates:  No Known Allergies    Current Medications[1]    Review of Systems   Constitutional:  Negative for unexpected weight change.   HENT:  Negative for trouble swallowing.    Eyes:  Negative for visual disturbance.   Respiratory:  Negative for shortness of breath.    Cardiovascular:  Negative for chest pain, palpitations and leg swelling.   Gastrointestinal:  Negative for blood in stool.   Genitourinary:  Negative for hematuria.   Skin:  Positive for rash.   Allergic/Immunologic: Negative for immunocompromised state.   Neurological:  Negative for headaches.   Hematological:  Does not bruise/bleed easily.   Psychiatric/Behavioral:  Negative for agitation. The patient is not nervous/anxious.        Objective:      /70 (BP Location: Left arm, Patient Position: Sitting)   Pulse 90   Temp 97.7 °F (36.5 °C) (Oral)   Ht 5' 10" (1.778 m)   Wt 91.8 kg (202 lb 6.1 oz)   SpO2 97%   BMI 29.04 kg/m²   Physical Exam  Constitutional:       Appearance: He is well-developed.   Eyes:      Conjunctiva/sclera: Conjunctivae normal.      Pupils: Pupils are equal, round, and reactive to light.   Cardiovascular:      Rate and Rhythm: Normal rate and regular rhythm.      Heart sounds: Normal heart sounds.   Pulmonary:      Effort: Pulmonary effort is normal.   Musculoskeletal:    " "     General: Normal range of motion.   Skin:     Findings: Rash present. Rash is macular.   Neurological:      Mental Status: He is alert and oriented to person, place, and time.   Psychiatric:         Behavior: Behavior normal.         Thought Content: Thought content normal.         Judgment: Judgment normal.         Assessment:       1. Contact dermatitis, unspecified contact dermatitis type, unspecified trigger    2. Essential hypertension    3. TORRIE (generalized anxiety disorder)    4. Stage 3a chronic kidney disease    5. S/P lumbar spinal fusion    6. Overweight (BMI 25.0-29.9)        Plan:       Contact dermatitis, unspecified contact dermatitis type, unspecified trigger  -     hydrOXYzine HCL (ATARAX) 10 MG Tab; Take 2 tablets (20 mg total) by mouth 3 (three) times daily as needed (itching).  Dispense: 15 tablet; Refill: 2  -     Ambulatory referral/consult to Dermatology; Future; Expected date: 05/06/2025  -     methylPREDNISolone (MEDROL DOSEPACK) 4 mg tablet; use as directed  Dispense: 1 each; Refill: 0  -     triamcinolone acetonide injection 40 mg  -     betamethasone acetate-betamethasone sodium phosphate injection 6 mg    Essential hypertension  Stable, continue management  Low sodium diet  BP Readings from Last 3 Encounters:   04/29/25 132/70   03/31/25 138/76   02/19/25 (!) 140/80      TORRIE (generalized anxiety disorder)  Stable, on xanax  Stage 3a chronic kidney disease  Stable and chronic.  Will continue to monitor q3-6 months and control chronic conditions as optimally as possible to preserve function.   S/P lumbar spinal fusion  Stable, continue follow up  Overweight (BMI 25.0-29.9)  Counseled patient on his ideal body weight, health consequences of being obese and current recommendations including weekly exercise and a heart healthy diet.  Current BMI is:Estimated body mass index is 29.04 kg/m² as calculated from the following:    Height as of this encounter: 5' 10" (1.778 m).    Weight as of " this encounter: 91.8 kg (202 lb 6.1 oz)..  Patient is aware that ideal BMI < 25 or Weight in (lb) to have BMI = 25: 173.9.           Patient readiness: acceptance and barriers:none    During the course of the visit the patient was educated and counseled about the following:     Hypertension:   Dietary sodium restriction.  Regular aerobic exercise.  Obesity:   General weight loss/lifestyle modification strategies discussed (elicit support from others; identify saboteurs; non-food rewards, etc).  Regular aerobic exercise program discussed.    Goals: Hypertension: Reduce Blood Pressure and Obesity: Reduce calorie intake and BMI    Did patient meet goals/outcomes: Yes    The following self management tools provided: declined    Patient Instructions (the written plan) was given to the patient/family.     Time spent with patient: 30 minutes    Barriers to medications present (no )    Adverse reactions to current medications (no)    Over the counter medications reviewed (Yes)               [1]   Current Outpatient Medications:     ALPRAZolam (XANAX) 0.25 MG tablet, Take 1 tablet (0.25 mg total) by mouth 2 (two) times daily as needed for Anxiety., Disp: 60 tablet, Rfl: 0    clotrimazole-betamethasone (LOTRISONE) lotion, Apply topically 2 (two) times daily., Disp: 30 mL, Rfl: 0    HYDROcodone-acetaminophen (NORCO)  mg per tablet, Take 1 tablet by mouth 3 (three) times daily as needed., Disp: , Rfl:     lidocaine-prilocaine (EMLA) cream, , Disp: , Rfl:     methocarbamoL (ROBAXIN) 750 MG Tab, Take 750 mg by mouth., Disp: , Rfl:     morphine (MS CONTIN) 30 MG 12 hr tablet, Take 30 mg by mouth every 12 (twelve) hours as needed., Disp: , Rfl:     naloxegoL (MOVANTIK) 25 mg tablet, Take 1 tablet (25 mg total) by mouth once daily., Disp: 30 tablet, Rfl: 2    naproxen (NAPROSYN) 250 MG tablet, Take 250 mg by mouth 2 (two) times daily., Disp: , Rfl:     olmesartan (BENICAR) 20 MG tablet, Take 1 tablet (20 mg total) by mouth  once daily., Disp: 90 tablet, Rfl: 2    oxyCODONE-acetaminophen (PERCOCET)  mg per tablet, Take 0.5 tablets by mouth 4 (four) times daily as needed., Disp: , Rfl:     tadalafiL (CIALIS) 20 MG Tab, Take 1 tablet (20 mg total) by mouth once daily., Disp: 100 tablet, Rfl: 0    tamsulosin (FLOMAX) 0.4 mg Cap, TAKE TWO CAPSULES BY MOUTH ONCE A DAY, Disp: 180 capsule, Rfl: 4    tiZANidine (ZANAFLEX) 4 MG tablet, SMARTSI Tablet(s) By Mouth Every Evening PRN, Disp: , Rfl:     fluocinonide 0.05% (LIDEX) 0.05 % cream, Apply topically 2 (two) times daily. Do not use longer than 2 weeks for 10 days, Disp: 60 g, Rfl: 1    hydrOXYzine HCL (ATARAX) 10 MG Tab, Take 2 tablets (20 mg total) by mouth 3 (three) times daily as needed (itching)., Disp: 15 tablet, Rfl: 2    lansoprazole (PREVACID) 15 MG capsule, Take 2 capsules (30 mg total) by mouth once daily., Disp: 30 capsule, Rfl: 11    [START ON 2025] methylPREDNISolone (MEDROL DOSEPACK) 4 mg tablet, use as directed, Disp: 1 each, Rfl: 0  No current facility-administered medications for this visit.

## 2025-04-30 NOTE — TELEPHONE ENCOUNTER
Please inform patient that I have placed an external Derm referral to Dr.Eric Phillips -please fax referral and inform  patient to call 's office to schedule

## 2025-04-30 NOTE — TELEPHONE ENCOUNTER
LVM on personalized VM regarding referral sent to Dr. Phillips. I have advised pt to call back with any questions or concerns

## 2025-05-19 ENCOUNTER — PATIENT MESSAGE (OUTPATIENT)
Dept: FAMILY MEDICINE | Facility: CLINIC | Age: 70
End: 2025-05-19
Payer: MEDICARE

## 2025-07-10 DIAGNOSIS — K59.02 CONSTIPATION BY OUTLET OBSTRUCTION: ICD-10-CM

## 2025-07-10 RX ORDER — NALOXEGOL OXALATE 25 MG/1
25 TABLET, FILM COATED ORAL
Qty: 30 TABLET | Refills: 2 | Status: SHIPPED | OUTPATIENT
Start: 2025-07-10

## 2025-07-21 ENCOUNTER — TELEPHONE (OUTPATIENT)
Dept: ORTHOPEDICS | Facility: CLINIC | Age: 70
End: 2025-07-21
Payer: MEDICARE

## 2025-07-21 NOTE — TELEPHONE ENCOUNTER
----- Message from Med Assistant Morales sent at 7/21/2025  3:30 PM CDT -----  Contact: pt  Pt request elbow injection with Bermudez   Last seen 11/24   kate Patel   refused  Call back

## 2025-07-28 ENCOUNTER — OFFICE VISIT (OUTPATIENT)
Dept: ORTHOPEDICS | Facility: CLINIC | Age: 70
End: 2025-07-28
Payer: MEDICARE

## 2025-07-28 VITALS — HEIGHT: 70 IN | BODY MASS INDEX: 28.97 KG/M2 | WEIGHT: 202.38 LBS

## 2025-07-28 DIAGNOSIS — M25.522 LEFT ELBOW PAIN: Primary | ICD-10-CM

## 2025-07-28 DIAGNOSIS — M77.02 MEDIAL EPICONDYLITIS OF ELBOW, LEFT: ICD-10-CM

## 2025-07-28 PROCEDURE — 1159F MED LIST DOCD IN RCRD: CPT | Mod: CPTII,S$GLB,, | Performed by: ORTHOPAEDIC SURGERY

## 2025-07-28 PROCEDURE — 1160F RVW MEDS BY RX/DR IN RCRD: CPT | Mod: CPTII,S$GLB,, | Performed by: ORTHOPAEDIC SURGERY

## 2025-07-28 PROCEDURE — 4010F ACE/ARB THERAPY RXD/TAKEN: CPT | Mod: CPTII,S$GLB,, | Performed by: ORTHOPAEDIC SURGERY

## 2025-07-28 PROCEDURE — 99214 OFFICE O/P EST MOD 30 MIN: CPT | Mod: S$GLB,,, | Performed by: ORTHOPAEDIC SURGERY

## 2025-07-28 PROCEDURE — 3008F BODY MASS INDEX DOCD: CPT | Mod: CPTII,S$GLB,, | Performed by: ORTHOPAEDIC SURGERY

## 2025-07-28 PROCEDURE — 1125F AMNT PAIN NOTED PAIN PRSNT: CPT | Mod: CPTII,S$GLB,, | Performed by: ORTHOPAEDIC SURGERY

## 2025-07-28 PROCEDURE — 99999 PR PBB SHADOW E&M-EST. PATIENT-LVL II: CPT | Mod: PBBFAC,,, | Performed by: ORTHOPAEDIC SURGERY

## 2025-07-28 NOTE — PROGRESS NOTES
Past Medical History:   Diagnosis Date    Dermatitis, atopic     Essential hypertension 2013    GERD (gastroesophageal reflux disease)     Hypertension        Past Surgical History:   Procedure Laterality Date    matthew      caudal     HERNIA REPAIR      WISDOM TOOTH EXTRACTION         Current Outpatient Medications   Medication Sig    ALPRAZolam (XANAX) 0.25 MG tablet Take 1 tablet (0.25 mg total) by mouth 2 (two) times daily as needed for Anxiety.    clotrimazole-betamethasone (LOTRISONE) lotion Apply topically 2 (two) times daily.    fluocinonide 0.05% (LIDEX) 0.05 % cream Apply topically 2 (two) times daily. Do not use longer than 2 weeks for 10 days    HYDROcodone-acetaminophen (NORCO)  mg per tablet Take 1 tablet by mouth 3 (three) times daily as needed.    hydrOXYzine HCL (ATARAX) 10 MG Tab Take 2 tablets (20 mg total) by mouth 3 (three) times daily as needed (itching).    lansoprazole (PREVACID) 15 MG capsule Take 2 capsules (30 mg total) by mouth once daily.    lidocaine-prilocaine (EMLA) cream     methocarbamoL (ROBAXIN) 750 MG Tab Take 750 mg by mouth.    methylPREDNISolone (MEDROL DOSEPACK) 4 mg tablet use as directed    morphine (MS CONTIN) 30 MG 12 hr tablet Take 30 mg by mouth every 12 (twelve) hours as needed.    MOVANTIK 25 mg tablet TAKE ONE TABLET BY MOUTH ONCE DAILY    naproxen (NAPROSYN) 250 MG tablet Take 250 mg by mouth 2 (two) times daily.    olmesartan (BENICAR) 20 MG tablet Take 1 tablet (20 mg total) by mouth once daily.    oxyCODONE-acetaminophen (PERCOCET)  mg per tablet Take 0.5 tablets by mouth 4 (four) times daily as needed.    tadalafiL (CIALIS) 20 MG Tab Take 1 tablet (20 mg total) by mouth once daily.    tamsulosin (FLOMAX) 0.4 mg Cap TAKE TWO CAPSULES BY MOUTH ONCE A DAY    tiZANidine (ZANAFLEX) 4 MG tablet SMARTSI Tablet(s) By Mouth Every Evening PRN     No current facility-administered medications for this visit.       Review of patient's allergies  indicates:  No Known Allergies    Family History   Problem Relation Name Age of Onset    Early death Brother      Arthritis Mother      Cancer Maternal Grandmother          abdominal    Cancer Maternal Grandfather  60        lung    Liver disease Paternal Grandfather         Social History     Socioeconomic History    Marital status:    Occupational History     Employer: [employer]   Tobacco Use    Smoking status: Former    Smokeless tobacco: Never   Substance and Sexual Activity    Alcohol use: Yes     Alcohol/week: 2.0 standard drinks of alcohol     Types: 2 Glasses of wine per week    Drug use: No    Sexual activity: Yes     Partners: Female       Chief Complaint:   No chief complaint on file.      History of present illness:  This is a 69-year-old right-hand-dominant male seen for left elbow pain.  Saw him a few years ago for right medial epicondylitis.  Resolved with some anti-inflammatories and an injection.  Left elbow is very similar.  Pain along the medial aspect.  Started a few months ago.  Patient has swims a lot.  He has been trying a tennis elbow strap and recently got a wrist brace as well.  We gave him a shot for medial epicondylitis back in November.      Review of Systems:  Musculoskeletal:  See HPI          Physical Examination:    Vital Signs:    There were no vitals filed for this visit.      There is no height or weight on file to calculate BMI.    This a well-developed, well nourished patient in no acute distress.  They are alert and oriented and cooperative to examination.  Pt. walks without an antalgic gait.      Examination of the left elbow shows no signs of rashes or erythema. The patient has no masses, ecchymosis, or effusion. The patient has full range of motion from 0-160°. Patient has full pronation and supination. Patient is moderately tender along the medial epicondyle and nontender over the lateral epicondyle. . Negative Tinel's sign over the cubital tunnel. 2+ radial  pulse. Intact light touch sensation.     X-rays:  X-rays of the left elbow is reviewed which shows mild degenerative change     Assessment::  Left medial epicondylitis    Plan:  I reviewed the findings with him today.   Agreed to do an injection since it helped him previously on the other side.  Continue the brace as needed.    This note was created using M Modal voice recognition software that occasionally misinterpreted phrases or words.    Consult note is delivered via Epic messaging service.

## 2025-07-31 DIAGNOSIS — F41.1 GAD (GENERALIZED ANXIETY DISORDER): ICD-10-CM

## 2025-08-01 RX ORDER — ALPRAZOLAM 0.25 MG/1
0.25 TABLET ORAL 2 TIMES DAILY
Qty: 60 TABLET | Refills: 2 | Status: SHIPPED | OUTPATIENT
Start: 2025-08-11 | End: 2025-09-10

## 2025-08-15 ENCOUNTER — LAB VISIT (OUTPATIENT)
Dept: LAB | Facility: HOSPITAL | Age: 70
End: 2025-08-15
Attending: FAMILY MEDICINE
Payer: MEDICARE

## 2025-08-15 DIAGNOSIS — Z12.5 PROSTATE CANCER SCREENING: ICD-10-CM

## 2025-08-15 DIAGNOSIS — G54.1 NEUROPATHY, SACRAL PLEXUS: ICD-10-CM

## 2025-08-15 LAB
ERYTHROCYTE [DISTWIDTH] IN BLOOD BY AUTOMATED COUNT: 12.3 % (ref 11.5–14.5)
HCT VFR BLD AUTO: 41.3 % (ref 40–54)
HGB BLD-MCNC: 13.5 GM/DL (ref 14–18)
MCH RBC QN AUTO: 34.2 PG (ref 27–31)
MCHC RBC AUTO-ENTMCNC: 32.7 G/DL (ref 32–36)
MCV RBC AUTO: 105 FL (ref 82–98)
PLATELET # BLD AUTO: 270 K/UL (ref 150–450)
PMV BLD AUTO: 10 FL (ref 9.2–12.9)
PSA SERPL-MCNC: 1.13 NG/ML
RBC # BLD AUTO: 3.95 M/UL (ref 4.6–6.2)
WBC # BLD AUTO: 4.16 K/UL (ref 3.9–12.7)

## 2025-08-15 PROCEDURE — 84153 ASSAY OF PSA TOTAL: CPT

## 2025-08-15 PROCEDURE — 85027 COMPLETE CBC AUTOMATED: CPT

## 2025-08-15 PROCEDURE — 36415 COLL VENOUS BLD VENIPUNCTURE: CPT | Mod: PO

## 2025-08-20 ENCOUNTER — OFFICE VISIT (OUTPATIENT)
Dept: PRIMARY CARE CLINIC | Facility: CLINIC | Age: 70
End: 2025-08-20
Payer: MEDICARE

## 2025-08-20 VITALS
OXYGEN SATURATION: 97 % | TEMPERATURE: 98 F | WEIGHT: 201.75 LBS | DIASTOLIC BLOOD PRESSURE: 80 MMHG | HEART RATE: 67 BPM | BODY MASS INDEX: 28.94 KG/M2 | SYSTOLIC BLOOD PRESSURE: 138 MMHG

## 2025-08-20 DIAGNOSIS — K21.9 GASTROESOPHAGEAL REFLUX DISEASE WITHOUT ESOPHAGITIS: ICD-10-CM

## 2025-08-20 DIAGNOSIS — N18.31 STAGE 3A CHRONIC KIDNEY DISEASE: ICD-10-CM

## 2025-08-20 DIAGNOSIS — Z98.1 S/P LUMBAR SPINAL FUSION: ICD-10-CM

## 2025-08-20 DIAGNOSIS — I10 ESSENTIAL HYPERTENSION: Primary | ICD-10-CM

## 2025-08-20 DIAGNOSIS — F41.1 GAD (GENERALIZED ANXIETY DISORDER): ICD-10-CM

## 2025-08-20 DIAGNOSIS — L25.5 DERMATITIS DUE TO PLANTS: ICD-10-CM

## 2025-08-20 DIAGNOSIS — R01.1 CARDIAC MURMUR: ICD-10-CM

## 2025-08-20 DIAGNOSIS — E66.3 OVERWEIGHT (BMI 25.0-29.9): ICD-10-CM

## 2025-08-20 DIAGNOSIS — R79.9 ABNORMAL FINDING OF BLOOD CHEMISTRY, UNSPECIFIED: ICD-10-CM

## 2025-08-20 PROCEDURE — 3288F FALL RISK ASSESSMENT DOCD: CPT | Mod: CPTII,S$GLB,, | Performed by: NURSE PRACTITIONER

## 2025-08-20 PROCEDURE — 3008F BODY MASS INDEX DOCD: CPT | Mod: CPTII,S$GLB,, | Performed by: NURSE PRACTITIONER

## 2025-08-20 PROCEDURE — 3061F NEG MICROALBUMINURIA REV: CPT | Mod: CPTII,S$GLB,, | Performed by: NURSE PRACTITIONER

## 2025-08-20 PROCEDURE — 93010 ELECTROCARDIOGRAM REPORT: CPT | Mod: S$GLB,,, | Performed by: INTERNAL MEDICINE

## 2025-08-20 PROCEDURE — 3075F SYST BP GE 130 - 139MM HG: CPT | Mod: CPTII,S$GLB,, | Performed by: NURSE PRACTITIONER

## 2025-08-20 PROCEDURE — 1159F MED LIST DOCD IN RCRD: CPT | Mod: CPTII,S$GLB,, | Performed by: NURSE PRACTITIONER

## 2025-08-20 PROCEDURE — 99999 PR PBB SHADOW E&M-EST. PATIENT-LVL IV: CPT | Mod: PBBFAC,,, | Performed by: NURSE PRACTITIONER

## 2025-08-20 PROCEDURE — 93005 ELECTROCARDIOGRAM TRACING: CPT | Mod: S$GLB,,, | Performed by: NURSE PRACTITIONER

## 2025-08-20 PROCEDURE — 4010F ACE/ARB THERAPY RXD/TAKEN: CPT | Mod: CPTII,S$GLB,, | Performed by: NURSE PRACTITIONER

## 2025-08-20 PROCEDURE — 1101F PT FALLS ASSESS-DOCD LE1/YR: CPT | Mod: CPTII,S$GLB,, | Performed by: NURSE PRACTITIONER

## 2025-08-20 PROCEDURE — 99214 OFFICE O/P EST MOD 30 MIN: CPT | Mod: S$GLB,,, | Performed by: NURSE PRACTITIONER

## 2025-08-20 PROCEDURE — 1160F RVW MEDS BY RX/DR IN RCRD: CPT | Mod: CPTII,S$GLB,, | Performed by: NURSE PRACTITIONER

## 2025-08-20 PROCEDURE — 3079F DIAST BP 80-89 MM HG: CPT | Mod: CPTII,S$GLB,, | Performed by: NURSE PRACTITIONER

## 2025-08-20 PROCEDURE — 1125F AMNT PAIN NOTED PAIN PRSNT: CPT | Mod: CPTII,S$GLB,, | Performed by: NURSE PRACTITIONER

## 2025-08-20 PROCEDURE — 3066F NEPHROPATHY DOC TX: CPT | Mod: CPTII,S$GLB,, | Performed by: NURSE PRACTITIONER

## 2025-08-20 RX ORDER — FLUOCINONIDE 0.5 MG/G
CREAM TOPICAL 2 TIMES DAILY
Qty: 60 G | Refills: 1 | Status: SHIPPED | OUTPATIENT
Start: 2025-08-20 | End: 2025-08-30

## 2025-08-20 RX ORDER — OLMESARTAN MEDOXOMIL 20 MG/1
20 TABLET ORAL DAILY
Qty: 90 TABLET | Refills: 3 | Status: SHIPPED | OUTPATIENT
Start: 2025-08-20 | End: 2025-08-20 | Stop reason: SDUPTHER

## 2025-08-20 RX ORDER — OLMESARTAN MEDOXOMIL 20 MG/1
20 TABLET ORAL DAILY
Qty: 90 TABLET | Refills: 3 | Status: SHIPPED | OUTPATIENT
Start: 2025-08-20 | End: 2026-08-20

## 2025-08-20 RX ORDER — HYDROXYZINE HYDROCHLORIDE 10 MG/1
20 TABLET, FILM COATED ORAL 3 TIMES DAILY PRN
Qty: 15 TABLET | Refills: 2 | Status: SHIPPED | OUTPATIENT
Start: 2025-08-20

## 2025-08-21 LAB
OHS QRS DURATION: 82 MS
OHS QTC CALCULATION: 424 MS

## 2025-08-22 DIAGNOSIS — M25.551 RIGHT HIP PAIN: Primary | ICD-10-CM

## 2025-08-25 ENCOUNTER — HOSPITAL ENCOUNTER (OUTPATIENT)
Dept: RADIOLOGY | Facility: HOSPITAL | Age: 70
Discharge: HOME | End: 2025-08-25
Payer: MEDICARE

## 2025-08-25 ENCOUNTER — OFFICE VISIT (OUTPATIENT)
Dept: ORTHOPEDICS | Facility: CLINIC | Age: 70
End: 2025-08-25
Payer: MEDICARE

## 2025-08-25 VITALS — HEIGHT: 70 IN | RESPIRATION RATE: 16 BRPM | BODY MASS INDEX: 28.88 KG/M2 | WEIGHT: 201.75 LBS

## 2025-08-25 DIAGNOSIS — M25.551 RIGHT HIP PAIN: ICD-10-CM

## 2025-08-25 DIAGNOSIS — M70.61 GREATER TROCHANTERIC BURSITIS, RIGHT: Primary | ICD-10-CM

## 2025-08-25 PROCEDURE — 73502 X-RAY EXAM HIP UNI 2-3 VIEWS: CPT | Mod: 26,RT,, | Performed by: RADIOLOGY

## 2025-08-25 PROCEDURE — 99999 PR PBB SHADOW E&M-EST. PATIENT-LVL III: CPT | Mod: PBBFAC,,,

## 2025-08-25 PROCEDURE — 73502 X-RAY EXAM HIP UNI 2-3 VIEWS: CPT | Mod: TC,PO,RT

## 2025-08-25 RX ORDER — TRIAMCINOLONE ACETONIDE 40 MG/ML
40 INJECTION, SUSPENSION INTRA-ARTICULAR; INTRAMUSCULAR
Status: DISCONTINUED | OUTPATIENT
Start: 2025-08-25 | End: 2025-08-25 | Stop reason: HOSPADM

## 2025-08-25 RX ADMIN — TRIAMCINOLONE ACETONIDE 40 MG: 40 INJECTION, SUSPENSION INTRA-ARTICULAR; INTRAMUSCULAR at 11:08

## (undated) DEVICE — SEE MEDLINE ITEM 146417

## (undated) DEVICE — CLOSURE SKIN 1X5 STERI-STRIP

## (undated) DEVICE — DRESSING MEPILEX BORDER 4 X 4

## (undated) DEVICE — ELECTRODE BLD 1 INCH TEFLON

## (undated) DEVICE — SEALER AQUAMANTYS 2.3 BIPOLAR

## (undated) DEVICE — KIT SURGIFLO EVITHROM

## (undated) DEVICE — SEE MEDLINE ITEM 156905

## (undated) DEVICE — SPONGE GAUZE 16PLY 4X4

## (undated) DEVICE — KIT IRR SUCTION HND PIECE

## (undated) DEVICE — DRESSING AQUACEL FOAM 5 X 5

## (undated) DEVICE — DRESSING MEPORE ADH 3.5X12

## (undated) DEVICE — TRAY CATH UM FOLEY SIL W 16FR

## (undated) DEVICE — SUT MONOCRYL 3-0 PS-2 UND

## (undated) DEVICE — DRESSING TRANS 4X4 TEGADERM

## (undated) DEVICE — BLADE 4IN EDGE INSULATED

## (undated) DEVICE — SUT 2-0 SILK 30IN BLK BRAID

## (undated) DEVICE — NDL SPINAL 18GX3.5 SPINOCAN

## (undated) DEVICE — DRAPE INCISE IOBAN 2 23X17IN

## (undated) DEVICE — GAUZE SPONGE 4X4 12PLY

## (undated) DEVICE — SEE MEDLINE ITEM 157150

## (undated) DEVICE — MARKER SKIN STND TIP BLUE BARR

## (undated) DEVICE — COVER BACK TABLE 72X21

## (undated) DEVICE — SPONGE LAP 4X18 PREWASHED

## (undated) DEVICE — DRAPE STERI-DRAPE 1000 17X11IN

## (undated) DEVICE — TAP 5MM SPINAL POWER

## (undated) DEVICE — CORD BIPOLAR 12 FOOT

## (undated) DEVICE — SUT VICRYL+ 1 CT1 18IN

## (undated) DEVICE — KIT EVACUATOR 3-SPRING 1/8 DRN

## (undated) DEVICE — DRAPE ABDOMINAL TIBURON 14X11

## (undated) DEVICE — TUBE FRAZIER 5MM 2FT SOFT TIP

## (undated) DEVICE — CARTRIDGE OIL

## (undated) DEVICE — APPLICATOR CHLORAPREP ORN 26ML

## (undated) DEVICE — SUT VICRYL PLUS 2-0 CT1 18

## (undated) DEVICE — DIFFUSER

## (undated) DEVICE — SUTURE STRATAFIX PGA PCL 3-0

## (undated) DEVICE — DRAPE STERI INSTRUMENT 1018

## (undated) DEVICE — SEE MEDLINE ITEM 146347

## (undated) DEVICE — SYS PRINEO SKIN CLOSURE

## (undated) DEVICE — SYS CLSR DERMABOND PRINEO 22CM

## (undated) DEVICE — FRAZIER 18FR

## (undated) DEVICE — DRESSING TEGADERM CHG 4X4.5

## (undated) DEVICE — DRAPE C-ARMOR EQUIPMENT COVER

## (undated) DEVICE — DRESSING SURGICAL 3/4X3/4

## (undated) DEVICE — DRAPE C ARM 42 X 120 10/BX

## (undated) DEVICE — KIT SPINAL PATIENT CARE JACK

## (undated) DEVICE — DRESSING AQUACEL FOAM 4 X 12

## (undated) DEVICE — BUR BONE CUT MICRO TPS 3X3.8MM

## (undated) DEVICE — ELECTRODE REM PLYHSV RETURN 9

## (undated) DEVICE — DRESSING AQUACEL FOAM 3 X 3

## (undated) DEVICE — DRESSING AQUACEL SACRAL 9 X 9

## (undated) DEVICE — DRESSING ABSRBNT ISLAND 3.6X8

## (undated) DEVICE — NDL 20GX1-1/2IN IB

## (undated) DEVICE — TAP 6MM SPINAL POWER

## (undated) DEVICE — ELECTRODE PENCIL W/ROCKER NDL

## (undated) DEVICE — STAPLER SKIN PROXIMATE WIDE

## (undated) DEVICE — SEE MEDLINE ITEM 157131

## (undated) DEVICE — WARMER DRAPE STERILE LF